# Patient Record
Sex: FEMALE | Race: WHITE | NOT HISPANIC OR LATINO | Employment: OTHER | URBAN - METROPOLITAN AREA
[De-identification: names, ages, dates, MRNs, and addresses within clinical notes are randomized per-mention and may not be internally consistent; named-entity substitution may affect disease eponyms.]

---

## 2017-01-18 ENCOUNTER — ALLSCRIPTS OFFICE VISIT (OUTPATIENT)
Dept: OTHER | Facility: OTHER | Age: 67
End: 2017-01-18

## 2017-01-19 ENCOUNTER — HOSPITAL ENCOUNTER (OUTPATIENT)
Dept: NON INVASIVE DIAGNOSTICS | Facility: CLINIC | Age: 67
Discharge: HOME | End: 2017-01-19
Payer: MEDICARE

## 2017-01-19 DIAGNOSIS — I77.1 STRICTURE OF ARTERY (HCC): ICD-10-CM

## 2017-01-19 PROCEDURE — 93880 EXTRACRANIAL BILAT STUDY: CPT

## 2017-01-20 ENCOUNTER — GENERIC CONVERSION - ENCOUNTER (OUTPATIENT)
Dept: OTHER | Facility: OTHER | Age: 67
End: 2017-01-20

## 2017-03-08 ENCOUNTER — HOSPITAL ENCOUNTER (OUTPATIENT)
Dept: RADIOLOGY | Facility: HOSPITAL | Age: 67
Discharge: HOME | End: 2017-03-08
Payer: COMMERCIAL

## 2017-03-08 DIAGNOSIS — Z87.891 PERSONAL HISTORY OF NICOTINE DEPENDENCE: ICD-10-CM

## 2017-03-12 ENCOUNTER — GENERIC CONVERSION - ENCOUNTER (OUTPATIENT)
Dept: OTHER | Facility: OTHER | Age: 67
End: 2017-03-12

## 2017-03-12 DIAGNOSIS — R91.8 OTHER NONSPECIFIC ABNORMAL FINDING OF LUNG FIELD: ICD-10-CM

## 2017-03-23 ENCOUNTER — ALLSCRIPTS OFFICE VISIT (OUTPATIENT)
Dept: OTHER | Facility: OTHER | Age: 67
End: 2017-03-23

## 2017-05-19 DIAGNOSIS — Z12.31 ENCOUNTER FOR SCREENING MAMMOGRAM FOR MALIGNANT NEOPLASM OF BREAST: ICD-10-CM

## 2017-06-08 ENCOUNTER — TRANSCRIBE ORDERS (OUTPATIENT)
Dept: LAB | Facility: HOSPITAL | Age: 67
End: 2017-06-08

## 2017-06-08 ENCOUNTER — APPOINTMENT (OUTPATIENT)
Dept: LAB | Facility: HOSPITAL | Age: 67
End: 2017-06-08
Payer: MEDICARE

## 2017-06-08 DIAGNOSIS — E03.9 HYPOTHYROIDISM: ICD-10-CM

## 2017-06-08 DIAGNOSIS — I10 ESSENTIAL (PRIMARY) HYPERTENSION: ICD-10-CM

## 2017-06-08 DIAGNOSIS — E78.5 HYPERLIPIDEMIA: ICD-10-CM

## 2017-06-08 LAB
ALBUMIN SERPL BCP-MCNC: 3.2 G/DL (ref 3.5–5)
ALP SERPL-CCNC: 67 U/L (ref 46–116)
ALT SERPL W P-5'-P-CCNC: 17 U/L (ref 12–78)
ANION GAP SERPL CALCULATED.3IONS-SCNC: 5 MMOL/L (ref 4–13)
AST SERPL W P-5'-P-CCNC: 16 U/L (ref 5–45)
BASOPHILS # BLD AUTO: 0.02 THOUSANDS/ΜL (ref 0–0.1)
BASOPHILS NFR BLD AUTO: 0 % (ref 0–1)
BILIRUB SERPL-MCNC: 0.56 MG/DL (ref 0.2–1)
BUN SERPL-MCNC: 17 MG/DL (ref 5–25)
CALCIUM SERPL-MCNC: 9 MG/DL (ref 8.3–10.1)
CHLORIDE SERPL-SCNC: 110 MMOL/L (ref 100–108)
CHOLEST SERPL-MCNC: 179 MG/DL (ref 50–200)
CO2 SERPL-SCNC: 28 MMOL/L (ref 21–32)
CREAT SERPL-MCNC: 0.82 MG/DL (ref 0.6–1.3)
EOSINOPHIL # BLD AUTO: 0.15 THOUSAND/ΜL (ref 0–0.61)
EOSINOPHIL NFR BLD AUTO: 3 % (ref 0–6)
ERYTHROCYTE [DISTWIDTH] IN BLOOD BY AUTOMATED COUNT: 13.4 % (ref 11.6–15.1)
GFR SERPL CREATININE-BSD FRML MDRD: >60 ML/MIN/1.73SQ M
GLUCOSE P FAST SERPL-MCNC: 79 MG/DL (ref 65–99)
HCT VFR BLD AUTO: 39.1 % (ref 34.8–46.1)
HDLC SERPL-MCNC: 63 MG/DL (ref 40–60)
HGB BLD-MCNC: 12.9 G/DL (ref 11.5–15.4)
LDLC SERPL CALC-MCNC: 103 MG/DL (ref 0–100)
LYMPHOCYTES # BLD AUTO: 1.78 THOUSANDS/ΜL (ref 0.6–4.47)
LYMPHOCYTES NFR BLD AUTO: 34 % (ref 14–44)
MCH RBC QN AUTO: 32.8 PG (ref 26.8–34.3)
MCHC RBC AUTO-ENTMCNC: 33 G/DL (ref 31.4–37.4)
MCV RBC AUTO: 100 FL (ref 82–98)
MONOCYTES # BLD AUTO: 0.38 THOUSAND/ΜL (ref 0.17–1.22)
MONOCYTES NFR BLD AUTO: 7 % (ref 4–12)
NEUTROPHILS # BLD AUTO: 2.98 THOUSANDS/ΜL (ref 1.85–7.62)
NEUTS SEG NFR BLD AUTO: 56 % (ref 43–75)
NRBC BLD AUTO-RTO: 0 /100 WBCS
PLATELET # BLD AUTO: 342 THOUSANDS/UL (ref 149–390)
PMV BLD AUTO: 9.4 FL (ref 8.9–12.7)
POTASSIUM SERPL-SCNC: 3.9 MMOL/L (ref 3.5–5.3)
PROT SERPL-MCNC: 6.8 G/DL (ref 6.4–8.2)
RBC # BLD AUTO: 3.93 MILLION/UL (ref 3.81–5.12)
SODIUM SERPL-SCNC: 143 MMOL/L (ref 136–145)
TRIGL SERPL-MCNC: 66 MG/DL
TSH SERPL DL<=0.05 MIU/L-ACNC: 1.97 UIU/ML (ref 0.36–3.74)
WBC # BLD AUTO: 5.32 THOUSAND/UL (ref 4.31–10.16)

## 2017-06-08 PROCEDURE — 84443 ASSAY THYROID STIM HORMONE: CPT

## 2017-06-08 PROCEDURE — 80061 LIPID PANEL: CPT

## 2017-06-08 PROCEDURE — 80053 COMPREHEN METABOLIC PANEL: CPT

## 2017-06-08 PROCEDURE — 85025 COMPLETE CBC W/AUTO DIFF WBC: CPT

## 2017-06-08 PROCEDURE — 36415 COLL VENOUS BLD VENIPUNCTURE: CPT

## 2017-06-14 ENCOUNTER — ALLSCRIPTS OFFICE VISIT (OUTPATIENT)
Dept: OTHER | Facility: OTHER | Age: 67
End: 2017-06-14

## 2017-07-09 ENCOUNTER — APPOINTMENT (OUTPATIENT)
Dept: RADIOLOGY | Age: 67
End: 2017-07-09
Attending: FAMILY MEDICINE
Payer: MEDICARE

## 2017-07-09 ENCOUNTER — APPOINTMENT (OUTPATIENT)
Dept: LAB | Age: 67
End: 2017-07-09
Attending: FAMILY MEDICINE
Payer: MEDICARE

## 2017-07-09 ENCOUNTER — OFFICE VISIT (OUTPATIENT)
Dept: URGENT CARE | Age: 67
End: 2017-07-09
Payer: MEDICARE

## 2017-07-09 ENCOUNTER — TRANSCRIBE ORDERS (OUTPATIENT)
Dept: URGENT CARE | Age: 67
End: 2017-07-09

## 2017-07-09 DIAGNOSIS — M25.522 PAIN IN LEFT ELBOW: ICD-10-CM

## 2017-07-09 DIAGNOSIS — R42 DIZZINESS: ICD-10-CM

## 2017-07-09 DIAGNOSIS — R42 DIZZINESS: Primary | ICD-10-CM

## 2017-07-09 LAB — GLUCOSE SERPL-MCNC: 97 MG/DL (ref 65–140)

## 2017-07-09 PROCEDURE — 93005 ELECTROCARDIOGRAM TRACING: CPT

## 2017-07-09 PROCEDURE — G0463 HOSPITAL OUTPT CLINIC VISIT: HCPCS | Performed by: FAMILY MEDICINE

## 2017-07-09 PROCEDURE — 82948 REAGENT STRIP/BLOOD GLUCOSE: CPT

## 2017-07-09 PROCEDURE — 73080 X-RAY EXAM OF ELBOW: CPT

## 2017-07-09 PROCEDURE — 99203 OFFICE O/P NEW LOW 30 MIN: CPT | Performed by: FAMILY MEDICINE

## 2017-07-10 LAB
ATRIAL RATE: 55 BPM
P AXIS: 69 DEGREES
PR INTERVAL: 148 MS
QRS AXIS: 33 DEGREES
QRSD INTERVAL: 74 MS
QT INTERVAL: 428 MS
QTC INTERVAL: 409 MS
T WAVE AXIS: 82 DEGREES
VENTRICULAR RATE: 55 BPM

## 2017-07-19 ENCOUNTER — ALLSCRIPTS OFFICE VISIT (OUTPATIENT)
Dept: OTHER | Facility: OTHER | Age: 67
End: 2017-07-19

## 2017-07-20 ENCOUNTER — GENERIC CONVERSION - ENCOUNTER (OUTPATIENT)
Dept: OTHER | Facility: OTHER | Age: 67
End: 2017-07-20

## 2017-07-20 ENCOUNTER — HOSPITAL ENCOUNTER (OUTPATIENT)
Dept: RADIOLOGY | Age: 67
Discharge: HOME | End: 2017-07-20
Payer: MEDICARE

## 2017-07-20 PROCEDURE — 77080 DXA BONE DENSITY AXIAL: CPT

## 2017-07-22 ENCOUNTER — ALLSCRIPTS OFFICE VISIT (OUTPATIENT)
Dept: OTHER | Facility: OTHER | Age: 67
End: 2017-07-22

## 2017-09-21 ENCOUNTER — GENERIC CONVERSION - ENCOUNTER (OUTPATIENT)
Dept: OTHER | Facility: OTHER | Age: 67
End: 2017-09-21

## 2017-09-21 ENCOUNTER — HOSPITAL ENCOUNTER (OUTPATIENT)
Dept: RADIOLOGY | Age: 67
Discharge: HOME | End: 2017-09-21
Payer: MEDICARE

## 2017-09-21 DIAGNOSIS — Z12.31 ENCOUNTER FOR SCREENING MAMMOGRAM FOR MALIGNANT NEOPLASM OF BREAST: ICD-10-CM

## 2017-09-21 PROCEDURE — G0202 SCR MAMMO BI INCL CAD: HCPCS

## 2017-11-06 ENCOUNTER — GENERIC CONVERSION - ENCOUNTER (OUTPATIENT)
Dept: OTHER | Facility: OTHER | Age: 67
End: 2017-11-06

## 2017-12-05 DIAGNOSIS — I77.1 STRICTURE OF ARTERY (HCC): ICD-10-CM

## 2017-12-05 DIAGNOSIS — E55.9 VITAMIN D DEFICIENCY: ICD-10-CM

## 2017-12-05 DIAGNOSIS — I10 ESSENTIAL (PRIMARY) HYPERTENSION: ICD-10-CM

## 2017-12-05 DIAGNOSIS — R01.1 CARDIAC MURMUR: ICD-10-CM

## 2017-12-05 DIAGNOSIS — E78.5 HYPERLIPIDEMIA: ICD-10-CM

## 2017-12-05 DIAGNOSIS — M81.0 AGE-RELATED OSTEOPOROSIS WITHOUT CURRENT PATHOLOGICAL FRACTURE: ICD-10-CM

## 2017-12-05 DIAGNOSIS — R91.8 OTHER NONSPECIFIC ABNORMAL FINDING OF LUNG FIELD (CODE): ICD-10-CM

## 2017-12-05 DIAGNOSIS — E03.9 HYPOTHYROIDISM: ICD-10-CM

## 2017-12-11 ENCOUNTER — APPOINTMENT (OUTPATIENT)
Dept: LAB | Facility: HOSPITAL | Age: 67
End: 2017-12-11
Payer: MEDICARE

## 2017-12-11 ENCOUNTER — TRANSCRIBE ORDERS (OUTPATIENT)
Dept: LAB | Facility: HOSPITAL | Age: 67
End: 2017-12-11

## 2017-12-11 DIAGNOSIS — E03.9 HYPOTHYROIDISM: ICD-10-CM

## 2017-12-11 DIAGNOSIS — I10 ESSENTIAL (PRIMARY) HYPERTENSION: ICD-10-CM

## 2017-12-11 DIAGNOSIS — E78.5 HYPERLIPIDEMIA: ICD-10-CM

## 2017-12-11 LAB
ALBUMIN SERPL BCP-MCNC: 3.2 G/DL (ref 3.5–5)
ALP SERPL-CCNC: 80 U/L (ref 46–116)
ALT SERPL W P-5'-P-CCNC: 16 U/L (ref 12–78)
ANION GAP SERPL CALCULATED.3IONS-SCNC: 6 MMOL/L (ref 4–13)
AST SERPL W P-5'-P-CCNC: 16 U/L (ref 5–45)
BILIRUB SERPL-MCNC: 0.92 MG/DL (ref 0.2–1)
BUN SERPL-MCNC: 16 MG/DL (ref 5–25)
CALCIUM SERPL-MCNC: 8.9 MG/DL (ref 8.3–10.1)
CHLORIDE SERPL-SCNC: 106 MMOL/L (ref 100–108)
CHOLEST SERPL-MCNC: 162 MG/DL (ref 50–200)
CO2 SERPL-SCNC: 27 MMOL/L (ref 21–32)
CREAT SERPL-MCNC: 0.86 MG/DL (ref 0.6–1.3)
GFR SERPL CREATININE-BSD FRML MDRD: 69 ML/MIN/1.73SQ M
GLUCOSE P FAST SERPL-MCNC: 87 MG/DL (ref 65–99)
HDLC SERPL-MCNC: 68 MG/DL (ref 40–60)
LDLC SERPL CALC-MCNC: 84 MG/DL (ref 0–100)
POTASSIUM SERPL-SCNC: 3.8 MMOL/L (ref 3.5–5.3)
PROT SERPL-MCNC: 7.3 G/DL (ref 6.4–8.2)
SODIUM SERPL-SCNC: 139 MMOL/L (ref 136–145)
TRIGL SERPL-MCNC: 52 MG/DL
TSH SERPL DL<=0.05 MIU/L-ACNC: 1.42 UIU/ML (ref 0.36–3.74)

## 2017-12-11 PROCEDURE — 36415 COLL VENOUS BLD VENIPUNCTURE: CPT

## 2017-12-11 PROCEDURE — 84443 ASSAY THYROID STIM HORMONE: CPT

## 2017-12-11 PROCEDURE — 80053 COMPREHEN METABOLIC PANEL: CPT

## 2017-12-11 PROCEDURE — 80061 LIPID PANEL: CPT

## 2017-12-15 ENCOUNTER — ALLSCRIPTS OFFICE VISIT (OUTPATIENT)
Dept: OTHER | Facility: OTHER | Age: 67
End: 2017-12-15

## 2017-12-16 NOTE — PROGRESS NOTES
Assessment  1  Hypertension (401 9) (I10)   2  Medicare annual wellness visit, subsequent (V70 0) (Z00 00)   3  Hyperlipidemia (272 4) (E78 5)   4  Hypothyroidism (244 9) (E03 9)   5  Osteoporosis (733 00) (M81 0)   6  Subclavian artery stenosis, left (447 1) (I77 1)   7  Vitamin D deficiency (268 9) (E55 9)   8  Pulmonary nodules (793 19) (R91 8)   9  Heart murmur (785 2) (R01 1)   10  Neck pain on right side (723 1) (M54 2)    Plan  Heart murmur, Hypertension    · ECHO COMPLETE WITH CONTRAST IF INDICATED; Status:Active; Requestedfor:89Mnu4249;   Hyperlipidemia    · Pravastatin Sodium 20 MG Oral Tablet; TAKE 1 TABLET Daily after supper   · (1) LIPID PANEL, FASTING; Status:Active; Requested for:54Osu9718;   Hyperlipidemia, Hypertension, Hypothyroidism    · (1) COMPREHENSIVE METABOLIC PANEL; Status:Active; Requested for:46Mud4624;   Hyperlipidemia, Hypertension, Hypothyroidism, Osteoporosis, Subclavian arterystenosis, left, Vitamin D deficiency    · Follow-up visit in 6 months Evaluation and Treatment  Follow-up  Status: Complete Done: [id number]  Hypertension    · Losartan Potassium 100 MG Oral Tablet; TAKE 1 TABLET DAILY  Hypertension, Hypothyroidism    · (1) CBC/PLT/DIFF; Status:Active; Requested for:50Gya8236;   Hypothyroidism    · Levothyroxine Sodium 112 MCG Oral Tablet; TAKE 1 TABLET DAILY   · (1) TSH; Status:Active; Requested CIF:61TNF0600;   Osteoporosis, Vitamin D deficiency    · (1) VITAMIN D 25-HYDROXY; Status:Active; Requested for:47Nhi4234;   Pulmonary nodules    · * CT CHEST WO CONTRAST; Status:Active; Requested for:90Ywd0814;   Screening for colon cancer    · COLONOSCOPY; Status:Active; Requested for:88Kph3601;   Subclavian artery stenosis, left    · VAS CAROTID COMPLETE STUDY; SIDE:Bilateral; Status:Active; Requestedfor:43Xte6683;   Vitamin D deficiency    · Vitamin D3 2000 UNIT Oral Tablet; take 1 tab daily    Discussion/Summary    1  HTN - patient did not take Losartan 100 mg this morning yet Recommended to take blood pressure medication on a regular basis  Follow a low sodium diet  Check BP at home, call if BP >140/90  Patient has heart murmur  Recommended to schedule ECHO  2  Hyperlipidemia - controlled on Pravastatin 20 mg daily  3  Hypothyroidism - continue Synthroid 112 mcg daily  4  Osteoporosis - DEXA scan from 7/17 showed improvement in BMD in lumbar spine  Continue Prolia injections every 6 months, weight bearing exercise  Take Calcium with Vit D supplements  Recheck DEXA scan in 2 years  5  Vit D deficiency- take Vit D 2000 IU daily  6  L subclavian artery stenosis - patient is asymptomatic  Cont  ASA, statin therapy  Schedule Carotid Duplex in 1/18  7  R-sided neck pain, r/o muscle strain - take Advil 200 mg 2 tablets twice daily for 3- 5 days  Call office if symptoms persist or worsen  Schedule follow-up office visit in 6 months  Check labs prior to next visit  The patient was counseled regarding diagnostic results,-- instructions for management,-- risk factor reductions,-- risks and benefits of treatment options,-- importance of compliance with treatment  Possible side effects of new medications were reviewed with the patient/guardian today  The treatment plan was reviewed with the patient/guardian  The patient/guardian understands and agrees with the treatment plan      Chief Complaint  Patient here for 6 month follow up for Hyperlipidemia, Hypertension, Hypothyroidism, Osteoporosis, and Vitamin D deficiency  Complains of soreness and pain on right side of neck since yesterday  Patient is here today for follow up of chronic conditions described in HPI  History of Present Illness  Patient presents for 6 month followup of HTN, Hyperlipidemia, Hypothyroidism, Osteoporosis, Vit D deficiency  Reviewed current medications, blood work results from 12/11/17  TSH 1 420, cholesterol 162, HDL 68, LDL 84, potassium 3 8, creatinine 0 86, fasting blood sugar 87  HTN - patient did not take Losartan 100 mg this morning yet  Denies chest pain, shortness of breath, dizziness  Hypothyroidism - currently on Synthroid 112 mcg daily  Weight has been stable  Denies fatigue, constipation, hair loss  Hyperlipidemia - controlled on Pravastatin 20 mg daily  Patient has L subclavian artery stenosis  She is asymptomatic  She was evaluated by vascular surgeon Dr Gay Ramirez in 1/15 who recommended antiplatelet and statin therapy, continue annual Duplex evaluation  Last Carotid Doppler done in 1/17  LDCT done in March 2017 showed mild emphysema with 7 mm R middle lobe nodule, 4 mm L LL nodule  Radiologist recommended followup LDCT in 6 months which patent did not schedule yet  Mammogram done in in 9/17  DEXA scan done in July 2015 showed osteoporosis  Patient is getting Prolia injections every 6 months  DEXA scan from 7/17 showed osteopenia, improvement in BMD in lumbar spine  Patient did not schedule colonoscopy as recommended at the last visit  Prevnar 13 done in 12/15  Patient had Flu shot in 10/17  Review of Systems   Constitutional: no fever,-- no chills-- and-- not feeling tired  Wt  has been stable  Eyes: wears glasses, but-- no eye pain,-- no dryness of the eyes,-- eyes not red,-- no purulent discharge from the eyes-- and-- no itching of the eyes  No visual disturbances  ENT: no earache,-- no nosebleeds,-- no sore throat,-- no hearing loss,-- no nasal discharge-- and-- no hoarseness  Cardiovascular: no chest pain,-- no intermittent leg claudication,-- no palpitations-- and-- no lower extremity edema  Respiratory: no shortness of breath,-- no wheezing-- and-- no shortness of breath during exertion--   The patient presents with complaints of occasional episodes of mild cough, described as dry  Gastrointestinal: no abdominal pain,-- no nausea,-- no vomiting,-- no constipation,-- no diarrhea-- and-- no blood in stools  Genitourinary: no dysuria,-- no pelvic pain-- and-- no incontinence    Musculoskeletal: pain on R side neck since yesterday, but-- no joint swelling  Integumentary: no rashes,-- no itching-- and-- no skin wound  Neurological: no headache,-- no numbness,-- no tingling,-- no dizziness-- and-- no fainting  Psychiatric: no anxiety,-- no sleep disturbances-- and-- no depression  Endocrine: no muscle weakness-- and-- no hot flashes  Hematologic/Lymphatic: a tendency for easy bruising, but-- no swollen glands,-- no tendency for easy bleeding-- and-- no swollen glands in the neck  Active Problems  1  Allergic rhinitis (477 9) (J30 9)   2  Bradycardia (427 89) (R00 1)   3  Bruit of left carotid artery (785 9) (R09 89)   4  Chronic sinusitis (473 9) (J32 9)   5  Dizziness (780 4) (R42)   6  Encounter for screening mammogram for malignant neoplasm of breast (V76 12) (Z12 31)   7  Hyperlipidemia (272 4) (E78 5)   8  Hypertension (401 9) (I10)   9  Hypothyroidism (244 9) (E03 9)   10  Initial Medicare annual wellness visit (V70 0) (Z00 00)   11  LVH (left ventricular hypertrophy) (429 3) (I51 7)   12  Osteoporosis (733 00) (M81 0)   13  Pulmonary nodules (793 19) (R91 8)   14  Screening for colon cancer (V76 51) (Z12 11)   15  Subclavian artery stenosis, left (447 1) (I77 1)   16  Visit for screening mammogram (V76 12) (Z12 31)   17  Vitamin D deficiency (268 9) (E55 9)    Past Medical History  1  History of Acute recurrent maxillary sinusitis (461 0) (J01 01)   2  History of Graves' disease (V12 29) (Z86 39)   3  History of Left otitis media (382 9) (H66 92)   4  History of Onychomycosis of toenail (110 1) (B35 1)    The active problems and past medical history were reviewed and updated today  Surgical History  1  History of Open Treatment Of Fracture Of Distal Radius   2  History of Sinus Surgery   3  History of Tonsillectomy With Adenoidectomy    The surgical history was reviewed and updated today  Family History  Mother    1  Family history of malignant neoplasm of breast (V16 3) (Z80 3)   2   Family history of pancreatic cancer (V16 0) (Z80 0)  Father    3  Family history of Aneurysm, aortic  Family History    4  Family history of Aneurysm Of Abdominal Aorta   5  Family history of Breast Cancer (V16 3)    The family history was reviewed and updated today  Social History   · Former smoker (F51 42) (Q37 893)   · Stopped Drinking Alcohol  The social history was reviewed and updated today  Current Meds   1  Aspirin 81 MG TABS; 1 TAB DAILY; Therapy: 81AMB9518 to Recorded   2  Azelastine HCl - 0 15 % Nasal Solution; USE 2 SPRAYS IN EACH NOSTRIL twice DAILY; Therapy: 27RVZ5690 to (Last ZB:66MHR6748)  Requested for: 07OFA0822 Ordered   3  Diclofenac Sodium 1 % Transdermal Gel; APPLY TO AFFECTED AREA UP TO 2- 3 TIMES DAILY; Therapy: 20RFJ5529 to (Last Rx:47Lpg8747)  Requested for: 10Jgp1742 Ordered   4  Levothyroxine Sodium 112 MCG Oral Tablet; TAKE 1 TABLET DAILY; Therapy: 21QTJ2665 to (Evaluate:09Jun2018)  Requested for: 69TNR2623; Last Rx:14Jun2017 Ordered   5  Losartan Potassium 100 MG Oral Tablet; TAKE 1 TABLET DAILY; Therapy: 96OVJ7031 to (Evaluate:09Jun2018)  Requested for: 46GIL0593; Last Rx:14Jun2017 Ordered   6  Pravastatin Sodium 20 MG Oral Tablet; TAKE 1 TABLET Daily after supper; Therapy: 84SFR1276 to (Ala Meline)  Requested for: 99VBU2770; Last Rx:72Gza9058 Ordered   7  ProAir  (90 Base) MCG/ACT Inhalation Aerosol Solution; INHALE 1 PUFF EVERY 4 HOURS AS NEEDED; Therapy: 27WCQ1522 to (Last Rx:20Mar2016)  Requested for: 20Mar2016 Ordered   8  Prolia 60 MG/ML Subcutaneous Solution; INJECT SUBCUTANEOUSLY  60 MG / 1 ML EVERY 6 MONTHS; Recurring Schedule:21Jul2017 to (Exp:21Jul2018); Status: IN PROGRESS - Order Generated Ordered   9  Vitamin D3 2000 UNIT Oral Tablet; take 1 tab daily; Therapy: 84RUE4514 to (Last Rx:17Jun2015) Ordered    The medication list was reviewed and updated today  Allergies  1   Sulfa Drugs    Vitals  Vital Signs    Recorded: 21NJF3632 09:23AM Recorded: 18EWS7367 09: 02AM   Temperature  97 2 F, Tympanic   Heart Rate  72, L Radial   Respiration  16   Systolic 216, , LUE   Diastolic 86, RUE 90, LUE   BP CUFF SIZE Large    Height  5 ft 5 in   Weight  180 lb 6 4 oz   BMI Calculated  30 02   BSA Calculated  1 89   Pain Scale  2-3     Physical Exam   Constitutional  General appearance: No acute distress, well appearing and well nourished  Eyes  Conjunctiva and lids: No swelling, erythema or discharge  Pupils and irises: Equal, round and reactive to light  Ears, Nose, Mouth, and Throat  External inspection of ears and nose: Normal    Otoscopic examination: Tympanic membranes translucent with normal light reflex  Canals patent without erythema  Nasal mucosa, septum, and turbinates: Normal without edema or erythema  Oropharynx: Normal with no erythema, edema, exudate or lesions  Pulmonary  Respiratory effort: No increased work of breathing or signs of respiratory distress  Auscultation of lungs: Clear to auscultation  Cardiovascular  Auscultation of heart: Abnormal   A grade 1 systolic murmur was heard at the RUSB  A grade 1 systolic murmur was heard at the LUSB  Examination of extremities for edema and/or varicosities: Normal    Carotid pulses: Abnormal  -- L carotid bruits  Abdomen  Abdomen: Non-tender, no masses  -- no abdominal bruits  Liver and spleen: No hepatomegaly or splenomegaly  Lymphatic  Palpation of lymph nodes in neck: No lymphadenopathy  Musculoskeletal  Gait and station: Normal    Digits and nails: Normal without clubbing or cyanosis  Inspection/palpation of joints, bones, and muscles: Abnormal   Neck exam: mild tenderness over R side of the neck  FROM  Skin  Skin and subcutaneous tissue: Normal without rashes or lesions     Psychiatric  Orientation to person, place, and time: Normal    Mood and affect: Normal          Results/Data  *VB-Depression Screening 72KYJ1219 09:17AM Jeronimo Rhodes     Test Name Result Flag Reference Depression Scale Result      Depression Screen - Negative For Symptoms     *VB - Fall Risk Assessment  (Dx Z13 89 Screen for Neurologic Disorder) 13TTP3813 09:17AM Pillo Parkinson     Test Name Result Flag Reference   Falls Risk      No falls in the past year     (1) COMPREHENSIVE METABOLIC PANEL 34GPL4851 43:83QJ Salvador Reyna Order Number: [id number]     Test Name Result Flag Reference   SODIUM 139 mmol/L  136-145   POTASSIUM 3 8 mmol/L  3 5-5 3   CHLORIDE 106 mmol/L  100-108   CARBON DIOXIDE 27 mmol/L  21-32   ANION GAP (CALC) 6 mmol/L  4-13   BLOOD UREA NITROGEN 16 mg/dL  5-25   CREATININE 0 86 mg/dL  0 60-1 30   Standardized to IDMS reference method   CALCIUM 8 9 mg/dL  8 3-10 1   BILI, TOTAL 0 92 mg/dL  0 20-1 00   ALK PHOSPHATAS 80 U/L     ALT (SGPT) 16 U/L  12-78   Specimen collection should occur prior to Sulfasalazine and/or Sulfapyridine administration due to the potential for falsely depressed results  AST(SGOT) 16 U/L  5-45   Specimen collection should occur prior to Sulfasalazine administration due to the potential for falsely depressed results  ALBUMIN 3 2 g/dL L 3 5-5 0   TOTAL PROTEIN 7 3 g/dL  6 4-8 2   eGFR 69 ml/min/1 73sq m     National Kidney Disease Education Program recommendations are as follows: GFR calculation is accurate only with a steady state creatinine Chronic Kidney disease less than 60 ml/min/1 73 sq  meters Kidney failure less than 15 ml/min/1 73 sq  meters  GLUCOSE FASTING 87 mg/dL  65-99   Specimen collection should occur prior to Sulfasalazine administration due to the potential for falsely depressed results  Specimen collection should occur prior to Sulfapyridine administration due to the potential for falsely elevated results       (1) LIPID PANEL, FASTING 11Rha5742 06:36AM Pillo Parkinson   TW Order Number: [id number]     Test Name Result Flag Reference   CHOLESTEROL 162 mg/dL     HDL,DIRECT 68 mg/dL H 40-60   Specimen collection should occur prior to Metamizole administration due to the potential for falsley depressed results  LDL CHOLESTEROL CALCULATED 84 mg/dL  0-100   Triglyceride:       Normal <150 mg/dl  Borderline High 150-199 mg/dl  High 200-499 mg/dl  Very High >499 mg/dl   Cholesterol:      Desirable <200 mg/dl   Borderline High 200-239 mg/dl   High >239 mg/dl   HDL Cholesterol:      High>59 mg/dL   Low <41 mg/dL   This screening LDL is a calculated result  It does not have the accuracy of the Direct Measured LDL in the monitoring of patients with hyperlipidemia and/or statin therapy  Direct Measure LDL (QCQ845) must be ordered separately in these patients  TRIGLYCERIDES 52 mg/dL  <=150   Specimen collection should occur prior to N-Acetylcysteine or Metamizole administration due to the potential for falsely depressed results  (1) TSH 40PRL0969 06:36AM Ned RAYGOZA Order Number: [id number]     Test Name Result Flag Reference   TSH 1 420 uIU/mL  0 358-3 740   Patients undergoing fluorescein dye angiography may retain small amounts of fluorescein in the body for 48-72 hours post procedure  Samples containing fluorescein can produce falsely depressed TSH values  If the patient had this procedure,a specimen should be resubmitted post fluorescein clearance  The recommended reference ranges for TSH during pregnancy are as follows: First trimester 0 1 to 2 5 uIU/mL Second trimester  0 2 to 3 0 uIU/mL Third trimester 0 3 to 3 0 uIU/m     * MAMMO SCREENING BILATERAL W CAD 85Grx4513 09:25AM Lola Gomez Order Number: [id number]   - Patient Instructions: To schedule this appointment, please contact Central Scheduling at [phone]  Do not wear any perfume, powder, lotion or deodorant on breast or underarm area    Please bring your doctors order, referral (if needed) and insurance information with you on the day of the test  Failure to bring this information may result in this test being rescheduled  Arrive 15 minutes prior to your appointment time to register  On the day of your test, please bring any prior mammogram or breast studies with you that were not performed at a [facility]  Failure to bring prior exams may result in your test needing to be rescheduled  Test Name Result Flag Reference   MAMMO SCREENING BILATERAL W CAD (Report)     Patient History:  Patient is postmenopausal   Family history of pancreatic cancer at age 77 and breast cancer   at age 47 in mother, breast cancer at age 48 or over in maternal   grandmother  Took hormonal contraceptives for 2 years  Patient is a former smoker, and smoked for 40 years  Patient's   BMI is 28 7  Reason for exam: screening, asymptomatic  Mammo Screening Bilateral W CAD: September 21, 2017 - Check In #:  [de-identified]  Bilateral MLO and CC view(s) were taken  XCCL view(s) were taken  of the left breast    Technologist: RT Juan C(JUAN MIGUEL)(M)  Prior study comparison: May 13, 2016, mammo screening bilateral W  CAD performed at [facility]  March 2, 2015,   digital bilateral screening mammogram performed at [facility]  October 26, 2010, bilateral digital screening   mammogram, performed at [address] [place] [address]    September 8, 2005, bilateral mammogram, performed at [facility]  September 8, 2005, bilateral screening   mammogram, performed at [facility]  September 12, 1995, bilateral mammogram, performed at [facility]  July 12, 1995, bilateral screening mammogram, performed  at [facility]  The breast tissue is heterogeneously dense, potentially limiting   the sensitivity of mammography  Patient risk, included in this   report, assists in determining the appropriate screening regimen   (such as 3-D mammography or the inclusion of automated breast   ultrasound or MRI)   3-D mammography may also remain indicated as screening  The parenchymal pattern appears stable  No dominant soft tissue   mass or suspicious calcifications are noted  Scattered benign   appearing calcifications are seen  The skin and nipple contours   are within normal limits  No mammographic evidence of malignancy  No   significant changes when compared with prior studies  ACR BI-RADSï¾® Assessments: BiRad:2 - Benign   Recommendation:  Routine screening mammogram in 1 year  Analyzed by CAD   The patient is scheduled in a reminder system for screening   mammography  8-10% of cancers will be missed on mammography  Management of a   palpable abnormality must be based on clinical grounds  Patients  will be notified of their results via letter from our facility  Accredited by [employer] of Radiology and FDA  Transcription Location: JUAN MIGUEL Harley 98: [id number]   Risk Value(s):  Tyrer-Cuzick 10 Year: 8 000%, Tyrer-Cuzick Lifetime: 13 300%,   Myriad Table: 1 5%, ANKIT 5 Year: 3 4%, NCI Lifetime: 10 2%, MRS   : Based on personal and/or family history,   consideration of hereditary risk assessment may be warranted  Signed by:  Deandra Cabello MD  9/21/17     Future Appointments    Date/Time Provider Specialty Site   06/15/2018 09:30 AM ROCIO Cunninhgam   [facility]   01/24/2018 10:30 AM [facility], Nurse Schedule  [facility]     Signatures   Electronically signed by : ROCIO Shah ; Dec 15 2017  7:48PM EST                       (Author)

## 2018-01-05 ENCOUNTER — HOSPITAL ENCOUNTER (OUTPATIENT)
Dept: RADIOLOGY | Facility: HOSPITAL | Age: 68
Discharge: HOME | End: 2018-01-05
Payer: MEDICARE

## 2018-01-05 ENCOUNTER — HOSPITAL ENCOUNTER (OUTPATIENT)
Dept: NON INVASIVE DIAGNOSTICS | Facility: HOSPITAL | Age: 68
Discharge: HOME | End: 2018-01-05
Payer: MEDICARE

## 2018-01-05 ENCOUNTER — GENERIC CONVERSION - ENCOUNTER (OUTPATIENT)
Dept: OTHER | Facility: OTHER | Age: 68
End: 2018-01-05

## 2018-01-05 ENCOUNTER — GENERIC CONVERSION - ENCOUNTER (OUTPATIENT)
Dept: FAMILY MEDICINE CLINIC | Facility: CLINIC | Age: 68
End: 2018-01-05

## 2018-01-05 DIAGNOSIS — I10 ESSENTIAL (PRIMARY) HYPERTENSION: ICD-10-CM

## 2018-01-05 DIAGNOSIS — R01.1 CARDIAC MURMUR: ICD-10-CM

## 2018-01-05 DIAGNOSIS — R91.8 OTHER NONSPECIFIC ABNORMAL FINDING OF LUNG FIELD (CODE): ICD-10-CM

## 2018-01-05 PROCEDURE — 71250 CT THORAX DX C-: CPT

## 2018-01-05 PROCEDURE — 93306 TTE W/DOPPLER COMPLETE: CPT

## 2018-01-07 ENCOUNTER — GENERIC CONVERSION - ENCOUNTER (OUTPATIENT)
Dept: OTHER | Facility: OTHER | Age: 68
End: 2018-01-07

## 2018-01-11 NOTE — RESULT NOTES
Message   Call patient  Carodid Doppler showed no singnificant change since prior study, less than 50 % stenosis in BL carotid arteries  Recmmend to continue  Aspirin, Pravastatin  Will recheck Carotid Doppler in 1 year  Verified Results  VAS CAROTID COMPLETE STUDY 21MCB3803 09:11AM Linsey Quintanilla Order Number: [id number]    - Patient Instructions: To schedule this appointment, please contact Central Scheduling at [phone]  Test Name Result Flag Reference   VAS CAROTID COMPLETE STUDY (Report)     [facility] REPORT   CLINICAL:   Indications:   Carotid disease w/o CVA [I65 29]  Yearly surveillance of carotid artery disease  Patient is asymptomatic at this   time  Operative History   Patient denies any cardiovascular surgeries   Risk Factors   The patient has history of HTN, hyperlipidemia and previous smoking (quit   >10yrs ago)  Clinical   Right Brachial Pressure: 130/72 mmHg, Left Brachial Pressure: 142/70 mmHg  FINDINGS:      Right    Impression PSV EDV (cm/s) Direction of Flow Ratio    Dist  ICA         84     15           1 28    Mid  ICA         100     23           1 53    Prox  ICA  1 - 49%   85     20           1 30    Dist CCA         70     12                 Mid CCA          66      9           0 89    Prox CCA         73      0                 Ext Carotid       111     11           1 69    Prox Vert         81     14 Antegrade            Subclavian        183      9                    Left     Impression PSV EDV (cm/s) Direction of Flow Ratio    Dist  ICA         83     22           0 96    Mid  ICA         79     22           0 91    Prox   ICA  1 - 49%   59     13           0 68    Dist CCA         70     11                 Mid CCA          87     13           0 84    Prox CCA         103      0                 Ext Carotid       111      9           1 28    Prox Vert         50     11 Antegrade            Subclavian        384      0 CONCLUSION:   Impression   RIGHT:   There is <50% stenosis noted in the internal carotid artery  Plaque is   heterogenous and smooth  Vertebral artery flow is antegrade  There is no significant subclavian artery   disease  LEFT:   There is <50% stenosis noted in the internal carotid artery  Plaque is   homogenous and smooth  Vertebral artery flow is antegrade  There is no significant subclavian artery   disease  Compared to previous study on 01/12/2016, there is no significant change  Recommend repeat study in 1 year as per protocol, unless otherwise clinically   inidcated        SIGNATURE:   Electronically Signed by: Urbano Macdonald MD on 2017-01-19 07:50:17 PM

## 2018-01-12 VITALS
SYSTOLIC BLOOD PRESSURE: 142 MMHG | HEART RATE: 72 BPM | WEIGHT: 180.05 LBS | BODY MASS INDEX: 30 KG/M2 | TEMPERATURE: 100.1 F | DIASTOLIC BLOOD PRESSURE: 76 MMHG | HEIGHT: 65 IN | RESPIRATION RATE: 16 BRPM

## 2018-01-12 VITALS
HEART RATE: 60 BPM | BODY MASS INDEX: 35.57 KG/M2 | WEIGHT: 181.2 LBS | HEIGHT: 60 IN | SYSTOLIC BLOOD PRESSURE: 142 MMHG | RESPIRATION RATE: 16 BRPM | TEMPERATURE: 98.2 F | DIASTOLIC BLOOD PRESSURE: 72 MMHG

## 2018-01-12 NOTE — PROGRESS NOTES
Chief Complaint  Patient is here for her Prolia injection  Active Problems    1  Acute pain of left shoulder (719 41) (M25 512)   2  Allergic rhinitis (477 9) (J30 9)   3  Bradycardia (427 89) (R00 1)   4  Bruit of left carotid artery (785 9) (R09 89)   5  Chronic sinusitis (473 9) (J32 9)   6  Dizziness (780 4) (R42)   7  Encounter for screening mammogram for malignant neoplasm of breast (V76 12)   (Z12 31)   8  Hyperlipidemia (272 4) (E78 5)   9  Hypertension (401 9) (I10)   10  Hypothyroidism (244 9) (E03 9)   11  Initial Medicare annual wellness visit (V70 0) (Z00 00)   12  Left elbow pain (719 42) (M25 522)   13  LVH (left ventricular hypertrophy) (429 3) (I51 7)   14  Medicare annual wellness visit, subsequent (V70 0) (Z00 00)   15  Onychomycosis of toenail (110 1) (B35 1)   16  Osteoporosis (733 00) (M81 0)   17  Pulmonary nodules (793 19) (R91 8)   18  Ruptured tympanic membrane (384 20) (H72 90)   19  Screening for colon cancer (V76 51) (Z12 11)   20  Subclavian artery stenosis, left (447 1) (I77 1)   21  Visit for screening mammogram (V76 12) (Z12 31)   22  Vitamin D deficiency (268 9) (E55 9)    Current Meds   1  Aspirin 81 MG TABS; 1 TAB DAILY; Therapy: 17BHR5232 to Recorded   2  Fluticasone Propionate 50 MCG/ACT Nasal Suspension; use 2 spr  in each nostril daily; Therapy: 71SGD1391 to (Last Rx:23Mar2017)  Requested for: 23Mar2017 Ordered   3  Levothyroxine Sodium 112 MCG Oral Tablet; TAKE 1 TABLET DAILY; Therapy: 19ZUN5426 to (Evaluate:09Jun2018)  Requested for: 25JSX3431; Last   Rx:14Jun2017 Ordered   4  Losartan Potassium 100 MG Oral Tablet; TAKE 1 TABLET DAILY; Therapy: 95VPH9479 to (Evaluate:09Jun2018)  Requested for: 93FRZ6364; Last   Rx:14Jun2017 Ordered   5  Meloxicam 15 MG Oral Tablet; TAKE 1 TABLET DAILY WITH FOOD; Therapy: 47CDX0442 to (Complete:29Jul2017); Last Rx:09Jul2017 Ordered   6  Pravastatin Sodium 20 MG Oral Tablet; TAKE 1 TABLET Daily after supper;    Therapy: 94MII8108 to ([phone])  Requested for: 07XMW6664; Last   Rx:23Cyu6911 Ordered   7  ProAir  (90 Base) MCG/ACT Inhalation Aerosol Solution; INHALE 1 PUFF   EVERY 4 HOURS AS NEEDED; Therapy: 69JLD5680 to (Last Rx:20Mar2016)  Requested for: 20Mar2016 Ordered   8  Prolia 60 MG/ML Subcutaneous Solution; INJECT SUBCUTANEOUSLY  60 MG / 1 ML   EVERY 6 MONTHS; Therapy: 52RRS2197 to (Evaluate:00Lnt0362)  Requested for: 54RIW9776; Last   Rx:59Crm5601 Ordered   9  Vitamin D3 2000 UNIT Oral Tablet; take 1 tab daily; Therapy: 45RUB2444 to (Last Rx:17Jun2015) Ordered    Allergies    1  Sulfa Drugs    Future Appointments    Date/Time Provider Specialty Site   12/15/2017 09:00 AM ROCIO Cedillo   Family Medicine [facility]     Signatures   Electronically signed by : ROCIO Leon ; Jul 19 2017  4:03PM EST                       (Author)

## 2018-01-13 NOTE — RESULT NOTES
Verified Results  * DXA BONE DENSITY SPINE HIP AND PELVIS 15Jun2017 02:04AM Rebekah Faye Order Number: [id number]    - Patient Instructions: To schedule this appointment, please contact Central Scheduling at [phone]  Test Name Result Flag Reference   DXA BONE DENSITY SPINE HIP AND PELVIS (Report)     DXA SCAN     CLINICAL HISTORY: 47-year-old woman  Menopause at age 38       OTHER RISK FACTORS: COPD  TECHNIQUE: Bone densitometry was performed using a Hologic Horizon A bone densitometer  Regions of interest appear properly placed  COMPARISON: July 15, 2015  RESULTS:      LUMBAR SPINE L1-L3: BMD 1 042 gm/cm2 / T-score 0 2 / Z score 2 2   (Lumbar levels within parentheses [if any] represent vertebrae excluded from analysis due to local structural abnormalities or artifact)  LEFT TOTAL HIP: BMD 0 714 gm/cm2 / T-score -1 9 / Z score -0 4   LEFT FEMORAL NECK: BMD 0 724 gm/cm2 / T score -1 1 / Z score 0 6     CHANGE: Since the last DXA:   LUMBAR SPINE BMD has increased 0 017 gm/cm2 or 1 7%  This change is statistically significant  HIP BMD has increased 0 007 gm/cm2 or 1 0%  This change is not statistically significant  IMPRESSION:     1  Low bone mass (osteopenia)  2  Since a DXA study from July 15, 2015, bone mineral density has increased 1 7% in the lumbar spine  This change is statistically significant  3  The 10 year risk of hip fracture is 0 9% with the 10 year risk of major osteoporotic fracture being 8 7% as calculated by the [facility]/WHO fracture risk assessment tool (FRAX)  4  The current NOF guidelines recommend treating patients with a T-score of -2 5 or less in the lumbar spine or hips, or in post-menopausal women and men over the age of 48 with low bone mass (osteopenia) and a FRAX 10 year risk score of >3% for hip    fracture and/or >20% for major osteoporotic fracture       5  A daily intake of at least 1200 mg calcium and vitamin D 800- 1000 IU, as well as weight bearing and muscle strengthening exercise, fall prevention and avoidance of tobacco and excessive alcohol as preventive measurements are suggested  6  Follow-up DXA in two years is recommended for most patients  A one year follow-up DXA is recommended after initiation or change in therapy for osteoporosis  More frequent evaluation is also appropriate for patients with conditions associated with    rapid bone loss, such as glucocorticoid therapy  The FRAX tool has not been validated in patients currently or previously treated with pharmacotherapy for osteoporosis  In such patients, clinical judgment must be exercised in interpreting FRAX scores  It is not appropriate to use FRAX to monitor    treatment response  WHO CLASSIFICATION:   Normal (a T-score of -1 0 or higher)   Low bone mineral density (a T-score of less than -1 0 but higher than -2 5)   Osteoporosis (a T-score of -2 5 or less)   Severe osteoporosis (a T-score of -2 5 or less with a fragility fracture)     Least significant change (lumbar spine): 0 014 g/cm2; 1 7%   Least significant change (total hip): 0 008 g/cm2; 1 1%   Least significant change (forearm): 0 011 g/cm2; 1 9%         Workstation performed: [id number]     Signed by:   Thao Thorpe MD   7/20/17

## 2018-01-13 NOTE — PROGRESS NOTES
Assessment    1  Medicare annual wellness visit, subsequent (V70 0) (Z00 00)   2  Former smoker (K80 86) (P78 573)   · Patient quit in 2005  3  Vitamin D deficiency (268 9) (E55 9)    Plan  SocHx: Former smoker    · * CT LUNG SCREENING PROGRAM; Status:Hold For - Scheduling; Requested  for:21Cmr5949;   Vitamin D deficiency    · (1) VITAMIN D 25-HYDROXY; Status:Active; Requested for:91Wyd2257;     Discussion/Summary    1  Medicare subsequent annual wellness exam     2  H/o tobacco use for 40 years - recommended to schedule LDCT for lung CA screening  3 Vit D deficiency - cont  Vit D 2000 IU daily  Check Vit D 25 OH with next blood test in 5/16  Impression: Subsequent Annual Wellness Visit  Cardiovascular screening and counseling: the risks and benefits of screening were discussed, screening is current, counseling was given on maintaining a healthy diet, counseling was given on maintaining a healthy weight, counseling was given on ways to improve cholesterol, counseling was given on ways to improve blood pressure and counseling was given on ways to improve exercise tolerance  Diabetes screening and counseling: the risks and benefits of screening were discussed, counseling was given on maintaining a healthy diet, counseling was given on maintaining a healthy weight, counseling was given on ways to improve physical activity and recommended to schedule colonoscopy  Colorectal cancer screening and counseling: the risks and benefits of screening were discussed and screening is current  Breast cancer screening and counseling: the risks and benefits of screening were discussed and screening is current  Cervical cancer screening and counseling: the risks and benefits of screening were discussed and screening not indicated     Osteoporosis screening and counseling: the risks and benefits of screening were discussed, screening is current, counseling was given on obtaining adequate amounts of calcium and vitamin D on a daily basis and counseling was given on the importance of regular weightbearing exercise  Abdominal aortic aneurysm screening and counseling: the risks and benefits of screening were discussed and screening not indicated  Glaucoma screening and counseling: the risks and benefits of screening were discussed and screening is current  HIV screening and counseling: screening not indicated  Immunizations: the risks and benefits of influenza vaccination were discussed with the patient, influenza vaccine is up to date this year, the risks and benefits of pneumococcal vaccination were discussed with the patient, Prevnar 13 done in 12/15, hepatitis B vaccination series is not indicated at this time due to the patient's low risk of lucian the disease, the risks and benefits of the Zostavax vaccine were discussed with the patient, recommended Zostavax vaccination, the risks and benefits of the Tdap vaccine were discussed with the patient and recommended Tdap vaccination  Advance Directive Planning: not complete, paperwork and instructions were given to the patient, she was encouraged to follow-up with me to discuss her questions and/or decisions  Patient Discussion: plan discussed with the patient, follow-up visit needed in one year  Chief Complaint  Medicare wellness visit      History of Present Illness  HPI: Patient presents for Medicare subsequent annual wellness visit  Patient was seen 1 week ago for acute sinusitis  She feels better  No sinus congestion, no IBARRA  She has 2 more days to complete antibiotic therapy with Amoxil 875 mg  Patient has a long history of smoking  She smoked cigarettes 1 ppd for 40 years, quit smoking in 2002  Denies cough, wheezing  No chest tightness  Patient has history of Vitamin D deficiency, currently on Vit D 2000 IU daily  She would like to have blood work done to check Vit D level  Welcome to [employer] and Wellness Visits:  The patient is being seen for the subsequent annual wellness visit  Medicare Screening and Risk Factors   Medicare Screening Tests Risk Questions   Abdominal aortic aneurysm risk assessment: none indicated  Osteoporosis risk assessment: , female gender and over 48years of age  HIV risk assessment: none indicated  Drug and Alcohol Use: The patient is a former cigarette smoker and quit smoking 2002  The patient reports never drinking alcohol  She has never used illicit drugs  Diet and Physical Activity: Current diet includes well balanced meals, limited junk food, 1 servings of fruit per day, 1 servings of vegetables per day, servings of meat per day, 2 servings of whole grains per day and 1 cups of coffee per day  She exercises 3 times per week  Exercise: walking  Mood Disorder and Cognitive Impairment Screening: PHQ-9 Depression Scale   Over the past 2 weeks, how often have you been bothered by the following problems? 1 ) Little interest or pleasure in doing things? Not at all    2 ) Feeling down, depressed or hopeless? Not at all    3 ) Trouble falling asleep or sleeping too much? Not at all    4 ) Feeling tired or having little energy? Not at all    5 ) Poor appetite or overeating? Not at all    6 ) Feeling bad about yourself, or that you are a failure, or have let yourself or your family down? Not at all    7 ) Trouble concentrating on things, such as reading a newspaper or watching television? Not at all    8 ) Moving or speaking so slowly that other people could have noticed, or the opposite, moving or speaking faster than usual? Not at all    9 ) Thoughts that you would be off dead or of hurting yourself in some way? Not at all  TOTAL SCORE: 0  She denies feeling down, depressed, or hopeless over the past two weeks  She denies feeling little interest or pleasure in doing things over the past two weeks     Cognitive impairment screening: denies difficulty learning/retaining new information, denies difficulty handling complex tasks, denies difficulty with reasoning, denies difficulty with spatial ability and orientation, denies difficulty with language, denies difficulty with behavior and MMSE: 30/30  Functional Ability/Level of Safety: Hearing is normal bilaterally, normal in the right ear, normal in the left ear and a hearing aid is not used  The patient is currently able to do activities of daily living without limitations, able to do instrumental activities of daily living without limitations, able to participate in social activities without limitations and able to drive without limitations  Activities of daily living details: does not need help using the phone, no transportation help needed, does not need help shopping, no meal preparation help needed, does not need help doing housework, does not need help doing laundry, does not need help managing medications and does not need help managing money  Fall risk factors:  antihypertensive use, but The patient fell 0 times in the past 12 months , no polypharmacy, no alcohol use, no mobility impairment, no antidepressant use, no deconditioning, no postural hypotension, no sedative use, no visual impairment, no urinary incontinence, no cognitive impairment, up and go test was normal and no previous fall  Home safety risk factors:  no unfamiliar surroundings, no loose rugs, no poor household lighting, no uneven floors, no household clutter, grab bars in the bathroom and handrails on the stairs  Co-Managers and Medical Equipment/Suppliers: See Patient Care Team      Patient Care Team    Care Team Member Role Specialty Office Number   Rosa Lara MD  Vascular Surgery [phone]     Active Problems    1  Acute recurrent maxillary sinusitis (461 0) (J01 01)   2  Allergic rhinitis (477 9) (J30 9)   3  Bruit of left carotid artery (785 9) (R09 89)   4  Chronic sinusitis (473 9) (J32 9)   5   Encounter for screening mammogram for malignant neoplasm of breast (V76 12)   (Z12 31)   6  Hyperlipidemia (272 4) (E78 5)   7  Hypertension (401 9) (I10)   8  Hypothyroidism (244 9) (E03 9)   9  Initial Medicare annual wellness visit (V70 0) (Z00 00)   10  LVH (left ventricular hypertrophy) (429 3) (I51 7)   11  Onychomycosis of toenail (110 1) (B35 1)   12  Osteoporosis (733 00) (M81 0)   13  Ruptured tympanic membrane (384 20) (H72 90)   14  Screening for colon cancer (V76 51) (Z12 11)   15  Subclavian artery stenosis, left (447 1) (I77 1)   16  Visit for screening mammogram (V76 12) (Z12 31)   17  Vitamin D deficiency (268 9) (E55 9)    Past Medical History    · History of acute sinusitis (V12 69) (Z87 09)   · History of Graves' disease (V12 29) (Z86 39)   · History of Left otitis media (382 9) (H66 92)    Surgical History    · History of Open Treatment Of Fracture Of Distal Radius   · History of Sinus Surgery   · History of Tonsillectomy With Adenoidectomy    Family History  Mother    · Family history of malignant neoplasm of breast (V16 3) (Z80 3)   · Family history of pancreatic cancer (V16 0) (Z80 0)  Father    · Family history of Aneurysm, aortic  Family History    · Family history of Aneurysm Of Abdominal Aorta   · Family history of Breast Cancer (V16 3)    The family history was reviewed and updated today  Social History    · Former smoker (R97 88) (S17 321)   · Patient quit in 2005  · Stopped Drinking Alcohol  The social history was reviewed and updated today  Current Meds   1  Amoxicillin 875 MG Oral Tablet; TAKE 1 TABLET EVERY 12 HOURS DAILY; Therapy: 28NSJ9802 to (Complete:58Yqo3655)  Requested for: 66Ihw4942; Last   Rx:24Yvv2640 Ordered   2  Aspirin 81 MG TABS; 1 TAB DAILY; Therapy: 40LWJ4956 to Recorded   3  Calcium 500 +D 500-400 MG-UNIT Oral Tablet; 1 TAB DAILY; Therapy: 40HLP5058 to (Last Rx:18Jun2014) Ordered   4  Levothyroxine Sodium 112 MCG Oral Tablet; TAKE 1 TABLET DAILY;    Therapy: 26QZJ5314 to (Evaluate:09Jun2017)  Requested for: 98IJP2833; Last   Rx:14Jun2016 Ordered   5  Losartan Potassium 100 MG Oral Tablet; TAKE 1 TABLET DAILY; Therapy: 95PBT9092 to (Evaluate:09Jun2017)  Requested for: 38JWN2078; Last   Rx:14Jun2016 Ordered   6  MethylPREDNISolone 4 MG Oral Tablet Therapy Pack; use as directed wit food; Therapy: 90SOL5450 to (Last Rx:70Bep8767)  Requested for: 78Eed7127 Ordered   7  Pravastatin Sodium 20 MG Oral Tablet; TAKE 1 TABLET Daily after supper; Therapy: 53VXY7928 to (Tana Te)  Requested for: 91RQA7700; Last   Rx:69Fle3881 Ordered   8  ProAir  (90 Base) MCG/ACT Inhalation Aerosol Solution; INHALE 1 PUFF   EVERY 4 HOURS AS NEEDED; Therapy: 86QFD2525 to (Last Rx:20Mar2016)  Requested for: 20Mar2016 Ordered   9  Prolia 60 MG/ML Subcutaneous Solution; INJECT SUBCUTANEOUSLY  60 MG / 1 ML   EVERY 6 MONTHS; Therapy: 83CWF8241 to (Evaluate:34Qvd6973)  Requested for: 17ZCN6406; Last   Rx:99Wra9678 Ordered   10  Vitamin D3 2000 UNIT Oral Tablet; take 1 tab daily; Therapy: 11HHG4012 to (Last Rx:17Jun2015) Ordered    Allergies    1  Sulfa Drugs    Immunizations   1 2 3    Influenza  30-Oct-2013 Sep 2015 13-Oct-2016    PCV  15-Dec-2015      PPSV  18-Jun-2014       Vitals  Signs    Temperature: 97 8 F  Heart Rate: 60  Respiration: 20  Systolic: 247  Diastolic: 62  Height: 5 ft 5 in  Weight: 182 lb   BMI Calculated: 30 29  BSA Calculated: 1 9    Physical Exam    Constitutional   General appearance: No acute distress, well appearing and well nourished  Head and Face   Head and face: Normal     Palpation of the face and sinuses: No sinus tenderness  Eyes   Conjunctiva and lids: No swelling, erythema or discharge  Pupils and irises: Equal, round, reactive to light  Ears, Nose, Mouth, and Throat   External inspection of ears and nose: Normal     Otoscopic examination: Tympanic membranes translucent with normal light reflex  Canals patent without erythema      Hearing: Normal     Nasal mucosa, septum, and turbinates: Normal without edema or erythema  Oropharynx: Normal with no erythema, edema, exudate or lesions  Pulmonary   Respiratory effort: No increased work of breathing or signs of respiratory distress  Auscultation of lungs: Clear to auscultation  Cardiovascular   Auscultation of heart: Normal rate and rhythm, normal S1 and S2, no murmurs  Examination of extremities for edema and/or varicosities: Normal     Musculoskeletal   Gait and station: Normal     Digits and nails: Normal without clubbing or cyanosis  Joints, bones, and muscles: Normal     Skin   Skin and subcutaneous tissue: Normal without rashes or lesions  Psychiatric   Judgment and insight: Normal     Orientation to person, place, and time: Normal     Recent and remote memory: Intact  Mood and affect: Normal        Future Appointments    Date/Time Provider Specialty Site   06/14/2017 08:30 AM ROCIO Cunningham   Family Medicine [facility]     Signatures   Electronically signed by : ROCIO Shah ; Dec 21 2016 10:08AM EST                       (Author)

## 2018-01-15 VITALS
SYSTOLIC BLOOD PRESSURE: 120 MMHG | RESPIRATION RATE: 16 BRPM | TEMPERATURE: 97.9 F | HEART RATE: 54 BPM | DIASTOLIC BLOOD PRESSURE: 76 MMHG | BODY MASS INDEX: 30.05 KG/M2 | HEIGHT: 65 IN | WEIGHT: 180.38 LBS

## 2018-01-17 NOTE — RESULT NOTES
Message  I called patient with CT chest report  Recommended to repeat CT chest without contrast in 6 months  Order placed in EMR  Verified Results  * CT LUNG SCREENING PROGRAM [id number] 07:11AM Sd Kohler    Order Number: [id number]    - Patient Instructions: To schedule this appointment, please contact Central Scheduling at [phone]  Test Name Result Flag Reference   CT LUNG SCREENING PROGRAM (Report)     CT CHEST LUNG CANCER SCREENING WITHOUT IV CONTRAST     INDICATION: Long term smoking history  COMPARISON: None  TECHNIQUE: Unenhanced CT examination of the chest was performed utilizing a low dose protocol  Axial, sagittal and coronal reformatted images were submitted for interpretation  This examination, like all CT scans performed in the [facility], was performed utilizing techniques to minimize radiation dose exposure, including the use of iterative reconstruction and automated exposure control  Rad dose 162 94 mGy      FINDINGS:     LUNGS: Background emphysema with biapical scarring  Subpleural nodule right middle lobe series 4 image 47 measuring 7 mm  Subpleural nodule left lower lobe series 4 image 53 measuring 5 mm    No tracheal or endobronchial lesion  PLEURA: Unremarkable  HEART/GREAT VESSELS: Unremarkable for patient's age  MEDIASTINUM AND JORDAN: Unremarkable  CHEST WALL AND LOWER NECK: Unremarkable  VISUALIZED STRUCTURES IN THE UPPER ABDOMEN: Unremarkable  OSSEOUS STRUCTURES: No acute fracture  No destructive osseous lesion  IMPRESSION:     Background emphysema with 7 mm right middle lobe nodule and 4 mm left lower lobe nodule  Lung-RADS: Lung-RADS 3, probably benign  Short-term followup suggested with six-month LDCT  When ordering followup, please request noncontrast chest CT for evaluation of pulmonary nodule  Please do not request Lung cancer screening   because now that an abnormality has been detected, this case can no longer continue in the routine screening   program  Thank you  ##sigslh##sigslh         ##[id number]##fuslh6       Workstation performed: [id number]     Signed by:    Navneet Child MD   3/9/17       Signatures   Electronically signed by : ROCIO Dick ; Mar 12 2017  1:37PM EST                       (Author)

## 2018-01-17 NOTE — PROGRESS NOTES
Chief Complaint  Prolia injection administered L upper arm subcutaneous lot # [id number] Exp 11/18      Active Problems    1  Allergic rhinitis (477 9) (J30 9)   2  Bruit of left carotid artery (785 9) (R09 89)   3  Chronic sinusitis (473 9) (J32 9)   4  Encounter for screening mammogram for malignant neoplasm of breast (V76 12)   (Z12 31)   5  Hyperlipidemia (272 4) (E78 5)   6  Hypertension (401 9) (I10)   7  Hypothyroidism (244 9) (E03 9)   8  Initial Medicare annual wellness visit (V70 0) (Z00 00)   9  LVH (left ventricular hypertrophy) (429 3) (I51 7)   10  Onychomycosis of toenail (110 1) (B35 1)   11  Osteoporosis (733 00) (M81 0)   12  Ruptured tympanic membrane (384 20) (H72 90)   13  Screening for colon cancer (V76 51) (Z12 11)   14  Subclavian artery stenosis, left (447 1) (I77 1)   15  Visit for screening mammogram (V76 12) (Z12 31)   16  Vitamin D deficiency (268 9) (E55 9)    Current Meds   1  Aspirin 81 MG TABS; 1 TAB DAILY; Therapy: 38BXB5284 to Recorded   2  Calcium 500 +D 500-400 MG-UNIT Oral Tablet; 1 TAB DAILY; Therapy: 50ENK7751 to (Last Rx:18Jun2014) Ordered   3  Cefdinir 300 MG Oral Capsule; Therapy: (Recorded:20Mar2016) to Recorded   4  Levothyroxine Sodium 112 MCG Oral Tablet; TAKE 1 TABLET DAILY; Therapy: 60BFM7774 to (Evaluate:09Jun2017)  Requested for: 16WIH8351; Last   Rx:14Jun2016 Ordered   5  Losartan Potassium 100 MG Oral Tablet; TAKE 1 TABLET DAILY; Therapy: 82OQC9955 to (Evaluate:09Jun2017)  Requested for: 76IRU7576; Last   Rx:14Jun2016 Ordered   6  Pravastatin Sodium 20 MG Oral Tablet; TAKE 1 TABLET Daily after supper; Therapy: 43OSF2655 to (Juan M Waddell)  Requested for: 63INL6556; Last   Rx:75Bwc8804 Ordered   7  ProAir  (90 Base) MCG/ACT Inhalation Aerosol Solution; INHALE 1 PUFF   EVERY 4 HOURS AS NEEDED; Therapy: 54TUK5541 to (Last Rx:20Mar2016)  Requested for: 20Mar2016 Ordered   8   Prolia 60 MG/ML Subcutaneous Solution; INJECT SUBCUTANEOUSLY  60 MG / 1 ML   EVERY 6 MONTHS; Therapy: 60CUY2130 to (Evaluate:74Eaz4681)  Requested for: 02LIV3522; Last   Rx:60Xbm6814 Ordered   9  Vitamin D3 2000 UNIT Oral Tablet; take 1 tab daily; Therapy: 40XPK9478 to (Last Rx:17Jun2015) Ordered    Allergies    1  Sulfa Drugs    Plan  Osteoporosis    · Prolia 60 MG/ML Subcutaneous Solution    Future Appointments    Date/Time Provider Specialty Site   12/14/2016 08:30 AM ROCIO Orellana   Family Medicine [facility]     Signatures   Electronically signed by : ROCIO Hedrick ; Jul 13 2016 12:02PM EST                       (Author)

## 2018-01-18 NOTE — RESULT NOTES
Verified Results  * MAMMO SCREENING BILATERAL W CAD [id number] 09:25AM [facility] Order Number: [id number]    - Patient Instructions: To schedule this appointment, please contact Central Scheduling at [phone]  Do not wear any perfume, powder, lotion or deodorant on breast or underarm area  Please bring your doctors order, referral (if needed) and insurance information with you on the day of the test  Failure to bring this information may result in this test being rescheduled  Arrive 15 minutes prior to your appointment time to register  On the day of your test, please bring any prior mammogram or breast studies with you that were not performed at a [facility]  Failure to bring prior exams may result in your test needing to be rescheduled  Test Name Result Flag Reference   MAMMO SCREENING BILATERAL W CAD (Report)     Patient History:   Patient is postmenopausal    Family history of pancreatic cancer at age 77 and breast cancer    at age 47 in mother, breast cancer at age 48 or over in maternal    grandmother  Took hormonal contraceptives for 2 years  Patient is a former smoker, and smoked for 40 years  Patient's    BMI is 28 7  Reason for exam: screening, asymptomatic  Mammo Screening Bilateral W CAD: September 21, 2017 - Check In #:   [de-identified]   Bilateral MLO and CC view(s) were taken  XCCL view(s) were taken   of the left breast      Technologist: RT Jhonny(R)(M)   Prior study comparison: May 13, 2016, mammo screening bilateral W   CAD performed at [facility]  March 2, 2015,    digital bilateral screening mammogram performed at [facility]  October 26, 2010, bilateral digital screening    mammogram, performed at [address] [place] [address]     September 8, 2005, bilateral mammogram, performed at [facility]  September 8, 2005, bilateral screening    mammogram, performed at [facility]  September 12, 1995, bilateral mammogram, performed at [facility]  July 12, 1995, bilateral screening mammogram, performed   at [facility]  The breast tissue is heterogeneously dense, potentially limiting    the sensitivity of mammography  Patient risk, included in this    report, assists in determining the appropriate screening regimen    (such as 3-D mammography or the inclusion of automated breast    ultrasound or MRI)  3-D mammography may also remain indicated as    screening  The parenchymal pattern appears stable  No dominant soft tissue    mass or suspicious calcifications are noted  Scattered benign    appearing calcifications are seen  The skin and nipple contours    are within normal limits  No mammographic evidence of malignancy  No    significant changes when compared with prior studies  ACR BI-RADSï¾® Assessments: BiRad:2 - Benign     Recommendation:   Routine screening mammogram in 1 year  Analyzed by CAD     The patient is scheduled in a reminder system for screening    mammography  8-10% of cancers will be missed on mammography  Management of a    palpable abnormality must be based on clinical grounds  Patients   will be notified of their results via letter from our facility  Accredited by [employer] of [employer] and FDA  Transcription Location: [facility] 98: [id number]     Risk Value(s):   Tyrer-Cuzick 10 Year: 8 000%, Tyrer-Cuzick Lifetime: 13 300%,    Myriad Table: 1 5%, ANKIT 5 Year: 3 4%, NCI Lifetime: 10 2%, MRS    : Based on personal and/or family history,    consideration of hereditary risk assessment may be warranted     Signed by:   Mia Aguila MD   9/21/17

## 2018-01-18 NOTE — PROGRESS NOTES
Chief Complaint  Prolia injection administered L deltoid SQ lot # [id number] Exp 12/18      Active Problems    1  Acute recurrent maxillary sinusitis (461 0) (J01 01)   2  Allergic rhinitis (477 9) (J30 9)   3  Bruit of left carotid artery (785 9) (R09 89)   4  Chronic sinusitis (473 9) (J32 9)   5  Encounter for screening mammogram for malignant neoplasm of breast (V76 12)   (Z12 31)   6  Hyperlipidemia (272 4) (E78 5)   7  Hypertension (401 9) (I10)   8  Hypothyroidism (244 9) (E03 9)   9  Initial Medicare annual wellness visit (V70 0) (Z00 00)   10  LVH (left ventricular hypertrophy) (429 3) (I51 7)   11  Medicare annual wellness visit, subsequent (V70 0) (Z00 00)   12  Onychomycosis of toenail (110 1) (B35 1)   13  Osteoporosis (733 00) (M81 0)   14  Ruptured tympanic membrane (384 20) (H72 90)   15  Screening for colon cancer (V76 51) (Z12 11)   16  Subclavian artery stenosis, left (447 1) (I77 1)   17  Visit for screening mammogram (V76 12) (Z12 31)   18  Vitamin D deficiency (268 9) (E55 9)    Current Meds   1  Aspirin 81 MG TABS; 1 TAB DAILY; Therapy: 99QTN9866 to Recorded   2  Calcium 500 +D 500-400 MG-UNIT Oral Tablet; 1 TAB DAILY; Therapy: 87QIN7336 to (Last Rx:18Jun2014) Ordered   3  Levothyroxine Sodium 112 MCG Oral Tablet; TAKE 1 TABLET DAILY; Therapy: 14IIS0662 to (Evaluate:09Jun2017)  Requested for: 91DGT7177; Last   Rx:14Jun2016 Ordered   4  Losartan Potassium 100 MG Oral Tablet; TAKE 1 TABLET DAILY; Therapy: 36ZEJ2346 to (Evaluate:09Jun2017)  Requested for: 43YGE6809; Last   Rx:14Jun2016 Ordered   5  MethylPREDNISolone 4 MG Oral Tablet Therapy Pack; use as directed wit food; Therapy: 53ZLY5676 to (Last Rx:53Pwd5670)  Requested for: 87Eqs4075 Ordered   6  Pravastatin Sodium 20 MG Oral Tablet; TAKE 1 TABLET Daily after supper; Therapy: 77XXY4120 to (Earl Sheets)  Requested for: 32QIC6038; Last   Rx:92Lqu4057 Ordered   7   ProAir  (90 Base) MCG/ACT Inhalation Aerosol Solution; INHALE 1 PUFF   EVERY 4 HOURS AS NEEDED; Therapy: 71LEI4903 to (Last Rx:20Mar2016)  Requested for: 20Mar2016 Ordered   8  Prolia 60 MG/ML Subcutaneous Solution; INJECT SUBCUTANEOUSLY  60 MG / 1 ML   EVERY 6 MONTHS; Therapy: 04LIF0322 to (Evaluate:54Reu0680)  Requested for: 56BIF1378; Last   Rx:63Fwl8861 Ordered   9  Vitamin D3 2000 UNIT Oral Tablet; take 1 tab daily; Therapy: 69MGN7506 to (Last Rx:17Jun2015) Ordered    Allergies    1  Sulfa Drugs    Plan  Osteoporosis    · Prolia 60 MG/ML Subcutaneous Solution    Future Appointments    Date/Time Provider Specialty Site   06/14/2017 08:30 AM ROCIO Pierce   [facility]   07/19/2017 08:30 AM William Isaac Nurse Schedule  [facility]     Signatures   Electronically signed by : ROCIO Vaca ; Jan 18 2017 11:58AM EST                       (Author)

## 2018-01-18 NOTE — PROGRESS NOTES
Assessment    1  Acute sinusitis (461 9) (J01 90)   2  Left otitis media (382 9) (H66 92)   3  Ruptured tympanic membrane (384 20) (H72 90)    Plan  Ruptured tympanic membrane    · Benzonatate 100 MG Oral Capsule (Tessalon Perles); TAKE 1 CAPSULE EVERY 8  HOURS AS NEEDED   · ProAir  (90 Base) MCG/ACT Inhalation Aerosol Solution; INHALE 1 PUFF  EVERY 4 HOURS AS NEEDED    Discussion/Summary  Discussion Summary:   Use albuterol inhaler every 4 hours as needed for coughing fits  Use tessalon every 8 hours as needed for cough  Continue Cefdinir and Ofloxacin drops  Take tylenol as needed for facial pain  Apply warm compresses to sinuses and do sinus massages to help them drain  Follow up with your ENT physician as scheduled  Medication Side Effects Reviewed: Possible side effects of new medications were reviewed with the patient/guardian today  Understands and agrees with treatment plan: The treatment plan was reviewed with the patient/guardian  The patient/guardian understands and agrees with the treatment plan   Follow Up Instructions: Follow Up with your Primary Care Provider in 3 days  If your symptoms worsen, go to the nearest [facility] Emergency Department  Chief Complaint    1  Cough  Chief Complaint Free Text Note Form: sick for 2 weeks  seen @ PCP and ENT   Dx Sinus Infection /perforation lt eardrum  Here for continued cough with clear mucus  unable to sleep   Pain lt face and lt ear      History of Present Illness  HPI: Pt states she has had cold symptoms x several weeks  She saw her PCP and was given augmentin for a sinus infection  Then she saw her ENT specialist because she had a perforated TM  She was given cefdinir and ofloxacin  She also went to an urgent care and was given prednisone, which she did not complete  She is currently taking cefdinir and using ofloxacin  She denies fevers, but is still having left sided facial pressure, left ear pain and is coughing   Cough is non productive  She is getting coughing fits  Hospital Based Practices Required Assessment:   Pain Assessment   the patient states they have pain  The pain is located in the face/lt ear  The patient describes the pain as aching  (on a scale of 0 to 10, the patient rates the pain at 5 )   Abuse And Domestic Violence Screen    Yes, the patient is safe at home  The patient states no one is hurting them  Depression And Suicide Screen  No, the patient has not had thoughts of hurting themself  No, the patient has not felt depressed in the past 7 days  Prefered Language is  [place]  Primary Language is  English  Review of Systems  Focused-Female:   Constitutional: feeling poorly, but no fever and no chills  ENT: earache  Cardiovascular: no chest pain  Respiratory: cough  ROS Reviewed:   ROS reviewed  Active Problems    1  Acute sinusitis (461 9) (J01 90)   2  Allergic rhinitis (477 9) (J30 9)   3  Bruit of left carotid artery (785 9) (R09 89)   4  Chronic sinusitis (473 9) (J32 9)   5  Encounter for screening mammogram for malignant neoplasm of breast (V76 12)   (Z12 31)   6  Hyperlipidemia (272 4) (E78 5)   7  Hypertension (401 9) (I10)   8  Hypothyroidism (244 9) (E03 9)   9  Initial Medicare annual wellness visit (V70 0) (Z00 00)   10  LVH (left ventricular hypertrophy) (429 3) (I51 7)   11  Onychomycosis of toenail (110 1) (B35 1)   12  Osteoporosis (733 00) (M81 0)   13  Screening for colon cancer (V76 51) (Z12 11)   14  Subclavian artery stenosis, left (447 1) (I77 1)   15  Visit for screening mammogram (V76 12) (Z12 31)   16  Vitamin D deficiency (268 9) (E55 9)    Past Medical History    1  History of Graves' disease (V12 29) (Z86 39)  Active Problems And Past Medical History Reviewed: The active problems and past medical history were reviewed and updated today  Family History    1  Family history of malignant neoplasm of breast (V16 3) (Z80 3)   2   Family history of pancreatic cancer (V16 0) (Z80 0)    3  Family history of Aneurysm, aortic    4  Family history of Aneurysm Of Abdominal Aorta   5  Family history of Breast Cancer (V16 3)  Family History Reviewed: The family history was reviewed and updated today  Social History    · Former smoker (L72 76) (W82 316)   · Stopped Drinking Alcohol  Social History Reviewed: The social history was reviewed and updated today  The social history was reviewed and is unchanged  Surgical History    1  History of Open Treatment Of Fracture Of Distal Radius   2  History of Sinus Surgery   3  History of Tonsillectomy With Adenoidectomy  Surgical History Reviewed: The surgical history was reviewed and updated today  Current Meds   1  Aspirin 81 MG Oral Tablet; 1 TAB DAILY; Therapy: 33WZT4226 to Recorded   2  Calcium 500 +D 500-400 MG-UNIT Oral Tablet; 1 TAB DAILY; Therapy: 21WYY4267 to (Last Rx:18Jun2014) Ordered   3  Cefdinir 300 MG Oral Capsule; Therapy: (Recorded:20Mar2016) to Recorded   4  Levothyroxine Sodium 112 MCG Oral Tablet; TAKE 1 TABLET DAILY; Therapy: 70PVF5289 to (Evaluate:11Jun2016)  Requested for: 44XOB1732; Last   Rx:17Jun2015 Ordered   5  Losartan Potassium 100 MG Oral Tablet; TAKE 1 TABLET DAILY; Therapy: 76FFO8222 to (Evaluate:11Jun2016)  Requested for: 22GWQ9616; Last   Rx:17Jun2015 Ordered   6  Ofloxacin 0 3 % Otic Solution; Therapy: (Recorded:20Mar2016) to Recorded   7  Pravastatin Sodium 20 MG Oral Tablet; TAKE 1 TABLET Daily after supper; Therapy: 76COT2605 to (Shin Pak)  Requested for: 33PKM1140; Last   Rx:67Mee4689 Ordered   8  Prolia 60 MG/ML Subcutaneous Solution; INJECT SUBCUTANEOUSLY  60 MG / 1 ML   EVERY 6 MONTHS; Therapy: 66KNU9311 to (Evaluate:75Cgx3926)  Requested for: 45GKG7912; Last   Rx:93Wji4950 Ordered   9  Vitamin D3 2000 UNIT Oral Tablet; take 1 tab daily; Therapy: 68FYT1365 to (Last Rx:17Jun2015) Ordered  Medication List Reviewed:    The medication list was reviewed and updated today  Allergies    1  Sulfa Drugs    Vitals  Signs [Data Includes: Current Encounter]   Recorded: 20Mar2016 10:12AM   Temperature: 98 7 F  Heart Rate: 84  Respiration: 18  Systolic: 616  Diastolic: 62  Height: 5 ft 5 in  Weight: 178 lb   BMI Calculated: 29 62  BSA Calculated: 1 88  O2 Saturation: 95  Pain Scale: 5    Physical Exam    Constitutional   General appearance: No acute distress, well appearing and well nourished  Ears, Nose, Mouth, and Throat   External inspection of ears and nose: Normal     Otoscopic examination: Abnormal   The right tympanic membrane was not red  The left tympanic membrane had a perforation, but was not red  Exam of the left middle ear showed a hemotympanum  Nasal mucosa, septum, and turbinates: Abnormal   The bilateral nasal mucosa was edematous  Oropharynx: Normal with no erythema, edema, exudate or lesions  Pulmonary   Respiratory effort: No increased work of breathing or signs of respiratory distress  Auscultation of lungs: Clear to auscultation  Cardiovascular   Auscultation of heart: Normal rate and rhythm, normal S1 and S2, without murmurs  Lymphatic   Palpation of lymph nodes in neck: No lymphadenopathy  Psychiatric   Orientation to person, place, and time: Normal     Mood and affect: Normal        Future Appointments    Date/Time Provider Specialty Site   06/14/2016 08:30 AM ROCIO Bond   [facility]     Signatures   Electronically signed by : Job Storm; Mar 20 2016 10:29AM EST                       (Author)    Electronically signed by : Yelena Oliveira DO; Mar 21 2016  7:14AM EST                       (Co-author)

## 2018-01-22 VITALS
WEIGHT: 181.6 LBS | HEART RATE: 76 BPM | TEMPERATURE: 97.1 F | HEIGHT: 60 IN | BODY MASS INDEX: 35.65 KG/M2 | SYSTOLIC BLOOD PRESSURE: 132 MMHG | RESPIRATION RATE: 16 BRPM | DIASTOLIC BLOOD PRESSURE: 70 MMHG

## 2018-01-23 VITALS
TEMPERATURE: 97.2 F | SYSTOLIC BLOOD PRESSURE: 150 MMHG | HEIGHT: 65 IN | HEART RATE: 72 BPM | BODY MASS INDEX: 30.06 KG/M2 | WEIGHT: 180.4 LBS | DIASTOLIC BLOOD PRESSURE: 86 MMHG | RESPIRATION RATE: 16 BRPM

## 2018-01-23 NOTE — RESULT NOTES
Verified Results  * CT CHEST [address] [id number] 11:28AM Romel Carrasco Order Number: [id number]    - Patient Instructions: To schedule this appointment, please contact Central Scheduling at [phone]  Test Name Result Flag Reference   CT CHEST WO CONTRAST (Report)     CT CHEST WITHOUT IV CONTRAST     INDICATION: Pulmonary nodules  COMPARISON: 3/8/2017  TECHNIQUE: CT examination of the chest was performed without intravenous contrast  Reformatted images were created in axial, sagittal, and coronal planes  Radiation dose length product (DLP) for this visit: 161 21 mGy-cm   This examination, like all CT scans performed in the [facility], was performed utilizing techniques to minimize radiation dose exposure, including the use of iterative   reconstruction and automated exposure control  FINDINGS:     LUNGS: Background emphysema with biapical pleural-parenchymal scarring is before  Previously identified right middle lobe 7 mm pulmonary nodule has apparently resolved  Left basilar 4 mm nodule on 3/51 appears stable  Scattered regions of pulmonary    scarring noted as before  PLEURA: Unremarkable  HEART/GREAT VESSELS: Unremarkable for patient's age  MEDIASTINUM AND JORDAN: Unremarkable  CHEST WALL AND LOWER NECK:    Normal      VISUALIZED STRUCTURES IN THE UPPER ABDOMEN: Unremarkable  OSSEOUS STRUCTURES: No acute fracture  No destructive osseous lesion  IMPRESSION:     1  Background emphysema with stable left basilar 4 mm pulmonary nodule  This nodule has been stable for 10 months  Based on current Fleischner Society 2017 Guidelines on incidental pulmonary nodule, because the patient is considered high risk for    lung cancer, 12 month follow-up non-contrast chest CT is recommended  2  Previously characterized 7 mm right middle lobe nodule has resolved during the interim                      Workstation performed: [id number] Signed by:   Jaki Medeiros MD   1/5/18

## 2018-01-23 NOTE — PROGRESS NOTES
Assessment    1  Hypertension (401 9) (I10)   2  Medicare annual wellness visit, subsequent (V70 0) (Z00 00)   3  Hyperlipidemia (272 4) (E78 5)   4  Hypothyroidism (244 9) (E03 9)   5  Osteoporosis (733 00) (M81 0)   6  Subclavian artery stenosis, left (447 1) (I77 1)   7  Vitamin D deficiency (268 9) (E55 9)   8  Pulmonary nodules (793 19) (R91 8)   9  Heart murmur (785 2) (R01 1)    Plan  Heart murmur, Hypertension    · ECHO COMPLETE WITH CONTRAST IF INDICATED; Status:Hold For - Scheduling;  Requested for:05Kha0058;   Hyperlipidemia    · (1) LIPID PANEL, FASTING; Status:Active; Requested for:35Vzd0046;   Hyperlipidemia, Hypertension, Hypothyroidism    · (1) COMPREHENSIVE METABOLIC PANEL; Status:Active; Requested for:69Rpa9836;   Hyperlipidemia, Hypertension, Hypothyroidism, Osteoporosis, Subclavian artery  stenosis, left, Vitamin D deficiency    · Follow-up visit in 6 months Evaluation and Treatment  Follow-up  Status: Hold For -  Scheduling  Requested for: 53Ely2917  Hypertension, Hypothyroidism    · (1) CBC/PLT/DIFF; Status:Active; Requested for:99Vtz8898;   Hypothyroidism    · (1) TSH; Status:Active; Requested URM:71HST3943;   Osteoporosis, Vitamin D deficiency    · (1) VITAMIN D 25-HYDROXY; Status:Active; Requested for:15Zzb2420;   Pulmonary nodules    · * CT CHEST WO CONTRAST; Status:Hold For - Scheduling; Requested for:75Abv1560;   Screening for colon cancer    · COLONOSCOPY; Status:Active; Requested for:98Bxr5465;   Subclavian artery stenosis, left    · VAS CAROTID COMPLETE STUDY; SIDE:Bilateral; Status:Hold For - Scheduling;  Requested for:51Lbn9647;     Discussion/Summary  Impression: Subsequent Annual Wellness Visit       Cardiovascular screening and counseling: the risks and benefits of screening were discussed, screening is current, counseling was given on maintaining a healthy diet, counseling was given on maintaining a healthy weight, counseling was given on ways to improve cholesterol, counseling was given on ways to improve blood pressure and counseling was given on ways to improve exercise tolerance  Diabetes screening and counseling: the risks and benefits of screening were discussed, screening is current, counseling was given on maintaining a healthy diet, counseling was given on maintaining a healthy weight and counseling was given on ways to improve physical activity  Colorectal cancer screening and counseling: the risks and benefits of screening were discussed, counseling was given on ways to eat a high fiber diet and recommended to schedule colonoscopy  Breast cancer screening and counseling: the risks and benefits of screening were discussed and screening is current  Cervical cancer screening and counseling: the risks and benefits of screening were discussed and screening not indicated  Osteoporosis screening and counseling: the risks and benefits of screening were discussed, screening is current, counseling was given on obtaining adequate amounts of calcium and vitamin D on a daily basis and counseling was given on the importance of regular weightbearing exercise  Abdominal aortic aneurysm screening and counseling: the risks and benefits of screening were discussed and screening not indicated  HIV screening and counseling: screening not indicated  Immunizations: the risks and benefits of influenza vaccination were discussed with the patient, influenza vaccine is up to date this year, the risks and benefits of pneumococcal vaccination were discussed with the patient, Prevnar 13 done in 12/15, hepatitis B vaccination series is not indicated at this time due to the patient's low risk of lucian the disease, the risks and benefits of the Zostavax vaccine were discussed with the patient, recommended Zostavax vaccination, the risks and benefits of the Tdap vaccine were discussed with the patient and recommended Tdap vaccination     Advance Directive Planning: not complete, paperwork and instructions were given to the patient, she was encouraged to follow-up with me to discuss her questions and/or decisions  Patient Discussion: plan discussed with the patient, follow-up visit needed in one year  Chief Complaint  Medicare annual wellness visit  History of Present Illness  Welcome to Medicare and Wellness Visits: The patient is being seen for the subsequent annual wellness visit  Medicare Screening and Risk Factors   Hospitalizations: no previous hospitalizations  Once per lifetime medicare screening tests: AAA screening [place] has not yet been done  Medicare Screening Tests Risk Questions   Abdominal aortic aneurysm risk assessment: family history of AAA, but none indicated  Osteoporosis risk assessment: , female gender and over 48years of age  HIV risk assessment: none indicated  Drug and Alcohol Use: The patient is a former cigarette smoker and quit smoking 15 years ago  The patient reports rare alcohol use  She has never used illicit drugs  Diet and Physical Activity: Current diet includes 1 servings of fruit per day, 1 servings of vegetables per day, 1 cups of coffee per day and 0 cups of tea per day  She exercises 3 times per week  Exercise: walking 25 minutes per day  Mood Disorder and Cognitive Impairment Screening: PHQ-9 Depression Scale   Over the past 2 weeks, how often have you been bothered by the following problems? 1 ) Little interest or pleasure in doing things? Not at all    2 ) Feeling down, depressed or hopeless? Not at all    3 ) Trouble falling asleep or sleeping too much? Several days  4 ) Feeling tired or having little energy? Several days  5 ) Poor appetite or overeating? Several days  6 ) Feeling bad about yourself, or that you are a failure, or have let yourself or your family down? Not at all    7 ) Trouble concentrating on things, such as reading a newspaper or watching television?  Not at all    8 ) Moving or speaking so slowly that other people could have noticed, or the opposite, moving or speaking faster than usual? Not at all  TOTAL SCORE: 3    How difficult have these problems made it for you to do your work, take care of things at home, or get along with people? Not at all  She denies feeling down, depressed, or hopeless over the past two weeks  She denies feeling little interest or pleasure in doing things over the past two weeks  Cognitive impairment screening: denies difficulty learning/retaining new information, denies difficulty handling complex tasks, denies difficulty with reasoning, denies difficulty with spatial ability and orientation, denies difficulty with language, denies difficulty with behavior and MMSE: 30/30  Functional Ability/Level of Safety: Hearing is normal bilaterally, normal in the right ear and normal in the left ear  She does not use a hearing aid  The patient is currently able to do activities of daily living without limitations, able to do instrumental activities of daily living without limitations, able to participate in social activities without limitations and able to drive without limitations  Activities of daily living details: does not need help using the phone, no transportation help needed, does not need help shopping, no meal preparation help needed, does not need help doing housework, does not need help doing laundry, does not need help managing medications and does not need help managing money  Fall risk factors: The patient fell 0 times in the past 12 months  Injury History: no polypharmacy, no alcohol use, no mobility impairment, no antidepressant use, no deconditioning, no postural hypotension, no sedative use, no visual impairment, no urinary incontinence, antihypertensive use, no cognitive impairment and no previous fall     Home safety risk factors:  no unfamiliar surroundings, no loose rugs, no poor household lighting, no uneven floors, no household clutter, grab bars in the bathroom and handrails on the stairs  Advance Directives: Advance directives: no living will, no durable power of  for health care directives and no advance directives  end of life decisions were reviewed with the patient  Co-Managers and Medical Equipment/Suppliers: See Patient Care Team      Patient Care Team    Care Team Member Role Specialty Office Number   Manish Voegl MD  Vascular Surgery [phone]     Active Problems    1  Allergic rhinitis (477 9) (J30 9)   2  Bradycardia (427 89) (R00 1)   3  Bruit of left carotid artery (785 9) (R09 89)   4  Chronic sinusitis (473 9) (J32 9)   5  Dizziness (780 4) (R42)   6  Encounter for screening mammogram for malignant neoplasm of breast (V76 12)   (Z12 31)   7  Hyperlipidemia (272 4) (E78 5)   8  Hypertension (401 9) (I10)   9  Hypothyroidism (244 9) (E03 9)   10  Initial Medicare annual wellness visit (V70 0) (Z00 00)   11  LVH (left ventricular hypertrophy) (429 3) (I51 7)   12  Onychomycosis of toenail (110 1) (B35 1)   13  Osteoporosis (733 00) (M81 0)   14  Pulmonary nodules (793 19) (R91 8)   15  Screening for colon cancer (V76 51) (Z12 11)   16  Subclavian artery stenosis, left (447 1) (I77 1)   17  Visit for screening mammogram (V76 12) (Z12 31)   18  Vitamin D deficiency (268 9) (E55 9)    Past Medical History    · History of Acute pain of left shoulder (719 41) (M25 512)   · History of Acute recurrent maxillary sinusitis (461 0) (J01 01)   · History of Acute upper respiratory infection (465 9) (J06 9)   · History of Graves' disease (V12 29) (Z86 39)   · History of lateral epicondylitis of left elbow (V13 59) (Z87 39)   · History of perforation of tympanic membrane (V12 49) (Z86 69)   · History of Left elbow pain (719 42) (M25 522)   · History of Left otitis media (382 9) (H66 92)   · History of Medicare annual wellness visit, subsequent (V70 0) (Z00 00)    The active problems and past medical history were reviewed and updated today        Surgical History    · History of Open Treatment Of Fracture Of Distal Radius   · History of Sinus Surgery   · History of Tonsillectomy With Adenoidectomy    The surgical history was reviewed and updated today  Family History  Mother    · Family history of malignant neoplasm of breast (V16 3) (Z80 3)   · Family history of pancreatic cancer (V16 0) (Z80 0)  Father    · Family history of Aneurysm, aortic  Family History    · Family history of Aneurysm Of Abdominal Aorta   · Family history of Breast Cancer (V16 3)    The family history was reviewed and updated today  Social History    · Former smoker (Z42 73) (O21 086)   · Patient quit in 2005  · Stopped Drinking Alcohol  The social history was reviewed and updated today  Current Meds   1  Aspirin 81 MG TABS; 1 TAB DAILY; Therapy: 06QFP9041 to Recorded   2  Azelastine HCl - 0 15 % Nasal Solution; USE 2 SPRAYS IN EACH NOSTRIL twice   DAILY; Therapy: 72NEM7157 to (Last FC:83CLC2524)  Requested for: 71NWN5464 Ordered   3  Diclofenac Sodium 1 % Transdermal Gel; APPLY TO AFFECTED AREA UP TO 2- 3   TIMES DAILY; Therapy: 45NAM6805 to (Last Rx:02Zty3156)  Requested for: 96Qre4079 Ordered   4  Levothyroxine Sodium 112 MCG Oral Tablet; TAKE 1 TABLET DAILY; Therapy: 20MCE7558 to (Evaluate:09Jun2018)  Requested for: 75WQW4924; Last   Rx:14Jun2017 Ordered   5  Losartan Potassium 100 MG Oral Tablet; TAKE 1 TABLET DAILY; Therapy: 40MDZ9373 to (Evaluate:09Jun2018)  Requested for: 58ELJ8989; Last   Rx:14Jun2017 Ordered   6  Pravastatin Sodium 20 MG Oral Tablet; TAKE 1 TABLET Daily after supper; Therapy: 02EGV5312 to (Uma Manning)  Requested for: 46RAH9756; Last   Rx:85Fgq0400 Ordered   7  ProAir  (90 Base) MCG/ACT Inhalation Aerosol Solution; INHALE 1 PUFF   EVERY 4 HOURS AS NEEDED; Therapy: 71AOC1566 to (Last Rx:20Mar2016)  Requested for: 20Mar2016 Ordered   8   Prolia 60 MG/ML Subcutaneous Solution; INJECT SUBCUTANEOUSLY  60 MG / 1 ML   EVERY 6 MONTHS; Recurring Schedule:82Otg3612 to (Exp:71Tmq7506); Status: IN   PROGRESS - Order Generated Ordered   9  Vitamin D3 2000 UNIT Oral Tablet; take 1 tab daily; Therapy: 87TJS3965 to (Last Rx:17Jun2015) Ordered    The medication list was reviewed and updated today  Allergies    1  Sulfa Drugs    Immunizations   1 2 3 4    Influenza  30-Oct-2013 Sep 2015 13-Oct-2016 04-Oct-2017    PCV  15-Dec-2015       PPSV  18-Jun-2014        Vitals  Signs    Systolic: 538, RUE  Diastolic: 86, RUE  BP Cuff Size: Large   Temperature: 97 2 F, Tympanic  Heart Rate: 72, L Radial  Respiration: 16  Systolic: 295, LUE  Diastolic: 90, LUE  Height: 5 ft 5 in  Weight: 180 lb 6 4 oz  BMI Calculated: 30 02  BSA Calculated: 1 89  Pain Scale: 2-3    Results/Data  *VB-Depression Screening 90XVB9046 09:17AM Altagracia Foote     Test Name Result Flag Reference   Depression Scale Result      Depression Screen - Negative For Symptoms     *VB - Fall Risk Assessment  (Dx Z13 89 Screen for Neurologic Disorder) 69KSI4567 09:17AM Altagracia Foote     Test Name Result Flag Reference   Falls Risk      No falls in the past year       Future Appointments    Date/Time Provider Specialty Site   06/15/2018 09:30 AM ROCIO Baires   [facility]   01/24/2018 10:30 AM [facility], Nurse Schedule  [facility]     Signatures   Electronically signed by : [facility], M D ; Dec 15 2017 10:38AM EST                       (Author)

## 2018-01-23 NOTE — RESULT NOTES
Verified Results  ECHO COMPLETE WITH CONTRAST IF INDICATED 16RTO9936 10:50AM Andrez Bang Order Number: [id number]    - Patient Instructions: To schedule this appointment, please contact Central Scheduling at [phone]  Test Name Result Flag Reference   ECHO COMPLETE WITH CONTRAST IF INDICATED (Report)     Madisyn Gómez   [address], [address] [facility] [facility]   [phone]     Transthoracic Echocardiogram   2D, M-mode, Doppler, and Color Doppler     Study date: 2018     Patient: Anjana Anderson   MR number: [id number]   Account number: [de-identified]   : 1950   Age: 69 years   Gender: Female   Status: Outpatient   Location: Echo lab   Height: 65 in   Weight: 180 lb   BP: 150/ 86 mmHg     Indications: HTN     Diagnoses: I10  - Essential (primary) hypertension     Sonographer: JAZMÍN Dc   Primary Physician: Alireza Cohen MD   Referring Physician: Alireza Cohen MD   Group: [facility]   Interpreting Physician: Noni Cabot, MD     SUMMARY     LEFT VENTRICLE:   Systolic function was normal  Ejection fraction was estimated to be 60 %  There were no regional wall motion abnormalities  RIGHT VENTRICLE:   The size was normal    Systolic function was normal      HISTORY: PRIOR HISTORY: HTN,HLD, Bradycardia     PROCEDURE: The procedure was performed in the echo lab  This was a routine study  The transthoracic approach was used  The study included complete 2D imaging, M-mode, complete spectral Doppler, and color Doppler  The heart rate was 60 bpm,   at the start of the study  Images were obtained from the parasternal, apical, subcostal, and suprasternal notch acoustic windows  Echocardiographic views were limited due to lung interference  This was a technically difficult study  LEFT VENTRICLE: Size was normal  Systolic function was normal  Ejection fraction was estimated to be 60 %   There were no regional wall motion abnormalities  Wall thickness was normal  DOPPLER: The transmitral flow pattern was normal  Left   ventricular diastolic function parameters were normal      RIGHT VENTRICLE: The size was normal  Systolic function was normal  Wall thickness was normal      LEFT ATRIUM: Size was normal      RIGHT ATRIUM: Size was normal      MITRAL VALVE: Valve structure was normal  There was normal leaflet separation  DOPPLER: The transmitral velocity was within the normal range  There was no evidence for stenosis  There was trace regurgitation  AORTIC VALVE: The valve was trileaflet  Leaflets exhibited normal thickness and normal cuspal separation  DOPPLER: Transaortic velocity was within the normal range  There was no evidence for stenosis  There was no significant   regurgitation  TRICUSPID VALVE: The valve structure was normal  There was normal leaflet separation  DOPPLER: The transtricuspid velocity was within the normal range  There was no evidence for stenosis  There was trace regurgitation  Pulmonary artery   systolic pressure was within the normal range  Estimated peak PA pressure was 24 mmHg  PULMONIC VALVE: Leaflets exhibited normal thickness, no calcification, and normal cuspal separation  DOPPLER: The transpulmonic velocity was within the normal range  There was trace regurgitation  PERICARDIUM: There was no pericardial effusion  The pericardium was normal in appearance  AORTA: The root exhibited normal size  SYSTEMIC VEINS: IVC: The inferior vena cava was normal in size   Respirophasic changes were normal      SYSTEM MEASUREMENT TABLES     2D   %FS: 39 3 %   Ao Diam: 3 04 cm   EDV(Teich): 90 25 ml   EF(Cube): 77 63 %   EF(Teich): 69 96 %   ESV(Cube): 19 77 ml   ESV(Teich): 27 11 ml   IVSd: 0 82 cm   LA Area: 16 33 cm2   LA Diam: 2 8 cm   LVEDV MOD A4C: 81 4 ml   LVEF MOD A4C: 76 42 %   LVESV MOD A4C: 19 19 ml   LVIDd: 4 45 cm   LVIDs: 2 7 cm   LVLd A4C: 8 42 cm   LVLs A4C: 6 38 cm LVPWd: 0 79 cm   RA Area: 15 8 cm2   SV MOD A4C: 62 21 ml   SV(Cube): 68 6 ml   SV(Teich): 63 14 ml   rv diam: 3 32 cm     CW   TR Vmax: 2 32 m/s   TR maxP 45 mmHg     MM   TAPSE: 2 53 cm     PW   E': 0 1 m/s   E/E': 9 13   MV A Mikel: 0 61 m/s   MV Dec [place]: 3 71 m/s2   MV DecT: 238 88 ms   MV E Mikel: 0 89 m/s   MV E/A Ratio: 1 45     [employer] Accredited Echocardiography Laboratory     Prepared and electronically signed by     Connie Frankel MD   Signed 2018 12:39:32

## 2018-01-24 ENCOUNTER — CLINICAL SUPPORT (OUTPATIENT)
Dept: FAMILY MEDICINE CLINIC | Facility: CLINIC | Age: 68
End: 2018-01-24
Payer: MEDICARE

## 2018-01-24 DIAGNOSIS — M81.0 AGE RELATED OSTEOPOROSIS, UNSPECIFIED PATHOLOGICAL FRACTURE PRESENCE: Primary | ICD-10-CM

## 2018-01-24 PROCEDURE — 96372 THER/PROPH/DIAG INJ SC/IM: CPT

## 2018-01-24 RX ORDER — LOSARTAN POTASSIUM 100 MG/1
1 TABLET ORAL DAILY
COMMUNITY
Start: 2011-12-16 | End: 2018-06-15 | Stop reason: SDUPTHER

## 2018-01-24 RX ORDER — LEVOTHYROXINE SODIUM 112 UG/1
1 TABLET ORAL DAILY
COMMUNITY
Start: 2011-12-16 | End: 2018-05-24 | Stop reason: SDUPTHER

## 2018-01-24 RX ORDER — PRAVASTATIN SODIUM 20 MG
1 TABLET ORAL DAILY
COMMUNITY
Start: 2015-06-17 | End: 2018-05-23 | Stop reason: SDUPTHER

## 2018-01-24 RX ORDER — AZELASTINE HCL 205.5 UG/1
2 SPRAY NASAL 2 TIMES DAILY
COMMUNITY
Start: 2017-11-06 | End: 2018-11-12 | Stop reason: ALTCHOICE

## 2018-01-24 RX ORDER — CHOLECALCIFEROL (VITAMIN D3) 125 MCG
1 CAPSULE ORAL DAILY
COMMUNITY
Start: 2015-06-17

## 2018-01-24 RX ORDER — ALBUTEROL SULFATE 90 UG/1
1 AEROSOL, METERED RESPIRATORY (INHALATION) EVERY 4 HOURS PRN
COMMUNITY
Start: 2016-03-20 | End: 2018-06-15 | Stop reason: HOSPADM

## 2018-01-25 ENCOUNTER — HOSPITAL ENCOUNTER (OUTPATIENT)
Dept: NON INVASIVE DIAGNOSTICS | Facility: CLINIC | Age: 68
Discharge: HOME | End: 2018-01-25
Payer: MEDICARE

## 2018-01-25 DIAGNOSIS — I65.23 CAROTID ARTERY STENOSIS, ASYMPTOMATIC, BILATERAL: ICD-10-CM

## 2018-01-25 PROCEDURE — 93880 EXTRACRANIAL BILAT STUDY: CPT

## 2018-01-25 PROCEDURE — 93880 EXTRACRANIAL BILAT STUDY: CPT | Performed by: SURGERY

## 2018-02-05 ENCOUNTER — TELEPHONE (OUTPATIENT)
Dept: FAMILY MEDICINE CLINIC | Facility: CLINIC | Age: 68
End: 2018-02-05

## 2018-05-23 DIAGNOSIS — E78.5 HYPERLIPIDEMIA, UNSPECIFIED HYPERLIPIDEMIA TYPE: Primary | ICD-10-CM

## 2018-05-23 RX ORDER — PRAVASTATIN SODIUM 20 MG
20 TABLET ORAL DAILY
Qty: 90 TABLET | Refills: 3 | Status: SHIPPED | OUTPATIENT
Start: 2018-05-23 | End: 2019-07-10 | Stop reason: SDUPTHER

## 2018-05-24 DIAGNOSIS — E03.9 ACQUIRED HYPOTHYROIDISM: ICD-10-CM

## 2018-05-24 DIAGNOSIS — E03.9 ACQUIRED HYPOTHYROIDISM: Primary | ICD-10-CM

## 2018-05-24 RX ORDER — LEVOTHYROXINE SODIUM 112 UG/1
TABLET ORAL
Qty: 90 TABLET | Refills: 3 | Status: SHIPPED | OUTPATIENT
Start: 2018-05-24 | End: 2018-05-24 | Stop reason: SDUPTHER

## 2018-05-24 RX ORDER — LEVOTHYROXINE SODIUM 112 UG/1
112 TABLET ORAL DAILY
Qty: 90 TABLET | Refills: 3 | Status: SHIPPED | OUTPATIENT
Start: 2018-05-24 | End: 2018-06-15 | Stop reason: SDUPTHER

## 2018-06-05 DIAGNOSIS — E03.9 HYPOTHYROIDISM: ICD-10-CM

## 2018-06-08 ENCOUNTER — APPOINTMENT (OUTPATIENT)
Dept: LAB | Facility: HOSPITAL | Age: 68
End: 2018-06-08
Payer: MEDICARE

## 2018-06-08 DIAGNOSIS — E55.9 VITAMIN D DEFICIENCY: ICD-10-CM

## 2018-06-08 DIAGNOSIS — E03.9 HYPOTHYROIDISM: ICD-10-CM

## 2018-06-08 DIAGNOSIS — M81.0 AGE-RELATED OSTEOPOROSIS WITHOUT CURRENT PATHOLOGICAL FRACTURE: ICD-10-CM

## 2018-06-08 DIAGNOSIS — E78.5 HYPERLIPIDEMIA: ICD-10-CM

## 2018-06-08 DIAGNOSIS — I10 ESSENTIAL (PRIMARY) HYPERTENSION: ICD-10-CM

## 2018-06-08 LAB
25(OH)D3 SERPL-MCNC: 39.5 NG/ML (ref 30–100)
ALBUMIN SERPL BCP-MCNC: 3.4 G/DL (ref 3.5–5)
ALP SERPL-CCNC: 66 U/L (ref 46–116)
ALT SERPL W P-5'-P-CCNC: 16 U/L (ref 12–78)
ANION GAP SERPL CALCULATED.3IONS-SCNC: 6 MMOL/L (ref 4–13)
AST SERPL W P-5'-P-CCNC: 16 U/L (ref 5–45)
BASOPHILS # BLD AUTO: 0.03 THOUSANDS/ΜL (ref 0–0.1)
BASOPHILS NFR BLD AUTO: 1 % (ref 0–1)
BILIRUB SERPL-MCNC: 0.61 MG/DL (ref 0.2–1)
BUN SERPL-MCNC: 18 MG/DL (ref 5–25)
CALCIUM SERPL-MCNC: 8.5 MG/DL (ref 8.3–10.1)
CHLORIDE SERPL-SCNC: 107 MMOL/L (ref 100–108)
CHOLEST SERPL-MCNC: 169 MG/DL (ref 50–200)
CO2 SERPL-SCNC: 27 MMOL/L (ref 21–32)
CREAT SERPL-MCNC: 0.85 MG/DL (ref 0.6–1.3)
EOSINOPHIL # BLD AUTO: 0.13 THOUSAND/ΜL (ref 0–0.61)
EOSINOPHIL NFR BLD AUTO: 3 % (ref 0–6)
ERYTHROCYTE [DISTWIDTH] IN BLOOD BY AUTOMATED COUNT: 12.9 % (ref 11.6–15.1)
GFR SERPL CREATININE-BSD FRML MDRD: 70 ML/MIN/1.73SQ M
GLUCOSE P FAST SERPL-MCNC: 76 MG/DL (ref 65–99)
HCT VFR BLD AUTO: 39.5 % (ref 34.8–46.1)
HDLC SERPL-MCNC: 53 MG/DL (ref 40–60)
HGB BLD-MCNC: 12.6 G/DL (ref 11.5–15.4)
IMM GRANULOCYTES # BLD AUTO: 0.02 THOUSAND/UL (ref 0–0.2)
IMM GRANULOCYTES NFR BLD AUTO: 0 % (ref 0–2)
LDLC SERPL CALC-MCNC: 102 MG/DL (ref 0–100)
LYMPHOCYTES # BLD AUTO: 1.85 THOUSANDS/ΜL (ref 0.6–4.47)
LYMPHOCYTES NFR BLD AUTO: 37 % (ref 14–44)
MCH RBC QN AUTO: 32.9 PG (ref 26.8–34.3)
MCHC RBC AUTO-ENTMCNC: 31.9 G/DL (ref 31.4–37.4)
MCV RBC AUTO: 103 FL (ref 82–98)
MONOCYTES # BLD AUTO: 0.41 THOUSAND/ΜL (ref 0.17–1.22)
MONOCYTES NFR BLD AUTO: 8 % (ref 4–12)
NEUTROPHILS # BLD AUTO: 2.59 THOUSANDS/ΜL (ref 1.85–7.62)
NEUTS SEG NFR BLD AUTO: 51 % (ref 43–75)
NONHDLC SERPL-MCNC: 116 MG/DL
NRBC BLD AUTO-RTO: 0 /100 WBCS
PLATELET # BLD AUTO: 394 THOUSANDS/UL (ref 149–390)
PMV BLD AUTO: 9.1 FL (ref 8.9–12.7)
POTASSIUM SERPL-SCNC: 4 MMOL/L (ref 3.5–5.3)
PROT SERPL-MCNC: 6.8 G/DL (ref 6.4–8.2)
RBC # BLD AUTO: 3.83 MILLION/UL (ref 3.81–5.12)
SODIUM SERPL-SCNC: 140 MMOL/L (ref 136–145)
TRIGL SERPL-MCNC: 70 MG/DL
TSH SERPL DL<=0.05 MIU/L-ACNC: 2.67 UIU/ML (ref 0.36–3.74)
WBC # BLD AUTO: 5.03 THOUSAND/UL (ref 4.31–10.16)

## 2018-06-08 PROCEDURE — 80053 COMPREHEN METABOLIC PANEL: CPT

## 2018-06-08 PROCEDURE — 36415 COLL VENOUS BLD VENIPUNCTURE: CPT

## 2018-06-08 PROCEDURE — 80061 LIPID PANEL: CPT

## 2018-06-08 PROCEDURE — 82306 VITAMIN D 25 HYDROXY: CPT

## 2018-06-08 PROCEDURE — 85025 COMPLETE CBC W/AUTO DIFF WBC: CPT

## 2018-06-08 PROCEDURE — 84443 ASSAY THYROID STIM HORMONE: CPT

## 2018-06-14 PROBLEM — R01.1 HEART MURMUR: Status: ACTIVE | Noted: 2017-12-15

## 2018-06-14 PROBLEM — R91.8 PULMONARY NODULES: Status: ACTIVE | Noted: 2017-03-12

## 2018-06-14 PROBLEM — R00.1 BRADYCARDIA: Status: ACTIVE | Noted: 2017-07-09

## 2018-06-15 ENCOUNTER — OFFICE VISIT (OUTPATIENT)
Dept: FAMILY MEDICINE CLINIC | Facility: CLINIC | Age: 68
End: 2018-06-15
Payer: MEDICARE

## 2018-06-15 VITALS
WEIGHT: 180 LBS | HEART RATE: 62 BPM | OXYGEN SATURATION: 97 % | SYSTOLIC BLOOD PRESSURE: 126 MMHG | TEMPERATURE: 99 F | DIASTOLIC BLOOD PRESSURE: 86 MMHG | HEIGHT: 65 IN | RESPIRATION RATE: 16 BRPM | BODY MASS INDEX: 29.99 KG/M2

## 2018-06-15 DIAGNOSIS — E78.2 MIXED HYPERLIPIDEMIA: ICD-10-CM

## 2018-06-15 DIAGNOSIS — E03.9 ACQUIRED HYPOTHYROIDISM: ICD-10-CM

## 2018-06-15 DIAGNOSIS — I77.1 SUBCLAVIAN ARTERY STENOSIS (HCC): ICD-10-CM

## 2018-06-15 DIAGNOSIS — E55.9 VITAMIN D DEFICIENCY: ICD-10-CM

## 2018-06-15 DIAGNOSIS — I10 ESSENTIAL HYPERTENSION: Primary | ICD-10-CM

## 2018-06-15 DIAGNOSIS — Z12.39 SCREENING FOR MALIGNANT NEOPLASM OF BREAST: ICD-10-CM

## 2018-06-15 DIAGNOSIS — R91.8 PULMONARY NODULES: ICD-10-CM

## 2018-06-15 DIAGNOSIS — M81.0 AGE-RELATED OSTEOPOROSIS WITHOUT CURRENT PATHOLOGICAL FRACTURE: ICD-10-CM

## 2018-06-15 PROCEDURE — 99215 OFFICE O/P EST HI 40 MIN: CPT | Performed by: FAMILY MEDICINE

## 2018-06-15 RX ORDER — LOSARTAN POTASSIUM 100 MG/1
100 TABLET ORAL DAILY
Qty: 90 TABLET | Refills: 3 | Status: SHIPPED | OUTPATIENT
Start: 2018-06-15 | End: 2019-06-21 | Stop reason: SDUPTHER

## 2018-06-15 RX ORDER — LEVOTHYROXINE SODIUM 112 UG/1
112 TABLET ORAL DAILY
Qty: 90 TABLET | Refills: 3 | Status: SHIPPED | OUTPATIENT
Start: 2018-06-15 | End: 2019-05-02 | Stop reason: SDUPTHER

## 2018-06-15 NOTE — ASSESSMENT & PLAN NOTE
Patient is asymptomatic  Continue aspirin 81 mg daily, statin therapy  Will continue to monitor subclavian artery disease with annual carotid Doppler

## 2018-06-15 NOTE — PROGRESS NOTES
Assessment/Plan:    Hypertension  BP is stable  Continue Losartan 100 mg daily  Follow a low sodium diet, regular exercise  Hyperlipidemia  Hyperlipidemia - controlled on Pravastatin 20 mg daily  Hypothyroidism  Continue Levothyroxine 112 mcg daily  Osteoporosis  DEXA scan done in 7/17 showed  improvement in bone mineral density in the lumbar spine  Continue Prolia injections every 6 months, weight-bearing exercise, calcium with Vit D supplementation  Recheck DEXA scan next year  Vitamin D deficiency  Vit D level improved  Continue Vit D 2000 IU daily  Pulmonary nodules  Patient is a former smoker  Will recheck LDCT in 1/19  Subclavian artery stenosis [facility])   Patient is asymptomatic  Continue aspirin 81 mg daily, statin therapy  Will continue to monitor subclavian artery disease with annual carotid Doppler  HM: recommended colonoscopy as discussed at previous visits  Check screening annual mammogram in 9/18  I have spent 40 minutes with Patient  today in which greater than 50% of this time was spent in counseling/coordination of care regarding Importance of tx compliance , reviewing diagnostic testings, answering patients questions, counseling regarding regular exercise and healthy diet  Schedule follow-up office visit in 6 months with AWV   Check labs prior to next visit  Diagnoses and all orders for this visit:    Essential hypertension  -     Comprehensive metabolic panel; Future    Mixed hyperlipidemia  -     CBC and differential; Future  -     Comprehensive metabolic panel; Future    Acquired hypothyroidism  -     TSH, 3rd generation with T4 reflex; Future    Age-related osteoporosis without current pathological fracture    Vitamin D deficiency    Pulmonary nodules    Subclavian artery stenosis (HCC)    Screening for malignant neoplasm of breast  -     Mammo screening bilateral w cad; Future          Subjective:      Patient ID: Gail Gonzales is a 77 y o  female  HPI     Patient is 64-year-old female with HTN, Hyperlipidemia, Hypothyroidism, Osteoporosis, Vit D deficiency, BL sublclavian  artery disease  She presents for 6 month follow-up office visit  Reviewed current medications,  blood work results from June 8, 2018  TSH 2 670, potassium 4 0, creatinine 0 85, fasting blood sugar 76, cholesterol 169, HDL 53, , Hb 12 6,   Vit D 25 hydroxy 39 5  HTN -  blood pressure is stable  Patient takes Losartan 100 mg daily  Denies chest pain, shortness of breath, dizziness  She walks every day  Echocardiogram done in January 2018: EF 60%, trace MR, TR  Hypothyroidism - currently on Levothyroxine 112 mcg daily  Weight has been stable  Patient denies fatigue, constipation, hair loss  Hyperlipidemia -controlled on Pravastatin 20 mg daily  Patient had Carotid Doppler done in January 2018 which showed moderate subclavian artery disease, <  50% stenosis in ICA  bilaterally  Patient is a former smoker  She had CT of the chest low-dose protocol done in January 2018 which showed small stable 4 mm L LE pulmonary nodule  patient had normal mammogram in September 2017  Osteoporosis - DEXA scan from 7/17 showed osteopenia, improvement in BMD  in the lumbar spine  Patient is getting Prolia injections every 6 months  Patient did not schedule colonoscopy as recommended at the last visit  Prevnar 13 done in 12/15  The following portions of the patient's history were reviewed and updated as appropriate: allergies, current medications, past family history, past medical history, past social history, past surgical history and problem list     Review of Systems   Constitutional: Negative for activity change, appetite change, chills, fatigue and fever  HENT: Negative for congestion, ear pain, hearing loss, nosebleeds, sore throat, tinnitus and trouble swallowing      Eyes: Negative for pain, discharge, redness, itching and visual disturbance  Respiratory: Negative for cough, chest tightness, shortness of breath and wheezing  Cardiovascular: Negative for chest pain, palpitations and leg swelling  Gastrointestinal: Negative for abdominal pain, blood in stool, constipation, diarrhea, nausea and vomiting  Endocrine: Negative for cold intolerance and heat intolerance  Genitourinary: Negative for difficulty urinating, dysuria, flank pain, frequency, hematuria and pelvic pain  Musculoskeletal: Negative for arthralgias, gait problem, joint swelling and myalgias  Skin: Negative for rash and wound  Neurological: Negative for dizziness, syncope and headaches  Hematological: Negative for adenopathy  Bruises/bleeds easily  Psychiatric/Behavioral: Negative  Objective:      /86 (BP Location: Left arm, Patient Position: Sitting, Cuff Size: Adult)   Pulse 62   Temp 99 °F (37 2 °C) (Tympanic)   Resp 16   Ht 5' 5" (1 651 m)   Wt 81 6 kg (180 lb)   SpO2 97%   BMI 29 95 kg/m²        Physical Exam   Constitutional: She appears well-nourished  HENT:   Head: Normocephalic and atraumatic  Right Ear: External ear normal    Left Ear: External ear normal    Mouth/Throat: Oropharynx is clear and moist    Eyes: Conjunctivae are normal  Pupils are equal, round, and reactive to light  Neck: Normal range of motion  No JVD present  Cardiovascular: Normal rate and regular rhythm  Murmur (soft systolic murmur at R USB) heard  L carotid bruit  No BL LE edema  No abdominal bruits   Pulmonary/Chest: Effort normal and breath sounds normal  She has no wheezes  She has no rales  Abdominal: Soft  Bowel sounds are normal  There is no tenderness  Musculoskeletal: Normal range of motion  She exhibits no edema, tenderness or deformity  Skin: Skin is warm and dry  Lipomatous lump 2 x 1 5 cm on L inner forearm    Psychiatric: She has a normal mood and affect  Nursing note and vitals reviewed

## 2018-06-15 NOTE — ASSESSMENT & PLAN NOTE
DEXA scan done in 7/17 showed  improvement in bone mineral density in the lumbar spine  Continue Prolia injections every 6 months, weight-bearing exercise, calcium with Vit D supplementation  Recheck DEXA scan next year

## 2018-08-02 ENCOUNTER — CLINICAL SUPPORT (OUTPATIENT)
Dept: FAMILY MEDICINE CLINIC | Facility: CLINIC | Age: 68
End: 2018-08-02
Payer: MEDICARE

## 2018-08-02 DIAGNOSIS — M81.0 AGE-RELATED OSTEOPOROSIS WITHOUT CURRENT PATHOLOGICAL FRACTURE: ICD-10-CM

## 2018-08-02 PROCEDURE — 96372 THER/PROPH/DIAG INJ SC/IM: CPT

## 2018-08-02 NOTE — PROGRESS NOTES
Patient presents for a Prolia Injection  Tolerated well in left arm  She will return in 6 months, around February 2019 for the next injection

## 2018-10-04 ENCOUNTER — HOSPITAL ENCOUNTER (OUTPATIENT)
Dept: RADIOLOGY | Age: 68
Discharge: HOME | End: 2018-10-04
Payer: MEDICARE

## 2018-10-04 DIAGNOSIS — Z12.39 SCREENING FOR MALIGNANT NEOPLASM OF BREAST: ICD-10-CM

## 2018-10-04 PROCEDURE — 77067 SCR MAMMO BI INCL CAD: CPT

## 2018-10-05 DIAGNOSIS — R92.2 BREAST DENSITY: Primary | ICD-10-CM

## 2018-10-11 ENCOUNTER — HOSPITAL ENCOUNTER (OUTPATIENT)
Dept: ULTRASOUND IMAGING | Facility: CLINIC | Age: 68
Discharge: HOME | End: 2018-10-11
Payer: MEDICARE

## 2018-10-11 ENCOUNTER — HOSPITAL ENCOUNTER (OUTPATIENT)
Dept: MAMMOGRAPHY | Facility: CLINIC | Age: 68
Discharge: HOME | End: 2018-10-11
Payer: MEDICARE

## 2018-10-11 DIAGNOSIS — R92.8 ABNORMAL MAMMOGRAM: ICD-10-CM

## 2018-10-11 DIAGNOSIS — N63.0 BREAST NODULE: Primary | ICD-10-CM

## 2018-10-11 PROCEDURE — 76642 ULTRASOUND BREAST LIMITED: CPT

## 2018-10-11 PROCEDURE — 77065 DX MAMMO INCL CAD UNI: CPT

## 2018-10-11 PROCEDURE — G0279 TOMOSYNTHESIS, MAMMO: HCPCS

## 2018-10-11 NOTE — PROGRESS NOTES
Left message for patient letting her know the results were reviewed by Dr Alf Lara, and I do see she scheduled the biopsy for 11/1/18, so I told her to call with questions or concerns

## 2018-10-11 NOTE — PROGRESS NOTES
Met with patient and Dr Eli Chapa regarding recommendation for;      __x___ RIGHT ______LEFT      __x___Ultrasound guided  ______Stereotactic  Breast biopsy  __x___Verbalized understanding        Blood thinners:  _____yes __x___no  [facility] only    Date stopped: _____n/a______    Biopsy teaching sheet given:  ____x___yes ______no

## 2018-11-01 ENCOUNTER — HOSPITAL ENCOUNTER (OUTPATIENT)
Dept: MAMMOGRAPHY | Facility: CLINIC | Age: 68
Discharge: HOME | End: 2018-11-01

## 2018-11-01 ENCOUNTER — HOSPITAL ENCOUNTER (OUTPATIENT)
Dept: ULTRASOUND IMAGING | Facility: CLINIC | Age: 68
Discharge: HOME | End: 2018-11-01
Payer: MEDICARE

## 2018-11-01 VITALS — DIASTOLIC BLOOD PRESSURE: 76 MMHG | HEART RATE: 80 BPM | SYSTOLIC BLOOD PRESSURE: 128 MMHG

## 2018-11-01 DIAGNOSIS — R92.8 ABNORMAL ULTRASOUND OF BREAST: ICD-10-CM

## 2018-11-01 DIAGNOSIS — R92.8 ABNORMAL MAMMOGRAM: ICD-10-CM

## 2018-11-01 PROCEDURE — 88361 TUMOR IMMUNOHISTOCHEM/COMPUT: CPT | Performed by: PATHOLOGY

## 2018-11-01 PROCEDURE — 88341 IMHCHEM/IMCYTCHM EA ADD ANTB: CPT | Performed by: PATHOLOGY

## 2018-11-01 PROCEDURE — 88342 IMHCHEM/IMCYTCHM 1ST ANTB: CPT | Performed by: PATHOLOGY

## 2018-11-01 PROCEDURE — 88305 TISSUE EXAM BY PATHOLOGIST: CPT | Performed by: PATHOLOGY

## 2018-11-01 PROCEDURE — 19083 BX BREAST 1ST LESION US IMAG: CPT

## 2018-11-01 RX ORDER — LIDOCAINE HYDROCHLORIDE 10 MG/ML
4 INJECTION, SOLUTION INFILTRATION; PERINEURAL ONCE
Status: COMPLETED | OUTPATIENT
Start: 2018-11-01 | End: 2018-11-01

## 2018-11-01 RX ADMIN — LIDOCAINE HYDROCHLORIDE 4 ML: 10 INJECTION, SOLUTION INFILTRATION; PERINEURAL at 10:33

## 2018-11-01 NOTE — DISCHARGE INSTR - OTHER ORDERS
POST LARGE CORE BREAST BIOPSY PATIENT INFORMATION      1  Place an ice pack inside your bra over the top of the dressing every hour for 20 minutes (20 minutes on, 60 minutes off)  Do this until bedtime  2  Do not shower or bathe until the following morning  3  You may bathe your breast carefully with the steri-strips in place  Be careful    Not to loosen them  The steri-strips will fall off in 3-5 days  4  You may have mild discomfort, and you may have some bruising where the   Needle entered the skin  This should clear within 5-7 days  5  If you need medicine for discomfort, take acetaminophen products such as   Tylenol  You may also take Advil or Motrin products  6  Do not participate in strenuous activities such as-tennis, aerobics, skiing,  Weight lifting, etc  for 24 hours  Refrain from swimming/soaking for 72 hours  7  Wearing a bra for sleeping may be more comfortable for the first 24-48 hours  8  Watch for continued bleeding, pain or fever over 101; please call with any questions or concerns  For procedures done at the [facility]  Keith Pickens Osmar Mcintyre "[address] call:  Sai Allen RN at [phone]  Vicky Arroyo RN at [phone]                    *After 4 PM call the Interventional Radiology Department                    [phone] and ask to speak with the nurse on call  For procedures done at the [address] [place] [place] [address] call:         Mandie Carver RN at   *After 4 PM call the Interventional Radiology Department   [phone] and ask to speak with the nurse on call  For procedures done at [address] [place] [address] call: The Radiology Nurse at [phone]  *After 4 PM call your physician, or go to the Emergency Department  9          The final results of your biopsy are usually available within one week

## 2018-11-01 NOTE — PROGRESS NOTES
Procedure type:    __x___ultrasound guided _____stereotactic    Breast:    _____Left __x___Right    Location: 3-4:00 5cm from nipple    Needle: 12g Ivis    # of passes: 3    Clip: Hydromark-butterfly    Performed by: Dr Ezekiel Norris held for 5 minutes by: Irish Gutierrez     Sternelia Strips:    __x___yes _____no    Band aid:    __x___yes_____no    Tape and guaze:    _____yes __x___no    Tolerated procedure:    ___x__yes _____no

## 2018-11-02 ENCOUNTER — TELEPHONE (OUTPATIENT)
Dept: FAMILY MEDICINE CLINIC | Facility: CLINIC | Age: 68
End: 2018-11-02

## 2018-11-02 NOTE — TELEPHONE ENCOUNTER
Please call patient and pharmacy  OK to refill prescription for Levothyroxine 75 mcg -take 1 5 tablet daily  90 day supply with 3 refills

## 2018-11-02 NOTE — PROGRESS NOTES
Post procedure call completed on 11/2/18 at 1046    Bleeding: _____yes __x___no    Pain: _____yes ___x___no    Redness/Swelling: ______yes ___x___no    Band aid removed: __x___yes _____no    Steri-Strips intact: ___x___yes _____no

## 2018-11-06 ENCOUNTER — TELEPHONE (OUTPATIENT)
Dept: MAMMOGRAPHY | Facility: CLINIC | Age: 68
End: 2018-11-06

## 2018-11-06 PROBLEM — C50.311 MALIGNANT NEOPLASM OF LOWER-INNER QUADRANT OF RIGHT BREAST OF FEMALE, ESTROGEN RECEPTOR POSITIVE (HCC): Status: ACTIVE | Noted: 2018-11-06

## 2018-11-06 PROBLEM — Z17.0 MALIGNANT NEOPLASM OF LOWER-INNER QUADRANT OF RIGHT BREAST OF FEMALE, ESTROGEN RECEPTOR POSITIVE (HCC): Status: ACTIVE | Noted: 2018-11-06

## 2018-11-12 ENCOUNTER — APPOINTMENT (OUTPATIENT)
Dept: LAB | Facility: CLINIC | Age: 68
End: 2018-11-12
Payer: MEDICARE

## 2018-11-12 ENCOUNTER — DOCUMENTATION (OUTPATIENT)
Dept: HEMATOLOGY ONCOLOGY | Facility: CLINIC | Age: 68
End: 2018-11-12

## 2018-11-12 ENCOUNTER — OFFICE VISIT (OUTPATIENT)
Dept: SURGICAL ONCOLOGY | Facility: CLINIC | Age: 68
End: 2018-11-12
Payer: MEDICARE

## 2018-11-12 ENCOUNTER — TRANSCRIBE ORDERS (OUTPATIENT)
Dept: LAB | Facility: CLINIC | Age: 68
End: 2018-11-12

## 2018-11-12 VITALS
SYSTOLIC BLOOD PRESSURE: 102 MMHG | RESPIRATION RATE: 14 BRPM | WEIGHT: 181 LBS | DIASTOLIC BLOOD PRESSURE: 60 MMHG | HEIGHT: 65 IN | BODY MASS INDEX: 30.16 KG/M2 | HEART RATE: 62 BPM | TEMPERATURE: 97.8 F

## 2018-11-12 DIAGNOSIS — C50.311 MALIGNANT NEOPLASM OF LOWER-INNER QUADRANT OF RIGHT FEMALE BREAST, UNSPECIFIED ESTROGEN RECEPTOR STATUS (HCC): Primary | ICD-10-CM

## 2018-11-12 DIAGNOSIS — C50.311 MALIGNANT NEOPLASM OF LOWER-INNER QUADRANT OF RIGHT BREAST OF FEMALE, ESTROGEN RECEPTOR POSITIVE (HCC): Primary | ICD-10-CM

## 2018-11-12 DIAGNOSIS — C50.311 MALIGNANT NEOPLASM OF LOWER-INNER QUADRANT OF RIGHT FEMALE BREAST, UNSPECIFIED ESTROGEN RECEPTOR STATUS (HCC): ICD-10-CM

## 2018-11-12 DIAGNOSIS — Z17.0 MALIGNANT NEOPLASM OF LOWER-INNER QUADRANT OF RIGHT BREAST OF FEMALE, ESTROGEN RECEPTOR POSITIVE (HCC): Primary | ICD-10-CM

## 2018-11-12 PROCEDURE — 99205 OFFICE O/P NEW HI 60 MIN: CPT | Performed by: SURGERY

## 2018-11-12 PROCEDURE — 36415 COLL VENOUS BLD VENIPUNCTURE: CPT

## 2018-11-12 NOTE — PROGRESS NOTES
Oncology RN Navigator Visit:    Assessment:  Genetic information visit: Joanna Huddleston patient to educate on insurance/copay and lab processes for genetic testing through [employer]  Explained my role as a nurse navigator and informed her that I am not a Genetics Counselor  I advised her that positive result becomes part of the permanent medical records, and might have implications if she applies for life insurance  She verbalized understanding and wants to pursue testing-explained that results turnaround are typically 7-10 days, and Dr Renay Diaz will share her results during her f/u visit in 2 weeks  Gave her literature on genetic testing, insurance/OOP averages and a card on how to reach the genetics lab for testing or billing concerns  Also provided info for testing of blood relatives through [facility]  Offered emotional  support to the patient, and answered all her questions at this time  Also advised patient that if her results are either positive or a mutation of unknown significance we will schedule her for Genetics counseling session with a certified genetics counselor with the lab company  Pt was given my office contact information as well as a contact number, e-mail address and reference ID for [facility]  Patient verbalized understanding of information and was escorted to the lab for her blood draw   Pt was encouraged to call for any questions/concerns  Will follow up with her on an as needed basis      Potential Barriers:    Care Coordination:    Communication/Education:    Cultural/Protestant/Spiritual:    Health Promotion:    Insurance/Medical Costs:    Logistical:    Psychosocial/Distress/Behavioral:    Work/School:    Interventions:    Referrals:        Comments:

## 2018-11-12 NOTE — PROGRESS NOTES
Surgical Oncology Consult Note       [address]  [facility] [place]  [address]  [facility] [address]  1950  [id number]  [address]  [facility] [place]  [address] [address]      Chief Complaint:     Chief Complaint   Patient presents with    Consult    Breast Cancer       Assessment and Plan:   Assessment/Plan   Patient presents with a new diagnosis of right breast cancer  Patient has a family history of her mother as well as her maternal grandmother having breast cancer the mother was diagnosed at the age of 46  I have recommended that the patient be genetically tested for predisposition to breast cancer prior to making any definitive plans however we are leaning towards breast conservation therapy  See HPI  Oncology History:        Malignant neoplasm of lower-inner quadrant of right breast of female, estrogen receptor positive ([facility] [address] )    11/1/2018 Biopsy     Right breast biopsy  3-4 o'clock, 5 cmfn  Invasive ductal carcinoma  Grade 1  8 mm on ultrasound    DE 90  Her 2 1 +         11/12/2018 Genetic Testing     BRCA testing-[facility]            History of Present Illness: This is a 69-year-old woman who went for a screening mammogram which demonstrated an asymmetry at the 3 o'clock position 5 cm from the nipple in the right breast   The left breast was cleared  This was concordant with the imaging report  There are no abnormalities identified in the left breast   This was biopsied and shown to be invasive ductal carcinoma measuring approximately 0 6 cm in size, grade 1, %, DE 90%, HER2 Kaylyn 1+/negative  An axillary ultrasound of the right axilla showed no evidence of adenopathy  The butterfly clip was in good position  Patient presents now for an opinion regarding further management      Review of Systems:   Review of Systems   All other systems reviewed and are negative  Past Medical History:      Patient Active Problem List   Diagnosis    Allergic rhinitis    Bradycardia    Heart murmur    Hyperlipidemia    Hypertension    Hypothyroidism    Osteoporosis    Pulmonary nodules    Subclavian artery stenosis (HCC)    Vitamin D deficiency    Malignant neoplasm of lower-inner quadrant of right breast of female, estrogen receptor positive ([facility] [address] )        Past Medical History:   Diagnosis Date    Acute pain of left shoulder     History    Acute recurrent maxillary sinusitis     History    Acute upper respiratory infection     History    Disease of thyroid gland     History of Graves' disease     status post 131 treatment    History of lateral epicondylitis of left elbow     History of onychomycosis     of toenail    History of perforation of tympanic membrane     Hyperlipidemia     Hypertension     Left otitis media     History    Medicare annual wellness visit, subsequent         Past Surgical History:   Procedure Laterality Date    SINUS SURGERY Left 1996    nasal polyps(?)-large papilloma left maxillary sinus with extension into posterior nasopharynx completed by Dr Rajesh Gilbert   TONSILLECTOMY AND ADENOIDECTOMY      US GUIDED BREAST BIOPSY RIGHT COMPLETE Right 11/1/2018    WRIST FRACTURE SURGERY  09/1960    Open Treatment of Fracture of Distal Radius        Family History   Problem Relation Age of Onset    Breast cancer Mother     Pancreatic cancer Mother     Aortic aneurysm Father     Aortic aneurysm Family         Abdominal    Breast cancer Family     Breast cancer Maternal Grandmother         Social History     Social History    Marital status: /Civil Union     Spouse name: N/A    Number of children: N/A    Years of education: N/A     Occupational History    Not on file       Social History Main Topics    Smoking status: Former Smoker     Quit date: 10/16/2002    Smokeless tobacco: Never Used    Alcohol use No    Drug use: Unknown    Sexual activity: Not on file     Other Topics Concern    Not on file     Social History Narrative    No narrative on file        Current Outpatient Prescriptions:     aspirin 81 MG tablet, Take 1 tablet by mouth daily, Disp: , Rfl:     Cholecalciferol (VITAMIN D3) 2000 units TABS, Take 1 tablet by mouth daily, Disp: , Rfl:     levothyroxine 112 mcg tablet, Take 1 tablet (112 mcg total) by mouth daily, Disp: 90 tablet, Rfl: 3    losartan (COZAAR) 100 MG tablet, Take 1 tablet (100 mg total) by mouth daily, Disp: 90 tablet, Rfl: 3    pravastatin (PRAVACHOL) 20 mg tablet, Take 1 tablet (20 mg total) by mouth daily for 90 days, Disp: 90 tablet, Rfl: 3    Current Facility-Administered Medications:     denosumab (PROLIA) subcutaneous injection 60 mg, 60 mg, Subcutaneous, Q6 Months, Sumeet Mccormick MD, 60 mg at 08/02/18 1357     Allergies   Allergen Reactions    Sulfa Antibiotics Dizziness     Other reaction(s): feeling "off-balance"  Category: Adverse Reaction;        Physical Exam:     Vitals:    11/12/18 1331   BP: 102/60   Pulse: 62   Resp: 14   Temp: 97 8 °F (36 6 °C)     Physical Exam   Constitutional: She is oriented to person, place, and time  She appears well-developed and well-nourished  HENT:   Head: Normocephalic and atraumatic  Mouth/Throat: Oropharynx is clear and moist    Eyes: Pupils are equal, round, and reactive to light  EOM are normal    Neck: Normal range of motion  Neck supple  No JVD present  No tracheal deviation present  No thyromegaly present  Cardiovascular: Normal rate, regular rhythm, normal heart sounds and intact distal pulses  Exam reveals no gallop and no friction rub  No murmur heard  Left carotid bruit known to the patient   Pulmonary/Chest: Effort normal and breath sounds normal  No respiratory distress  She has no wheezes  She has no rales     Examination the right breast demonstrates a small amount of ecchymosis at the 3 o'clock position there is no dominant mass no other skin changes and no axillary adenopathy  The left breast shows no nipple discharge dominant masses axillary or supraclavicular adenopathy  Abdominal: Soft  She exhibits no distension and no mass  There is no hepatomegaly  There is no tenderness  There is no rebound and no guarding  Musculoskeletal: Normal range of motion  She exhibits no edema or tenderness  Lymphadenopathy:     She has no cervical adenopathy  Neurological: She is alert and oriented to person, place, and time  No cranial nerve deficit  Skin: Skin is warm and dry  No rash noted  No erythema  Psychiatric: She has a normal mood and affect  Her behavior is normal    Vitals reviewed  Results:   Pathology:  I reviewed the pathology is concordant with the imaging  Imaging  I reviewed the imaging specifically the mammograms and post biopsy films I agree with reports as outlined  Discussion/Summary:   Given the observation of breast cancer in the maternal grandmother the mother and the patient as well as the patient having children I have recommended she have genetic testing  Because she wishes to have genetic testing I prefer to have that done before surgery in case it would impact her surgical options  The patient currently prefers breast conservation therapy  At the age of 77 she may not need radiation therapy we talked about this but I deferred this further in till final pathology  We also talked about anti hormonal therapy  Also explained would be unlikely for her to need chemotherapy but again final pathology will be necessary prior to making definitive decisions  All questions were answered the patient's satisfaction  We will see her back after her genetic testing  Advance Care Planning/Advance Directives:  I discussed the disease status, treatment plans and follow-up with the patient

## 2018-11-12 NOTE — LETTER
November 12, 2018     Bety Ahmadi[address]    Patient: Fidencio Grewal   YOB: 1950   Date of Visit: 11/12/2018       Dear Dr Susie Torrez:    Thank you for referring Rajni Holliday to me for evaluation  Below are my notes for this consultation  If you have questions, please do not hesitate to call me  I look forward to following your patient along with you  Sincerely,        Luciano Duncan MD        CC: No Recipients  Luciano Duncan MD  11/12/2018  2:22 PM  Sign at close encounter               Surgical Oncology Consult Note       [address]  1950  [id number]  [address] [facility],[address]  [facility] [place] [id number]      Chief Complaint:     Chief Complaint   Patient presents with    Consult    Breast Cancer       Assessment and Plan:   Assessment/Plan   Patient presents with a new diagnosis of right breast cancer  Patient has a family history of her mother as well as her maternal grandmother having breast cancer the mother was diagnosed at the age of 46  I have recommended that the patient be genetically tested for predisposition to breast cancer prior to making any definitive plans however we are leaning towards breast conservation therapy  See HPI  Oncology History:        Malignant neoplasm of lower-inner quadrant of right breast of female, estrogen receptor positive ([facility] [address] )    11/1/2018 Biopsy     Right breast biopsy  3-4 o'clock, 5 cmfn  Invasive ductal carcinoma  Grade 1  8 mm on ultrasound    CO 90  Her 2 1 +         11/12/2018 Genetic Testing     BRCA testing-[facility]            History of Present Illness:    This is a 70-year-old woman who went for a screening mammogram which demonstrated an asymmetry at the 3 o'clock position 5 cm from the nipple in the right breast  The left breast was cleared  This was concordant with the imaging report  There are no abnormalities identified in the left breast   This was biopsied and shown to be invasive ductal carcinoma measuring approximately 0 6 cm in size, grade 1, %, [place] 90%, HER2 Kaylyn 1+/negative  An axillary ultrasound of the right axilla showed no evidence of adenopathy  The butterfly clip was in good position  Patient presents now for an opinion regarding further management  Review of Systems:   Review of Systems   All other systems reviewed and are negative  Past Medical History:      Patient Active Problem List   Diagnosis    Allergic rhinitis    Bradycardia    Heart murmur    Hyperlipidemia    Hypertension    Hypothyroidism    Osteoporosis    Pulmonary nodules    Subclavian artery stenosis (HCC)    Vitamin D deficiency    Malignant neoplasm of lower-inner quadrant of right breast of female, estrogen receptor positive ([facility] [address] )        Past Medical History:   Diagnosis Date    Acute pain of left shoulder     History    Acute recurrent maxillary sinusitis     History    Acute upper respiratory infection     History    Disease of thyroid gland     History of Graves' disease     status post 131 treatment    History of lateral epicondylitis of left elbow     History of onychomycosis     of toenail    History of perforation of tympanic membrane     Hyperlipidemia     Hypertension     Left otitis media     History    Medicare annual wellness visit, subsequent         Past Surgical History:   Procedure Laterality Date    SINUS SURGERY Left 1996    nasal polyps(?)-large papilloma left maxillary sinus with extension into posterior nasopharynx completed by Dr Goran Farley      TONSILLECTOMY AND ADENOIDECTOMY      US GUIDED BREAST BIOPSY RIGHT COMPLETE Right 11/1/2018    WRIST FRACTURE SURGERY  09/1960    Open Treatment of Fracture of Distal Radius        Family History   Problem Relation Age of Onset    Breast cancer Mother     Pancreatic cancer Mother     Aortic aneurysm Father     Aortic aneurysm Family         Abdominal    Breast cancer Family     Breast cancer Maternal Grandmother         Social History     Social History    Marital status: /Civil Union     Spouse name: N/A    Number of children: N/A    Years of education: N/A     Occupational History    Not on file  Social History Main Topics    Smoking status: Former Smoker     Quit date: 10/16/2002    Smokeless tobacco: Never Used    Alcohol use No    Drug use: Unknown    Sexual activity: Not on file     Other Topics Concern    Not on file     Social History Narrative    No narrative on file        Current Outpatient Prescriptions:     aspirin 81 MG tablet, Take 1 tablet by mouth daily, Disp: , Rfl:     Cholecalciferol (VITAMIN D3) 2000 units TABS, Take 1 tablet by mouth daily, Disp: , Rfl:     levothyroxine 112 mcg tablet, Take 1 tablet (112 mcg total) by mouth daily, Disp: 90 tablet, Rfl: 3    losartan (COZAAR) 100 MG tablet, Take 1 tablet (100 mg total) by mouth daily, Disp: 90 tablet, Rfl: 3    pravastatin (PRAVACHOL) 20 mg tablet, Take 1 tablet (20 mg total) by mouth daily for 90 days, Disp: 90 tablet, Rfl: 3    Current Facility-Administered Medications:     denosumab (PROLIA) subcutaneous injection 60 mg, 60 mg, Subcutaneous, Q6 Months, Bety Ahmadi MD, 60 mg at 08/02/18 1357     Allergies   Allergen Reactions    Sulfa Antibiotics Dizziness     Other reaction(s): feeling "off-balance"  Category: Adverse Reaction;        Physical Exam:     Vitals:    11/12/18 1331   BP: 102/60   Pulse: 62   Resp: 14   Temp: 97 8 °F (36 6 °C)     Physical Exam   Constitutional: She is oriented to person, place, and time  She appears well-developed and well-nourished  HENT:   Head: Normocephalic and atraumatic  Mouth/Throat: Oropharynx is clear and moist    Eyes: Pupils are equal, round, and reactive to light   EOM are normal    Neck: Normal range of motion  Neck supple  No JVD present  No tracheal deviation present  No thyromegaly present  Cardiovascular: Normal rate, regular rhythm, normal heart sounds and intact distal pulses  Exam reveals no gallop and no friction rub  No murmur heard  Left carotid bruit known to the patient   Pulmonary/Chest: Effort normal and breath sounds normal  No respiratory distress  She has no wheezes  She has no rales  Examination the right breast demonstrates a small amount of ecchymosis at the 3 o'clock position there is no dominant mass no other skin changes and no axillary adenopathy  The left breast shows no nipple discharge dominant masses axillary or supraclavicular adenopathy  Abdominal: Soft  She exhibits no distension and no mass  There is no hepatomegaly  There is no tenderness  There is no rebound and no guarding  Musculoskeletal: Normal range of motion  She exhibits no edema or tenderness  Lymphadenopathy:     She has no cervical adenopathy  Neurological: She is alert and oriented to person, place, and time  No cranial nerve deficit  Skin: Skin is warm and dry  No rash noted  No erythema  Psychiatric: She has a normal mood and affect  Her behavior is normal    Vitals reviewed  Results:   Pathology:  I reviewed the pathology is concordant with the imaging  Imaging  I reviewed the imaging specifically the mammograms and post biopsy films I agree with reports as outlined  Discussion/Summary:   Given the observation of breast cancer in the maternal grandmother the mother and the patient as well as the patient having children I have recommended she have genetic testing  Because she wishes to have genetic testing I prefer to have that done before surgery in case it would impact her surgical options  The patient currently prefers breast conservation therapy    At the age of 77 she may not need radiation therapy we talked about this but I deferred this further in till final pathology  We also talked about anti hormonal therapy  Also explained would be unlikely for her to need chemotherapy but again final pathology will be necessary prior to making definitive decisions  All questions were answered the patient's satisfaction  We will see her back after her genetic testing  Advance Care Planning/Advance Directives:  I discussed the disease status, treatment plans and follow-up with the patient

## 2018-11-13 LAB — MISCELLANEOUS LAB TEST RESULT: NORMAL

## 2018-11-21 ENCOUNTER — TELEPHONE (OUTPATIENT)
Dept: SURGICAL ONCOLOGY | Facility: CLINIC | Age: 68
End: 2018-11-21

## 2018-11-21 NOTE — TELEPHONE ENCOUNTER
Results of genetic testing given to patient which were negative  Patient has pre op followup scheduled for 11/29

## 2018-11-29 ENCOUNTER — OFFICE VISIT (OUTPATIENT)
Dept: SURGICAL ONCOLOGY | Facility: CLINIC | Age: 68
End: 2018-11-29
Payer: MEDICARE

## 2018-11-29 VITALS
RESPIRATION RATE: 18 BRPM | SYSTOLIC BLOOD PRESSURE: 142 MMHG | DIASTOLIC BLOOD PRESSURE: 70 MMHG | HEIGHT: 65 IN | HEART RATE: 55 BPM | BODY MASS INDEX: 29.99 KG/M2 | TEMPERATURE: 98.1 F | WEIGHT: 180 LBS

## 2018-11-29 DIAGNOSIS — Z17.0 MALIGNANT NEOPLASM OF LOWER-INNER QUADRANT OF RIGHT BREAST OF FEMALE, ESTROGEN RECEPTOR POSITIVE (HCC): Primary | ICD-10-CM

## 2018-11-29 DIAGNOSIS — C50.311 MALIGNANT NEOPLASM OF LOWER-INNER QUADRANT OF RIGHT BREAST OF FEMALE, ESTROGEN RECEPTOR POSITIVE (HCC): Primary | ICD-10-CM

## 2018-11-29 PROCEDURE — 99214 OFFICE O/P EST MOD 30 MIN: CPT | Performed by: SURGERY

## 2018-11-29 RX ORDER — OXYCODONE HYDROCHLORIDE AND ACETAMINOPHEN 5; 325 MG/1; MG/1
1 TABLET ORAL EVERY 4 HOURS PRN
Qty: 6 TABLET | Refills: 0 | Status: SHIPPED | OUTPATIENT
Start: 2018-11-29 | End: 2019-01-04 | Stop reason: ALTCHOICE

## 2018-11-29 NOTE — LETTER
November 29, 2018     Blas Jaimes, [facility] [address] [facility]    Patient: Marvel Nava   YOB: 1950   Date of Visit: 11/29/2018       Dear Dr Prabhjot Velazquez:    Thank you for referring Aster Zhang to me for evaluation  Below are my notes for this consultation  If you have questions, please do not hesitate to call me  I look forward to following your patient along with you  Sincerely,        Rafaela Ochoa MD        CC: No Recipients  Rafaela Ochoa MD  11/29/2018 12:23 PM  Sign at close encounter     Surgical Oncology Follow Up       [address] [place] [address] [address]  1950  [id number]  [address]  [facility] [place] [id number]    Chief Complaint   Patient presents with    Pre-op Exam          Assessment & Plan:   Patient presents for a follow-up visit  Her genetic results were negative  The patient was aware of this  We reviewed the implications of this  The patient prefers breast conservation therapy  We went through the process of informed consent for right breast needle localization lumpectomy in conjunction with a sentinel lymph node biopsy and possible axillary dissection  All questions were answered to the patient's satisfaction  We will coordinate the surgery next mutual convenience  The risk and benefit as outlined on the consent form were all reviewed in detail and all questions answered      Cancer History:        Malignant neoplasm of lower-inner quadrant of right breast of female, estrogen receptor positive ([facility] [address] )    11/1/2018 Biopsy     Right breast biopsy  3-4 o'clock, 5 cmfn  Invasive ductal carcinoma  Grade 1  8 mm on ultrasound    MA 90  Her 2 1 +         11/12/2018 Genetic Testing     BRCA testing-invitae  Negative              Interval History:   See above    Review of Systems: Review of Systems   Constitutional: Negative for activity change, appetite change and fatigue  HENT: Negative  Eyes: Negative  Respiratory: Negative for cough, shortness of breath and wheezing  Cardiovascular: Negative for chest pain and leg swelling  Gastrointestinal: Negative  Endocrine: Negative  Genitourinary: Negative  Musculoskeletal:        No new changes or complaints of bone pain   Skin: Negative  Allergic/Immunologic: Negative  Neurological: Negative  Hematological: Negative  Psychiatric/Behavioral: Negative  Past Medical History     Patient Active Problem List   Diagnosis    Allergic rhinitis    Bradycardia    Heart murmur    Hyperlipidemia    Hypertension    Hypothyroidism    Osteoporosis    Pulmonary nodules    Subclavian artery stenosis (HCC)    Vitamin D deficiency    Malignant neoplasm of lower-inner quadrant of right breast of female, estrogen receptor positive ([facility] [address] )     Past Medical History:   Diagnosis Date    Acute pain of left shoulder     History    Acute recurrent maxillary sinusitis     History    Acute upper respiratory infection     History    Disease of thyroid gland     History of Graves' disease     status post 131 treatment    History of lateral epicondylitis of left elbow     History of onychomycosis     of toenail    History of perforation of tympanic membrane     Hyperlipidemia     Hypertension     Left otitis media     History    Medicare annual wellness visit, subsequent      Past Surgical History:   Procedure Laterality Date    SINUS SURGERY Left 1996    nasal polyps(?)-large papilloma left maxillary sinus with extension into posterior nasopharynx completed by Dr Felisa Lou      TONSILLECTOMY AND ADENOIDECTOMY      US GUIDED BREAST BIOPSY RIGHT COMPLETE Right 11/1/2018    WRIST FRACTURE SURGERY  09/1960    Open Treatment of Fracture of Distal Radius     Family History   Problem Relation Age of Onset    Breast cancer Mother     Pancreatic cancer Mother     Aortic aneurysm Father     Aortic aneurysm Family         Abdominal    Breast cancer Family     Breast cancer Maternal Grandmother      Social History     Social History    Marital status: /Civil Union     Spouse name: N/A    Number of children: N/A    Years of education: N/A     Occupational History    Not on file  Social History Main Topics    Smoking status: Former Smoker     Quit date: 10/16/2002    Smokeless tobacco: Never Used    Alcohol use No    Drug use: Unknown    Sexual activity: Not on file     Other Topics Concern    Not on file     Social History Narrative    No narrative on file       Current Outpatient Prescriptions:     aspirin 81 MG tablet, Take 1 tablet by mouth daily, Disp: , Rfl:     Cholecalciferol (VITAMIN D3) 2000 units TABS, Take 1 tablet by mouth daily, Disp: , Rfl:     levothyroxine 112 mcg tablet, Take 1 tablet (112 mcg total) by mouth daily, Disp: 90 tablet, Rfl: 3    losartan (COZAAR) 100 MG tablet, Take 1 tablet (100 mg total) by mouth daily, Disp: 90 tablet, Rfl: 3    pravastatin (PRAVACHOL) 20 mg tablet, Take 1 tablet (20 mg total) by mouth daily for 90 days, Disp: 90 tablet, Rfl: 3    Current Facility-Administered Medications:     denosumab (PROLIA) subcutaneous injection 60 mg, 60 mg, Subcutaneous, Q6 Months, Blas Jaimes MD, 60 mg at 08/02/18 1357  Allergies   Allergen Reactions    Sulfa Antibiotics Dizziness     Other reaction(s): feeling "off-balance"  Category: Adverse Reaction;        Physical Exam:     Vitals:    11/29/18 1142   BP: 142/70   Pulse: 55   Resp: 18   Temp: 98 1 °F (36 7 °C)     Physical Exam   Constitutional: She is oriented to person, place, and time  She appears well-developed and well-nourished  HENT:   Head: Normocephalic and atraumatic  Mouth/Throat: Oropharynx is clear and moist    Eyes: Pupils are equal, round, and reactive to light   EOM are normal    Neck: Normal range of motion  Neck supple  No JVD present  No tracheal deviation present  No thyromegaly present  Cardiovascular: Normal rate, regular rhythm, normal heart sounds and intact distal pulses  Exam reveals no gallop and no friction rub  No murmur heard  Pulmonary/Chest: Effort normal and breath sounds normal  No respiratory distress  She has no wheezes  She has no rales  Both breasts were examined in the sitting and supine position  There are no worrisome skin lesions, no nipple retraction and no nipple discharge  There are no dominant masses, axillary adenopathy or supraclavicular adenopathy on either side  Abdominal: Soft  She exhibits no distension and no mass  There is no hepatomegaly  There is no tenderness  There is no rebound and no guarding  Musculoskeletal: Normal range of motion  She exhibits no edema or tenderness  Lymphadenopathy:     She has no cervical adenopathy  Neurological: She is alert and oriented to person, place, and time  No cranial nerve deficit  Skin: Skin is warm and dry  No rash noted  No erythema  Psychiatric: She has a normal mood and affect  Her behavior is normal    Vitals reviewed  Results:   I reviewed the implications of the genetic testing with the patient  She is related this to the family as well  We reviewed the process of the surgery as well as the benefit and options  Advance Care Planning/Advance Directives:  I discussed the disease status, treatment plans and follow-up with the patient

## 2018-11-29 NOTE — PROGRESS NOTES
Surgical Oncology Follow Up       [address] [place] [address]  [facility] SUR[facility]COLOGY [address]  1950  [id number]  [address] [place] [address]  CANCER [facility] SURGICAL ONCOL[facility]Y [facility] [phone] [id number]    Chief Complaint   Patient presents with    Pre-op Exam          Assessment & Plan:   Patient presents for a follow-up visit  Her genetic results were negative  The patient was aware of this  We reviewed the implications of this  The patient prefers breast conservation therapy  We went through the process of informed consent for right breast needle localization lumpectomy in conjunction with a sentinel lymph node biopsy and possible axillary dissection  All questions were answered to the patient's satisfaction  We will coordinate the surgery next mutual convenience  The risk and benefit as outlined on the consent form were all reviewed in detail and all questions answered  Cancer History:        Malignant neoplasm of lower-inner quadrant of right breast of female, estrogen receptor positive ([facility] [address] )    11/1/2018 Biopsy     Right breast biopsy  3-4 o'clock, 5 cmfn  Invasive ductal carcinoma  Grade 1  8 mm on ultrasound    SC 90  Her 2 1 +         11/12/2018 Genetic Testing     BRCA testing-invitae  Negative              Interval History:   See above    Review of Systems:   Review of Systems   Constitutional: Negative for activity change, appetite change and fatigue  HENT: Negative  Eyes: Negative  Respiratory: Negative for cough, shortness of breath and wheezing  Cardiovascular: Negative for chest pain and leg swelling  Gastrointestinal: Negative  Endocrine: Negative  Genitourinary: Negative  Musculoskeletal:        No new changes or complaints of bone pain   Skin: Negative  Allergic/Immunologic: Negative  Neurological: Negative  Hematological: Negative  Psychiatric/Behavioral: Negative  Past Medical History     Patient Active Problem List   Diagnosis    Allergic rhinitis    Bradycardia    Heart murmur    Hyperlipidemia    Hypertension    Hypothyroidism    Osteoporosis    Pulmonary nodules    Subclavian artery stenosis (HCC)    Vitamin D deficiency    Malignant neoplasm of lower-inner quadrant of right breast of female, estrogen receptor positive ([facility] [address] )     Past Medical History:   Diagnosis Date    Acute pain of left shoulder     History    Acute recurrent maxillary sinusitis     History    Acute upper respiratory infection     History    Disease of thyroid gland     History of Graves' disease     status post 131 treatment    History of lateral epicondylitis of left elbow     History of onychomycosis     of toenail    History of perforation of tympanic membrane     Hyperlipidemia     Hypertension     Left otitis media     History    Medicare annual wellness visit, subsequent      Past Surgical History:   Procedure Laterality Date    SINUS SURGERY Left 1996    nasal polyps(?)-large papilloma left maxillary sinus with extension into posterior nasopharynx completed by Dr Castillo Matters   TONSILLECTOMY AND ADENOIDECTOMY      US GUIDED BREAST BIOPSY RIGHT COMPLETE Right 11/1/2018    WRIST FRACTURE SURGERY  09/1960    Open Treatment of Fracture of Distal Radius     Family History   Problem Relation Age of Onset    Breast cancer Mother     Pancreatic cancer Mother     Aortic aneurysm Father     Aortic aneurysm Family         Abdominal    Breast cancer Family     Breast cancer Maternal Grandmother      Social History     Social History    Marital status: /Civil Union     Spouse name: N/A    Number of children: N/A    Years of education: N/A     Occupational History    Not on file       Social History Main Topics    Smoking status: Former Smoker     Quit date: 10/16/2002    Smokeless tobacco: Never Used    Alcohol use No    Drug use: Unknown    Sexual activity: Not on file     Other Topics Concern    Not on file     Social History Narrative    No narrative on file       Current Outpatient Prescriptions:     aspirin 81 MG tablet, Take 1 tablet by mouth daily, Disp: , Rfl:     Cholecalciferol (VITAMIN D3) 2000 units TABS, Take 1 tablet by mouth daily, Disp: , Rfl:     levothyroxine 112 mcg tablet, Take 1 tablet (112 mcg total) by mouth daily, Disp: 90 tablet, Rfl: 3    losartan (COZAAR) 100 MG tablet, Take 1 tablet (100 mg total) by mouth daily, Disp: 90 tablet, Rfl: 3    pravastatin (PRAVACHOL) 20 mg tablet, Take 1 tablet (20 mg total) by mouth daily for 90 days, Disp: 90 tablet, Rfl: 3    Current Facility-Administered Medications:     denosumab (PROLIA) subcutaneous injection 60 mg, 60 mg, Subcutaneous, Q6 Months, Chio Melton MD, 60 mg at 08/02/18 1357  Allergies   Allergen Reactions    Sulfa Antibiotics Dizziness     Other reaction(s): feeling "off-balance"  Category: Adverse Reaction;        Physical Exam:     Vitals:    11/29/18 1142   BP: 142/70   Pulse: 55   Resp: 18   Temp: 98 1 °F (36 7 °C)     Physical Exam   Constitutional: She is oriented to person, place, and time  She appears well-developed and well-nourished  HENT:   Head: Normocephalic and atraumatic  Mouth/Throat: Oropharynx is clear and moist    Eyes: Pupils are equal, round, and reactive to light  EOM are normal    Neck: Normal range of motion  Neck supple  No JVD present  No tracheal deviation present  No thyromegaly present  Cardiovascular: Normal rate, regular rhythm, normal heart sounds and intact distal pulses  Exam reveals no gallop and no friction rub  No murmur heard  Pulmonary/Chest: Effort normal and breath sounds normal  No respiratory distress  She has no wheezes  She has no rales  Both breasts were examined in the sitting and supine position  There are no worrisome skin lesions, no nipple retraction and no nipple discharge  There are no dominant masses, axillary adenopathy or supraclavicular adenopathy on either side  Abdominal: Soft  She exhibits no distension and no mass  There is no hepatomegaly  There is no tenderness  There is no rebound and no guarding  Musculoskeletal: Normal range of motion  She exhibits no edema or tenderness  Lymphadenopathy:     She has no cervical adenopathy  Neurological: She is alert and oriented to person, place, and time  No cranial nerve deficit  Skin: Skin is warm and dry  No rash noted  No erythema  Psychiatric: She has a normal mood and affect  Her behavior is normal    Vitals reviewed  Results:   I reviewed the implications of the genetic testing with the patient  She is related this to the family as well  We reviewed the process of the surgery as well as the benefit and options  Advance Care Planning/Advance Directives:  I discussed the disease status, treatment plans and follow-up with the patient

## 2018-11-29 NOTE — PATIENT INSTRUCTIONS
Pre-Surgery Instructions:   Medication Instructions    aspirin 81 MG tablet Stop taking 1 week prior to surgery    Cholecalciferol (VITAMIN D3) 2000 units TABS Stop taking 1 days prior to surgery    levothyroxine 112 mcg tablet Take morning of surgery    losartan (COZAAR) 100 MG tablet Take morning of surgery       [place] Lymph Node Biopsy   AMBULATORY CARE:   What you need to know about a sentinel lymph node biopsy:  A sentinel lymph node (SLN) is usually the lymph node closest to a tumor  A biopsy is a procedure used to find and remove a SLN  During the biopsy, the SLN will be tested for cancer cells  If the test is positive, it may mean that cancer has spread to other places in your body  This information can help your healthcare provider decide what other treatments you need  How to prepare for a sentinel lymph node biopsy:   · You may need a nuclear scan before your procedure  During a nuclear scan, healthcare providers will inject a small amount of radioactive liquid near the tumor  Radioactive liquid will move to the location of your lymph nodes and help them show up better in pictures  A camera will take pictures of the lymph nodes  The pictures will help your healthcare provider plan for your procedure  · Your healthcare provider will talk to you about how to prepare for your procedure  He may tell you not to eat or drink anything after midnight on the day of your procedure  He will tell you what medicines to take or not take on the day of your procedure  You may be given contrast liquid during your biopsy  Tell your healthcare provider if you have ever had an allergic reaction to contrast liquid  Arrange for someone to drive you home and stay with you after your procedure  What will happen during a sentinel lymph node biopsy:   · You may be given an antibiotic through your IV to help prevent a bacterial infection   You may be given general anesthesia to keep you asleep and free from pain during procedure  You may instead be given local anesthesia to numb the area  With local anesthesia, you may still feel pressure or pushing during the procedure, but you should not feel any pain  · Your healthcare provider will inject blue contrast liquid, radioactive liquid, or both near the tumor  The liquid will move to the SLN  Your healthcare provider may use an instrument to help find the SLN  He will do this by gently moving an instrument over your skin  The instrument will show pictures of the SLN on a monitor  Your healthcare provider will make a small incision in the skin that covers the SLN  The SLN will be removed and checked for cancer cells  If cancer is found, your healthcare provider may remove several more lymph nodes for testing  Your incision may be closed with stitches or strips of medical tape and covered with a bandage  What will happen after a sentinel lymph node biopsy:  Healthcare providers will monitor you until you are awake  You may be able to go home after you are awake and your pain is controlled  Your urine or bowel movement may be blue for 24 to 48 hours after your procedure  This is caused by the blue contrast liquid given to you during the procedure  You may have bruising or swelling at the biopsy site  This is normal and expected  The arm or leg closest to the biopsy site may be sore  This should get better within 48 to 72 hours  Risks of a sentinel lymph node biopsy: You may bleed more than expected or get an infection  You may develop a condition called lymphedema  Lymphedema is tissue swelling in the body part nearest to where the SLN was removed  You may have long-term pain or discomfort in this area  Your skin in this area may be permanently thick or hard  Your nerves may be damaged during the procedure  This may cause numbness or tingling where the SLN was removed  It may also cause difficulty moving the body part closest to the SLN   You may have an allergic reaction to the contrast liquid  This may require medicine or other treatments  Seek care immediately if:   · Blood soaks through your bandage  · Your stitches come apart  · Your bruise suddenly gets larger and feels firm  Contact your healthcare provider if:   · You have a fever or chills  · Your wound is red, swollen, or draining pus  · You have nausea or are vomiting  · Your skin is itchy, swollen, or you have a rash  · Your pain does not get better after you take medicine for pain  · You have questions or concerns about your condition or care  Medicines: You may need any of the following:  · NSAIDs , such as ibuprofen, help decrease swelling, pain, and fever  This medicine is available with or without a doctor's order  NSAIDs can cause stomach bleeding or kidney problems in certain people  If you take blood thinner medicine, always ask your healthcare provider if NSAIDs are safe for you  Always read the medicine label and follow directions  · Acetaminophen  decreases pain and fever  It is available without a doctor's order  Ask how much to take and how often to take it  Follow directions  Read the labels of all other medicines you are using to see if they also contain acetaminophen, or ask your doctor or pharmacist  Acetaminophen can cause liver damage if not taken correctly  Do not use more than 4 grams (4,000 milligrams) total of acetaminophen in one day  · Prescription pain medicine  may be given  Ask your healthcare provider how to take this medicine safely  Some prescription pain medicines contain acetaminophen  Do not take other medicines that contain acetaminophen without talking to your healthcare provider  Too much acetaminophen may cause liver damage  Prescription pain medicine may cause constipation  Ask your healthcare provider how to prevent or treat constipation  · Take your medicine as directed    Contact your healthcare provider if you think your medicine is not helping or if you have side effects  Tell him or her if you are allergic to any medicine  Keep a list of the medicines, vitamins, and herbs you take  Include the amounts, and when and why you take them  Bring the list or the pill bottles to follow-up visits  Carry your medicine list with you in case of an emergency  Care for your wound as directed:  Ask your healthcare provider when your incision can get wet  Carefully wash around the incision with soap and water  It is okay to let soap and water gently run over your incision  Do not  scrub your incision  Gently pat dry the area and put on new, clean bandages as directed  Change your bandages when they get wet or dirty  If you have strips of medical tape, let them fall of on their own  It may take 10 to 14 days for them to fall off  Check your incision every day for signs of infection, such as redness, swelling, or pus  Do not put powders or lotions on your incision  If lymph nodes have been taken from your armpit, ask your healthcare provider when you can wear deodorant  Self-care:   · Apply ice  on your incision for 15 to 20 minutes every hour or as directed  Use an ice pack, or put crushed ice in a plastic bag  Cover it with a towel before you apply it to your skin  Ice helps prevent tissue damage and decreases swelling and pain  · Elevate  your arm or leg nearest to the biopsy site as often as you can  This will help decrease swelling and pain  Prop your arm or leg on pillows or blankets to keep it elevated above the level of your heart comfortably  · Do not do strenuous activities  for 24 to 48 hours  Strenuous activities include heavy lifting, sports, or running  If lymph nodes were taken from your armpit, do not push or pull with your arm  These activities may put too much stress on your incision  Rest and take short walks around the house  Ask your healthcare provider when you can return to your normal activities       · Drink plenty of liquids  as directed  This will help flush out contrast liquid from your body  Ask how much liquid to drink each day and which liquids are best for you  Ask your healthcare provider how to prevent lymphedema and infection:  Lymphedema is fluid buildup in fatty tissues under your skin  Lymphedema may happen where lymph nodes were removed or in the arm or leg closest to this area  An infection in your skin can make lymphedema worse  Ask your healthcare provider how you can decrease your risk for skin infections and lymphedema  Follow up with your healthcare provider as directed:  Write down your questions so you remember to ask them during your visits  © 2017 [address]  Information is for End User's use only and may not be sold, redistributed or otherwise used for commercial purposes  All illustrations and images included in CareNotes® are the copyrighted property of [employer]  or [employer]  The above information is an  only  It is not intended as medical advice for individual conditions or treatments  Talk to your doctor, nurse or pharmacist before following any medical regimen to see if it is safe and effective for you  Breast Lumpectomy   AMBULATORY CARE:   What you need to know about a lumpectomy:  A lumpectomy is surgery to remove a mass in your breast  Breast tissue that surrounds the mass may also be taken  A lumpectomy is also known as breast-conserving surgery, a partial mastectomy, or a segmental mastectomy  How to prepare for a lumpectomy:   · You may need a mammogram or ultrasound before surgery  These tests may be done the same day as your surgery or at an earlier time  Your healthcare provider may use pictures from these tests to bria the location of the mass  The marker will show him where to make your incision  · Your healthcare provider will talk to you about how to prepare for surgery   He may tell you not to eat or drink anything after midnight on the day of your surgery  He will tell you what medicines to take or not take on the day of your surgery  You may need to stop taking blood thinners or aspirin several days before your surgery  Arrange for someone to drive you home and stay with you for 24 hours after surgery  This person can help care for you, and monitor for any problems  What will happen during a lumpectomy:  You will be given general anesthesia to keep you asleep and free from pain during surgery  You may be given an antibiotic through your IV to help prevent a bacterial infection  Your healthcare provider will make an incision in your breast and remove the mass  He may also remove breast tissue or lymph nodes that are close to the mass  A drain may be inserted near your incision to remove extra fluid  This will decrease swelling and help your incision heal  Your healthcare provider will close your incision with stitches or strips of medical tape and cover it with a bandage  He may also wrap a tight-fitting bandage around both of your breasts  This may decrease swelling, bleeding, and pain  What will happen after a lumpectomy:  Healthcare providers will monitor you until you are awake  You may able to go home when you are awake and your pain is controlled  Instead you may need to spend the night in the hospital    Risks of a lumpectomy:  You may bleed more than expected or get an infection  Nerves, blood vessels, and muscles may be damaged during your surgery  You may have swelling in your arm closest to the lumpectomy or where lymph nodes were removed  This swelling is called lymphedema  Lymphedema may cause tingling, numbness, stiffness, and weakness in your arm  This may be permanent  You may get a blood clot in your arm or leg  The blood clot may travel to your heart lungs, or brain  This may become life-threatening  Call 911 for any of the following:   · You feel lightheaded, short of breath, and have chest pain       · You cough up blood      · You have trouble breathing  Seek care immediately if:   · Blood soaks through your bandage  · Your stitches come apart  · Your bruise suddenly gets bigger  · Your leg or arm is larger than normal and painful  Contact your healthcare provider if:   · You have a fever or chills  · Your wound is red, swollen, or draining pus  · You have nausea or are vomiting  · Your skin is itchy, swollen, or you have a rash  · Your pain does not get better after you take pain medicine  · Your drain falls out or stops draining fluid  · Your drain has pus or foul-smelling fluid coming out of it  · You have questions or concerns about your condition or care  Medicines: You may need any of the following:  · Antibiotics  help prevent a bacterial infection  · Prescription pain medicine  may be given  Ask your healthcare provider how to take this medicine safely  Some prescription pain medicines contain acetaminophen  Do not take other medicines that contain acetaminophen without talking to your healthcare provider  Too much acetaminophen may cause liver damage  Prescription pain medicine may cause constipation  Ask your healthcare provider how to prevent or treat constipation  · NSAIDs , such as ibuprofen, help decrease swelling, pain, and fever  NSAIDs can cause stomach bleeding or kidney problems in certain people  If you take blood thinner medicine, always ask your healthcare provider if NSAIDs are safe for you  Always read the medicine label and follow directions  · Take your medicine as directed  Contact your healthcare provider if you think your medicine is not helping or if you have side effects  Tell him or her if you are allergic to any medicine  Keep a list of the medicines, vitamins, and herbs you take  Include the amounts, and when and why you take them  Bring the list or the pill bottles to follow-up visits   Carry your medicine list with you in case of an emergency  Care for your wound as directed: If you have a tight-fitting bandage, you can remove it in 24 to 48 hours, or as directed  Ask your healthcare provider when your incision can get wet  You may need to take a sponge bath until your drain is removed  Carefully wash around the incision with soap and water  It is okay to allow the soap and water to gently run over your incision  Gently pat dry the area and put on new, clean bandages as directed  Change your bandages when they get wet or dirty  Check your incision every day for redness, pus, or swelling  Self-care:   · Apply ice  on your breast for 15 to 20 minutes every hour or as directed  Use an ice pack, or put crushed ice in a plastic bag  Cover it with a towel  Ice helps prevent tissue damage and decreases swelling and pain  · Rest  as directed  Do not lift anything heavy  Do not push or pull with your arms  Take short walks around the house  Gradually walk further as you feel better  Ask your healthcare provider when you can return to your normal activities  · Empty your drain  as directed  You may need to write down how much you empty from your drain each day  Ask your healthcare provider for more information about how to empty your drain  · Wear a supportive bra  as directed  Wait until you remove the tight-fitting bandage to wear a bra  You may be given a surgical bra or told to wear a sports bra  A supportive bra may help hold your bandages in place  It may also help with swelling and pain  Do not  wear bras with lace or underwire  They may rub against your incision and cause discomfort  Arm stretches: Your healthcare provider may show you how to do arm stretches  Arm stretches may prevent stiff arms or shoulders  You may need to wait until after your drains are removed to begin stretching  Do not do arm stretches until your healthcare provider says it is okay  Ask your healthcare provider for more information about arm stretches  Follow up with your healthcare provider as directed:  Write down your questions so you remember to ask them during your visits  © 2017 [facility] Information is for End User's use only and may not be sold, redistributed or otherwise used for commercial purposes  All illustrations and images included in CareNotes® are the copyrighted property of [employer]  or [employer]  The above information is an  only  It is not intended as medical advice for individual conditions or treatments  Talk to your doctor, nurse or pharmacist before following any medical regimen to see if it is safe and effective for you

## 2018-12-03 ENCOUNTER — APPOINTMENT (OUTPATIENT)
Dept: RADIOLOGY | Facility: CLINIC | Age: 68
End: 2018-12-03
Payer: MEDICARE

## 2018-12-03 ENCOUNTER — LAB (OUTPATIENT)
Dept: LAB | Facility: CLINIC | Age: 68
End: 2018-12-03
Payer: MEDICARE

## 2018-12-03 ENCOUNTER — APPOINTMENT (OUTPATIENT)
Dept: LAB | Facility: CLINIC | Age: 68
End: 2018-12-03
Payer: MEDICARE

## 2018-12-03 DIAGNOSIS — Z17.0 MALIGNANT NEOPLASM OF LOWER-INNER QUADRANT OF RIGHT BREAST OF FEMALE, ESTROGEN RECEPTOR POSITIVE (HCC): ICD-10-CM

## 2018-12-03 DIAGNOSIS — C50.311 MALIGNANT NEOPLASM OF LOWER-INNER QUADRANT OF RIGHT BREAST OF FEMALE, ESTROGEN RECEPTOR POSITIVE (HCC): ICD-10-CM

## 2018-12-03 LAB
ALBUMIN SERPL BCP-MCNC: 3.5 G/DL (ref 3.5–5)
ALP SERPL-CCNC: 72 U/L (ref 46–116)
ALT SERPL W P-5'-P-CCNC: 20 U/L (ref 12–78)
ANION GAP SERPL CALCULATED.3IONS-SCNC: 7 MMOL/L (ref 4–13)
AST SERPL W P-5'-P-CCNC: 21 U/L (ref 5–45)
ATRIAL RATE: 58 BPM
BASOPHILS # BLD AUTO: 0.04 THOUSANDS/ΜL (ref 0–0.1)
BASOPHILS NFR BLD AUTO: 1 % (ref 0–1)
BILIRUB SERPL-MCNC: 0.5 MG/DL (ref 0.2–1)
BUN SERPL-MCNC: 19 MG/DL (ref 5–25)
CALCIUM SERPL-MCNC: 9.4 MG/DL (ref 8.3–10.1)
CHLORIDE SERPL-SCNC: 105 MMOL/L (ref 100–108)
CO2 SERPL-SCNC: 27 MMOL/L (ref 21–32)
CREAT SERPL-MCNC: 0.9 MG/DL (ref 0.6–1.3)
EOSINOPHIL # BLD AUTO: 0.12 THOUSAND/ΜL (ref 0–0.61)
EOSINOPHIL NFR BLD AUTO: 2 % (ref 0–6)
ERYTHROCYTE [DISTWIDTH] IN BLOOD BY AUTOMATED COUNT: 12.7 % (ref 11.6–15.1)
GFR SERPL CREATININE-BSD FRML MDRD: 65 ML/MIN/1.73SQ M
GLUCOSE SERPL-MCNC: 89 MG/DL (ref 65–140)
HCT VFR BLD AUTO: 37.9 % (ref 34.8–46.1)
HGB BLD-MCNC: 12.6 G/DL (ref 11.5–15.4)
IMM GRANULOCYTES # BLD AUTO: 0.01 THOUSAND/UL (ref 0–0.2)
IMM GRANULOCYTES NFR BLD AUTO: 0 % (ref 0–2)
LYMPHOCYTES # BLD AUTO: 1.61 THOUSANDS/ΜL (ref 0.6–4.47)
LYMPHOCYTES NFR BLD AUTO: 26 % (ref 14–44)
MCH RBC QN AUTO: 33.5 PG (ref 26.8–34.3)
MCHC RBC AUTO-ENTMCNC: 33.2 G/DL (ref 31.4–37.4)
MCV RBC AUTO: 101 FL (ref 82–98)
MONOCYTES # BLD AUTO: 0.48 THOUSAND/ΜL (ref 0.17–1.22)
MONOCYTES NFR BLD AUTO: 8 % (ref 4–12)
NEUTROPHILS # BLD AUTO: 4.04 THOUSANDS/ΜL (ref 1.85–7.62)
NEUTS SEG NFR BLD AUTO: 63 % (ref 43–75)
NRBC BLD AUTO-RTO: 0 /100 WBCS
P AXIS: 58 DEGREES
PLATELET # BLD AUTO: 316 THOUSANDS/UL (ref 149–390)
PMV BLD AUTO: 8.7 FL (ref 8.9–12.7)
POTASSIUM SERPL-SCNC: 4.5 MMOL/L (ref 3.5–5.3)
PR INTERVAL: 150 MS
PROT SERPL-MCNC: 6.9 G/DL (ref 6.4–8.2)
QRS AXIS: 41 DEGREES
QRSD INTERVAL: 74 MS
QT INTERVAL: 428 MS
QTC INTERVAL: 420 MS
RBC # BLD AUTO: 3.76 MILLION/UL (ref 3.81–5.12)
SODIUM SERPL-SCNC: 139 MMOL/L (ref 136–145)
T WAVE AXIS: 77 DEGREES
VENTRICULAR RATE: 58 BPM
WBC # BLD AUTO: 6.3 THOUSAND/UL (ref 4.31–10.16)

## 2018-12-03 PROCEDURE — 71046 X-RAY EXAM CHEST 2 VIEWS: CPT

## 2018-12-03 PROCEDURE — 93005 ELECTROCARDIOGRAM TRACING: CPT

## 2018-12-03 PROCEDURE — 85025 COMPLETE CBC W/AUTO DIFF WBC: CPT

## 2018-12-03 PROCEDURE — 36415 COLL VENOUS BLD VENIPUNCTURE: CPT

## 2018-12-03 PROCEDURE — 93010 ELECTROCARDIOGRAM REPORT: CPT | Performed by: INTERNAL MEDICINE

## 2018-12-03 PROCEDURE — 80053 COMPREHEN METABOLIC PANEL: CPT

## 2018-12-12 NOTE — PRE-PROCEDURE INSTRUCTIONS
Pre-Surgery Instructions:   Medication Instructions    aspirin 81 MG tablet Patient was instructed by Physician and understands   Cholecalciferol (VITAMIN D3) 2000 units TABS Patient was instructed by Physician and understands   levothyroxine 112 mcg tablet Instructed patient per Anesthesia Guidelines   losartan (COZAAR) 100 MG tablet Instructed patient per Anesthesia Guidelines   pravastatin (PRAVACHOL) 20 mg tablet Instructed patient per Anesthesia Guidelines  Pre op and bathing instructions reviewed   Pt has hibiclens

## 2018-12-13 ENCOUNTER — TELEPHONE (OUTPATIENT)
Dept: FAMILY MEDICINE CLINIC | Facility: CLINIC | Age: 68
End: 2018-12-13

## 2018-12-13 NOTE — TELEPHONE ENCOUNTER
Patient is having a needle loc biopsy on 12/18/18 with Dr Madina Bradley  Patient needs to know when she should stop her low dose Kojo Aspirin prior to the procedure  Patient can be contacted at [phone]

## 2018-12-17 ENCOUNTER — ANESTHESIA EVENT (OUTPATIENT)
Dept: PERIOP | Facility: HOSPITAL | Age: 68
End: 2018-12-17
Payer: MEDICARE

## 2018-12-18 ENCOUNTER — APPOINTMENT (OUTPATIENT)
Dept: MAMMOGRAPHY | Facility: HOSPITAL | Age: 68
End: 2018-12-18
Payer: MEDICARE

## 2018-12-18 ENCOUNTER — HOSPITAL ENCOUNTER (OUTPATIENT)
Facility: HOSPITAL | Age: 68
Setting detail: OUTPATIENT SURGERY
Discharge: HOME | End: 2018-12-18
Attending: SURGERY | Admitting: SURGERY
Payer: MEDICARE

## 2018-12-18 ENCOUNTER — HOSPITAL ENCOUNTER (OUTPATIENT)
Dept: NUCLEAR MEDICINE | Facility: HOSPITAL | Age: 68
Discharge: HOME | End: 2018-12-18
Attending: SURGERY
Payer: MEDICARE

## 2018-12-18 ENCOUNTER — APPOINTMENT (OUTPATIENT)
Dept: ULTRASOUND IMAGING | Facility: HOSPITAL | Age: 68
End: 2018-12-18
Payer: MEDICARE

## 2018-12-18 ENCOUNTER — HOSPITAL ENCOUNTER (OUTPATIENT)
Dept: MAMMOGRAPHY | Facility: HOSPITAL | Age: 68
Discharge: HOME | End: 2018-12-18
Attending: SURGERY
Payer: MEDICARE

## 2018-12-18 ENCOUNTER — ANESTHESIA (OUTPATIENT)
Dept: PERIOP | Facility: HOSPITAL | Age: 68
End: 2018-12-18
Payer: MEDICARE

## 2018-12-18 VITALS
TEMPERATURE: 97 F | SYSTOLIC BLOOD PRESSURE: 160 MMHG | DIASTOLIC BLOOD PRESSURE: 66 MMHG | OXYGEN SATURATION: 99 % | BODY MASS INDEX: 29.99 KG/M2 | HEIGHT: 65 IN | HEART RATE: 55 BPM | WEIGHT: 180 LBS | RESPIRATION RATE: 18 BRPM

## 2018-12-18 DIAGNOSIS — C50.311 MALIGNANT NEOPLASM OF LOWER-INNER QUADRANT OF RIGHT BREAST OF FEMALE, ESTROGEN RECEPTOR POSITIVE (HCC): ICD-10-CM

## 2018-12-18 DIAGNOSIS — Z17.0 MALIGNANT NEOPLASM OF LOWER-INNER QUADRANT OF RIGHT BREAST OF FEMALE, ESTROGEN RECEPTOR POSITIVE (HCC): ICD-10-CM

## 2018-12-18 PROCEDURE — 19301 PARTIAL MASTECTOMY: CPT | Performed by: SURGERY

## 2018-12-18 PROCEDURE — 38792 RA TRACER ID OF SENTINL NODE: CPT

## 2018-12-18 PROCEDURE — 38525 BIOPSY/REMOVAL LYMPH NODES: CPT | Performed by: SURGERY

## 2018-12-18 PROCEDURE — 38900 IO MAP OF SENT LYMPH NODE: CPT | Performed by: SURGERY

## 2018-12-18 PROCEDURE — 88307 TISSUE EXAM BY PATHOLOGIST: CPT | Performed by: PATHOLOGY

## 2018-12-18 PROCEDURE — 19285 PERQ DEV BREAST 1ST US IMAG: CPT

## 2018-12-18 PROCEDURE — 88342 IMHCHEM/IMCYTCHM 1ST ANTB: CPT | Performed by: PATHOLOGY

## 2018-12-18 PROCEDURE — 38792 RA TRACER ID OF SENTINL NODE: CPT | Performed by: SURGERY

## 2018-12-18 PROCEDURE — A9541 TC99M SULFUR COLLOID: HCPCS

## 2018-12-18 RX ORDER — FENTANYL CITRATE/PF 50 MCG/ML
50 SYRINGE (ML) INJECTION
Status: DISCONTINUED | OUTPATIENT
Start: 2018-12-18 | End: 2018-12-18 | Stop reason: HOSPADM

## 2018-12-18 RX ORDER — CEFAZOLIN SODIUM 2 G/50ML
2000 SOLUTION INTRAVENOUS ONCE
Status: COMPLETED | OUTPATIENT
Start: 2018-12-18 | End: 2018-12-18

## 2018-12-18 RX ORDER — MAGNESIUM HYDROXIDE 1200 MG/15ML
LIQUID ORAL AS NEEDED
Status: DISCONTINUED | OUTPATIENT
Start: 2018-12-18 | End: 2018-12-18 | Stop reason: HOSPADM

## 2018-12-18 RX ORDER — SODIUM CHLORIDE 9 MG/ML
100 INJECTION, SOLUTION INTRAVENOUS CONTINUOUS
Status: DISCONTINUED | OUTPATIENT
Start: 2018-12-18 | End: 2018-12-18 | Stop reason: HOSPADM

## 2018-12-18 RX ORDER — PROPOFOL 10 MG/ML
INJECTION, EMULSION INTRAVENOUS AS NEEDED
Status: DISCONTINUED | OUTPATIENT
Start: 2018-12-18 | End: 2018-12-18 | Stop reason: SURG

## 2018-12-18 RX ORDER — SODIUM CHLORIDE, SODIUM LACTATE, POTASSIUM CHLORIDE, CALCIUM CHLORIDE 600; 310; 30; 20 MG/100ML; MG/100ML; MG/100ML; MG/100ML
125 INJECTION, SOLUTION INTRAVENOUS CONTINUOUS
Status: DISCONTINUED | OUTPATIENT
Start: 2018-12-18 | End: 2018-12-18 | Stop reason: HOSPADM

## 2018-12-18 RX ORDER — MIDAZOLAM HYDROCHLORIDE 1 MG/ML
INJECTION INTRAMUSCULAR; INTRAVENOUS AS NEEDED
Status: DISCONTINUED | OUTPATIENT
Start: 2018-12-18 | End: 2018-12-18 | Stop reason: SURG

## 2018-12-18 RX ORDER — ONDANSETRON 2 MG/ML
INJECTION INTRAMUSCULAR; INTRAVENOUS AS NEEDED
Status: DISCONTINUED | OUTPATIENT
Start: 2018-12-18 | End: 2018-12-18 | Stop reason: SURG

## 2018-12-18 RX ORDER — ONDANSETRON 2 MG/ML
4 INJECTION INTRAMUSCULAR; INTRAVENOUS ONCE AS NEEDED
Status: DISCONTINUED | OUTPATIENT
Start: 2018-12-18 | End: 2018-12-18 | Stop reason: HOSPADM

## 2018-12-18 RX ORDER — LIDOCAINE HYDROCHLORIDE 10 MG/ML
INJECTION, SOLUTION INFILTRATION; PERINEURAL AS NEEDED
Status: DISCONTINUED | OUTPATIENT
Start: 2018-12-18 | End: 2018-12-18 | Stop reason: SURG

## 2018-12-18 RX ORDER — OXYCODONE HYDROCHLORIDE AND ACETAMINOPHEN 5; 325 MG/1; MG/1
1 TABLET ORAL EVERY 4 HOURS PRN
Status: DISCONTINUED | OUTPATIENT
Start: 2018-12-18 | End: 2018-12-18 | Stop reason: HOSPADM

## 2018-12-18 RX ORDER — SODIUM CHLORIDE 9 MG/ML
INJECTION, SOLUTION INTRAVENOUS CONTINUOUS PRN
Status: DISCONTINUED | OUTPATIENT
Start: 2018-12-18 | End: 2018-12-18 | Stop reason: SURG

## 2018-12-18 RX ORDER — FENTANYL CITRATE 50 UG/ML
INJECTION, SOLUTION INTRAMUSCULAR; INTRAVENOUS AS NEEDED
Status: DISCONTINUED | OUTPATIENT
Start: 2018-12-18 | End: 2018-12-18 | Stop reason: SURG

## 2018-12-18 RX ORDER — LIDOCAINE WITH 8.4% SOD BICARB 0.9%(10ML)
5 SYRINGE (ML) INJECTION ONCE
Status: COMPLETED | OUTPATIENT
Start: 2018-12-18 | End: 2018-12-18

## 2018-12-18 RX ADMIN — LIDOCAINE HYDROCHLORIDE 3 ML: 10 INJECTION, SOLUTION INFILTRATION; PERINEURAL at 07:22

## 2018-12-18 RX ADMIN — SODIUM CHLORIDE: 0.9 INJECTION, SOLUTION INTRAVENOUS at 08:00

## 2018-12-18 RX ADMIN — ONDANSETRON 4 MG: 2 INJECTION INTRAMUSCULAR; INTRAVENOUS at 08:20

## 2018-12-18 RX ADMIN — MIDAZOLAM HYDROCHLORIDE 2 MG: 1 INJECTION, SOLUTION INTRAMUSCULAR; INTRAVENOUS at 08:09

## 2018-12-18 RX ADMIN — PROPOFOL 150 MG: 10 INJECTION, EMULSION INTRAVENOUS at 08:17

## 2018-12-18 RX ADMIN — LIDOCAINE HYDROCHLORIDE ANHYDROUS 50 MG: 10 INJECTION, SOLUTION INFILTRATION at 08:17

## 2018-12-18 RX ADMIN — FENTANYL CITRATE 25 MCG: 50 INJECTION INTRAMUSCULAR; INTRAVENOUS at 08:17

## 2018-12-18 RX ADMIN — CEFAZOLIN SODIUM 2000 MG: 2 SOLUTION INTRAVENOUS at 08:05

## 2018-12-18 RX ADMIN — DEXAMETHASONE SODIUM PHOSPHATE 4 MG: 10 INJECTION INTRAMUSCULAR; INTRAVENOUS at 08:20

## 2018-12-18 RX ADMIN — FENTANYL CITRATE 25 MCG: 50 INJECTION INTRAMUSCULAR; INTRAVENOUS at 08:54

## 2018-12-18 NOTE — H&P (VIEW-ONLY)
Surgical Oncology Follow Up       [address] [place] [address],[address] [place] [address]  [facility] [place] [address] [address] [facility] [address]  1950  [id number]  [address] [place] [address],[address] [place] [address]  [facility] SURGICAL ONCOL[facility] [place] [address] [address] [place] [address]    Chief Complaint   Patient presents with    Pre-op Exam          Assessment & Plan:   Patient presents for a follow-up visit  Her genetic results were negative  The patient was aware of this  We reviewed the implications of this  The patient prefers breast conservation therapy  We went through the process of informed consent for right breast needle localization lumpectomy in conjunction with a sentinel lymph node biopsy and possible axillary dissection  All questions were answered to the patient's satisfaction  We will coordinate the surgery next mutual convenience  The risk and benefit as outlined on the consent form were all reviewed in detail and all questions answered  Cancer History:        Malignant neoplasm of lower-inner quadrant of right breast of female, estrogen receptor positive ([facility] [address] )    11/1/2018 Biopsy     Right breast biopsy  3-4 o'clock, 5 cmfn  Invasive ductal carcinoma  Grade 1  8 mm on ultrasound    PA 90  Her 2 1 +         11/12/2018 Genetic Testing     BRCA testing-invitae  Negative              Interval History:   See above    Review of Systems:   Review of Systems   Constitutional: Negative for activity change, appetite change and fatigue  HENT: Negative  Eyes: Negative  Respiratory: Negative for cough, shortness of breath and wheezing  Cardiovascular: Negative for chest pain and leg swelling  Gastrointestinal: Negative  Endocrine: Negative  Genitourinary: Negative  Musculoskeletal:        No new changes or complaints of bone pain   Skin: Negative  Allergic/Immunologic: Negative  Neurological: Negative  Hematological: Negative  Psychiatric/Behavioral: Negative  Past Medical History     Patient Active Problem List   Diagnosis    Allergic rhinitis    Bradycardia    Heart murmur    Hyperlipidemia    Hypertension    Hypothyroidism    Osteoporosis    Pulmonary nodules    Subclavian artery stenosis (HCC)    Vitamin D deficiency    Malignant neoplasm of lower-inner quadrant of right breast of female, estrogen receptor positive ([facility] [address] [facility]     Past Medical History:   Diagnosis Date    Acute pain of left shoulder     History    Acute recurrent maxillary sinusitis     History    Acute upper respiratory infection     History    Disease of thyroid gland     History of Graves' disease     status post 131 treatment    History of lateral epicondylitis of left elbow     History of onychomycosis     of toenail    History of perforation of tympanic membrane     Hyperlipidemia     Hypertension     Left otitis media     History    Medicare annual wellness visit, subsequent      Past Surgical History:   Procedure Laterality Date    SINUS SURGERY Left 1996    nasal polyps(?)-large papilloma left maxillary sinus with extension into posterior nasopharynx completed by Dr Castillo Matters   TONSILLECTOMY AND ADENOIDECTOMY      US GUIDED BREAST BIOPSY RIGHT COMPLETE Right 11/1/2018    WRIST FRACTURE SURGERY  09/1960    Open Treatment of Fracture of Distal Radius     Family History   Problem Relation Age of Onset    Breast cancer Mother     Pancreatic cancer Mother     Aortic aneurysm Father     Aortic aneurysm Family         Abdominal    Breast cancer Family     Breast cancer Maternal Grandmother      Social History     Social History    Marital status: /Civil Union     Spouse name: N/A    Number of children: N/A    Years of education: N/A     Occupational History    Not on file       Social History Main Topics    Smoking status: Former Smoker     Quit date: 10/16/2002    Smokeless tobacco: Never Used    Alcohol use No    Drug use: Unknown    Sexual activity: Not on file     Other Topics Concern    Not on file     Social History Narrative    No narrative on file       Current Outpatient Prescriptions:     aspirin 81 MG tablet, Take 1 tablet by mouth daily, Disp: , Rfl:     Cholecalciferol (VITAMIN D3) 2000 units TABS, Take 1 tablet by mouth daily, Disp: , Rfl:     levothyroxine 112 mcg tablet, Take 1 tablet (112 mcg total) by mouth daily, Disp: 90 tablet, Rfl: 3    losartan (COZAAR) 100 MG tablet, Take 1 tablet (100 mg total) by mouth daily, Disp: 90 tablet, Rfl: 3    pravastatin (PRAVACHOL) 20 mg tablet, Take 1 tablet (20 mg total) by mouth daily for 90 days, Disp: 90 tablet, Rfl: 3    Current Facility-Administered Medications:     denosumab (PROLIA) subcutaneous injection 60 mg, 60 mg, Subcutaneous, Q6 Months, Ivonne Menard MD, 60 mg at 08/02/18 1357  Allergies   Allergen Reactions    Sulfa Antibiotics Dizziness     Other reaction(s): feeling "off-balance"  Category: Adverse Reaction;        Physical Exam:     Vitals:    11/29/18 1142   BP: 142/70   Pulse: 55   Resp: 18   Temp: 98 1 °F (36 7 °C)     Physical Exam   Constitutional: She is oriented to person, place, and time  She appears well-developed and well-nourished  HENT:   Head: Normocephalic and atraumatic  Mouth/Throat: Oropharynx is clear and moist    Eyes: Pupils are equal, round, and reactive to light  EOM are normal    Neck: Normal range of motion  Neck supple  No JVD present  No tracheal deviation present  No thyromegaly present  Cardiovascular: Normal rate, regular rhythm, normal heart sounds and intact distal pulses  Exam reveals no gallop and no friction rub  No murmur heard  Pulmonary/Chest: Effort normal and breath sounds normal  No respiratory distress  She has no wheezes  She has no rales  Both breasts were examined in the sitting and supine position  There are no worrisome skin lesions, no nipple retraction and no nipple discharge  There are no dominant masses, axillary adenopathy or supraclavicular adenopathy on either side  Abdominal: Soft  She exhibits no distension and no mass  There is no hepatomegaly  There is no tenderness  There is no rebound and no guarding  Musculoskeletal: Normal range of motion  She exhibits no edema or tenderness  Lymphadenopathy:     She has no cervical adenopathy  Neurological: She is alert and oriented to person, place, and time  No cranial nerve deficit  Skin: Skin is warm and dry  No rash noted  No erythema  Psychiatric: She has a normal mood and affect  Her behavior is normal    Vitals reviewed  Results:   I reviewed the implications of the genetic testing with the patient  She is related this to the family as well  We reviewed the process of the surgery as well as the benefit and options  Advance Care Planning/Advance Directives:  I discussed the disease status, treatment plans and follow-up with the patient

## 2018-12-18 NOTE — OP NOTE
OPERATIVE REPORT  PATIENT NAME: Maira Vazquez    :  1950  MRN: [id number]  Pt Location: [facility] OR ROOM 02    SURGERY DATE: 2018    Surgeon(s) and Role:     * Shannon Bright MD - Primary     * Mariah Griffin - Assisting    Preop Diagnosis:  Malignant neoplasm of lower-inner quadrant of right breast of female, estrogen receptor positive ([facility] [address] ) [C50 311, Z17 0]    Post-Op Diagnosis Codes:     * Malignant neoplasm of lower-inner quadrant of right breast of female, estrogen receptor positive ([facility] [address] ) [C50 311, Z17 0]    Procedure(s) (LRB):  BREAST LUMPECTOMY; BREAST NEEDLE LOCALIZATION (NEEDLE LOC AT 0700) (Right)  SENTINEL LYMPH NODE BIOPSY; LYMPHATIC MAPPING WITH BLUE DYE AND RADIOACTIVE DYE (INJECT AT 0800 BY DR PRADO IN THE OR) (Right)    Specimen(s):  ID Type Source Tests Collected by Time Destination   1 : Right breast lumpectomy Tissue Breast, Right TISSUE EXAM Shannon Bright MD 2018 4249    2 : Right breast lumpectomy- inferior- blue Tissue Breast, Right TISSUE Felicia Del Cid MD 2018 0844    3 : Right breast lumpectomy- lateral- red Tissue Breast, Right TISSUE Felicia Del Cid MD 2018 0845    4 : Right breast lumpectomy- medial- yellow Tissue Breast, Right TISSUE Felicia Del Cid MD 2018 0845    5 : Right breast lumpectomy- superior- orange Tissue Breast, Right TISSUE Felicia Del Cid MD 2018 0845    6 : Right axillary sentinel lymph node Tissue Lymph Node, [place] TISSUE EXAM Shannon Bright MD 2018 0850        Estimated Blood Loss:   Minimal    Drains:       Anesthesia Type:   General    Operative Indications:  Malignant neoplasm of lower-inner quadrant of right breast of female, estrogen receptor positive ([facility] [address] ) [C50 311, Z17 0]      Operative Findings:  Good specimen radiograph, sln not blue, but hot (7113 cpm)    Complications:   None    Procedure and Technique:  Lumpectomy  The patient was brought to the operation room and placed under general anesthesia    Attention was paid to ensure appropriate padding and correct positioning  The right breast was injected intradermally with 0 1cc of methylene blue followed by technetium  This area was vigorously massaged to facilitate identification of the sentinel lymph node (SLN)  The breast and axillary region were then prepped and draped in a sterile fashion  I initiated a time-out, identifying the patient, the correct side and the above procedure  All parties agreed with the time out  The planned incision was then injected with 0 25% Marcaine and epinephrine for local anesthesia  The incision was sharply incised  The localizing wire was used to guide the dissection  Superior, inferior, medial and lateral planes were developed around the localizing wire and the specimen truncated posteriorly with electrocautery  The specimen was then inked for orientation purposes  A specimen radiograph was taken in two dimensions and additional margins were removed to optimize complete removal of the tumor  The additional margins were inked on the most distal area  All specimens were sent to pathology for permanent analysis  Skin was anterior, muscle was posterior  Superficial bleeding controlled with electrocautery and the wound was extensively irrigated  The wound was closed with two layers, an inner layer of 3-0 vicryl and a subcuticular layer of 4-0 monocryl  Histocryl and steri-strips were applied  SLN Biopsy  The previously marked location of the sentinel lymph node was injected with 0 25% Marcaine and epinephrine  A curivilinear incision was made and dissection was taken down into the axillar proper with electrocautery  The yola counter was used to help localize the SLN  The sentinel lymph node was identified and removed with electrocautery  SLN Findings  The SLN was not blue but radioactive  The lymph node was grossly normal and sent for permanent pathologic analysis       I was present for the entire procedure    Patient Disposition:  PACU     SIGNATURE: Cynthia Braun MD  DATE: December 18, 2018  TIME: 9:04 AM

## 2018-12-18 NOTE — ANESTHESIA PREPROCEDURE EVALUATION
Review of Systems/Medical History  Patient summary reviewed    No history of anesthetic complications     Cardiovascular  EKG reviewed, Exercise tolerance (METS): >4,  Hyperlipidemia, Hypertension controlled,    Pulmonary  Smoker ex-smoker  , Tobacco cessation counseling given ,        GI/Hepatic  Negative GI/hepatic ROS          Negative  ROS        Endo/Other  History of thyroid disease , hypothyroidism,      GYN       Hematology  Negative hematology ROS      Musculoskeletal  Negative musculoskeletal ROS        Neurology  Negative neurology ROS      Psychology   Negative psychology ROS              Physical Exam    Airway    Mallampati score: III  TM Distance: <3 FB  Neck ROM: full     Dental   Comment: No loose,     Cardiovascular  Rhythm: regular, Rate: normal,     Pulmonary  Breath sounds clear to auscultation,     Other Findings        Anesthesia Plan  ASA Score- 2     Anesthesia Type- general with ASA Monitors  Additional Monitors:   Airway Plan: LMA  Plan Factors-  Patient did not smoke on day of surgery  Induction- intravenous  Postoperative Plan-   Planned trial extubation    Informed Consent- Anesthetic plan and risks discussed with patient

## 2018-12-18 NOTE — ANESTHESIA POSTPROCEDURE EVALUATION
Post-Op Assessment Note      CV Status:  Stable    Mental Status:  Alert and awake    Hydration Status:  Euvolemic and stable    PONV Controlled:  Controlled    Airway Patency:  Patent    Post Op Vitals Reviewed: Yes          Staff: Anesthesiologist           BP   147/65   Temp   97 6   Pulse  58   Resp   12   SpO2   100

## 2018-12-18 NOTE — DISCHARGE INSTRUCTIONS
POST-OPERATIVE BREAST CARE INSTRUCTIONS    Wound Closure/Dressing: Your wound is closed with:   Steri strips-white pieces of tape that hold your incision together along with surgical glue           Dissolvable sutures-underneath the skin    Wound care:     If you have gauze over your wound you may remove it the day after surgery  Leave the Steri-strips on, they will fall off on their own in 1-2 weeks   You may shower using soap and water to clean your wound  Gently pat dry  Drain Care: If you do not have a drain, disregard this section   Visiting Nursing should have been arranged upon discharge from hospital   A nurse will call your home to schedule an initial visit  Please call the number below if you have not received a call within 48 hours of hospital discharge   You may shower with the CECILE drains  Wash area around the drains with soap and water and pat dry   Record drain outputs on chart given to you at pre op visit and bring that chart to office at post op appt   CECILE drains can be removed in the office by a nurse either at post op visit OR when drain output is 30 ccs or less in a 24 hour period for three days in a row   If you had plastic surgery or reconstructive surgery, the plastic surgeon will manage the drains  Other:       You can begin wearing your own bra when it feels comfortable   You may resume using deodorant the day after surgery                 Post-op visit:  your post-op visit is scheduled for:  2019, @ 8 AM    Call our office at [phone] if you have any  of the followin  Redness, swelling, heat, unusual drainage or heavy bleeding from wound  2  Fever greater than 101 °  3  Pain not relieved by medication

## 2018-12-20 ENCOUNTER — OFFICE VISIT (OUTPATIENT)
Dept: SURGICAL ONCOLOGY | Facility: CLINIC | Age: 68
End: 2018-12-20

## 2018-12-20 ENCOUNTER — TELEPHONE (OUTPATIENT)
Dept: SURGICAL ONCOLOGY | Facility: CLINIC | Age: 68
End: 2018-12-20

## 2018-12-20 VITALS
WEIGHT: 189 LBS | HEIGHT: 65 IN | SYSTOLIC BLOOD PRESSURE: 122 MMHG | TEMPERATURE: 98.3 F | BODY MASS INDEX: 31.49 KG/M2 | RESPIRATION RATE: 18 BRPM | DIASTOLIC BLOOD PRESSURE: 80 MMHG | HEART RATE: 70 BPM

## 2018-12-20 DIAGNOSIS — Z17.0 MALIGNANT NEOPLASM OF LOWER-INNER QUADRANT OF RIGHT BREAST OF FEMALE, ESTROGEN RECEPTOR POSITIVE (HCC): Primary | ICD-10-CM

## 2018-12-20 DIAGNOSIS — C50.311 MALIGNANT NEOPLASM OF LOWER-INNER QUADRANT OF RIGHT BREAST OF FEMALE, ESTROGEN RECEPTOR POSITIVE (HCC): Primary | ICD-10-CM

## 2018-12-20 PROCEDURE — 99024 POSTOP FOLLOW-UP VISIT: CPT | Performed by: SURGERY

## 2018-12-20 NOTE — PROGRESS NOTES
Surgical Oncology Breast Post-Op       [address]  CANCER CARE [facility] SURGICAL ONCOLOGY [address]  1950  [id number]  [address]  CANCER [facility] SURGICAL ONCOLOGY [facility] [phone]    Chief Complaint:   Camilo Melendez is seen for a post-operative visit of her recent right breast surgery  Oncology History:        Malignant neoplasm of lower-inner quadrant of right breast of female, estrogen receptor positive ([facility] [address] )    11/1/2018 Biopsy     Right breast biopsy  3-4 o'clock, 5 cmfn  Invasive ductal carcinoma  Grade 1  8 mm on ultrasound    IA 90  Her 2 1 +         11/12/2018 Genetic Testing     BRCA testing-invitae  Negative            Assessment & Plan:   Assessment/Plan    Patient presents because she had noticed a bright red spot just underneath her sentinel lymph node site on the right side  History of Present Illness:   See above    Interval History:   See above    Review of Systems:   Review of Systems   All other systems reviewed and are negative        Past Medical History:     Patient Active Problem List   Diagnosis    Allergic rhinitis    Bradycardia    Heart murmur    Hyperlipidemia    Hypertension    Hypothyroidism    Osteoporosis    Pulmonary nodules    Subclavian artery stenosis (HCC)    Vitamin D deficiency    Malignant neoplasm of lower-inner quadrant of right breast of female, estrogen receptor positive ([facility] [address] )     Past Medical History:   Diagnosis Date    Acute pain of left shoulder     History    Acute recurrent maxillary sinusitis     History    Acute upper respiratory infection     History    Cancer ([address] )     Carotid artery narrowing     stenosis    Disease of thyroid gland     History of Graves' disease     status post 131 treatment    History of lateral epicondylitis of left elbow     History of onychomycosis     of toenail    History of perforation of tympanic membrane     Hyperlipidemia     Hypertension     Left otitis media     History    Medicare annual wellness visit, subsequent      Past Surgical History:   Procedure Laterality Date    BREAST LUMPECTOMY Right 12/18/2018    Procedure: BREAST LUMPECTOMY; BREAST NEEDLE LOCALIZATION (NEEDLE LOC AT 0700); Surgeon: Lenore French MD;  Location: [facility] OR;  Service: Surgical Oncology    ELBOW FRACTURE SURGERY  09/1960    Open Treatment of Fracture of proximal Radius    LYMPH NODE BIOPSY Right 12/18/2018    Procedure: SENTINEL LYMPH NODE BIOPSY; LYMPHATIC MAPPING WITH BLUE DYE AND RADIOACTIVE DYE (INJECT AT 0800 BY DR PRADO IN THE OR); Surgeon: Lenore French MD;  Location: [facility] OR;  Service: Surgical Oncology    SINUS SURGERY Left 1996    nasal polyps(?)-large papilloma left maxillary sinus with extension into posterior nasopharynx completed by Dr Maribel Bonilla   THYROID CYST EXCISION      ablation    TONSILLECTOMY AND ADENOIDECTOMY      US BREAST NEEDLE LOC RIGHT Right 12/18/2018    US GUIDED BREAST BIOPSY RIGHT COMPLETE Right 11/1/2018     Family History   Problem Relation Age of Onset    Breast cancer Mother     Pancreatic cancer Mother     Aortic aneurysm Father     Aortic aneurysm Family         Abdominal    Breast cancer Family     Breast cancer Maternal Grandmother      Social History     Social History    Marital status: /Civil Union     Spouse name: N/A    Number of children: N/A    Years of education: N/A     Occupational History    Not on file       Social History Main Topics    Smoking status: Former Smoker     Quit date: 10/16/2002    Smokeless tobacco: Never Used    Alcohol use Yes      Comment: rarely    Drug use: No    Sexual activity: Not on file     Other Topics Concern    Not on file     Social History Narrative    No narrative on file       Current Outpatient Prescriptions:     aspirin 81 MG tablet, Take 1 tablet by mouth daily, Disp: , Rfl:     Cholecalciferol (VITAMIN D3) 2000 units TABS, Take 1 tablet by mouth daily, Disp: , Rfl:     levothyroxine 112 mcg tablet, Take 1 tablet (112 mcg total) by mouth daily, Disp: 90 tablet, Rfl: 3    losartan (COZAAR) 100 MG tablet, Take 1 tablet (100 mg total) by mouth daily, Disp: 90 tablet, Rfl: 3    oxyCODONE-acetaminophen (PERCOCET) 5-325 mg per tablet, Take 1 tablet by mouth every 4 (four) hours as needed for moderate pain Max Daily Amount: 6 tablets (Patient not taking: Reported on 12/20/2018 ), Disp: 6 tablet, Rfl: 0    pravastatin (PRAVACHOL) 20 mg tablet, Take 1 tablet (20 mg total) by mouth daily for 90 days, Disp: 90 tablet, Rfl: 3    Current Facility-Administered Medications:     denosumab (PROLIA) subcutaneous injection 60 mg, 60 mg, Subcutaneous, Q6 Months, Chayo Lorenz MD, 60 mg at 08/02/18 1357  Allergies   Allergen Reactions    Sulfa Antibiotics Dizziness     Other reaction(s): feeling "off-balance"  Category: Adverse Reaction;        Physical Exams:     Vitals:    12/20/18 1512   BP: 122/80   Pulse: 70   Resp: 18   Temp: 98 3 °F (36 8 °C)     Physical Exam   Pulmonary/Chest:   Examination of the right sentinel lymph node biopsy site demonstrates the area of what appears to be abrasion were possible tape injury but not consistent with cellulitis or infection  Is separate from the incision itself  Results:       Labs:       Discussion/Summary:   I explained the patient that I did not think this was an area of infection I would not treat this with antibiotics  It may but potentially be an underlying hematoma but there is no swelling associated with it  I think it will resolve spontaneously reassured the patient  She will contact us if it worsens  Otherwise we will see her back for her regular postoperative visit

## 2018-12-20 NOTE — TELEPHONE ENCOUNTER
Patient called with concern of right axillary incision redness  Patient had lumpectomy and SLN biopsy this week  No fevers or swelling   Appt given for patient to be seen by Dr Santo Morris today

## 2018-12-31 ENCOUNTER — APPOINTMENT (OUTPATIENT)
Dept: LAB | Facility: HOSPITAL | Age: 68
End: 2018-12-31
Payer: MEDICARE

## 2018-12-31 DIAGNOSIS — E78.2 MIXED HYPERLIPIDEMIA: ICD-10-CM

## 2018-12-31 DIAGNOSIS — I10 ESSENTIAL HYPERTENSION: ICD-10-CM

## 2018-12-31 DIAGNOSIS — E03.9 ACQUIRED HYPOTHYROIDISM: ICD-10-CM

## 2018-12-31 LAB
ALBUMIN SERPL BCP-MCNC: 3.3 G/DL (ref 3.5–5)
ALP SERPL-CCNC: 74 U/L (ref 46–116)
ALT SERPL W P-5'-P-CCNC: 16 U/L (ref 12–78)
ANION GAP SERPL CALCULATED.3IONS-SCNC: 4 MMOL/L (ref 4–13)
AST SERPL W P-5'-P-CCNC: 11 U/L (ref 5–45)
BASOPHILS # BLD AUTO: 0.05 THOUSANDS/ΜL (ref 0–0.1)
BASOPHILS NFR BLD AUTO: 1 % (ref 0–1)
BILIRUB SERPL-MCNC: 0.45 MG/DL (ref 0.2–1)
BUN SERPL-MCNC: 18 MG/DL (ref 5–25)
CALCIUM SERPL-MCNC: 8.8 MG/DL (ref 8.3–10.1)
CHLORIDE SERPL-SCNC: 108 MMOL/L (ref 100–108)
CO2 SERPL-SCNC: 27 MMOL/L (ref 21–32)
CREAT SERPL-MCNC: 0.81 MG/DL (ref 0.6–1.3)
EOSINOPHIL # BLD AUTO: 0.16 THOUSAND/ΜL (ref 0–0.61)
EOSINOPHIL NFR BLD AUTO: 3 % (ref 0–6)
ERYTHROCYTE [DISTWIDTH] IN BLOOD BY AUTOMATED COUNT: 12.8 % (ref 11.6–15.1)
GFR SERPL CREATININE-BSD FRML MDRD: 74 ML/MIN/1.73SQ M
GLUCOSE P FAST SERPL-MCNC: 81 MG/DL (ref 65–99)
HCT VFR BLD AUTO: 40.3 % (ref 34.8–46.1)
HGB BLD-MCNC: 13 G/DL (ref 11.5–15.4)
IMM GRANULOCYTES # BLD AUTO: 0.02 THOUSAND/UL (ref 0–0.2)
IMM GRANULOCYTES NFR BLD AUTO: 0 % (ref 0–2)
LYMPHOCYTES # BLD AUTO: 2.06 THOUSANDS/ΜL (ref 0.6–4.47)
LYMPHOCYTES NFR BLD AUTO: 32 % (ref 14–44)
MCH RBC QN AUTO: 33.2 PG (ref 26.8–34.3)
MCHC RBC AUTO-ENTMCNC: 32.3 G/DL (ref 31.4–37.4)
MCV RBC AUTO: 103 FL (ref 82–98)
MONOCYTES # BLD AUTO: 0.5 THOUSAND/ΜL (ref 0.17–1.22)
MONOCYTES NFR BLD AUTO: 8 % (ref 4–12)
NEUTROPHILS # BLD AUTO: 3.67 THOUSANDS/ΜL (ref 1.85–7.62)
NEUTS SEG NFR BLD AUTO: 56 % (ref 43–75)
NRBC BLD AUTO-RTO: 0 /100 WBCS
PLATELET # BLD AUTO: 338 THOUSANDS/UL (ref 149–390)
PMV BLD AUTO: 9.3 FL (ref 8.9–12.7)
POTASSIUM SERPL-SCNC: 3.9 MMOL/L (ref 3.5–5.3)
PROT SERPL-MCNC: 7 G/DL (ref 6.4–8.2)
RBC # BLD AUTO: 3.92 MILLION/UL (ref 3.81–5.12)
SODIUM SERPL-SCNC: 139 MMOL/L (ref 136–145)
TSH SERPL DL<=0.05 MIU/L-ACNC: 0.46 UIU/ML (ref 0.36–3.74)
WBC # BLD AUTO: 6.46 THOUSAND/UL (ref 4.31–10.16)

## 2018-12-31 PROCEDURE — 84443 ASSAY THYROID STIM HORMONE: CPT

## 2018-12-31 PROCEDURE — 80053 COMPREHEN METABOLIC PANEL: CPT

## 2018-12-31 PROCEDURE — 36415 COLL VENOUS BLD VENIPUNCTURE: CPT

## 2018-12-31 PROCEDURE — 85025 COMPLETE CBC W/AUTO DIFF WBC: CPT

## 2019-01-02 ENCOUNTER — OFFICE VISIT (OUTPATIENT)
Dept: SURGICAL ONCOLOGY | Facility: CLINIC | Age: 69
End: 2019-01-02

## 2019-01-02 VITALS
DIASTOLIC BLOOD PRESSURE: 70 MMHG | SYSTOLIC BLOOD PRESSURE: 138 MMHG | HEART RATE: 60 BPM | RESPIRATION RATE: 16 BRPM | WEIGHT: 180.6 LBS | HEIGHT: 65 IN | TEMPERATURE: 98 F | BODY MASS INDEX: 30.09 KG/M2

## 2019-01-02 DIAGNOSIS — C50.311 MALIGNANT NEOPLASM OF LOWER-INNER QUADRANT OF RIGHT BREAST OF FEMALE, ESTROGEN RECEPTOR POSITIVE (HCC): Primary | ICD-10-CM

## 2019-01-02 DIAGNOSIS — Z17.0 MALIGNANT NEOPLASM OF LOWER-INNER QUADRANT OF RIGHT BREAST OF FEMALE, ESTROGEN RECEPTOR POSITIVE (HCC): Primary | ICD-10-CM

## 2019-01-02 PROCEDURE — 99024 POSTOP FOLLOW-UP VISIT: CPT | Performed by: SURGERY

## 2019-01-02 NOTE — PROGRESS NOTES
Surgical Oncology Breast Post-Op       [address]  CANCER CARE [facility] SURGICAL ONCOLOGY [address]  1950  [id number]  [address] SURGICAL ONCOLOGY [facility] [phone]    Chief Complaint:   Qing Campo is seen for a post-operative visit of her recent right breast surgery  Oncology History:        Malignant neoplasm of lower-inner quadrant of right breast of female, estrogen receptor positive ([facility] [address] )    11/1/2018 Biopsy     Right breast biopsy  3-4 o'clock, 5 cmfn  Invasive ductal carcinoma  Grade 1  8 mm on ultrasound    ME 90  Her 2 1 +         11/12/2018 Genetic Testing     BRCA testing-invitae  Negative         12/18/2018 Surgery     Right breast lumpectomy with sentinel lymph node biopsy  Invasive ductal carcinoma  Grade 1  1 1 cm  Margins negative   0/1 lymph node    ME 90  Her 2 1+  Stage IA            Assessment & Plan:   Assessment/Plan    Patient presents for a postoperative visit  She has no complaints referable to her surgery  Her wounds are healing well  Her pathology demonstrated clean margins, grade 1 ER strongly positive ME strongly positive  I have recommended she see Dr Carlos Choudhary for an opinion regarding anti hormonal therapy  Presuming she takes anti hormonal therapy I do not think radiation therapy would provide her significant advantage in this case  This was reviewed with the patient  She is agreeable to this approach  We will see her back in 3 months for  History of Present Illness:   See above    Interval History:   See above    Review of Systems:   Review of Systems   All other systems reviewed and are negative        Past Medical History:     Patient Active Problem List   Diagnosis    Allergic rhinitis    Bradycardia    Heart murmur    Hyperlipidemia    Hypertension    Hypothyroidism    Osteoporosis    Pulmonary nodules    Subclavian artery stenosis (HCC)    Vitamin D deficiency    Malignant neoplasm of lower-inner quadrant of right breast of female, estrogen receptor positive ([facility] [address] )     Past Medical History:   Diagnosis Date    Acute pain of left shoulder     History    Acute recurrent maxillary sinusitis     History    Acute upper respiratory infection     History    Cancer ([facility] [address] )     Carotid artery narrowing     stenosis    Disease of thyroid gland     History of Graves' disease     status post 131 treatment    History of lateral epicondylitis of left elbow     History of onychomycosis     of toenail    History of perforation of tympanic membrane     Hyperlipidemia     Hypertension     Left otitis media     History    Medicare annual wellness visit, subsequent      Past Surgical History:   Procedure Laterality Date    BREAST LUMPECTOMY Right 12/18/2018    Procedure: BREAST LUMPECTOMY; BREAST NEEDLE LOCALIZATION (NEEDLE LOC AT 0700); Surgeon: Marlen Choudhury MD;  Location: [facility];  Service: Surgical Oncology    ELBOW FRACTURE SURGERY  09/1960    Open Treatment of Fracture of proximal Radius    LYMPH NODE BIOPSY Right 12/18/2018    Procedure: SENTINEL LYMPH NODE BIOPSY; LYMPHATIC MAPPING WITH BLUE DYE AND RADIOACTIVE DYE (INJECT AT 0800 BY DR PRADO IN THE OR); Surgeon: Marlen Choudhury MD;  Location: [facility];  Service: Surgical Oncology    SINUS SURGERY Left 1996    nasal polyps(?)-large papilloma left maxillary sinus with extension into posterior nasopharynx completed by Dr Ludmila Law      THYROID CYST EXCISION      ablation    TONSILLECTOMY AND ADENOIDECTOMY      US BREAST NEEDLE LOC RIGHT Right 12/18/2018    US GUIDED BREAST BIOPSY RIGHT COMPLETE Right 11/1/2018     Family History   Problem Relation Age of Onset    Breast cancer Mother     Pancreatic cancer Mother     Aortic aneurysm Father     Aortic aneurysm Family         Abdominal    Breast cancer Family     Breast cancer Maternal Grandmother      Social History     Social History    Marital status: /Civil Union     Spouse name: N/A    Number of children: N/A    Years of education: N/A     Occupational History    Not on file  Social History Main Topics    Smoking status: Former Smoker     Quit date: 10/16/2002    Smokeless tobacco: Never Used    Alcohol use Yes      Comment: rarely    Drug use: No    Sexual activity: Not on file     Other Topics Concern    Not on file     Social History Narrative    No narrative on file       Current Outpatient Prescriptions:     aspirin 81 MG tablet, Take 1 tablet by mouth daily, Disp: , Rfl:     Cholecalciferol (VITAMIN D3) 2000 units TABS, Take 1 tablet by mouth daily, Disp: , Rfl:     levothyroxine 112 mcg tablet, Take 1 tablet (112 mcg total) by mouth daily, Disp: 90 tablet, Rfl: 3    losartan (COZAAR) 100 MG tablet, Take 1 tablet (100 mg total) by mouth daily, Disp: 90 tablet, Rfl: 3    oxyCODONE-acetaminophen (PERCOCET) 5-325 mg per tablet, Take 1 tablet by mouth every 4 (four) hours as needed for moderate pain Max Daily Amount: 6 tablets (Patient not taking: Reported on 1/2/2019 ), Disp: 6 tablet, Rfl: 0    pravastatin (PRAVACHOL) 20 mg tablet, Take 1 tablet (20 mg total) by mouth daily for 90 days, Disp: 90 tablet, Rfl: 3    Current Facility-Administered Medications:     denosumab (PROLIA) subcutaneous injection 60 mg, 60 mg, Subcutaneous, Q6 Months, Rylie Mendez MD, 60 mg at 08/02/18 1357  Allergies   Allergen Reactions    Sulfa Antibiotics Dizziness     Other reaction(s): feeling "off-balance"  Category: Adverse Reaction;        Physical Exams:     Vitals:    01/02/19 0744   BP: 138/70   Pulse: 60   Resp: 16   Temp: 98 °F (36 7 °C)     Physical Exam   Pulmonary/Chest:   Examination of the right breast demonstrates a well-healed medial incision as well as the sentinel lymph node incision  There is no evidence of infection         Results:       Labs:       Discussion/Summary:   I explained the patient we would see her back in 3 months  She will follow with Dr Kirby Hernandez  She understands that radiation reduces the chance that she would get recurrence by approximately 50% though her actual risk of recurrence is very low particularly if she takes anti hormonal therapy which will also reduced her wrist not only for local but also systemic disease

## 2019-01-02 NOTE — LETTER
January 2, 2019     [place] , [facility] [address] [facility]    Patient: Poonam Gómez   YOB: 1950   Date of Visit: 1/2/2019       Dear Dr Diane Gamboa:    Thank you for referring Tarunartur Carole to me for evaluation  Below are my notes for this consultation  If you have questions, please do not hesitate to call me  I look forward to following your patient along with you  Sincerely,        Greg Norman MD        CC: No Recipients  Greg Norman MD  1/2/2019  8:07 AM  Sign at close encounter     Surgical Oncology Breast Post-Op       [address]  1950  [id number]  [address] [facility],[address]  [facility] [phone] [id number]    Chief Complaint:   Beryle Ishihara is seen for a post-operative visit of her recent right breast surgery  Oncology History:        Malignant neoplasm of lower-inner quadrant of right breast of female, estrogen receptor positive ([facility] [address] )    11/1/2018 Biopsy     Right breast biopsy  3-4 o'clock, 5 cmfn  Invasive ductal carcinoma  Grade 1  8 mm on ultrasound    OK 90  Her 2 1 +         11/12/2018 Genetic Testing     BRCA testing-invitae  Negative         12/18/2018 Surgery     Right breast lumpectomy with sentinel lymph node biopsy  Invasive ductal carcinoma  Grade 1  1 1 cm  Margins negative   0/1 lymph node    OK 90  Her 2 1+  Stage IA            Assessment & Plan:   Assessment/Plan    Patient presents for a postoperative visit  She has no complaints referable to her surgery  Her wounds are healing well  Her pathology demonstrated clean margins, grade 1 ER strongly positive OK strongly positive  I have recommended she see Dr Gabe Hubbard for an opinion regarding anti hormonal therapy    Presuming she takes anti hormonal therapy I do not think radiation therapy would provide her significant advantage in this case  This was reviewed with the patient  She is agreeable to this approach  We will see her back in 3 months for  History of Present Illness:   See above    Interval History:   See above    Review of Systems:   Review of Systems   All other systems reviewed and are negative  Past Medical History:     Patient Active Problem List   Diagnosis    Allergic rhinitis    Bradycardia    Heart murmur    Hyperlipidemia    Hypertension    Hypothyroidism    Osteoporosis    Pulmonary nodules    Subclavian artery stenosis (HCC)    Vitamin D deficiency    Malignant neoplasm of lower-inner quadrant of right breast of female, estrogen receptor positive ([facility] [address] )     Past Medical History:   Diagnosis Date    Acute pain of left shoulder     History    Acute recurrent maxillary sinusitis     History    Acute upper respiratory infection     History    Cancer ([facility] [address] )     Carotid artery narrowing     stenosis    Disease of thyroid gland     History of Graves' disease     status post 131 treatment    History of lateral epicondylitis of left elbow     History of onychomycosis     of toenail    History of perforation of tympanic membrane     Hyperlipidemia     Hypertension     Left otitis media     History    Medicare annual wellness visit, subsequent      Past Surgical History:   Procedure Laterality Date    BREAST LUMPECTOMY Right 12/18/2018    Procedure: BREAST LUMPECTOMY; BREAST NEEDLE LOCALIZATION (NEEDLE LOC AT 0700); Surgeon: Milagro Ames MD;  [facility];  Service: Surgical Oncology    ELBOW FRACTURE SURGERY  09/1960    Open Treatment of Fracture of proximal Radius    LYMPH NODE BIOPSY Right 12/18/2018    Procedure: SENTINEL LYMPH NODE BIOPSY; LYMPHATIC MAPPING WITH BLUE DYE AND RADIOACTIVE DYE (INJECT AT 0800 BY DR PRADO IN THE OR);   Surgeon: Milagro Ames MD;  [facility];  Service: Surgical Oncology    SINUS SURGERY Left 1996    nasal polyps(?)-large papilloma left maxillary sinus with extension into posterior nasopharynx completed by Dr Kym Montes   THYROID CYST EXCISION      ablation    TONSILLECTOMY AND ADENOIDECTOMY      US BREAST NEEDLE LOC RIGHT Right 12/18/2018    US GUIDED BREAST BIOPSY RIGHT COMPLETE Right 11/1/2018     Family History   Problem Relation Age of Onset    Breast cancer Mother     Pancreatic cancer Mother     Aortic aneurysm Father     Aortic aneurysm Family         Abdominal    Breast cancer Family     Breast cancer Maternal Grandmother      Social History     Social History    Marital status: /Civil Union     Spouse name: N/A    Number of children: N/A    Years of education: N/A     Occupational History    Not on file       Social History Main Topics    Smoking status: Former Smoker     Quit date: 10/16/2002    Smokeless tobacco: Never Used    Alcohol use Yes      Comment: rarely    Drug use: No    Sexual activity: Not on file     Other Topics Concern    Not on file     Social History Narrative    No narrative on file       Current Outpatient Prescriptions:     aspirin 81 MG tablet, Take 1 tablet by mouth daily, Disp: , Rfl:     Cholecalciferol (VITAMIN D3) 2000 units TABS, Take 1 tablet by mouth daily, Disp: , Rfl:     levothyroxine 112 mcg tablet, Take 1 tablet (112 mcg total) by mouth daily, Disp: 90 tablet, Rfl: 3    losartan (COZAAR) 100 MG tablet, Take 1 tablet (100 mg total) by mouth daily, Disp: 90 tablet, Rfl: 3    oxyCODONE-acetaminophen (PERCOCET) 5-325 mg per tablet, Take 1 tablet by mouth every 4 (four) hours as needed for moderate pain Max Daily Amount: 6 tablets (Patient not taking: Reported on 1/2/2019 ), Disp: 6 tablet, Rfl: 0    pravastatin (PRAVACHOL) 20 mg tablet, Take 1 tablet (20 mg total) by mouth daily for 90 days, Disp: 90 tablet, Rfl: 3    Current Facility-Administered Medications:     denosumab (PROLIA) subcutaneous injection 60 mg, 60 mg, Subcutaneous, Q6 Months, Laurent Ramirez Natalia Wagner MD, 60 mg at 08/02/18 8015  Allergies   Allergen Reactions    Sulfa Antibiotics Dizziness     Other reaction(s): feeling "off-balance"  Category: Adverse Reaction;        Physical Exams:     Vitals:    01/02/19 0744   BP: 138/70   Pulse: 60   Resp: 16   Temp: 98 °F (36 7 °C)     Physical Exam   Pulmonary/Chest:   Examination of the right breast demonstrates a well-healed medial incision as well as the sentinel lymph node incision  There is no evidence of infection  Results:       Labs:       Discussion/Summary:   I explained the patient we would see her back in 3 months  She will follow with Dr Shameka Foote  She understands that radiation reduces the chance that she would get recurrence by approximately 50% though her actual risk of recurrence is very low particularly if she takes anti hormonal therapy which will also reduced her wrist not only for local but also systemic disease

## 2019-01-04 ENCOUNTER — OFFICE VISIT (OUTPATIENT)
Dept: FAMILY MEDICINE CLINIC | Facility: CLINIC | Age: 69
End: 2019-01-04
Payer: MEDICARE

## 2019-01-04 VITALS
DIASTOLIC BLOOD PRESSURE: 80 MMHG | HEIGHT: 65 IN | RESPIRATION RATE: 16 BRPM | SYSTOLIC BLOOD PRESSURE: 140 MMHG | OXYGEN SATURATION: 97 % | BODY MASS INDEX: 29.96 KG/M2 | WEIGHT: 179.8 LBS | TEMPERATURE: 97.6 F | HEART RATE: 64 BPM

## 2019-01-04 DIAGNOSIS — M81.0 AGE-RELATED OSTEOPOROSIS WITHOUT CURRENT PATHOLOGICAL FRACTURE: ICD-10-CM

## 2019-01-04 DIAGNOSIS — E78.2 MIXED HYPERLIPIDEMIA: ICD-10-CM

## 2019-01-04 DIAGNOSIS — I10 ESSENTIAL HYPERTENSION: Primary | ICD-10-CM

## 2019-01-04 DIAGNOSIS — R91.8 PULMONARY NODULES: ICD-10-CM

## 2019-01-04 DIAGNOSIS — Z00.00 MEDICARE ANNUAL WELLNESS VISIT, SUBSEQUENT: ICD-10-CM

## 2019-01-04 DIAGNOSIS — I77.1 SUBCLAVIAN ARTERY STENOSIS (HCC): ICD-10-CM

## 2019-01-04 DIAGNOSIS — E03.9 ACQUIRED HYPOTHYROIDISM: ICD-10-CM

## 2019-01-04 DIAGNOSIS — C50.311 MALIGNANT NEOPLASM OF LOWER-INNER QUADRANT OF RIGHT BREAST OF FEMALE, ESTROGEN RECEPTOR POSITIVE (HCC): ICD-10-CM

## 2019-01-04 DIAGNOSIS — Z17.0 MALIGNANT NEOPLASM OF LOWER-INNER QUADRANT OF RIGHT BREAST OF FEMALE, ESTROGEN RECEPTOR POSITIVE (HCC): ICD-10-CM

## 2019-01-04 PROCEDURE — G0439 PPPS, SUBSEQ VISIT: HCPCS | Performed by: FAMILY MEDICINE

## 2019-01-04 PROCEDURE — 99214 OFFICE O/P EST MOD 30 MIN: CPT | Performed by: FAMILY MEDICINE

## 2019-01-04 NOTE — PROGRESS NOTES
Assessment/Plan:    Hypertension  HTN - stable  Continue Losartan 100 mg daily  Hyperlipidemia  Continue Pravastatin 20 mg daily  Follow a low-cholesterol diet, regular exercise  Subclavian artery stenosis (HCC)  Continue Aspirin 81 mdaily, statin  Schedule annual carotid Doppler  Hypothyroidism  Continue Levothyroxine 112 mcg daily  Osteoporosis  Continue Prolia injections every 6 months  Will check DEXA scan in July 2019  Encouraged weight-bearing exercise, calcium with Vit D supplementation  Pulmonary nodules  Patient is a former smoker  Will order follow-up CT chest this year  Malignant neoplasm of lower-inner quadrant of right breast of female, estrogen receptor positive ([facility] [address] )  Patient is status post R lumpectomy with sentinel lymph node biopsy performed on 12/18/18  Follow- up with surgical oncology Dr Sergio Valenzuela and medical oncology   Dr Eleuterio Knutson to discuss hormonal therapy  HM: recommended to schedule colonoscopy  Discussed Shingrix vaccination  Patient will check with oncology if she can get Shingrix vaccine  Schedule follow-up office visit in 6 months  Check labs prior to next visit  Diagnoses and all orders for this visit:    Essential hypertension  -     Comprehensive metabolic panel; Future    Mixed hyperlipidemia  -     Comprehensive metabolic panel; Future  -     Lipid panel; Future    Subclavian artery stenosis (HCC)  -     aspirin 81 MG tablet; Take 1 tablet (81 mg total) by mouth daily  -     VAS carotid complete study; Future    Acquired hypothyroidism  -     TSH, 3rd generation with Free T4 reflex; Future    Age-related osteoporosis without current pathological fracture    Pulmonary nodules    Malignant ne[facility] of lower-inner quadrant of right breast of female, [facility]    Medicare annual wellness visit, subsequent          Subjective:      Patient ID: Jazzmine Vega is a 77 y o  female      HPI     Patient presents for 6 month follow-up of chronic medical conditions  PMHx: HTN, Hyperlipidemia, Hypothyroidism, Osteoporosis, Vit D deficiency, BL sublclavian  artery disease, R breast CA / S/P right lumpectomy on 12/18/18  Reviewed current medications, blood work results from  12/31/18  TSH 0 460, potassium 3 9, creatinine 0 81, fasting blood sugar 81  HTN - patient currently taking Losartan 100 mg daily  Denies chest pain, shortness of breath  C/o feeling lightheaded today  No heart palpitations  Echocardiogram done in January 2018: EF 60%,   trace MR, TR  Hypothyroidism -patient takes Levothyroxine 112 mcg daily  Weight has been stable  Denies fatigue, constipation, hair loss      Hyperlipidemia - controlled on Pravastatin 20 mg daily  Patient had carotid Doppler done in 1/18 which showed moderate subclavian artery disease, <  50% stenosis in  ICA BL  Patient is a former smoker  She had CT chest done in January 2018 which showed stable left basilar 4 mm pulmonary nodule  Osteoporosis - patient is getting Prolia injection every   6 months  DEXA scan done in 7/17 showed osteopenia, improvement in bone mineral density in the lumbar spine  Patient did not schedule colonoscopy yet  She was recently diagnosed with R breast CA, S/P   R breast lumpectomy with sentinel lymph node biopsy  Performed on 12/18/18  Patient was seen on 1/2/19 by surgical oncologist    Dr Abhay Ruggiero  who recommended to schedule appointment with medical oncologist Dr Ross Bernheim to discuss hormonal therapy  Prevnar 13 done in February 2015  Patient had Flu shot in 9/18  The following portions of the patient's history were reviewed and updated as appropriate: allergies, past family history, past medical history, past social history, past surgical history and problem list   Review of Systems   Constitutional: Negative for activity change, appetite change, chills, fatigue and fever     HENT: Negative for congestion, ear pain, hearing loss, nosebleeds, sore throat, tinnitus and trouble swallowing  Eyes: Negative for pain, discharge, redness, itching and visual disturbance  Respiratory: Negative for cough, chest tightness, shortness of breath and wheezing  Cardiovascular: Negative for chest pain, palpitations and leg swelling  Gastrointestinal: Negative for abdominal pain, blood in stool, constipation, diarrhea, nausea and vomiting  Genitourinary: Negative for difficulty urinating, dysuria, flank pain and hematuria  Musculoskeletal: Negative for arthralgias, back pain, gait problem, joint swelling, myalgias and neck pain  Skin: Negative for rash and wound  Neurological: Negative for syncope, light-headedness and headaches  Hematological: Negative for adenopathy  Bruises/bleeds easily  Psychiatric/Behavioral: Negative  Objective:      /80 (BP Location: Right arm, Patient Position: Sitting, Cuff Size: Standard)   Pulse 64   Temp 97 6 °F (36 4 °C) (Tympanic)   Resp 16   Ht 5' 5" (1 651 m)   Wt 81 6 kg (179 lb 12 8 oz)   SpO2 97%   BMI 29 92 kg/m²          Physical Exam   Constitutional: She appears well-developed and well-nourished  HENT:   Head: Normocephalic and atraumatic  Right Ear: External ear normal    Left Ear: External ear normal    Mouth/Throat: Oropharynx is clear and moist    Eyes: Pupils are equal, round, and reactive to light  Conjunctivae are normal    Neck: Normal range of motion  Neck supple  No JVD present  Cardiovascular: Normal rate, regular rhythm and normal heart sounds  No BL LE edema  L carotid bruit   Pulmonary/Chest: Effort normal and breath sounds normal  She has no wheezes  She has no rales  Abdominal: Soft  Bowel sounds are normal  There is no tenderness  Musculoskeletal: Normal range of motion  She exhibits no edema, tenderness or deformity  Skin: Skin is warm and dry  No rash noted  Psychiatric: She has a normal mood and affect     Nursing note and vitals reviewed

## 2019-01-04 NOTE — PROGRESS NOTES
Chief Complaint   Patient presents with   [employer] Wellness Visit     Subsequent AWV    Follow-up     6 month follow up     Health Maintenance   Topic Date Due    Hepatitis C Screening  1950   Ardeth Needs Medicare Annual Wellness Visit (AWV)  1950    CRC Screening: Colonoscopy  1950    DTaP,Tdap,and Td Vaccines (1 - Tdap) 03/02/1969    Fall Risk  06/15/2019    Depression Screening PHQ  06/15/2019    Urinary Incontinence Screening  06/15/2019    INFLUENZA VACCINE  Completed    Pneumococcal PPSV23/PCV13 65+ Years / High and Highest Risk  Completed     Assessment/Plan:    No problem-specific Assessment & Plan notes found for this encounter  Diagnoses and all orders for this visit:    Essential hypertension    Mixed hyperlipidemia    Subclavian artery stenosis (HCC)    Acquired hypothyroidism    Age-related osteoporosis without current pathological fracture    Pulmonary nodules    Malignant neoplasm of lower-inner quadrant of right breast of female, estrogen receptor positive (HCC)          Subjective:      Patient ID: Clifford Manzo is a 77 y o  female  HPI    The following portions of the patient's history were reviewed and updated as appropriate: allergies, current medications, past family history, past medical history, past surgical history and problem list     Review of Systems   Gastrointestinal: Negative for bowel incontinence  Psychiatric/Behavioral: The patient is not nervous/anxious            Objective:      /74 (BP Location: Left arm, Patient Position: Sitting, Cuff Size: Adult)   Pulse 64   Temp 97 6 °F (36 4 °C) (Tympanic)   Resp 16   Ht 5' 5" (1 651 m)   Wt 81 6 kg (179 lb 12 8 oz)   SpO2 97%   BMI 29 92 kg/m²          Physical Exam    Assessment and Plan:    Problem List Items Addressed This Visit     Hyperlipidemia    Hypertension - Primary    Hypothyroidism    Osteoporosis    Pulmonary nodules    Subclavian artery stenosis (HCC)    Malignant neoplasm of lower-inner quadrant of right breast of female, estrogen receptor positive [facility])        Health Maintenance Due   Topic Date Due    Hepatitis C Screening  1950   Georgeana Rouleau Medicare Annual Wellness Visit (AWV)  1950    CRC Screening: Colonoscopy  1950    DTaP,Tdap,and Td Vaccines (1 - Tdap) 03/02/1969         HPI:  Nelson Baez is a 77 y o  female here for her Subsequent Wellness Visit  Patient Active Problem List   Diagnosis    Allergic rhinitis    Bradycardia    Heart murmur    Hyperlipidemia    Hypertension    Hypothyroidism    Osteoporosis    Pulmonary nodules    Subclavian artery stenosis (HCC)    Vitamin D deficiency    Malignant neoplasm of lower-inner quadrant of right breast of female, estrogen receptor positive ([facility] [address] )     Past Medical History:   Diagnosis Date    Acute pain of left shoulder     History    Acute recurrent maxillary sinusitis     History    Acute upper respiratory infection     History    Cancer ([facility] [address] )     Carotid artery narrowing     stenosis    Disease of thyroid gland     History of Graves' disease     status post 131 treatment    History of lateral epicondylitis of left elbow     History of onychomycosis     of toenail    History of perforation of tympanic membrane     Hyperlipidemia     Hypertension     Left otitis media     History    Medicare annual wellness visit, subsequent      Past Surgical History:   Procedure Laterality Date    BREAST LUMPECTOMY Right 12/18/2018    Procedure: BREAST LUMPECTOMY; BREAST NEEDLE LOCALIZATION (NEEDLE LOC AT 0700); Surgeon: Tono Bentley MD;  [facility];  Service: Surgical Oncology    ELBOW FRACTURE SURGERY  09/1960    Open Treatment of Fracture of proximal Radius    LYMPH NODE BIOPSY Right 12/18/2018    Procedure: SENTINEL LYMPH NODE BIOPSY; LYMPHATIC MAPPING WITH BLUE DYE AND RADIOACTIVE DYE (INJECT AT 0800 BY DR PRADO IN THE OR);   Surgeon: Tono Bentley MD;  [facility];  Service: Surgical Oncology    SINUS SURGERY Left 1996    nasal polyps(?)-large papilloma left maxillary sinus with extension into posterior nasopharynx completed by Dr Rosa Maria Sheikh   THYROID CYST EXCISION      ablation    TONSILLECTOMY AND ADENOIDECTOMY      US BREAST NEEDLE LOC RIGHT Right 12/18/2018    US GUIDED BREAST BIOPSY RIGHT COMPLETE Right 11/1/2018     Family History   Problem Relation Age of Onset    Breast cancer Mother     Pancreatic cancer Mother     Aortic aneurysm Father     Aortic aneurysm Family         Abdominal    Breast cancer Family     Breast cancer Maternal Grandmother      History   Smoking Status    Former Smoker    Quit date: 10/16/2002   Smokeless Tobacco    Never Used     History   Alcohol Use    Yes     Comment: rarely      History   Drug Use No       Current Outpatient Prescriptions   Medication Sig Dispense Refill    aspirin 81 MG tablet Take 1 tablet by mouth daily      Cholecalciferol (VITAMIN D3) 2000 units TABS Take 1 tablet by mouth daily      levothyroxine 112 mcg tablet Take 1 tablet (112 mcg total) by mouth daily 90 tablet 3    losartan (COZAAR) 100 MG tablet Take 1 tablet (100 mg total) by mouth daily 90 tablet 3    pravastatin (PRAVACHOL) 20 mg tablet Take 1 tablet (20 mg total) by mouth daily for 90 days 90 tablet 3     Current Facility-Administered Medications   Medication Dose Route Frequency Provider Last Rate Last Dose    denosumab (PROLIA) subcutaneous injection 60 mg  60 mg Subcutaneous Q6 Months Elvin Haque MD   60 mg at 08/02/18 1357     Allergies   Allergen Reactions    Sulfa Antibiotics Dizziness     Other reaction(s): feeling "off-balance"  Category:  Adverse Reaction;      Immunization History   Administered Date(s) Administered    Influenza 10/10/2014, 09/19/2018    Influenza Split High Dose Preservative Free IM 10/30/2013    Influenza TIV (IM) 09/01/2015, 10/13/2016, 10/04/2017    Pneumococcal Conjugate 13-Valent 12/15/2015    Pneumococcal Polysaccharide PPV23 06/18/2014       Patient Care Team:  Davin Preciado MD as PCP - General  MD Agustín Horner MD as Surgeon (Surgical Oncology)  Irasema Danielson RN as Registered Nurse (Oncology)  Patricia Rosenbaum RN as Registered Nurse (Surgical Oncology)    Medicare Screening Tests and Risk Assessments:  Zo Nelson is here for her Subsequent Wellness visit  Health Risk Assessment:  Patient rates overall health as very good  Patient feels that their physical health rating is Same  Eyesight was rated as Slightly worse  Hearing was rated as Same  Patient feels that their emotional and mental health rating is Same  Pain experienced by patient in the last 7 days has been None  Emotional/Mental Health:  Patient has been feeling nervous/anxious  PHQ-9 Depression Screening:    Frequency of the following problems over the past two weeks:      1  Little interest or pleasure in doing things: 0 - not at all      2  Feeling down, depressed, or hopeless: 0 - not at all  PHQ-2 Score: 0          Broken Bones/Falls: Fall Risk Assessment:    In the past year, patient has experienced: No history of falling in past year          Bladder/Bowel:  Patient has not leaked urine accidently in the last six months  Patient reports no loss of bowel control  Immunizations:  Patient has had a flu vaccination within the last year  Patient has received a pneumonia shot  Patient has not received a shingles shot  Patient has not received tetanus/diphtheria shot  Home Safety:  Patient does not have trouble with stairs inside or outside of their home  Patient currently reports that there are no safety hazards present in home, working smoke alarms, no working carbon monoxide detectors        Preventative Screenings:   Breast cancer screening performed, 10/10/2018  no colon cancer screen completed, cholesterol screen completed, glaucoma eye exam completed,     Nutrition:  Current diet: Regular and Limited junk food with servings of the following:    Medications:  Patient is currently taking over-the-counter supplements  Patient is able to manage medications  Lifestyle Choices:  Patient reports no tobacco use  Patient has smoked or used tobacco in the past   Patient has stopped her tobacco use  Tobacco use quit date: 10/2002  Patient reports no alcohol use  Patient drives a vehicle  Patient wears seat belt  Activities of Daily Living:  Can get out of bed by his or her self, able to dress self, able to make own meals, able to do own shopping, able to bathe self, can do own laundry/housekeeping, can manage own money, pay bills and track expenses    Previous Hospitalizations:  No hospitalization or ED visit in past 12 months        Advanced Directives:  Patient has decided on a power of   Patient has spoken to designated power of   Patient has not completed advanced directive          Preventative Screening/Counseling:      Cardiovascular:      General: Risks and Benefits Discussed and Screening Current      Counseling: Healthy Diet, Healthy Weight, Improve Cholesterol, Improve Blood Pressure and Improve Exercise Tolerance          Diabetes:      General: Risks and Benefits Discussed and Screening Current      Counseling: Healthy Diet, Healthy Weight and Improve Physical Activity          Colorectal Cancer:      General: Risks and Benefits Discussed      Counseling: high fiber diet      Comments: Recommended to schedule colonoscopy        Breast Cancer:      General: Risks and Benefits Discussed and Screening Current          Cervical Cancer:      General: Risks and Benefits Discussed and Screening Not Indicated          Osteoporosis:      General: Risks and Benefits Discussed and Screening Current      Counseling: Calcium and Vitamin D Intake and Regular Weightbearing Exercise          AAA:      General: Risks and Benefits Discussed and Screening Not Indicated          Glaucoma: General: Risks and Benefits Discussed and Screening Current          HIV:      General: Screening Not Indicated          Hepatitis C:      General: Risks and Benefits Discussed        Advanced Directives:   Patient has no living will for healthcare, does not have durable POA for healthcare, patient does not have an advanced directive  Information on ACP and/or AD provided  5 wishes given  End of life assessment reviewed with patient  Immunizations:      Influenza: Risks & Benefits Discussed and Influenza UTD This Year      Pneumococcal: Risks & Benefits Discussed and Lifetime Vaccine Completed      Shingrix: Risks & Benefits Discussed      Hepatitis B (Low risk patients): Series Not Indicated      Zostavax: Risks & Benefits Discussed      TDAP: Risks & Benefits Discussed      Other Preventative Counseling (Non-Medicare): Fall Prevention, Increase physical activity, Nutrition Counseling, Car/seat belt/driving safety reviewed and Skin self-exam      MMSE: 30/30

## 2019-01-05 NOTE — ASSESSMENT & PLAN NOTE
Patient is status post R lumpectomy with sentinel lymph node biopsy performed on 12/18/18  Follow- up with surgical oncology Dr Greg Norman and medical oncology   Dr Gardenia Eugene to discuss hormonal therapy

## 2019-01-05 NOTE — ASSESSMENT & PLAN NOTE
Continue Prolia injections every 6 months  Will check DEXA scan in July 2019  Encouraged weight-bearing exercise, calcium with Vit D supplementation

## 2019-01-23 ENCOUNTER — DOCUMENTATION (OUTPATIENT)
Dept: HEMATOLOGY ONCOLOGY | Facility: CLINIC | Age: 69
End: 2019-01-23

## 2019-01-23 ENCOUNTER — CONSULT (OUTPATIENT)
Dept: HEMATOLOGY ONCOLOGY | Facility: CLINIC | Age: 69
End: 2019-01-23
Payer: MEDICARE

## 2019-01-23 VITALS
TEMPERATURE: 97 F | DIASTOLIC BLOOD PRESSURE: 64 MMHG | SYSTOLIC BLOOD PRESSURE: 138 MMHG | RESPIRATION RATE: 16 BRPM | WEIGHT: 180 LBS | OXYGEN SATURATION: 98 % | BODY MASS INDEX: 29.99 KG/M2 | HEART RATE: 67 BPM | HEIGHT: 65 IN

## 2019-01-23 DIAGNOSIS — Z17.0 MALIGNANT NEOPLASM OF LOWER-INNER QUADRANT OF RIGHT BREAST OF FEMALE, ESTROGEN RECEPTOR POSITIVE (HCC): ICD-10-CM

## 2019-01-23 DIAGNOSIS — C50.311 MALIGNANT NEOPLASM OF LOWER-INNER QUADRANT OF RIGHT BREAST OF FEMALE, ESTROGEN RECEPTOR POSITIVE (HCC): ICD-10-CM

## 2019-01-23 PROCEDURE — 99205 OFFICE O/P NEW HI 60 MIN: CPT | Performed by: INTERNAL MEDICINE

## 2019-01-23 RX ORDER — ANASTROZOLE 1 MG/1
1 TABLET ORAL DAILY
Qty: 90 TABLET | Refills: 1 | Status: SHIPPED | OUTPATIENT
Start: 2019-01-23 | End: 2019-07-03 | Stop reason: SDUPTHER

## 2019-01-23 NOTE — PROGRESS NOTES
1/23/2019  Received notification from Clinical that the pt will be starting Anastrozole (arimidex) 1mg  Called pt to obtain rx insurance information  However, she informed me that she already picked up the medication  The copay is $50 00  She stated that she may start looking for new insurances to lower the copay  I asked her if it would be ok  For a financial counselor to reach out to her to see what type/or if any type of assistance is available for her  Patient stated it would be ok      Notified clinical and financial

## 2019-01-23 NOTE — PROGRESS NOTES
Hematology / Oncology Outpatient Consult Note    Emry Kocher 77 y o  female [id number]         Date:  1/23/2019    Assessment / Plan:  A 66-year-old postmenopausal woman with newly diagnosed stage IA right breast cancer, grade 1, % positive, NM 90% positive, HER2 negative disease  She underwent lumpectomy with sentinel lymph node biopsy, resulting in MANUEL  She presents today to discuss adjuvant treatment options  We had extensive discussion regarding the diagnosis, staging information, tumor phenotype, low-grade disease, good prognosis and treatment options  Based on her age, low-grade ER positive disease, there is no benefit from adjuvant chemotherapy  I recommended her to have adjuvant hormonal therapy with anastrozole 1 mg once a day for 5 years  Side effects of anastrozole was thoroughly discussed, including but not limited to hot flashes, musculoskeletal symptoms and bone mineral density loss  She is partially protected from bone density loss, due to the ongoing treatment with denosumab  I recommended her to continue with denosumab at primary care physician's office  She is in agreement with my recommendation  I will see her again in 4 months for routine follow-up  Subjective:     HPI:  A 66-year-old postmenopausal woman who was found to have abnormality in her right breast, based on a screening mammography  She underwent right breast biopsy in November 1, 2018 which showed invasive ductal carcinoma, grade 1  Subsequently, she underwent lumpectomy with sentinel lymph node biopsy by Dr Renay Diaz in December 18, 2018 which showed 11 x 5 mm of invasive ductal carcinoma, grade 1  There was no evidence of lymphovascular invasion  One sentinel lymph node was negative for metastatic disease  This was % positive, NM 90% positive, HER2 negative disease  She presents today to discuss adjuvant treatment options  She has no complaint of pain  She feels well   She has history of osteoporosis for which she is on denosumab injection every 6 months for at least 3-4 years  She has improvement of bone density on denosumab  She has well-controlled hypertension  She was a smoker until 15 years ago  She is on lung cancer screening by CT scan  She has no respiratory symptoms such as cough, sputum production or shortness of breath  Her weight is stable  She has normal performance status  Interval History:          Objective:     Primary Diagnosis:    1  Right breast cancer, stage IA (pT1c, pN0, M0) grade 1, % positive, MO 90% positive, HER2 negative disease  Diagnosed in December 2018  2    History of osteoporosis  Cancer Staging:  Cancer Staging  No matching staging information was found for the patient  Previous Hematologic/ Oncologic Treatment:         Current Hematologic/ Oncologic Treatment:      Adjuvant hormonal therapy with anastrozole to be started in January 2019  Disease Status:     MANUEL status post lumpectomy and sentinel lymph node biopsy  Test Results:    Pathology:    11 x 5 mm of invasive ductal carcinoma, grade 1  No evidence of lymphovascular invasion  One sentinel lymph node was negative for metastatic disease  % positive, MO 90% positive, HER2 negative disease  Stage IA (pT1c, pN0, M0)    Radiology:    Chest x-ray was negative for pulmonary disease  DEXA scan in July 2017 showed T-score-1 9, consistent with osteopenia  Laboratory:    See below  Physical Exam:      General Appearance:    Alert, oriented        Eyes:    PERRL   Ears:    Normal external ear canals, both ears   Nose:   Nares normal, septum midline   Throat:   Mucosa moist  Pharynx without injection      Neck:   Supple       Lungs:     Clear to auscultation bilaterally   Chest Wall:    No tenderness or deformity    Heart:    Regular rate and rhythm       Abdomen:     Soft, non-tender, bowel sounds +, no organomegaly           Extremities:   Extremities no cyanosis or edema       Skin:   no rash or icterus  Lymph nodes:   Cervical, supraclavicular, and axillary nodes normal   Neurologic:   CNII-XII intact, normal strength, sensation and reflexes     Throughout          Breast exam:   Lumpectomy scar at 3:00 position in her right breast with no palpable abnormalities  Left breast exam is negative  ROS: Review of Systems   All other systems reviewed and are negative  Imaging: No results found  Labs:   Lab Results   Component Value Date    WBC 6 46 12/31/2018    HGB 13 0 12/31/2018    HCT 40 3 12/31/2018     (H) 12/31/2018     12/31/2018     Lab Results   Component Value Date     12/09/2015    K 3 9 12/31/2018     12/31/2018    CO2 27 12/31/2018    ANIONGAP 6 12/09/2015    BUN 18 12/31/2018    CREATININE 0 81 12/31/2018    GLUCOSE 81 12/09/2015    GLUF 81 12/31/2018    CALCIUM 8 8 12/31/2018    AST 11 12/31/2018    ALT 16 12/31/2018    ALKPHOS 74 12/31/2018    PROT 7 0 12/09/2015    BILITOT 0 57 12/09/2015    EGFR 74 12/31/2018       Vital Sign:    Body surface area is 1 9 meters squared      Wt Readings from Last 3 Encounters:   01/23/19 81 6 kg (180 lb)   01/04/19 81 6 kg (179 lb 12 8 oz)   01/02/19 81 9 kg (180 lb 9 6 oz)        Temp Readings from Last 3 Encounters:   01/23/19 (!) 97 °F (36 1 °C) (Tympanic)   01/04/19 97 6 °F (36 4 °C) (Tympanic)   01/02/19 98 °F (36 7 °C)        BP Readings from Last 3 Encounters:   01/23/19 138/64   01/04/19 140/80   01/02/19 138/70         Pulse Readings from Last 3 Encounters:   01/23/19 67   01/04/19 64   01/02/19 60     @LASTSAO2(3)@    Active Problems:   Patient Active Problem List   Diagnosis    Allergic rhinitis    Bradycardia    Heart murmur    Hyperlipidemia    Hypertension    Hypothyroidism    Osteoporosis    Pulmonary nodules    Subclavian artery stenosis (HCC)    Vitamin D deficiency    Malignant neoplasm of lower-inner quadrant of right breast of female, estrogen receptor [facility]    Medicare annual wellness visit, subsequent       Past Medical History:   Past Medical History:   Diagnosis Date    Acute pain of left shoulder     History    Acute recurrent maxillary sinusitis     History    Acute upper respiratory infection     History    [facility]     Cancer ([facility] )     Carotid artery narrowing     stenosis    Disease of thyroid gland     History of Graves' disease     status post 131 treatment    History of lateral epicondylitis of left elbow     History of onychomycosis     of toenail    History of perforation of tympanic membrane     Hyperlipidemia     Hypertension     Left otitis media     History    Medicare annual wellness visit, subsequent        Surgical History:   Past Surgical History:   Procedure Laterality Date    BREAST LUMPECTOMY Right 12/18/2018    Procedure: BREAST LUMPECTOMY; BREAST NEEDLE LOCALIZATION (NEEDLE LOC AT 0700); Surgeon: Greg Norman MD;  [facility];  Service: Surgical Oncology    BREAST LUMPECTOMY      ELBOW FRACTURE SURGERY  09/1960    Open Treatment of Fracture of proximal Radius    LYMPH NODE BIOPSY Right 12/18/2018    Procedure: SENTINEL LYMPH NODE BIOPSY; LYMPHATIC MAPPING WITH BLUE DYE AND RADIOACTIVE DYE (INJECT AT 0800 BY DR PRADO IN THE OR); Surgeon: Greg Norman MD;  [facility];  Service: Surgical Oncology    SINUS SURGERY Left 1996    nasal polyps(?)-large papilloma left maxillary sinus with extension into posterior nasopharynx completed by Dr Tenisha San      THYROID CYST EXCISION      ablation    TONSILLECTOMY AND ADENOIDECTOMY      US BREAST NEEDLE LOC RIGHT Right 12/18/2018    US GUIDED BREAST BIOPSY RIGHT COMPLETE Right 11/1/2018       Family History:    Family History   Problem Relation Age of Onset    Breast cancer Mother     Pancreatic cancer Mother     Aortic aneurysm Father     Aortic aneurysm Family         Abdominal    Breast cancer Family     Breast cancer Maternal Grandmother        Cancer-related family history includes Breast cancer in her family, maternal grandmother, and mother; Pancreatic cancer in her mother  Social History:   Social History     Social History    Marital status: /Civil Union     Spouse name: N/A    Number of children: N/A    Years of education: N/A     Occupational History    Not on file  Social History Main Topics    Smoking status: Former Smoker     Quit date: 10/16/2002    Smokeless tobacco: Never Used    Alcohol use Yes      Comment: rarely    Drug use: No    Sexual activity: Not on file     Other Topics Concern    Not on file     Social History Narrative    No narrative on file       Current Medications:   Current Outpatient Prescriptions   Medication Sig Dispense Refill    aspirin 81 MG tablet Take 1 tablet (81 mg total) by mouth daily 30 tablet 6    Cholecalciferol (VITAMIN D3) 2000 units TABS Take 1 tablet by mouth daily      levothyroxine 112 mcg tablet Take 1 tablet (112 mcg total) by mouth daily 90 tablet 3    losartan (COZAAR) 100 MG tablet Take 1 tablet (100 mg total) by mouth daily 90 tablet 3    pravastatin (PRAVACHOL) 20 mg tablet Take 1 tablet (20 mg total) by mouth daily for 90 days 90 tablet 3     Current Facility-Administered Medications   Medication Dose Route Frequency Provider Last Rate Last Dose    denosumab (PROLIA) subcutaneous injection 60 mg  60 mg Subcutaneous Q6 Months Chayo Lorenz MD   60 mg at 08/02/18 2848       Allergies: Allergies   Allergen Reactions    Sulfa Antibiotics Dizziness     Other reaction(s): feeling "off-balance"  Category:  Adverse Reaction;

## 2019-01-23 NOTE — LETTER
January 23, 2019     [phone]    Patient: Amaya Park   YOB: 1950   Date of Visit: 1/23/2019       Dear Dr Brandin Noyola:    Thank you for referring Maggy Oreilly to me for evaluation  Below are my notes for this consultation  If you have questions, please do not hesitate to call me  I look forward to following your patient along with you  Sincerely,        Shakila Figueroa MD        CC: MD Shakila Friedman MD  1/23/2019 10:17 AM  Sign at close encounter  Hematology / Oncology Outpatient Consult Note    Amaya Park 77 y o  female [id number]         Date:  1/23/2019    Assessment / Plan:  A 59-year-old postmenopausal woman with newly diagnosed stage IA right breast cancer, grade 1, % positive, [place] 90% positive, HER2 negative disease  She underwent lumpectomy with sentinel lymph node biopsy, resulting in MANUEL  She presents today to discuss adjuvant treatment options  We had extensive discussion regarding the diagnosis, staging information, tumor phenotype, low-grade disease, good prognosis and treatment options  Based on her age, low-grade ER positive disease, there is no benefit from adjuvant chemotherapy  I recommended her to have adjuvant hormonal therapy with anastrozole 1 mg once a day for 5 years  Side effects of anastrozole was thoroughly discussed, including but not limited to hot flashes, musculoskeletal symptoms and bone mineral density loss  She is partially protected from bone density loss, due to the ongoing treatment with denosumab  I recommended her to continue with denosumab at primary care physician's office  She is in agreement with my recommendation  I will see her again in 4 months for routine follow-up  Subjective:     HPI:  A 59-year-old postmenopausal woman who was found to have abnormality in her right breast, based on a screening mammography    She underwent right breast biopsy in November 1, 2018 which showed invasive ductal carcinoma, grade 1  Subsequently, she underwent lumpectomy with sentinel lymph node biopsy by Dr Nancy Bird in December 18, 2018 which showed 11 x 5 mm of invasive ductal carcinoma, grade 1  There was no evidence of lymphovascular invasion  One sentinel lymph node was negative for metastatic disease  This was % positive, IL 90% positive, HER2 negative disease  She presents today to discuss adjuvant treatment options  She has no complaint of pain  She feels well  She has history of osteoporosis for which she is on denosumab injection every 6 months for at least 3-4 years  She has improvement of bone density on denosumab  She has well-controlled hypertension  She was a smoker until 15 years ago  She is on lung cancer screening by CT scan  She has no respiratory symptoms such as cough, sputum production or shortness of breath  Her weight is stable  She has normal performance status  Interval History:          Objective:     Primary Diagnosis:    1  Right breast cancer, stage IA (pT1c, pN0, M0) grade 1, % positive, IL 90% positive, HER2 negative disease  Diagnosed in December 2018  2    History of osteoporosis  Cancer Staging:  Cancer Staging  No matching staging information was found for the patient  Previous Hematologic/ Oncologic Treatment:         Current Hematologic/ Oncologic Treatment:      Adjuvant hormonal therapy with anastrozole to be started in January 2019  Disease Status:     MANUEL status post lumpectomy and sentinel lymph node biopsy  Test Results:    Pathology:    11 x 5 mm of invasive ductal carcinoma, grade 1  No evidence of lymphovascular invasion  One sentinel lymph node was negative for metastatic disease  % positive, IL 90% positive, HER2 negative disease  Stage IA (pT1c, pN0, M0)    Radiology:    Chest x-ray was negative for pulmonary disease    DEXA scan in July 2017 showed T-score-1 9, consistent with osteopenia  Laboratory:    See below  Physical Exam:      General Appearance:    Alert, oriented        Eyes:    PERRL   Ears:    Normal external ear canals, both ears   Nose:   Nares normal, septum midline   Throat:   Mucosa moist  Pharynx without injection  Neck:   Supple       Lungs:     Clear to auscultation bilaterally   Chest Wall:    No tenderness or deformity    Heart:    Regular rate and rhythm       Abdomen:     Soft, non-tender, bowel sounds +, no organomegaly           Extremities:   Extremities no cyanosis or edema       Skin:   no rash or icterus  Lymph nodes:   Cervical, supraclavicular, and axillary nodes normal   Neurologic:   CNII-XII intact, normal strength, sensation and reflexes     Throughout          Breast exam:   Lumpectomy scar at 3:00 position in her right breast with no palpable abnormalities  Left breast exam is negative  ROS: Review of Systems   All other systems reviewed and are negative  Imaging: No results found  Labs:   Lab Results   Component Value Date    WBC 6 46 12/31/2018    HGB 13 0 12/31/2018    HCT 40 3 12/31/2018     (H) 12/31/2018     12/31/2018     Lab Results   Component Value Date     12/09/2015    K 3 9 12/31/2018     12/31/2018    CO2 27 12/31/2018    ANIONGAP 6 12/09/2015    BUN 18 12/31/2018    CREATININE 0 81 12/31/2018    GLUCOSE 81 12/09/2015    GLUF 81 12/31/2018    CALCIUM 8 8 12/31/2018    AST 11 12/31/2018    ALT 16 12/31/2018    ALKPHOS 74 12/31/2018    PROT 7 0 12/09/2015    BILITOT 0 57 12/09/2015    EGFR 74 12/31/2018       Vital Sign:    Body surface area is 1 9 meters squared      Wt Readings from Last 3 Encounters:   01/23/19 81 6 kg (180 lb)   01/04/19 81 6 kg (179 lb 12 8 oz)   01/02/19 81 9 kg (180 lb 9 6 oz)        Temp Readings from Last 3 Encounters:   01/23/19 (!) 97 °F (36 1 °C) (Tympanic)   01/04/19 97 6 °F (36 4 °C) (Tympanic)   01/02/19 98 °F (36 7 °C) BP Readings from Last 3 Encounters:   01/23/19 138/64   01/04/19 140/80   01/02/19 138/70         Pulse Readings from Last 3 Encounters:   01/23/19 67   01/04/19 64   01/02/19 60     @LASTSAO2(3)@    Active Problems:   Patient Active Problem List   Diagnosis    Allergic rhinitis    Bradycardia    Heart murmur    Hyperlipidemia    Hypertension    Hypothyroidism    Osteoporosis    Pulmonary nodules    Subclavian artery stenosis (HCC)    Vitamin D deficiency    Malignant neoplasm of lower-inner quadrant of right breast of female, estrogen receptor positive ([facility] )    Medicare annual wellness visit, subsequent       Past Medical History:   Past Medical History:   Diagnosis Date    Acute pain of left shoulder     History    Acute recurrent maxillary sinusitis     History    Acute upper respiratory infection     History    [facility]     Cancer ([facility] )     Carotid artery narrowing     stenosis    Disease of thyroid gland     History of Graves' disease     status post 131 treatment    History of lateral epicondylitis of left elbow     History of onychomycosis     of toenail    History of perforation of tympanic membrane     Hyperlipidemia     Hypertension     Left otitis media     History    Medicare annual wellness visit, subsequent        Surgical History:   Past Surgical History:   Procedure Laterality Date    BREAST LUMPECTOMY Right 12/18/2018    Procedure: BREAST LUMPECTOMY; BREAST NEEDLE LOCALIZATION (NEEDLE LOC AT 0700); Surgeon: Abhay Ruggiero MD;  [facility];  Service: Surgical Oncology    BREAST LUMPECTOMY      ELBOW FRACTURE SURGERY  09/1960    Open Treatment of Fracture of proximal Radius    LYMPH NODE BIOPSY Right 12/18/2018    Procedure: SENTINEL LYMPH NODE BIOPSY; LYMPHATIC MAPPING WITH BLUE DYE AND RADIOACTIVE DYE (INJECT AT 0800 BY DR PRADO IN THE OR);   Surgeon: Abhay Ruggiero MD;  [facility];  Service: Surgical Oncology    SINUS SURGERY Left 1996 nasal polyps(?)-large papilloma left maxillary sinus with extension into posterior nasopharynx completed by Dr Brittany Rincon   THYROID CYST EXCISION      ablation    TONSILLECTOMY AND ADENOIDECTOMY      US BREAST NEEDLE LOC RIGHT Right 12/18/2018    US GUIDED BREAST BIOPSY RIGHT COMPLETE Right 11/1/2018       Family History:    Family History   Problem Relation Age of Onset    Breast cancer Mother     Pancreatic cancer Mother     Aortic aneurysm Father     Aortic aneurysm Family         Abdominal    Breast cancer Family     Breast cancer Maternal Grandmother        Cancer-related family history includes Breast cancer in her family, maternal grandmother, and mother; Pancreatic cancer in her mother  Social History:   Social History     Social History    Marital status: /Civil Union     Spouse name: N/A    Number of children: N/A    Years of education: N/A     Occupational History    Not on file       Social History Main Topics    Smoking status: Former Smoker     Quit date: 10/16/2002    Smokeless tobacco: Never Used    Alcohol use Yes      Comment: rarely    Drug use: No    Sexual activity: Not on file     Other Topics Concern    Not on file     Social History Narrative    No narrative on file       Current Medications:   Current Outpatient Prescriptions   Medication Sig Dispense Refill    aspirin 81 MG tablet Take 1 tablet (81 mg total) by mouth daily 30 tablet 6    Cholecalciferol (VITAMIN D3) 2000 units TABS Take 1 tablet by mouth daily      levothyroxine 112 mcg tablet Take 1 tablet (112 mcg total) by mouth daily 90 tablet 3    losartan (COZAAR) 100 MG tablet Take 1 tablet (100 mg total) by mouth daily 90 tablet 3    pravastatin (PRAVACHOL) 20 mg tablet Take 1 tablet (20 mg total) by mouth daily for 90 days 90 tablet 3     Current Facility-Administered Medications   Medication Dose Route Frequency Provider Last Rate Last Dose    denosumab (PROLIA) subcutaneous injection 60 mg  60 mg Subcutaneous Q6 Months Reymundo Chapa MD   60 mg at 08/02/18 9892       Allergies: Allergies   Allergen Reactions    Sulfa Antibiotics Dizziness     Other reaction(s): feeling "off-balance"  Category:  Adverse Reaction;

## 2019-01-25 ENCOUNTER — DOCUMENTATION (OUTPATIENT)
Dept: HEMATOLOGY ONCOLOGY | Facility: CLINIC | Age: 69
End: 2019-01-25

## 2019-01-25 NOTE — PROGRESS NOTES
For Patient Socorro Kendrick   [id number]    Patient has an Approved teri thru [facility]  Valid  7-29-18  Thru 1-26-20  Card#  [id number]  Bin#  [id number]  PCN#  [id number]  [facility]#  [id number]    Patient has been notified and EPIC has been noted

## 2019-01-28 ENCOUNTER — HOSPITAL ENCOUNTER (OUTPATIENT)
Dept: NON INVASIVE DIAGNOSTICS | Facility: CLINIC | Age: 69
Discharge: HOME | End: 2019-01-28
Payer: MEDICARE

## 2019-01-28 DIAGNOSIS — I77.1 SUBCLAVIAN ARTERY STENOSIS (HCC): ICD-10-CM

## 2019-01-28 PROCEDURE — 93880 EXTRACRANIAL BILAT STUDY: CPT | Performed by: SURGERY

## 2019-01-28 PROCEDURE — 93880 EXTRACRANIAL BILAT STUDY: CPT

## 2019-02-04 ENCOUNTER — CLINICAL SUPPORT (OUTPATIENT)
Dept: FAMILY MEDICINE CLINIC | Facility: CLINIC | Age: 69
End: 2019-02-04
Payer: MEDICARE

## 2019-02-04 DIAGNOSIS — M81.0 OSTEOPOROSIS, UNSPECIFIED OSTEOPOROSIS TYPE, UNSPECIFIED PATHOLOGICAL FRACTURE PRESENCE: ICD-10-CM

## 2019-02-04 PROCEDURE — 96372 THER/PROPH/DIAG INJ SC/IM: CPT | Performed by: FAMILY MEDICINE

## 2019-04-02 PROBLEM — Z79.811 USE OF ANASTROZOLE (ARIMIDEX): Status: ACTIVE | Noted: 2019-04-02

## 2019-04-03 ENCOUNTER — OFFICE VISIT (OUTPATIENT)
Dept: SURGICAL ONCOLOGY | Facility: CLINIC | Age: 69
End: 2019-04-03
Payer: MEDICARE

## 2019-04-03 VITALS
WEIGHT: 180 LBS | HEART RATE: 52 BPM | SYSTOLIC BLOOD PRESSURE: 132 MMHG | TEMPERATURE: 97.8 F | HEIGHT: 65 IN | BODY MASS INDEX: 29.99 KG/M2 | RESPIRATION RATE: 16 BRPM | DIASTOLIC BLOOD PRESSURE: 70 MMHG

## 2019-04-03 DIAGNOSIS — Z17.0 MALIGNANT NEOPLASM OF LOWER-INNER QUADRANT OF RIGHT BREAST OF FEMALE, ESTROGEN RECEPTOR POSITIVE (HCC): Primary | ICD-10-CM

## 2019-04-03 DIAGNOSIS — Z79.811 USE OF ANASTROZOLE (ARIMIDEX): ICD-10-CM

## 2019-04-03 DIAGNOSIS — C50.311 MALIGNANT NEOPLASM OF LOWER-INNER QUADRANT OF RIGHT BREAST OF FEMALE, ESTROGEN RECEPTOR POSITIVE (HCC): Primary | ICD-10-CM

## 2019-04-03 PROCEDURE — 99214 OFFICE O/P EST MOD 30 MIN: CPT | Performed by: SURGERY

## 2019-05-02 DIAGNOSIS — E03.9 ACQUIRED HYPOTHYROIDISM: ICD-10-CM

## 2019-05-02 RX ORDER — LEVOTHYROXINE SODIUM 112 UG/1
112 TABLET ORAL DAILY
Qty: 90 TABLET | Refills: 3 | Status: SHIPPED | OUTPATIENT
Start: 2019-05-02 | End: 2019-07-10 | Stop reason: SDUPTHER

## 2019-05-15 ENCOUNTER — TELEPHONE (OUTPATIENT)
Dept: HEMATOLOGY ONCOLOGY | Facility: CLINIC | Age: 69
End: 2019-05-15

## 2019-06-21 DIAGNOSIS — I10 ESSENTIAL HYPERTENSION: ICD-10-CM

## 2019-06-21 RX ORDER — LOSARTAN POTASSIUM 100 MG/1
100 TABLET ORAL DAILY
Qty: 90 TABLET | Refills: 3 | Status: SHIPPED | OUTPATIENT
Start: 2019-06-21 | End: 2020-05-05 | Stop reason: SDUPTHER

## 2019-07-03 ENCOUNTER — OFFICE VISIT (OUTPATIENT)
Dept: HEMATOLOGY ONCOLOGY | Facility: CLINIC | Age: 69
End: 2019-07-03
Payer: MEDICARE

## 2019-07-03 VITALS
HEIGHT: 65 IN | HEART RATE: 75 BPM | BODY MASS INDEX: 30.32 KG/M2 | SYSTOLIC BLOOD PRESSURE: 124 MMHG | RESPIRATION RATE: 18 BRPM | DIASTOLIC BLOOD PRESSURE: 64 MMHG | WEIGHT: 182 LBS | OXYGEN SATURATION: 98 % | TEMPERATURE: 96.8 F

## 2019-07-03 DIAGNOSIS — Z17.0 MALIGNANT NEOPLASM OF LOWER-INNER QUADRANT OF RIGHT BREAST OF FEMALE, ESTROGEN RECEPTOR POSITIVE (HCC): Primary | ICD-10-CM

## 2019-07-03 DIAGNOSIS — C50.311 MALIGNANT NEOPLASM OF LOWER-INNER QUADRANT OF RIGHT BREAST OF FEMALE, ESTROGEN RECEPTOR POSITIVE (HCC): Primary | ICD-10-CM

## 2019-07-03 PROCEDURE — 99214 OFFICE O/P EST MOD 30 MIN: CPT | Performed by: INTERNAL MEDICINE

## 2019-07-03 PROCEDURE — 1124F ACP DISCUSS-NO DSCNMKR DOCD: CPT | Performed by: INTERNAL MEDICINE

## 2019-07-03 RX ORDER — ANASTROZOLE 1 MG/1
1 TABLET ORAL DAILY
Qty: 90 TABLET | Refills: 3 | Status: SHIPPED | OUTPATIENT
Start: 2019-07-03 | End: 2020-07-08 | Stop reason: SDUPTHER

## 2019-07-03 NOTE — PROGRESS NOTES
Hematology / Oncology Outpatient Follow Up Note    Pedro Betts 68 y o  female N:[id number] [id number]         Date:  7/3/2019    Assessment / Plan:  A 68-year-old postmenopausal woman with stage IA right breast cancer, grade 1, % positive, NH 90% positive, HER2 negative disease  She underwent lumpectomy with sentinel lymph node biopsy, resulting in MANUEL  Since January 2019, she has been on adjuvant hormonal therapy with anastrozole with no toxicity  Clinically, she has no evidence recurrent disease  I recommended her to continue with anastrozole 1 mg once a day  She is in agreement with my recommendation  I will see her again in a year for routine follow-up              Subjective:      HPI:  A 68-year-old postmenopausal woman who was found to have abnormality in her right breast, based on a screening mammography  She underwent right breast biopsy in November 1, 2018 which showed invasive ductal carcinoma, grade 1  Subsequently, she underwent lumpectomy with sentinel lymph node biopsy by Dr Angela Griffiths in December 18, 2018 which showed 11 x 5 mm of invasive ductal carcinoma, grade 1  There was no evidence of lymphovascular invasion  One sentinel lymph node was negative for metastatic disease  This was % positive, NH 90% positive, HER2 negative disease  She presents today to discuss adjuvant treatment options  She has no complaint of pain  She feels well  She has history of osteoporosis for which she is on denosumab injection every 6 months for at least 3-4 years  She has improvement of bone density on denosumab  She has well-controlled hypertension  She was a smoker until 15 years ago  She is on lung cancer screening by CT scan  She has no respiratory symptoms such as cough, sputum production or shortness of breath  Her weight is stable    She has normal performance status            Interval History:  A 68year-old postmenopausal woman with stage IA right breast cancer, grade 1, ER 100% positive, MD 90% positive, HER2 negative disease  She underwent lumpectomy with sentinel lymph node biopsy, resulting in MANUEL  Since January 2019, she has been on adjuvant hormonal therapy with anastrozole  She presents today for routine follow-up  She has no complaint hot flashes  She has baseline joint stiffness which did not change much since she started anastrozole  She has no complaint of pain  Her weight is stable  She has no respiratory symptoms  Her performance status is normal            Objective:      Primary Diagnosis:     1  Right breast cancer, stage IA (pT1c, pN0, M0) grade 1, % positive, MD 90% positive, HER2 negative disease  Diagnosed in December 2018  2    History of osteoporosis      Cancer Staging:  Cancer Staging  No matching staging information was found for the patient         Previous Hematologic/ Oncologic Treatment:            Current Hematologic/ Oncologic Treatment:       Adjuvant hormonal therapy with anastrozole since January 2019      Disease Status:      MANUEL status post lumpectomy and sentinel lymph node biopsy      Test Results:     Pathology:     11 x 5 mm of invasive ductal carcinoma, grade 1  No evidence of lymphovascular invasion  One sentinel lymph node was negative for metastatic disease  % positive, MD 90% positive, HER2 negative disease  Stage IA (pT1c, pN0, M0)     Radiology:     Chest x-ray was negative for pulmonary disease  DEXA scan in July 2017 showed T-score-1 9, consistent with osteopenia      Laboratory:     See below      Physical Exam:        General Appearance:    Alert, oriented          Eyes:    PERRL   Ears:    Normal external ear canals, both ears   Nose:   Nares normal, septum midline   Throat:   Mucosa moist  Pharynx without injection      Neck:   Supple         Lungs:     Clear to auscultation bilaterally   Chest Wall:    No tenderness or deformity    Heart:    Regular rate and rhythm         Abdomen:     Soft, non-tender, bowel sounds +, no organomegaly               Extremities:   Extremities no cyanosis or edema         Skin:   no rash or icterus  Lymph nodes:   Cervical, supraclavicular, and axillary nodes normal   Neurologic:   CNII-XII intact, normal strength, sensation and reflexes     Throughout             Breast exam:   Lumpectomy scar at 3:00 position in her right breast with no palpable abnormalities  Left breast exam is negative  ROS: Review of Systems   All other systems reviewed and are negative  Imaging: No results found  Labs:   Lab Results   Component Value Date    WBC 6 46 12/31/2018    HGB 13 0 12/31/2018    HCT 40 3 12/31/2018     (H) 12/31/2018     12/31/2018     Lab Results   Component Value Date     12/09/2015    K 3 9 12/31/2018     12/31/2018    CO2 27 12/31/2018    ANIONGAP 6 12/09/2015    BUN 18 12/31/2018    CREATININE 0 81 12/31/2018    GLUCOSE 81 12/09/2015    GLUF 81 12/31/2018    CALCIUM 8 8 12/31/2018    AST 11 12/31/2018    ALT 16 12/31/2018    ALKPHOS 74 12/31/2018    PROT 7 0 12/09/2015    BILITOT 0 57 12/09/2015    EGFR 74 12/31/2018         Current Medications: Reviewed  Allergies: Reviewed  PMH/FH/SH:  Reviewed      Vital Sign:    Body surface area is 1 9 meters squared      Wt Readings from Last 3 Encounters:   07/03/19 82 6 kg (182 lb)   04/03/19 81 6 kg (180 lb)   01/23/19 81 6 kg (180 lb)        Temp Readings from Last 3 Encounters:   07/03/19 (!) 96 8 °F (36 °C) (Tympanic Core)   04/03/19 97 8 °F (36 6 °C)   01/23/19 (!) 97 °F (36 1 °C) (Tympanic)        BP Readings from Last 3 Encounters:   07/03/19 124/64   04/03/19 132/70   01/23/19 138/64         Pulse Readings from Last 3 Encounters:   07/03/19 75   04/03/19 (!) 52   01/23/19 67     @LASTSAO2(3)@

## 2019-07-05 ENCOUNTER — LAB (OUTPATIENT)
Dept: LAB | Facility: HOSPITAL | Age: 69
End: 2019-07-05
Payer: MEDICARE

## 2019-07-05 DIAGNOSIS — I10 ESSENTIAL HYPERTENSION: ICD-10-CM

## 2019-07-05 DIAGNOSIS — E78.2 MIXED HYPERLIPIDEMIA: ICD-10-CM

## 2019-07-05 DIAGNOSIS — E03.9 ACQUIRED HYPOTHYROIDISM: ICD-10-CM

## 2019-07-05 LAB
ALBUMIN SERPL BCP-MCNC: 3.2 G/DL (ref 3.5–5)
ALP SERPL-CCNC: 61 U/L (ref 46–116)
ALT SERPL W P-5'-P-CCNC: 17 U/L (ref 12–78)
ANION GAP SERPL CALCULATED.3IONS-SCNC: 4 MMOL/L (ref 4–13)
AST SERPL W P-5'-P-CCNC: 17 U/L (ref 5–45)
BILIRUB SERPL-MCNC: 0.52 MG/DL (ref 0.2–1)
BUN SERPL-MCNC: 16 MG/DL (ref 5–25)
CALCIUM SERPL-MCNC: 9 MG/DL (ref 8.3–10.1)
CHLORIDE SERPL-SCNC: 110 MMOL/L (ref 100–108)
CHOLEST SERPL-MCNC: 157 MG/DL (ref 50–200)
CO2 SERPL-SCNC: 27 MMOL/L (ref 21–32)
CREAT SERPL-MCNC: 0.85 MG/DL (ref 0.6–1.3)
GFR SERPL CREATININE-BSD FRML MDRD: 69 ML/MIN/1.73SQ M
GLUCOSE P FAST SERPL-MCNC: 75 MG/DL (ref 65–99)
HDLC SERPL-MCNC: 59 MG/DL (ref 40–60)
LDLC SERPL CALC-MCNC: 86 MG/DL (ref 0–100)
NONHDLC SERPL-MCNC: 98 MG/DL
POTASSIUM SERPL-SCNC: 3.9 MMOL/L (ref 3.5–5.3)
PROT SERPL-MCNC: 6.5 G/DL (ref 6.4–8.2)
SODIUM SERPL-SCNC: 141 MMOL/L (ref 136–145)
TRIGL SERPL-MCNC: 58 MG/DL
TSH SERPL DL<=0.05 MIU/L-ACNC: 2.48 UIU/ML (ref 0.36–3.74)

## 2019-07-05 PROCEDURE — 36415 COLL VENOUS BLD VENIPUNCTURE: CPT

## 2019-07-05 PROCEDURE — 80061 LIPID PANEL: CPT

## 2019-07-05 PROCEDURE — 80053 COMPREHEN METABOLIC PANEL: CPT

## 2019-07-05 PROCEDURE — 84443 ASSAY THYROID STIM HORMONE: CPT

## 2019-07-10 ENCOUNTER — OFFICE VISIT (OUTPATIENT)
Dept: FAMILY MEDICINE CLINIC | Facility: CLINIC | Age: 69
End: 2019-07-10
Payer: MEDICARE

## 2019-07-10 VITALS
HEART RATE: 60 BPM | DIASTOLIC BLOOD PRESSURE: 76 MMHG | SYSTOLIC BLOOD PRESSURE: 128 MMHG | HEIGHT: 65 IN | WEIGHT: 178.8 LBS | BODY MASS INDEX: 29.79 KG/M2 | TEMPERATURE: 98 F | OXYGEN SATURATION: 97 % | RESPIRATION RATE: 12 BRPM

## 2019-07-10 DIAGNOSIS — Z11.59 ENCOUNTER FOR HEPATITIS C VIRUS SCREENING TEST FOR HIGH RISK PATIENT: ICD-10-CM

## 2019-07-10 DIAGNOSIS — C50.311 MALIGNANT NEOPLASM OF LOWER-INNER QUADRANT OF RIGHT BREAST OF FEMALE, ESTROGEN RECEPTOR POSITIVE (HCC): ICD-10-CM

## 2019-07-10 DIAGNOSIS — R91.8 PULMONARY NODULES: ICD-10-CM

## 2019-07-10 DIAGNOSIS — Z17.0 MALIGNANT NEOPLASM OF LOWER-INNER QUADRANT OF RIGHT BREAST OF FEMALE, ESTROGEN RECEPTOR POSITIVE (HCC): ICD-10-CM

## 2019-07-10 DIAGNOSIS — M81.0 OSTEOPOROSIS, UNSPECIFIED OSTEOPOROSIS TYPE, UNSPECIFIED PATHOLOGICAL FRACTURE PRESENCE: ICD-10-CM

## 2019-07-10 DIAGNOSIS — Z91.89 ENCOUNTER FOR HEPATITIS C VIRUS SCREENING TEST FOR HIGH RISK PATIENT: ICD-10-CM

## 2019-07-10 DIAGNOSIS — E03.9 ACQUIRED HYPOTHYROIDISM: ICD-10-CM

## 2019-07-10 DIAGNOSIS — I10 ESSENTIAL HYPERTENSION: Primary | ICD-10-CM

## 2019-07-10 DIAGNOSIS — E78.2 MIXED HYPERLIPIDEMIA: ICD-10-CM

## 2019-07-10 DIAGNOSIS — I77.1 SUBCLAVIAN ARTERY STENOSIS (HCC): ICD-10-CM

## 2019-07-10 PROCEDURE — 99214 OFFICE O/P EST MOD 30 MIN: CPT | Performed by: FAMILY MEDICINE

## 2019-07-10 RX ORDER — PRAVASTATIN SODIUM 20 MG
20 TABLET ORAL DAILY
Qty: 90 TABLET | Refills: 4
Start: 2019-07-10 | End: 2020-05-05 | Stop reason: SDUPTHER

## 2019-07-10 RX ORDER — PRAVASTATIN SODIUM 20 MG
20 TABLET ORAL DAILY
Qty: 90 TABLET | Refills: 4 | Status: SHIPPED | OUTPATIENT
Start: 2019-07-10 | End: 2019-07-10 | Stop reason: SDUPTHER

## 2019-07-10 RX ORDER — LEVOTHYROXINE SODIUM 112 UG/1
112 TABLET ORAL DAILY
Qty: 90 TABLET | Refills: 4 | Status: SHIPPED | OUTPATIENT
Start: 2019-07-10 | End: 2020-05-05 | Stop reason: SDUPTHER

## 2019-07-10 NOTE — PROGRESS NOTES
Chief Complaint   Patient presents with    Follow-up     6 month follow up     Health Maintenance   Topic Date Due    Hepatitis C Screening  1950    CRC Screening: Colonoscopy  1950    BMI: Followup Plan  03/02/1966    DTaP,Tdap,and Td Vaccines (1 - Tdap) 03/02/1969    INFLUENZA VACCINE  09/09/2019 (Originally 7/1/2019)    MAMMOGRAM  10/11/2019    Fall Risk  01/04/2020    Depression Screening PHQ  01/04/2020    Urinary Incontinence Screening  01/04/2020    Medicare Annual Wellness Visit (AWV)  01/04/2020    BMI: Adult  07/10/2020    Pneumococcal Vaccine: 65+ Years  Completed    Pneumococcal Vaccine: Pediatrics (0 to 5 Years) and At-Risk Patients (6 to 59 Years)  Aged Out    HEPATITIS B VACCINES  Aged Out     BMI Counseling: Body mass index is 29 75 kg/m²  Discussed the patient's BMI with her  The BMI is above average  BMI counseling and education was provided to the patient  Nutrition recommendations include reducing portion sizes, decreasing overall calorie intake, 3-5 servings of fruits/vegetables daily, reducing fast food intake, consuming healthier snacks, decreasing soda and/or juice intake, moderation in carbohydrate intake, increasing intake of lean protein, reducing intake of saturated fat and trans fat and reducing intake of cholesterol  Exercise recommendations include moderate aerobic physical activity for 150 minutes/week  Assessment/Plan:    Hypertension  Blood pressure is well controlled  Continue Losartan 100 mg daily  Hypothyroidism  Continue replacement with Levothyroxine 112 mcg daily  Hyperlipidemia  Lipid panel has improved  Take Pravastatin 20 mg daily  Follow a low-cholesterol diet, regular exercise  Osteoporosis  Schedule DEXA scan  Continue Prolia injections every 3 months  Encouraged weight-bearing exercise, continue calcium with Vit D supplementation  Pulmonary nodules  Patient is a former smoker      Will repeat CT chest next year     Subclavian artery stenosis [facility])  Patient is asymptomatic  Continue aspirin 81 mg daily, statin therapy  Will repeat Carotid Doppler next year  Malignant neoplasm of lower-inner quadrant of right breast of female, estrogen receptor positive ([facility] [address] )  Patient is S/P right lumpectomy with sentinel lymph node biopsy performed in 2018  Currently taking Anastrozole 1 mg daily  Follow-up with surgical oncologist Dr Corrine Hammond and medical oncologist  Dr Armando Ayers     Patient has order from oncology to schedule mammogram in December  HM: schedule colonoscopy  Discussed screening for Hep C, Tdap and Shingrix vaccination  Patient will check with insurance regarding coverage  Schedule follow-up office visit in 7 months  Check labs prior to next visit  Diagnoses and all orders for this visit:    Essential hypertension  -     CBC and differential; Future  -     Comprehensive metabolic panel; Future    Acquired hypothyroidism  -     TSH, 3rd generation with Free T4 reflex; Future  -     levothyroxine 112 mcg tablet; Take 1 tablet (112 mcg total) by mouth daily for 90 days    Mixed hyperlipidemia  -     Comprehensive metabolic panel; Future  -     Lipid panel; Future  -     pravastatin (PRAVACHOL) 20 mg tablet; Take 1 tablet (20 mg total) by mouth daily    Osteoporosis, unspecified osteoporosis type, unspecified pathological fracture presence  -     DXA bone density spine hip and pelvis; Future    Encounter for hepatitis C virus screening test for high risk patient  -     Hepatitis C antibody; Future    Pulmonary nodules    Subclavian artery stenosis (HCC)    Malignant neoplasm of lower-inner quadrant of right breast of female, estrogen receptor positive ([facility] [address] )    Other orders  -     Discontinue: pravastatin (PRAVACHOL) 20 mg tablet; Take 1 tablet (20 mg total) by mouth daily          Subjective:      Patient ID: Pradeep Marie is a 68 y o  female      HPI    Patient is 70-year-old female with HTN, Hyperlipidemia, Hypothyroidism, Osteoporosis, Vit D deficiency, BL sublclavian  artery disease, R breast CA / S/P right lumpectomy in 12/18  She presents for 6 month follow-up visit  Reviewed current medications, blood test results from July 5, 2019  Potassium 3 9, fasting blood sugar 75, creatinine 0 85, TSH 2 480, cholesterol 157, HDL 59, LDL 86  HTN -  blood pressure remains stable  Patient takes Losartan 100 mg daily  Denies chest pain, shortness of breath, dizziness  Echocardiogram done in January 2018 showed EF 60%, trace MR, TR  Hyperlipidemia - lipid panel has improved  Patient takes Pravastatin 20 mg daily  Hypothyroidism - currently on Levothyroxine 112 mcg daily  Denies fatigue, constipation, hair loss  Weight has been stable  Osteoporosis - patient is getting Prolia injections every 6 months  She had DEXA scan done in July 2017 which showed osteopenia, improvement in bone mineral density in the lumbar spine  Patient has been followed by surgical oncologist Dr Justin Topete and medical oncologist Dr Tila Webster  Currently taking Anastrozole 1 mg daily  She was seen by Dr Tila Webster in on 7/3/19  Patient is a former smoker  She had CT chest done in January 2018 which showed stable left basilar 4 mm pulmonary nodule  Prevnar 13 done in February 2015  The following portions of the patient's history were reviewed and updated as appropriate: current medications, past family history, past medical history, past social history, past surgical history and problem list     Review of Systems   Constitutional: Negative for activity change, appetite change, chills, fatigue and fever  HENT: Negative for congestion, ear pain, hearing loss, nosebleeds, sore throat, tinnitus and trouble swallowing  Eyes: Negative for pain, discharge, redness, itching and visual disturbance     Respiratory: Negative for cough, chest tightness, shortness of breath and wheezing  Cardiovascular: Negative for chest pain and leg swelling  Gastrointestinal: Negative for abdominal pain, blood in stool, constipation, diarrhea, nausea and vomiting  Genitourinary: Negative for difficulty urinating, dysuria, flank pain, frequency, hematuria and pelvic pain  Musculoskeletal: Negative for arthralgias, back pain, gait problem, joint swelling, myalgias and neck pain  Skin: Negative for rash and wound  Neurological: Negative for dizziness, syncope and headaches  Hematological: Negative for adenopathy  Bruises/bleeds easily  Psychiatric/Behavioral: Negative  Objective:      /76 (BP Location: Left arm, Patient Position: Sitting, Cuff Size: Adult)   Pulse 60   Temp 98 °F (36 7 °C) (Tympanic)   Resp 12   Ht 5' 5" (1 651 m)   Wt 81 1 kg (178 lb 12 8 oz)   SpO2 97%   BMI 29 75 kg/m²          Physical Exam   Constitutional: She appears well-developed and well-nourished  HENT:   Head: Normocephalic and atraumatic  Right Ear: External ear normal    Left Ear: External ear normal    Mouth/Throat: Oropharynx is clear and moist    Eyes: Pupils are equal, round, and reactive to light  Conjunctivae are normal    Neck: Normal range of motion  Neck supple  No JVD present  Cardiovascular: Normal rate, regular rhythm and normal heart sounds  No murmur heard  No carotid bruits BL  No BL LE edema   Pulmonary/Chest: Effort normal and breath sounds normal    Abdominal: Soft  Bowel sounds are normal  There is no tenderness  Musculoskeletal: Normal range of motion  She exhibits no edema, tenderness or deformity  Skin: Skin is warm and dry  No rash noted  Psychiatric: She has a normal mood and affect  Nursing note and vitals reviewed

## 2019-07-11 NOTE — ASSESSMENT & PLAN NOTE
Lipid panel has improved  Take Pravastatin 20 mg daily  Follow a low-cholesterol diet, regular exercise

## 2019-07-11 NOTE — ASSESSMENT & PLAN NOTE
Patient is S/P right lumpectomy with sentinel lymph node biopsy performed in December 2018  Currently taking Anastrozole 1 mg daily  Follow-up with surgical oncologist Dr Christiana Ramachandran and medical oncologist  Dr Mohinder Uglade     Patient has order from oncology to schedule mammogram in December

## 2019-07-11 NOTE — ASSESSMENT & PLAN NOTE
Patient is asymptomatic  Continue aspirin 81 mg daily, statin therapy  Will repeat Carotid Doppler next year

## 2019-07-11 NOTE — ASSESSMENT & PLAN NOTE
Schedule DEXA scan  Continue Prolia injections every 3 months  Encouraged weight-bearing exercise, continue calcium with Vit D supplementation

## 2019-08-07 ENCOUNTER — CLINICAL SUPPORT (OUTPATIENT)
Dept: FAMILY MEDICINE CLINIC | Facility: CLINIC | Age: 69
End: 2019-08-07
Payer: MEDICARE

## 2019-08-07 DIAGNOSIS — M81.0 AGE-RELATED OSTEOPOROSIS WITHOUT CURRENT PATHOLOGICAL FRACTURE: Primary | ICD-10-CM

## 2019-08-07 PROCEDURE — 96372 THER/PROPH/DIAG INJ SC/IM: CPT

## 2019-08-07 NOTE — PROGRESS NOTES
Patient presented for her Prolia injection, given for treatment of Osteoporosis  She receives this every 6 months  The last date she had the shot was 2/4/2019  Today, she received the injection in her left arm, subcutaneously  She tolerated the injection well  She is to return on 2/11/2019 for an office visit and will receive the next injection then

## 2019-08-22 ENCOUNTER — HOSPITAL ENCOUNTER (OUTPATIENT)
Dept: RADIOLOGY | Age: 69
Discharge: HOME | End: 2019-08-22
Payer: MEDICARE

## 2019-08-22 DIAGNOSIS — M81.0 OSTEOPOROSIS, UNSPECIFIED OSTEOPOROSIS TYPE, UNSPECIFIED PATHOLOGICAL FRACTURE PRESENCE: ICD-10-CM

## 2019-08-22 PROCEDURE — 77080 DXA BONE DENSITY AXIAL: CPT

## 2019-10-14 ENCOUNTER — HOSPITAL ENCOUNTER (OUTPATIENT)
Dept: MAMMOGRAPHY | Facility: CLINIC | Age: 69
Discharge: HOME | End: 2019-10-14
Payer: MEDICARE

## 2019-10-14 VITALS — BODY MASS INDEX: 29.66 KG/M2 | WEIGHT: 178 LBS | HEIGHT: 65 IN

## 2019-10-14 DIAGNOSIS — C50.311 MALIGNANT NEOPLASM OF LOWER-INNER QUADRANT OF RIGHT BREAST OF FEMALE, ESTROGEN RECEPTOR POSITIVE (HCC): ICD-10-CM

## 2019-10-14 DIAGNOSIS — Z17.0 MALIGNANT NEOPLASM OF LOWER-INNER QUADRANT OF RIGHT BREAST OF FEMALE, ESTROGEN RECEPTOR POSITIVE (HCC): ICD-10-CM

## 2019-10-14 PROCEDURE — 77066 DX MAMMO INCL CAD BI: CPT

## 2019-10-14 PROCEDURE — G0279 TOMOSYNTHESIS, MAMMO: HCPCS

## 2019-10-23 ENCOUNTER — OFFICE VISIT (OUTPATIENT)
Dept: SURGICAL ONCOLOGY | Facility: CLINIC | Age: 69
End: 2019-10-23
Payer: MEDICARE

## 2019-10-23 VITALS
DIASTOLIC BLOOD PRESSURE: 72 MMHG | WEIGHT: 179 LBS | HEART RATE: 62 BPM | SYSTOLIC BLOOD PRESSURE: 148 MMHG | HEIGHT: 65 IN | TEMPERATURE: 97.7 F | BODY MASS INDEX: 29.82 KG/M2

## 2019-10-23 DIAGNOSIS — Z79.811 USE OF ANASTROZOLE (ARIMIDEX): ICD-10-CM

## 2019-10-23 DIAGNOSIS — Z17.0 MALIGNANT NEOPLASM OF LOWER-INNER QUADRANT OF RIGHT BREAST OF FEMALE, ESTROGEN RECEPTOR POSITIVE (HCC): Primary | ICD-10-CM

## 2019-10-23 DIAGNOSIS — C50.311 MALIGNANT NEOPLASM OF LOWER-INNER QUADRANT OF RIGHT BREAST OF FEMALE, ESTROGEN RECEPTOR POSITIVE (HCC): Primary | ICD-10-CM

## 2019-10-23 PROCEDURE — 99214 OFFICE O/P EST MOD 30 MIN: CPT | Performed by: NURSE PRACTITIONER

## 2019-10-23 NOTE — PROGRESS NOTES
Surgical Oncology Follow Up       [address] [facility]  CANCER [facility] SURGICAL ONCOLOGY [place]  [address] [facility] [place] [address]    Sandhya Betts  1950  [id number]  [address] [facility]  CANCER [facility] SURGICAL ONCOLOGY [place]  [address] [facility] [place] [address]    Chief Complaint   Patient presents with    Breast Cancer     F/u        Assessment/Plan:  1  Malignant neoplasm of lower-inner quadrant of right breast of female, estrogen receptor positive ([facility] [address] )  - 6 mo f/u visit    2  Use of anastrozole (Arimidex)  - continue use per Medical Oncology     Discussion/Summary:  Patient is a 64-year-old female who presents today for a six-month follow-up visit for right breast cancer diagnosed in November 2018  Her pathology revealed invasive ductal carcinoma, %, GA 90%, her 2-  She underwent genetic testing which was negative  She underwent a right lumpectomy and sentinel node biopsy by Dr Vance Islas  She is currently taking anastrozole  She did not receive radiation therapy  She had a bilateral 3D diagnostic mammogram performed on October 14, 2019 which was BI-RADS 2, category 2 density  She has no new complaints today and there are no concerns on today's exam   She stopped smoking approximately 15 years ago  We will plan to see the patient back in 6 months or sooner if the need arises  She was instructed to call with any new concerns or symptoms prior to that time  All of her questions were answered today      History of Present Illness:        Malignant neoplasm of lower-inner quadrant of right breast of female, estrogen receptor positive ([facility] [address] )    11/1/2018 Biopsy     Right breast biopsy  3-4 o'clock, 5 cmfn  Invasive ductal carcinoma  Grade 1  8 mm on ultrasound    GA 90  Her 2 1 +      11/12/2018 Genetic Testing     BRCA testing-invitae  Negative      12/18/2018 Surgery     Right breast lumpectomy with sentinel lymph node biopsy  Invasive ductal carcinoma  Grade 1  1 1 cm  Margins negative   0/1 lymph node    AZ 90  Her 2 1+  Stage IA      1/23/2019 -  Hormone Therapy     Anastrozole 1 mg daily for 5 years  With Dr Jeannine Reyes          -Interval History:  Patient presents today for a six-month follow-up visit for right breast cancer diagnosed in 2018  She had a bilateral 3D diagnostic mammogram performed on October 14, 2019 which was BI-RADS 2, category 2 density  She notices no changes on self-exam   She reports generalized stiffness but denies headaches, back pain, bone pain, cough, shortness of breath, abdominal pain  Review of Systems:  Review of Systems   Constitutional: Negative for activity change, appetite change, chills, fatigue, fever and unexpected weight change  HENT: Negative for trouble swallowing  Eyes: Negative for pain, redness and visual disturbance  Respiratory: Negative for cough, shortness of breath and wheezing  Cardiovascular: Negative for chest pain, palpitations and leg swelling  Gastrointestinal: Negative for abdominal pain, constipation, diarrhea, nausea and vomiting  Endocrine: Negative for cold intolerance and heat intolerance  Musculoskeletal: Positive for arthralgias  Negative for back pain, gait problem and myalgias  Skin: Negative for color change and rash  Neurological: Negative for dizziness, syncope, light-headedness, numbness and headaches  Hematological: Negative for adenopathy  Psychiatric/Behavioral: Negative for agitation and confusion  All other systems reviewed and are negative        Patient Active Problem List   Diagnosis    Allergic rhinitis    Bradycardia    Heart murmur    Hyperlipidemia    Hypertension    Hypothyroidism    Osteoporosis    Pulmonary nodules    Subclavian artery stenosis (HCC)    Vitamin D deficiency    Malignant neoplasm of lower-inner quadrant of right breast of female, estrogen receptor positive ([facility] [address] )    Medicare annual wellness visit, subsequent    Use of anastrozole (Arimidex)     Past Medical History:   Diagnosis Date    Acute pain of left shoulder     History    Acute recurrent maxillary sinusitis     History    Acute upper respiratory infection     History    BRCA negative 11/12/2018    Invitae    BRCA1 negative     BRCA2 negative     Breast cancer ([facility] [address] ) 11/01/2018    Right    Cancer ([facility] [address] )     Carotid artery narrowing     stenosis    Disease of thyroid gland     History of Graves' disease     status post 131 treatment    History of lateral epicondylitis of left elbow     History of onychomycosis     of toenail    History of perforation of tympanic membrane     Hyperlipidemia     Hypertension     Left otitis media     History    Medicare annual wellness visit, subsequent      Past Surgical History:   Procedure Laterality Date    BREAST BIOPSY Right 11/01/2018    U/S BX    BREAST LUMPECTOMY Right 12/18/2018    Procedure: BREAST LUMPECTOMY; BREAST NEEDLE LOCALIZATION (NEEDLE LOC AT 0700); Surgeon: Larisa Juarez MD;  [facility];  Service: Surgical Oncology    BREAST LUMPECTOMY      ELBOW FRACTURE SURGERY  09/1960    Open Treatment of Fracture of proximal Radius    LYMPH NODE BIOPSY Right 12/18/2018    Procedure: SENTINEL LYMPH NODE BIOPSY; LYMPHATIC MAPPING WITH BLUE DYE AND RADIOACTIVE DYE (INJECT AT 0800 BY DR PRADO IN THE OR); Surgeon: Larisa Juarez MD;  [facility];  Service: Surgical Oncology    SINUS SURGERY Left 1996    nasal polyps(?)-large papilloma left maxillary sinus with extension into posterior nasopharynx completed by Dr Jerad Acosta      THYROID CYST EXCISION      ablation    TONSILLECTOMY AND ADENOIDECTOMY      US BREAST NEEDLE LOC RIGHT Right 12/18/2018    US GUIDED BREAST BIOPSY RIGHT COMPLETE Right 11/1/2018     Family History   Problem Relation Age of Onset    Breast cancer Mother     Pancreatic cancer Mother     Aortic aneurysm Father     Aortic aneurysm Family         Abdominal    Breast cancer Family  Breast cancer Maternal Grandmother      Social History     Socioeconomic History    Marital status: /Civil Union     Spouse name: Not on file    Number of children: Not on file    Years of education: Not on file    Highest education level: Not on file   Occupational History    Not on file   Social Needs    Financial resource strain: Not on file    Food insecurity:     Worry: Not on file     Inability: Not on file    Transportation needs:     Medical: Not on file     Non-medical: Not on file   Tobacco Use    Smoking status: Former Smoker     Last attempt to quit: 10/16/2002     Years since quittin 0    Smokeless tobacco: Never Used   Substance and Sexual Activity    Alcohol use: Yes     Comment: rarely    Drug use: No    Sexual activity: Not on file   Lifestyle    Physical activity:     Days per week: Not on file     Minutes per session: Not on file    Stress: Not on file   Relationships    Social connections:     Talks on phone: Not on file     Gets together: Not on file     Attends Baptism service: Not on file     Active member of club or organization: Not on file     Attends meetings of clubs or organizations: Not on file     Relationship status: Not on file    Intimate partner violence:     Fear of current or ex partner: Not on file     Emotionally abused: Not on file     Physically abused: Not on file     Forced sexual activity: Not on file   Other Topics Concern    Not on file   Social History Narrative    Not on file       Current Outpatient Medications:     anastrozole (ARIMIDEX) 1 mg tablet, Take 1 tablet (1 mg total) by mouth daily, Disp: 90 tablet, Rfl: 3    aspirin 81 MG tablet, Take 1 tablet (81 mg total) by mouth daily, Disp: 30 tablet, Rfl: 6    Cholecalciferol (VITAMIN D3) 2000 units TABS, Take 1 tablet by mouth daily, Disp: , Rfl:     losartan (COZAAR) 100 MG tablet, Take 1 tablet (100 mg total) by mouth daily, Disp: 90 tablet, Rfl: 3    pravastatin (PRAVACHOL) 20 mg tablet, Take 1 tablet (20 mg total) by mouth daily, Disp: 90 tablet, Rfl: 4    levothyroxine 112 mcg tablet, Take 1 tablet (112 mcg total) by mouth daily for 90 days, Disp: 90 tablet, Rfl: 4    Current Facility-Administered Medications:     denosumab (PROLIA) subcutaneous injection 60 mg, 60 mg, Subcutaneous, Q6 Months, Priscila Irvin MD, 60 mg at 08/07/19 0827  Allergies   Allergen Reactions    Sulfa Antibiotics Dizziness     Other reaction(s): feeling "off-balance"  Category: Adverse Reaction;      Vitals:    10/23/19 0901   BP: 148/72   Pulse: 62   Temp: 97 7 °F (36 5 °C)       Physical Exam   Constitutional: She is oriented to person, place, and time  Vital signs are normal  She appears well-developed and well-nourished  No distress  HENT:   Head: Normocephalic and atraumatic  Neck: Normal range of motion  Cardiovascular: Normal rate, regular rhythm and normal heart sounds  Pulmonary/Chest: Effort normal and breath sounds normal    Bilateral breasts were examined in the sitting and supine position  Right breast and right axillary surgical scars present  There are no dominant masses, skin nodules, nipple changes or nipple discharge  There is no bilateral supraclavicular or axillary lymphadenopathy noted  Abdominal: Soft  Normal appearance  She exhibits no mass  There is no hepatosplenomegaly  There is no tenderness  Musculoskeletal: Normal range of motion  Lymphadenopathy:     She has no axillary adenopathy  Right: No supraclavicular adenopathy present  Left: No supraclavicular adenopathy present  Neurological: She is alert and oriented to person, place, and time  Skin: Skin is warm, dry and intact  No rash noted  She is not diaphoretic  Psychiatric: She has a normal mood and affect  Her speech is normal    Vitals reviewed          Results:    Imaging  Mammo Diagnostic Bilateral W 3d & Cad    Result Date: 10/14/2019  Narrative: DIAGNOSIS: Malignant neoplasm of lower-inner quadrant of right breast of female, estrogen receptor positive ([facility] [address] ) TECHNIQUE: Digital screening mammography was performed  Computer Aided Detection (CAD) analyzed all applicable images  COMPARISONS:  Multiple prior exams dated between 10/26/2010 and 12/18/2018  RELEVANT HISTORY: Family Breast Cancer History: History of breast cancer in Mother, Family, Maternal Grandmother  Family Medical History: Family medical history includes breast cancer in family, breast cancer in maternal grandmother, and breast cancer in mother  Personal History: Hormone history includes birth control and other  Surgical history includes breast biopsy and lumpectomy  Medical history includes breast cancer, BRCA 1 negative, and BRCA 2 negative  RISK ASSESSMENT: 5 Year Tyrer-Cuzick: 4 99 % 10 Year Tyrer-Cuzick: 10 39 % Lifetime Tyrer-Cuzick: 16 11 % TISSUE DENSITY: There are scattered areas of fibroglandular density  INDICATION: Cipriano Gallardo is a 68 y o  female presenting for History of breast cancer  FINDINGS: RIGHT 1) POST-SURGICAL FINDING: There are post-surgical findings from a previous lumpectomy seen in the right breast  There has been no interval development of a suspicious mass, microcalcification, or architectural distortion  BILATERAL There are no suspicious masses, grouped microcalcifications or areas of architectural distortion  The skin and nipple areolar complex are unremarkable  Benign-appearing calcifications are noted in each breast       Impression:  No evidence of malignancy  ASSESSMENT/BI-RADS CATEGORY: Left: 2 - Benign Right: 2 - Benign Overall: 2 - Benign RECOMMENDATION:      - Diagnostic mammogram in 1 year for both breasts  Workstation ID: [id number]      I reviewed the above imaging data  Advance Care Planning/Advance Directives:  Discussed disease status, cancer treatment plans and/or cancer treatment goals with the patient

## 2019-11-10 ENCOUNTER — OFFICE VISIT (OUTPATIENT)
Dept: URGENT CARE | Facility: CLINIC | Age: 69
End: 2019-11-10
Payer: MEDICARE

## 2019-11-10 VITALS
OXYGEN SATURATION: 97 % | WEIGHT: 175 LBS | HEART RATE: 84 BPM | TEMPERATURE: 100 F | SYSTOLIC BLOOD PRESSURE: 140 MMHG | BODY MASS INDEX: 28.12 KG/M2 | HEIGHT: 66 IN | RESPIRATION RATE: 18 BRPM | DIASTOLIC BLOOD PRESSURE: 66 MMHG

## 2019-11-10 DIAGNOSIS — B96.89 ACUTE BACTERIAL BRONCHITIS: Primary | ICD-10-CM

## 2019-11-10 DIAGNOSIS — J20.8 ACUTE BACTERIAL BRONCHITIS: Primary | ICD-10-CM

## 2019-11-10 PROCEDURE — 99213 OFFICE O/P EST LOW 20 MIN: CPT | Performed by: PHYSICIAN ASSISTANT

## 2019-11-10 PROCEDURE — G0463 HOSPITAL OUTPT CLINIC VISIT: HCPCS | Performed by: PHYSICIAN ASSISTANT

## 2019-11-10 RX ORDER — BENZONATATE 100 MG/1
100 CAPSULE ORAL 3 TIMES DAILY PRN
Qty: 30 CAPSULE | Refills: 0 | Status: SHIPPED | OUTPATIENT
Start: 2019-11-10 | End: 2020-02-11 | Stop reason: ALTCHOICE

## 2019-11-10 RX ORDER — AZITHROMYCIN 250 MG/1
TABLET, FILM COATED ORAL
Qty: 6 TABLET | Refills: 0 | Status: SHIPPED | OUTPATIENT
Start: 2019-11-10 | End: 2019-11-14

## 2019-11-10 NOTE — PROGRESS NOTES
Assessment/Plan    Acute bacterial bronchitis [J20 8, B96 89]  1  Acute bacterial bronchitis  azithromycin (ZITHROMAX) 250 mg tablet    benzonatate (TESSALON PERLES) 100 mg capsule         Subjective:     Patient ID: Cammy Aggarwal is a 68 y o  female  Reason For Visit / Chief Complaint  Chief Complaint   Patient presents with    Cough     began thursday; taking Mucinex DM - last dose @ [phone] this am  Keeping awake all night  Cough so harsh at times causes throat pain (pain alleviates when coughing ceases )         24-year-old female presents the clinic with intermittent productive and dry cough, chills, subjective fevers, sore throat pain that is worse when cough is productive  Patient denies nasal congestion, rhinorrhea, headaches, dizziness, lightheadedness, wheezing  Patient states that she does get some mild chest tightness when coughing which resolves quickly after coughing ends  Patient states she has been taking Mucinex DM since yesterday without much improvement in symptoms  Patient states that her grandchildren, daughter,  have all had colds and she is last 1 to get sick at home        Past Medical History:   Diagnosis Date    Acute pain of left shoulder     History    Acute recurrent maxillary sinusitis     History    Acute upper respiratory infection     History    BRCA negative 11/12/2018    [facility]ae    BRCA1 negative     BRCA2 negative     Breast cancer ([facility] [address] ) 11/01/2018    Right    Cancer ([facility] [address] [facility]     Carotid artery narrowing     stenosis    Disease of thyroid gland     History of Graves' disease     status post 131 treatment    History of lateral epicondylitis of left elbow     History of onychomycosis     of toenail    History of perforation of tympanic membrane     Hyperlipidemia     Hypertension     Left otitis media     History    Medicare annual wellness visit, subsequent        Past Surgical History:   Procedure Laterality Date    BREAST BIOPSY Right 11/01/2018 U/S BX    BREAST LUMPECTOMY Right 12/18/2018    Procedure: BREAST LUMPECTOMY; BREAST NEEDLE LOCALIZATION (NEEDLE LOC AT 0700); Surgeon: Dick Verdugo MD;  [facility];  Service: Surgical Oncology    BREAST LUMPECTOMY      ELBOW FRACTURE SURGERY  09/1960    Open Treatment of Fracture of proximal Radius    LYMPH NODE BIOPSY Right 12/18/2018    Procedure: SENTINEL LYMPH NODE BIOPSY; LYMPHATIC MAPPING WITH BLUE DYE AND RADIOACTIVE DYE (INJECT AT 0800 BY DR PRADO IN THE OR); Surgeon: Dick Verdugo MD;  [facility];  Service: Surgical Oncology    SINUS SURGERY Left 1996    nasal polyps(?)-large papilloma left maxillary sinus with extension into posterior nasopharynx completed by Dr Yolanda Ram   THYROID CYST EXCISION      ablation    TONSILLECTOMY AND ADENOIDECTOMY      US BREAST NEEDLE LOC RIGHT Right 12/18/2018    US GUIDED BREAST BIOPSY RIGHT COMPLETE Right 11/1/2018       Family History   Problem Relation Age of Onset    Breast cancer Mother     Pancreatic cancer Mother     Aortic aneurysm Father     Aortic aneurysm Family         Abdominal    Breast cancer Family     Breast cancer Maternal Grandmother        Review of Systems   Constitutional: Positive for chills and fever (subjective)  HENT: Positive for sore throat  Negative for congestion, ear pain and rhinorrhea  Respiratory: Positive for cough, chest tightness and wheezing  Skin: Negative for rash  Neurological: Positive for headaches  Negative for dizziness and light-headedness  All other systems reviewed and are negative  Objective:    /66   Pulse 84   Temp 100 °F (37 8 °C)   Resp 18   Ht 5' 6" (1 676 m)   Wt 79 4 kg (175 lb)   SpO2 97%   BMI 28 25 kg/m²     Physical Exam   Constitutional: She is oriented to person, place, and time  Vital signs are normal  She appears well-developed and well-nourished  No distress  HENT:   Head: Normocephalic and atraumatic     Right Ear: Tympanic membrane normal  Left Ear: Tympanic membrane normal    Nose: Nose normal    Mouth/Throat: Uvula is midline and mucous membranes are normal  Posterior oropharyngeal erythema present  Eyes: Conjunctivae are normal    Cardiovascular: Normal rate, regular rhythm and normal heart sounds  Pulmonary/Chest: Effort normal and breath sounds normal    Dry coughing noted in exam room and when taking deep breaths  No rhonchi is a rales noted on exam    Musculoskeletal: Normal range of motion  Neurological: She is alert and oriented to person, place, and time  Skin: Skin is warm and dry  She is not diaphoretic  Nursing note and vitals reviewed

## 2019-11-10 NOTE — PATIENT INSTRUCTIONS

## 2020-02-03 ENCOUNTER — APPOINTMENT (OUTPATIENT)
Dept: LAB | Facility: HOSPITAL | Age: 70
End: 2020-02-03
Payer: COMMERCIAL

## 2020-02-03 DIAGNOSIS — Z91.89 ENCOUNTER FOR HEPATITIS C VIRUS SCREENING TEST FOR HIGH RISK PATIENT: ICD-10-CM

## 2020-02-03 DIAGNOSIS — E78.2 MIXED HYPERLIPIDEMIA: ICD-10-CM

## 2020-02-03 DIAGNOSIS — Z11.59 ENCOUNTER FOR HEPATITIS C VIRUS SCREENING TEST FOR HIGH RISK PATIENT: ICD-10-CM

## 2020-02-03 DIAGNOSIS — I10 ESSENTIAL HYPERTENSION: ICD-10-CM

## 2020-02-03 DIAGNOSIS — E03.9 ACQUIRED HYPOTHYROIDISM: ICD-10-CM

## 2020-02-03 LAB
ALBUMIN SERPL BCP-MCNC: 3.2 G/DL (ref 3.5–5)
ALP SERPL-CCNC: 78 U/L (ref 46–116)
ALT SERPL W P-5'-P-CCNC: 15 U/L (ref 12–78)
ANION GAP SERPL CALCULATED.3IONS-SCNC: 5 MMOL/L (ref 4–13)
AST SERPL W P-5'-P-CCNC: 14 U/L (ref 5–45)
BASOPHILS # BLD AUTO: 0.05 THOUSANDS/ΜL (ref 0–0.1)
BASOPHILS NFR BLD AUTO: 1 % (ref 0–1)
BILIRUB SERPL-MCNC: 0.5 MG/DL (ref 0.2–1)
BUN SERPL-MCNC: 17 MG/DL (ref 5–25)
CALCIUM SERPL-MCNC: 9.3 MG/DL (ref 8.3–10.1)
CHLORIDE SERPL-SCNC: 107 MMOL/L (ref 100–108)
CHOLEST SERPL-MCNC: 173 MG/DL (ref 50–200)
CO2 SERPL-SCNC: 28 MMOL/L (ref 21–32)
CREAT SERPL-MCNC: 0.82 MG/DL (ref 0.6–1.3)
EOSINOPHIL # BLD AUTO: 0.41 THOUSAND/ΜL (ref 0–0.61)
EOSINOPHIL NFR BLD AUTO: 8 % (ref 0–6)
ERYTHROCYTE [DISTWIDTH] IN BLOOD BY AUTOMATED COUNT: 12.8 % (ref 11.6–15.1)
GFR SERPL CREATININE-BSD FRML MDRD: 72 ML/MIN/1.73SQ M
GLUCOSE P FAST SERPL-MCNC: 76 MG/DL (ref 65–99)
HCT VFR BLD AUTO: 39.1 % (ref 34.8–46.1)
HCV AB SER QL: NORMAL
HDLC SERPL-MCNC: 62 MG/DL
HGB BLD-MCNC: 12.7 G/DL (ref 11.5–15.4)
IMM GRANULOCYTES # BLD AUTO: 0.02 THOUSAND/UL (ref 0–0.2)
IMM GRANULOCYTES NFR BLD AUTO: 0 % (ref 0–2)
LDLC SERPL CALC-MCNC: 97 MG/DL (ref 0–100)
LYMPHOCYTES # BLD AUTO: 1.44 THOUSANDS/ΜL (ref 0.6–4.47)
LYMPHOCYTES NFR BLD AUTO: 29 % (ref 14–44)
MCH RBC QN AUTO: 33 PG (ref 26.8–34.3)
MCHC RBC AUTO-ENTMCNC: 32.5 G/DL (ref 31.4–37.4)
MCV RBC AUTO: 102 FL (ref 82–98)
MONOCYTES # BLD AUTO: 0.45 THOUSAND/ΜL (ref 0.17–1.22)
MONOCYTES NFR BLD AUTO: 9 % (ref 4–12)
NEUTROPHILS # BLD AUTO: 2.64 THOUSANDS/ΜL (ref 1.85–7.62)
NEUTS SEG NFR BLD AUTO: 53 % (ref 43–75)
NONHDLC SERPL-MCNC: 111 MG/DL
NRBC BLD AUTO-RTO: 0 /100 WBCS
PLATELET # BLD AUTO: 355 THOUSANDS/UL (ref 149–390)
PMV BLD AUTO: 8.8 FL (ref 8.9–12.7)
POTASSIUM SERPL-SCNC: 4 MMOL/L (ref 3.5–5.3)
PROT SERPL-MCNC: 7 G/DL (ref 6.4–8.2)
RBC # BLD AUTO: 3.85 MILLION/UL (ref 3.81–5.12)
SODIUM SERPL-SCNC: 140 MMOL/L (ref 136–145)
TRIGL SERPL-MCNC: 68 MG/DL
TSH SERPL DL<=0.05 MIU/L-ACNC: 1.94 UIU/ML (ref 0.36–3.74)
WBC # BLD AUTO: 5.01 THOUSAND/UL (ref 4.31–10.16)

## 2020-02-03 PROCEDURE — 85025 COMPLETE CBC W/AUTO DIFF WBC: CPT

## 2020-02-03 PROCEDURE — 84443 ASSAY THYROID STIM HORMONE: CPT

## 2020-02-03 PROCEDURE — 80061 LIPID PANEL: CPT

## 2020-02-03 PROCEDURE — 36415 COLL VENOUS BLD VENIPUNCTURE: CPT

## 2020-02-03 PROCEDURE — 80053 COMPREHEN METABOLIC PANEL: CPT

## 2020-02-03 PROCEDURE — 86803 HEPATITIS C AB TEST: CPT

## 2020-02-09 PROBLEM — M85.89 OSTEOPENIA OF MULTIPLE SITES: Status: ACTIVE | Noted: 2020-02-09

## 2020-02-11 ENCOUNTER — OFFICE VISIT (OUTPATIENT)
Dept: FAMILY MEDICINE CLINIC | Facility: CLINIC | Age: 70
End: 2020-02-11
Payer: COMMERCIAL

## 2020-02-11 VITALS
HEIGHT: 66 IN | BODY MASS INDEX: 28.73 KG/M2 | SYSTOLIC BLOOD PRESSURE: 136 MMHG | DIASTOLIC BLOOD PRESSURE: 70 MMHG | RESPIRATION RATE: 16 BRPM | WEIGHT: 178.8 LBS | HEART RATE: 56 BPM | OXYGEN SATURATION: 98 %

## 2020-02-11 DIAGNOSIS — E78.2 MIXED HYPERLIPIDEMIA: ICD-10-CM

## 2020-02-11 DIAGNOSIS — C50.311 MALIGNANT NEOPLASM OF LOWER-INNER QUADRANT OF RIGHT BREAST OF FEMALE, ESTROGEN RECEPTOR POSITIVE (HCC): ICD-10-CM

## 2020-02-11 DIAGNOSIS — M85.89 OSTEOPENIA OF MULTIPLE SITES: ICD-10-CM

## 2020-02-11 DIAGNOSIS — R91.8 PULMONARY NODULES: ICD-10-CM

## 2020-02-11 DIAGNOSIS — E03.9 ACQUIRED HYPOTHYROIDISM: ICD-10-CM

## 2020-02-11 DIAGNOSIS — I10 ESSENTIAL HYPERTENSION: Primary | ICD-10-CM

## 2020-02-11 DIAGNOSIS — Z17.0 MALIGNANT NEOPLASM OF LOWER-INNER QUADRANT OF RIGHT BREAST OF FEMALE, ESTROGEN RECEPTOR POSITIVE (HCC): ICD-10-CM

## 2020-02-11 DIAGNOSIS — I77.1 SUBCLAVIAN ARTERY STENOSIS (HCC): ICD-10-CM

## 2020-02-11 PROCEDURE — 1036F TOBACCO NON-USER: CPT | Performed by: FAMILY MEDICINE

## 2020-02-11 PROCEDURE — 3078F DIAST BP <80 MM HG: CPT | Performed by: FAMILY MEDICINE

## 2020-02-11 PROCEDURE — 4040F PNEUMOC VAC/ADMIN/RCVD: CPT | Performed by: FAMILY MEDICINE

## 2020-02-11 PROCEDURE — 3008F BODY MASS INDEX DOCD: CPT | Performed by: FAMILY MEDICINE

## 2020-02-11 PROCEDURE — 1101F PT FALLS ASSESS-DOCD LE1/YR: CPT | Performed by: FAMILY MEDICINE

## 2020-02-11 PROCEDURE — 99214 OFFICE O/P EST MOD 30 MIN: CPT | Performed by: FAMILY MEDICINE

## 2020-02-11 PROCEDURE — 3288F FALL RISK ASSESSMENT DOCD: CPT | Performed by: FAMILY MEDICINE

## 2020-02-11 PROCEDURE — 3075F SYST BP GE 130 - 139MM HG: CPT | Performed by: FAMILY MEDICINE

## 2020-02-11 PROCEDURE — 1160F RVW MEDS BY RX/DR IN RCRD: CPT | Performed by: FAMILY MEDICINE

## 2020-02-11 NOTE — PROGRESS NOTES
Assessment/Plan:    Hypothyroidism  Continue Levothyroxine 112 mcg daily  Subclavian artery stenosis [facility])  Patient is asymptomatic  Continue aspirin, statin therapy  Schedule Carotid Doppler  Hyperlipidemia  Hyperlipidemia -controlled  Continue Pravastatin 20 mg daily  Osteopenia of multiple sites  DEXA scan done August 2019 showed osteopenia  Patient declined to continue Prolia injections  Encouraged weight-bearing exercise, continue calcium with Vit D supplementation  Pulmonary nodules  Patient is a former smoker  Recommended to repeat CT chest     Malignant neoplasm of lower-inner quadrant of right breast of female, estrogen receptor positive ([facility] [address] )  Patient is status post right lumpectomy with sentinel lymph node biopsy perform in December 2018  Currently taking Anastrozole 1 mg daily  Patient c/o worsening joint pains and stiffness in the joints  Recommended to contact medical oncologist Dr Jed Stewart to review medical therapy  Schedule follow-up office visit and Medicare AWV in 6 months  Check labs prior to next visit  Diagnoses and all orders for this visit:    Essential hypertension  -     Comprehensive metabolic panel; Future    Acquired hypothyroidism  -     TSH, 3rd generation with Free T4 reflex; Future    Subclavian artery stenosis (HCC)  -     VAS carotid complete study; Future    Mixed hyperlipidemia  -     Comprehensive metabolic panel; Future  -     Lipid panel; Future    Osteopenia of multiple sites    Pulmonary nodules  -     CT chest wo contrast; Future    Malignant neoplasm of lower-inner quadrant of right breast of female, estrogen receptor positive (HCC)          Subjective:      Patient ID: Rachelle Bell is a 68 y o  female  HPI     Patient presents for 6 month follow of chronic medical conditions        PMHx: HTN, Hyperlipidemia, Hypothyroidism, Osteoporosis, Vit D deficiency, BL sublclavian  artery disease, R breast CA / S/P right lumpectomy in 12/18  Reviewed all current medications blood test results from 2/3/20  TSH 1 940, cholesterol 173, triglycerides 68, HDL 62, LDL 97, Hemoglobin 12 7  Fasting blood sugar 76,6, potassium 4 0, creatinine 0 82  HTN -  patient takes Losartan 100 mg daily  Denies chest pain, shortness of breath, dizziness  Hypothyroidism -currently on Levothyroxine 112 mcg daily  Denies fatigue, hair loss, constipation  Weight has been stable  Patient has H/o osteoporosis  She refusing to continue Prolia injections  DEXA scan done in 8/19 showed osteopenia  Patient is a former smoker  She had CT of the chest done in January 2018 which showed stable left basilar 4 mm pulmonary nodule  Patient denies cough  No recurrent lung infections  R breast CA - patient has been followed surgical oncologist Dr Johnny Canela and medical oncologist Dr Tracy Bynum  Currently taking Anastrozole 1 mg daily  Reports worsening stiffness and pain in  Her joints  Takes Aleve PRN  Patient had bilateral diagnostic mammogram in  October 2019 which showed no evidence of malignancy  She was seen by surgical oncologist Dr Johnny Canela in October 2019  Flu shot done in 9/10,  Prevnar 13 in 2/15  The following portions of the patient's history were reviewed and updated as appropriate: allergies, past family history, past medical history, past social history, past surgical history and problem list     Review of Systems   Constitutional: Negative for activity change, appetite change, chills, fatigue and fever  HENT: Negative for congestion, ear pain, hearing loss, mouth sores, nosebleeds, sore throat and trouble swallowing  Eyes: Negative for pain, discharge, redness, itching and visual disturbance  Respiratory: Negative for cough, chest tightness, shortness of breath and wheezing  Cardiovascular: Negative for chest pain, palpitations and leg swelling     Gastrointestinal: Negative for abdominal pain, blood in stool, constipation, diarrhea, nausea and vomiting  Genitourinary: Negative for difficulty urinating, dysuria, flank pain, frequency, hematuria and pelvic pain  Musculoskeletal: Positive for arthralgias  Negative for back pain, gait problem, joint swelling and neck pain  Stiffness in joints   Skin: Negative for rash and wound  Neurological: Negative for dizziness, syncope and headaches  Hematological: Negative for adenopathy  Bruises/bleeds easily  Psychiatric/Behavioral: Negative  Objective:      /70 (BP Location: Left arm, Patient Position: Sitting, Cuff Size: Standard)   Pulse 56   Resp 16   Ht 5' 6" (1 676 m)   Wt 81 1 kg (178 lb 12 8 oz)   SpO2 98%   BMI 28 86 kg/m²          Physical Exam   Constitutional: She appears well-developed and well-nourished  HENT:   Head: Normocephalic and atraumatic  Right Ear: External ear normal    Left Ear: External ear normal    Mouth/Throat: Oropharynx is clear and moist    Eyes: Pupils are equal, round, and reactive to light  Conjunctivae are normal    Neck: Normal range of motion  Neck supple  No JVD present  Cardiovascular: Normal rate, regular rhythm and normal heart sounds  No murmur heard  No BL LE edema  No carotid bruits BL   Pulmonary/Chest: Effort normal and breath sounds normal    Abdominal: Soft  Bowel sounds are normal  There is no tenderness  Musculoskeletal:   No joints swelling   Skin: Skin is warm and dry  No rash noted  Psychiatric: She has a normal mood and affect  Vitals reviewed

## 2020-02-11 NOTE — ASSESSMENT & PLAN NOTE
DEXA scan done August 2019 showed osteopenia  Patient declined to continue Prolia injections  Encouraged weight-bearing exercise, continue calcium with Vit D supplementation

## 2020-02-11 NOTE — ASSESSMENT & PLAN NOTE
Patient is status post right lumpectomy with sentinel lymph node biopsy perform in December 2018  Currently taking Anastrozole 1 mg daily  Patient c/o worsening joint pains and stiffness in the joints  Recommended to contact medical oncologist Dr Rosmery Bhandari to review medical therapy

## 2020-02-20 ENCOUNTER — TELEPHONE (OUTPATIENT)
Dept: FAMILY MEDICINE CLINIC | Facility: CLINIC | Age: 70
End: 2020-02-20

## 2020-02-20 NOTE — TELEPHONE ENCOUNTER
Patient called in to let you know she scheduled her lung scan for 3/4/20  She said we would check how much her insurance would cover?  Please advise

## 2020-02-20 NOTE — TELEPHONE ENCOUNTER
I called patient back, she was calling to ensure that if a prior authorization is needed, that we can start it  Under the medicare plan and secondary insurance, they do not require an authorization

## 2020-02-28 DIAGNOSIS — I10 ESSENTIAL HYPERTENSION: ICD-10-CM

## 2020-02-28 DIAGNOSIS — I10 ESSENTIAL HYPERTENSION: Primary | ICD-10-CM

## 2020-02-28 RX ORDER — LOSARTAN POTASSIUM 50 MG/1
TABLET ORAL
Qty: 60 TABLET | Refills: 3 | Status: SHIPPED | OUTPATIENT
Start: 2020-02-28 | End: 2020-08-17 | Stop reason: ALTCHOICE

## 2020-02-28 NOTE — TELEPHONE ENCOUNTER
Please inform patient that Cozaar 100 mg on back order  Prescription sent to the pharmacy for Cozaar 50 mg to take 2 tablets daily

## 2020-02-28 NOTE — TELEPHONE ENCOUNTER
I actually did receive a notification to obtain a prior authorization, and was able to get that approved  The authorization number is [id number] and is valid through the dates 2/28/20-4/28/20  I called patient and made her aware and also faxed the approval information to the finance dept

## 2020-02-28 NOTE — TELEPHONE ENCOUNTER
Pharmacy calling, Cozaar 100 mg  on back order   Change to 50 mg 1 tab bid    [facility]

## 2020-02-29 RX ORDER — LOSARTAN POTASSIUM 100 MG/1
100 TABLET ORAL DAILY
Qty: 90 TABLET | Refills: 3 | OUTPATIENT
Start: 2020-02-29

## 2020-03-06 ENCOUNTER — HOSPITAL ENCOUNTER (OUTPATIENT)
Dept: NON INVASIVE DIAGNOSTICS | Facility: CLINIC | Age: 70
Discharge: HOME | End: 2020-03-06
Payer: COMMERCIAL

## 2020-03-06 DIAGNOSIS — I77.1 SUBCLAVIAN ARTERY STENOSIS (HCC): ICD-10-CM

## 2020-03-06 PROCEDURE — 93880 EXTRACRANIAL BILAT STUDY: CPT

## 2020-03-07 PROCEDURE — 93880 EXTRACRANIAL BILAT STUDY: CPT | Performed by: SURGERY

## 2020-03-11 ENCOUNTER — HOSPITAL ENCOUNTER (OUTPATIENT)
Dept: RADIOLOGY | Facility: HOSPITAL | Age: 70
Discharge: HOME | End: 2020-03-11
Payer: COMMERCIAL

## 2020-03-11 DIAGNOSIS — R91.8 PULMONARY NODULES: ICD-10-CM

## 2020-03-11 PROCEDURE — 71250 CT THORAX DX C-: CPT

## 2020-04-27 ENCOUNTER — TELEPHONE (OUTPATIENT)
Dept: SURGICAL ONCOLOGY | Facility: CLINIC | Age: 70
End: 2020-04-27

## 2020-04-30 ENCOUNTER — OFFICE VISIT (OUTPATIENT)
Dept: SURGICAL ONCOLOGY | Facility: CLINIC | Age: 70
End: 2020-04-30
Payer: COMMERCIAL

## 2020-04-30 VITALS
HEIGHT: 66 IN | BODY MASS INDEX: 28.28 KG/M2 | RESPIRATION RATE: 14 BRPM | SYSTOLIC BLOOD PRESSURE: 138 MMHG | WEIGHT: 176 LBS | DIASTOLIC BLOOD PRESSURE: 70 MMHG | TEMPERATURE: 97.9 F | HEART RATE: 60 BPM

## 2020-04-30 DIAGNOSIS — Z17.0 MALIGNANT NEOPLASM OF LOWER-INNER QUADRANT OF RIGHT BREAST OF FEMALE, ESTROGEN RECEPTOR POSITIVE (HCC): Primary | ICD-10-CM

## 2020-04-30 DIAGNOSIS — Z79.811 USE OF ANASTROZOLE (ARIMIDEX): ICD-10-CM

## 2020-04-30 DIAGNOSIS — C50.311 MALIGNANT NEOPLASM OF LOWER-INNER QUADRANT OF RIGHT BREAST OF FEMALE, ESTROGEN RECEPTOR POSITIVE (HCC): Primary | ICD-10-CM

## 2020-04-30 PROCEDURE — 1036F TOBACCO NON-USER: CPT | Performed by: NURSE PRACTITIONER

## 2020-04-30 PROCEDURE — 3078F DIAST BP <80 MM HG: CPT | Performed by: NURSE PRACTITIONER

## 2020-04-30 PROCEDURE — 3075F SYST BP GE 130 - 139MM HG: CPT | Performed by: NURSE PRACTITIONER

## 2020-04-30 PROCEDURE — 99214 OFFICE O/P EST MOD 30 MIN: CPT | Performed by: NURSE PRACTITIONER

## 2020-04-30 PROCEDURE — 3008F BODY MASS INDEX DOCD: CPT | Performed by: NURSE PRACTITIONER

## 2020-04-30 PROCEDURE — 1160F RVW MEDS BY RX/DR IN RCRD: CPT | Performed by: NURSE PRACTITIONER

## 2020-04-30 PROCEDURE — 4040F PNEUMOC VAC/ADMIN/RCVD: CPT | Performed by: NURSE PRACTITIONER

## 2020-05-05 DIAGNOSIS — E03.9 ACQUIRED HYPOTHYROIDISM: ICD-10-CM

## 2020-05-05 DIAGNOSIS — I10 ESSENTIAL HYPERTENSION: ICD-10-CM

## 2020-05-05 DIAGNOSIS — E78.2 MIXED HYPERLIPIDEMIA: ICD-10-CM

## 2020-05-05 RX ORDER — PRAVASTATIN SODIUM 20 MG
20 TABLET ORAL DAILY
Qty: 90 TABLET | Refills: 3 | Status: SHIPPED | OUTPATIENT
Start: 2020-05-05 | End: 2021-07-14 | Stop reason: SDUPTHER

## 2020-05-05 RX ORDER — LOSARTAN POTASSIUM 100 MG/1
100 TABLET ORAL DAILY
Qty: 90 TABLET | Refills: 3 | Status: SHIPPED | OUTPATIENT
Start: 2020-05-05 | End: 2021-05-05 | Stop reason: SDUPTHER

## 2020-05-05 RX ORDER — LEVOTHYROXINE SODIUM 112 UG/1
112 TABLET ORAL DAILY
Qty: 90 TABLET | Refills: 3 | Status: SHIPPED | OUTPATIENT
Start: 2020-05-05 | End: 2020-09-28 | Stop reason: SDUPTHER

## 2020-07-08 ENCOUNTER — OFFICE VISIT (OUTPATIENT)
Dept: HEMATOLOGY ONCOLOGY | Facility: CLINIC | Age: 70
End: 2020-07-08
Payer: COMMERCIAL

## 2020-07-08 VITALS
WEIGHT: 174 LBS | OXYGEN SATURATION: 97 % | HEIGHT: 66 IN | RESPIRATION RATE: 18 BRPM | HEART RATE: 69 BPM | SYSTOLIC BLOOD PRESSURE: 122 MMHG | BODY MASS INDEX: 27.97 KG/M2 | DIASTOLIC BLOOD PRESSURE: 70 MMHG | TEMPERATURE: 97.9 F

## 2020-07-08 DIAGNOSIS — Z17.0 MALIGNANT NEOPLASM OF LOWER-INNER QUADRANT OF RIGHT BREAST OF FEMALE, ESTROGEN RECEPTOR POSITIVE (HCC): Primary | ICD-10-CM

## 2020-07-08 DIAGNOSIS — C50.311 MALIGNANT NEOPLASM OF LOWER-INNER QUADRANT OF RIGHT BREAST OF FEMALE, ESTROGEN RECEPTOR POSITIVE (HCC): Primary | ICD-10-CM

## 2020-07-08 PROCEDURE — 4040F PNEUMOC VAC/ADMIN/RCVD: CPT | Performed by: INTERNAL MEDICINE

## 2020-07-08 PROCEDURE — 1160F RVW MEDS BY RX/DR IN RCRD: CPT | Performed by: INTERNAL MEDICINE

## 2020-07-08 PROCEDURE — 3074F SYST BP LT 130 MM HG: CPT | Performed by: INTERNAL MEDICINE

## 2020-07-08 PROCEDURE — 1036F TOBACCO NON-USER: CPT | Performed by: INTERNAL MEDICINE

## 2020-07-08 PROCEDURE — 3078F DIAST BP <80 MM HG: CPT | Performed by: INTERNAL MEDICINE

## 2020-07-08 PROCEDURE — 99214 OFFICE O/P EST MOD 30 MIN: CPT | Performed by: INTERNAL MEDICINE

## 2020-07-08 PROCEDURE — 3008F BODY MASS INDEX DOCD: CPT | Performed by: INTERNAL MEDICINE

## 2020-07-08 RX ORDER — ANASTROZOLE 1 MG/1
1 TABLET ORAL DAILY
Qty: 90 TABLET | Refills: 3 | Status: SHIPPED | OUTPATIENT
Start: 2020-07-08 | End: 2021-07-13 | Stop reason: SDUPTHER

## 2020-07-08 RX ORDER — ANASTROZOLE 1 MG/1
1 TABLET ORAL DAILY
Qty: 90 TABLET | Refills: 3 | Status: CANCELLED | OUTPATIENT
Start: 2020-07-08

## 2020-07-08 NOTE — PROGRESS NOTES
Hematology / Oncology Outpatient Follow Up Note    Charisma Betts 65 y o  female :1950 [id number]         Date:  2020    Assessment / Plan:  A 68-year-old postmenopausal woman with stage IA right breast cancer, grade 1, % positive, WA 90% positive, HER2 negative disease   She underwent lumpectomy with sentinel lymph node biopsy, resulting in MANUEL  Since 2019, she has been on adjuvant hormonal therapy with anastrozole with no toxicity  She has no evidence recurrent disease, based on her symptomatology and physical examinations  I recommended her to continue with anastrozole 1 mg once a day  She is in agreement with my recommendation  I will see her again in a year for routine follow-up              Subjective:      HPI:  A 23-year-old postmenopausal woman who was found to have abnormality in her right breast, based on a screening mammography   She underwent right breast biopsy in 2018 which showed invasive ductal carcinoma, grade 1   Subsequently, she underwent lumpectomy with sentinel lymph node biopsy by Dr Guillaume Calhoun in 2018 which showed 11 x 5 mm of invasive ductal carcinoma, grade 1   There was no evidence of lymphovascular invasion   One sentinel lymph node was negative for metastatic disease   This was % positive, WA 90% positive, HER2 negative disease   She presents today to discuss adjuvant treatment options   She has no complaint of pain   She feels well   She has history of osteoporosis for which she is on denosumab injection every 6 months for at least 3-4 years  Adeola Copeland has improvement of bone density on denosumab    She has well-controlled hypertension  Adeola Copeland was a smoker until 15 years ago  Adeola Copeland is on lung cancer screening by CT scan   She has no respiratory symptoms such as cough, sputum production or shortness of breath   Her weight is stable   She has normal performance status            Interval History:  A 68-year-old postmenopausal woman with stage IA right breast cancer, grade 1, % positive, PA 90% positive, HER2 negative disease   She underwent lumpectomy with sentinel lymph node biopsy, resulting in MANUEL  Since January 2019, she has been on adjuvant hormonal therapy with anastrozole  She presents today for routine follow-up  She continued to do well with no new complaint  She has no hot flashes  However, she has mild joint stiffness  Otherwise, she feels well  She denied any pain  Her weight is stable  He has no respiratory symptoms  Her performance status is normal        Objective:      Primary Diagnosis:     1    Right breast cancer, stage IA (pT1c, pN0, M0) grade 1, % positive, PA 90% positive, HER2 negative disease    Diagnosed in December 2018  2    History of osteoporosis      Cancer Staging:  Cancer Staging  No matching staging information was found for the patient         Previous Hematologic/ Oncologic Treatment:            Current Hematologic/ Oncologic Treatment:       Adjuvant hormonal therapy with anastrozole since January 2019      Disease Status:      MANUEL status post lumpectomy and sentinel lymph node biopsy      Test Results:     Pathology:     11 x 5 mm of invasive ductal carcinoma, grade 1   No evidence of lymphovascular invasion   One sentinel lymph node was negative for metastatic disease   % positive, PA 90% positive, HER2 negative disease   Stage IA (pT1c, pN0, M0)     Radiology:     Mammography in October 2019 was benign  BI-RADS 2  DEXA scan in August 2019 showed T-score-1 8, consistent with osteopenia      Laboratory:     See below      Physical Exam:        General Appearance:    Alert, oriented          Eyes:    PERRL   Ears:    Normal external ear canals, both ears   Nose:   Nares normal, septum midline   Throat:   Mucosa moist  Pharynx without injection      Neck:   Supple         Lungs:     Clear to auscultation bilaterally   Chest Wall:    No tenderness or deformity    Heart:    Regular rate and rhythm         Abdomen:     Soft, non-tender, bowel sounds +, no organomegaly               Extremities:   Extremities no cyanosis or edema         Skin:   no rash or icterus  Lymph nodes:   Cervical, supraclavicular, and axillary nodes normal   Neurologic:   CNII-XII intact, normal strength, sensation and reflexes     Throughout             Breast exam:   Lumpectomy scar at 3:00 position in her right breast with no palpable abnormalities  Left breast exam is negative  ROS: Review of Systems   All other systems reviewed and are negative  Imaging: No results found  Labs:   Lab Results   Component Value Date    WBC 5 01 02/03/2020    HGB 12 7 02/03/2020    HCT 39 1 02/03/2020     (H) 02/03/2020     02/03/2020     Lab Results   Component Value Date     12/09/2015    K 4 0 02/03/2020     02/03/2020    CO2 28 02/03/2020    ANIONGAP 6 12/09/2015    BUN 17 02/03/2020    CREATININE 0 82 02/03/2020    GLUCOSE 81 12/09/2015    GLUF 76 02/03/2020    CALCIUM 9 3 02/03/2020    AST 14 02/03/2020    ALT 15 02/03/2020    ALKPHOS 78 02/03/2020    PROT 7 0 12/09/2015    BILITOT 0 57 12/09/2015    EGFR 72 02/03/2020         Current Medications: Reviewed  Allergies: Reviewed  PMH/FH/SH:  Reviewed      Vital Sign:    Body surface area is 1 88 meters squared      Wt Readings from Last 3 Encounters:   07/08/20 78 9 kg (174 lb)   04/30/20 79 8 kg (176 lb)   02/11/20 81 1 kg (178 lb 12 8 oz)        Temp Readings from Last 3 Encounters:   07/08/20 97 9 °F (36 6 °C) (Tympanic Core)   04/30/20 97 9 °F (36 6 °C)   11/10/19 100 °F (37 8 °C)        BP Readings from Last 3 Encounters:   07/08/20 122/70   04/30/20 138/70   02/11/20 136/70         Pulse Readings from Last 3 Encounters:   07/08/20 69   04/30/20 60   02/11/20 56     @LASTSAO2(3)@

## 2020-08-05 ENCOUNTER — APPOINTMENT (OUTPATIENT)
Dept: LAB | Facility: HOSPITAL | Age: 70
End: 2020-08-05
Payer: COMMERCIAL

## 2020-08-05 DIAGNOSIS — I10 ESSENTIAL HYPERTENSION: ICD-10-CM

## 2020-08-05 DIAGNOSIS — E03.9 ACQUIRED HYPOTHYROIDISM: ICD-10-CM

## 2020-08-05 DIAGNOSIS — E78.2 MIXED HYPERLIPIDEMIA: ICD-10-CM

## 2020-08-05 LAB
ALBUMIN SERPL BCP-MCNC: 3.3 G/DL (ref 3.5–5)
ALP SERPL-CCNC: 112 U/L (ref 46–116)
ALT SERPL W P-5'-P-CCNC: 19 U/L (ref 12–78)
ANION GAP SERPL CALCULATED.3IONS-SCNC: 3 MMOL/L (ref 4–13)
AST SERPL W P-5'-P-CCNC: 18 U/L (ref 5–45)
BILIRUB SERPL-MCNC: 0.65 MG/DL (ref 0.2–1)
BUN SERPL-MCNC: 22 MG/DL (ref 5–25)
CALCIUM SERPL-MCNC: 9.8 MG/DL (ref 8.3–10.1)
CHLORIDE SERPL-SCNC: 108 MMOL/L (ref 100–108)
CHOLEST SERPL-MCNC: 171 MG/DL (ref 50–200)
CO2 SERPL-SCNC: 30 MMOL/L (ref 21–32)
CREAT SERPL-MCNC: 0.84 MG/DL (ref 0.6–1.3)
GFR SERPL CREATININE-BSD FRML MDRD: 70 ML/MIN/1.73SQ M
GLUCOSE P FAST SERPL-MCNC: 82 MG/DL (ref 65–99)
HDLC SERPL-MCNC: 61 MG/DL
LDLC SERPL CALC-MCNC: 97 MG/DL (ref 0–100)
NONHDLC SERPL-MCNC: 110 MG/DL
POTASSIUM SERPL-SCNC: 3.9 MMOL/L (ref 3.5–5.3)
PROT SERPL-MCNC: 6.7 G/DL (ref 6.4–8.2)
SODIUM SERPL-SCNC: 141 MMOL/L (ref 136–145)
TRIGL SERPL-MCNC: 67 MG/DL
TSH SERPL DL<=0.05 MIU/L-ACNC: 1.4 UIU/ML (ref 0.36–3.74)

## 2020-08-05 PROCEDURE — 84443 ASSAY THYROID STIM HORMONE: CPT

## 2020-08-05 PROCEDURE — 80061 LIPID PANEL: CPT

## 2020-08-05 PROCEDURE — 80053 COMPREHEN METABOLIC PANEL: CPT

## 2020-08-05 PROCEDURE — 36415 COLL VENOUS BLD VENIPUNCTURE: CPT

## 2020-08-17 ENCOUNTER — OFFICE VISIT (OUTPATIENT)
Dept: FAMILY MEDICINE CLINIC | Facility: CLINIC | Age: 70
End: 2020-08-17
Payer: COMMERCIAL

## 2020-08-17 VITALS
RESPIRATION RATE: 14 BRPM | OXYGEN SATURATION: 96 % | BODY MASS INDEX: 27 KG/M2 | DIASTOLIC BLOOD PRESSURE: 80 MMHG | SYSTOLIC BLOOD PRESSURE: 150 MMHG | HEART RATE: 74 BPM | HEIGHT: 66 IN | WEIGHT: 168 LBS | TEMPERATURE: 97.8 F

## 2020-08-17 DIAGNOSIS — C50.311 MALIGNANT NEOPLASM OF LOWER-INNER QUADRANT OF RIGHT BREAST OF FEMALE, ESTROGEN RECEPTOR POSITIVE (HCC): ICD-10-CM

## 2020-08-17 DIAGNOSIS — I77.1 SUBCLAVIAN ARTERY STENOSIS (HCC): ICD-10-CM

## 2020-08-17 DIAGNOSIS — M85.89 OSTEOPENIA OF MULTIPLE SITES: ICD-10-CM

## 2020-08-17 DIAGNOSIS — I10 ESSENTIAL HYPERTENSION: Primary | ICD-10-CM

## 2020-08-17 DIAGNOSIS — E03.9 ACQUIRED HYPOTHYROIDISM: ICD-10-CM

## 2020-08-17 DIAGNOSIS — Z00.00 MEDICARE ANNUAL WELLNESS VISIT, SUBSEQUENT: ICD-10-CM

## 2020-08-17 DIAGNOSIS — E78.2 MIXED HYPERLIPIDEMIA: ICD-10-CM

## 2020-08-17 DIAGNOSIS — Z17.0 MALIGNANT NEOPLASM OF LOWER-INNER QUADRANT OF RIGHT BREAST OF FEMALE, ESTROGEN RECEPTOR POSITIVE (HCC): ICD-10-CM

## 2020-08-17 DIAGNOSIS — R91.8 PULMONARY NODULES: ICD-10-CM

## 2020-08-17 PROCEDURE — 3077F SYST BP >= 140 MM HG: CPT | Performed by: FAMILY MEDICINE

## 2020-08-17 PROCEDURE — 3079F DIAST BP 80-89 MM HG: CPT | Performed by: FAMILY MEDICINE

## 2020-08-17 PROCEDURE — 3725F SCREEN DEPRESSION PERFORMED: CPT | Performed by: FAMILY MEDICINE

## 2020-08-17 PROCEDURE — 1160F RVW MEDS BY RX/DR IN RCRD: CPT | Performed by: FAMILY MEDICINE

## 2020-08-17 PROCEDURE — 1170F FXNL STATUS ASSESSED: CPT | Performed by: FAMILY MEDICINE

## 2020-08-17 PROCEDURE — G0439 PPPS, SUBSEQ VISIT: HCPCS | Performed by: FAMILY MEDICINE

## 2020-08-17 PROCEDURE — 99214 OFFICE O/P EST MOD 30 MIN: CPT | Performed by: FAMILY MEDICINE

## 2020-08-17 PROCEDURE — 1036F TOBACCO NON-USER: CPT | Performed by: FAMILY MEDICINE

## 2020-08-17 PROCEDURE — 3008F BODY MASS INDEX DOCD: CPT | Performed by: FAMILY MEDICINE

## 2020-08-17 PROCEDURE — 4040F PNEUMOC VAC/ADMIN/RCVD: CPT | Performed by: FAMILY MEDICINE

## 2020-08-17 PROCEDURE — 1125F AMNT PAIN NOTED PAIN PRSNT: CPT | Performed by: FAMILY MEDICINE

## 2020-08-17 NOTE — PATIENT INSTRUCTIONS
Medicare Preventive Visit Patient Instructions  Thank you for completing your Welcome to Medicare Visit or Medicare Annual Wellness Visit today  Your next wellness visit will be due in one year (8/17/2021)  The screening/preventive services that you may require over the next 5-10 years are detailed below  Some tests may not apply to you based off risk factors and/or age  Screening tests ordered at today's visit but not completed yet may show as past due  Also, please note that scanned in results may not display below  Preventive Screenings:  Service Recommendations Previous Testing/Comments   Colorectal Cancer Screening  * Colonoscopy    * Fecal Occult Blood Test (FOBT)/Fecal Immunochemical Test (FIT)  * Fecal DNA/Cologuard Test  * Flexible Sigmoidoscopy Age: 54-65 years old   Colonoscopy: every 10 years (may be performed more frequently if at higher risk)  OR  FOBT/FIT: every 1 year  OR  Cologuard: every 3 years  OR  Sigmoidoscopy: every 5 years  Screening may be recommended earlier than age 48 if at higher risk for colorectal cancer  Also, an individualized decision between you and your healthcare provider will decide whether screening between the ages of 74-80 would be appropriate  Colonoscopy: Not on file  FOBT/FIT: Not on file  Cologuard: Not on file  Sigmoidoscopy: Not on file         Breast Cancer Screening Age: 36 years old  Frequency: every 1-2 years  Not required if history of left and right mastectomy Mammogram: 10/14/2019       Cervical Cancer Screening Between the ages of 21-29, pap smear recommended once every 3 years  Between the ages of 33-67, can perform pap smear with HPV co-testing every 5 years     Recommendations may differ for women with a history of total hysterectomy, cervical cancer, or abnormal pap smears in past  Pap Smear: Not on file       Hepatitis C Screening Once for adults born between [facility]  More frequently in patients at high risk for Hepatitis C Hep C Antibody: 02/03/2020       Diabetes Screening 1-2 times per year if you're at risk for diabetes or have pre-diabetes Fasting glucose: 82 mg/dL   A1C: No results in last 5 years       Cholesterol Screening Once every 5 years if you don't have a lipid disorder  May order more often based on risk factors  Lipid panel: 08/05/2020         Other Preventive Screenings Covered by Medicare:  1  Abdominal Aortic Aneurysm (AAA) Screening: covered once if your at risk  You're considered to be at risk if you have a family history of AAA  2  Lung Cancer Screening: covers low dose CT scan once per year if you meet all of the following conditions: (1) Age 50-69; (2) No signs or symptoms of lung cancer; (3) Current smoker or have quit smoking within the last 15 years; (4) You have a tobacco smoking history of at least 30 pack years (packs per day multiplied by number of years you smoked); (5) You get a written order from a healthcare provider  3  Glaucoma Screening: covered annually if you're considered high risk: (1) You have diabetes OR (2) Family history of glaucoma OR (3)  aged 48 and older OR (3)  American aged 72 and older  3  Osteoporosis Screening: covered every 2 years if you meet one of the following conditions: (1) You're estrogen deficient and at risk for osteoporosis based off medical history and other findings; (2) Have a vertebral abnormality; (3) On glucocorticoid therapy for more than 3 months; (4) Have primary hyperparathyroidism; (5) On osteoporosis medications and need to assess response to drug therapy  · Last bone density test (DXA Scan): 08/22/2019   5  HIV Screening: covered annually if you're between the age of 15-65  Also covered annually if you are younger than 13 and older than 72 with risk factors for HIV infection  For pregnant patients, it is covered up to 3 times per pregnancy      Immunizations:  Immunization Recommendations   Influenza Vaccine Annual influenza vaccination during flu season is recommended for all persons aged >= 6 months who do not have contraindications   Pneumococcal Vaccine (Prevnar and Pneumovax)  * Prevnar = PCV13  * Pneumovax = PPSV23   Adults 25-60 years old: 1-3 doses may be recommended based on certain risk factors  Adults 72 years old: Prevnar (PCV13) vaccine recommended followed by Pneumovax (PPSV23) vaccine  If already received PPSV23 since turning 65, then PCV13 recommended at least one year after PPSV23 dose  Hepatitis B Vaccine 3 dose series if at intermediate or high risk (ex: diabetes, end stage renal disease, liver disease)   Tetanus (Td) Vaccine - COST NOT COVERED BY MEDICARE PART B Following completion of primary series, a booster dose should be given every 10 years to maintain immunity against tetanus  Td may also be given as tetanus wound prophylaxis  Tdap Vaccine - COST NOT COVERED BY MEDICARE PART B Recommended at least once for all adults  For pregnant patients, recommended with each pregnancy  Shingles Vaccine (Shingrix) - COST NOT COVERED BY MEDICARE PART B  2 shot series recommended in those aged 48 and above     Health Maintenance Due:      Topic Date Due    MAMMOGRAM  10/14/2020    Hepatitis C Screening  Completed     Immunizations Due:      Topic Date Due    DTaP,Tdap,and Td Vaccines (1 - Tdap) 03[birth year]/1969    Influenza Vaccine  07/01/2020     Advance Directives   What are advance directives? Advance directives are legal documents that state your wishes and plans for medical care  These plans are made ahead of time in case you lose your ability to make decisions for yourself  Advance directives can apply to any medical decision, such as the treatments you want, and if you want to donate organs  What are the types of advance directives? There are many types of advance directives, and each state has rules about how to use them  You may choose a combination of any of the following:  · Living will:   This is a written record of the treatment you want  You can also choose which treatments you do not want, which to limit, and which to stop at a certain time  This includes surgery, medicine, IV fluid, and tube feedings  · Durable power of  for healthcare [place] [facility]): This is a written record that states who you want to make healthcare choices for you when you are unable to make them for yourself  This person, called a proxy, is usually a family member or a friend  You may choose more than 1 proxy  · Do not resuscitate (DNR) order:  A DNR order is used in case your heart stops beating or you stop breathing  It is a request not to have certain forms of treatment, such as CPR  A DNR order may be included in other types of advance directives  · Medical directive: This covers the care that you want if you are in a coma, near death, or unable to make decisions for yourself  You can list the treatments you want for each condition  Treatment may include pain medicine, surgery, blood transfusions, dialysis, IV or tube feedings, and a ventilator (breathing machine)  · Values history: This document has questions about your views, beliefs, and how you feel and think about life  This information can help others choose the care that you would choose  Why are advance directives important? An advance directive helps you control your care  Although spoken wishes may be used, it is better to have your wishes written down  Spoken wishes can be misunderstood, or not followed  Treatments may be given even if you do not want them  An advance directive may make it easier for your family to make difficult choices about your care  Weight Management   Why it is important to manage your weight:  Being overweight increases your risk of health conditions such as heart disease, high blood pressure, type 2 diabetes, and certain types of cancer  It can also increase your risk for osteoarthritis, sleep apnea, and other respiratory problems   Aim for a slow, steady weight loss  Even a small amount of weight loss can lower your risk of health problems  How to lose weight safely:  A safe and healthy way to lose weight is to eat fewer calories and get regular exercise  You can lose up about 1 pound a week by decreasing the number of calories you eat by 500 calories each day  Healthy meal plan for weight management:  A healthy meal plan includes a variety of foods, contains fewer calories, and helps you stay healthy  A healthy meal plan includes the following:  · Eat whole-grain foods more often  A healthy meal plan should contain fiber  Fiber is the part of grains, fruits, and vegetables that is not broken down by your body  Whole-grain foods are healthy and provide extra fiber in your diet  Some examples of whole-grain foods are whole-wheat breads and pastas, oatmeal, brown rice, and bulgur  · Eat a variety of vegetables every day  Include dark, leafy greens such as spinach, kale, jean carlos greens, and mustard greens  Eat yellow and orange vegetables such as carrots, sweet potatoes, and winter squash  · Eat a variety of fruits every day  Choose fresh or canned fruit (canned in its own juice or light syrup) instead of juice  Fruit juice has very little or no fiber  · Eat low-fat dairy foods  Drink fat-free (skim) milk or 1% milk  Eat fat-free yogurt and low-fat cottage cheese  Try low-fat cheeses such as mozzarella and other reduced-fat cheeses  · Choose meat and other protein foods that are low in fat  Choose beans or other legumes such as split peas or lentils  Choose fish, skinless poultry (chicken or turkey), or lean cuts of red meat (beef or pork)  Before you cook meat or poultry, cut off any visible fat  · Use less fat and oil  Try baking foods instead of frying them  Add less fat, such as margarine, sour cream, regular salad dressing and mayonnaise to foods  Eat fewer high-fat foods   Some examples of high-fat foods include french fries, doughnuts, ice cream, and cakes  · Eat fewer sweets  Limit foods and drinks that are high in sugar  This includes candy, cookies, regular soda, and sweetened drinks  Exercise:  Exercise at least 30 minutes per day on most days of the week  Some examples of exercise include walking, biking, dancing, and swimming  You can also fit in more physical activity by taking the stairs instead of the elevator or parking farther away from stores  Ask your healthcare provider about the best exercise plan for you  © Copyright [employer] 2018 Information is for End User's use only and may not be sold, redistributed or otherwise used for commercial purposes   All illustrations and images included in CareNotes® are the copyrighted property of [employer]  or [address] [place] [address]

## 2020-08-17 NOTE — ASSESSMENT & PLAN NOTE
Patient had Carotid Doppler done in March 2020 which showed <  50% stenosis in BL ICA, no significant subclavian carotid artery disease  Continue aspirin, statin therapy  Will repeat Carotid Doppler in March 2021

## 2020-08-17 NOTE — PROGRESS NOTES
Assessment/Plan:    Hypertension  Continue Losartan 100 mg daily  Patient c/o occasional lightheadedness  Recommended to stay well hydrated, avoid salty foods  Start checking BP at home and call with BP log in 1 week  Hypothyroidism  Continue Levothyroxine 112 mcg daily  Hyperlipidemia  Continue Pravastatin 20 mg daily  Follow a low cholesterol diet, regular exercise  Osteopenia of multiple sites  Patient has history of osteoporosis  DEXA scan done in August 2019 showed osteopenia  Patient declined to continue Prolia injections  Continue weight-bearing exercise, calcium with Vit D supplementation  Recheck DEXA scan next year  Pulmonary nodules  Patient stopped smoking 18 years ago  She was a smoker for 40 years  Will continue annual LDCT for lung cancer screening  Subclavian artery stenosis [facility])  Patient had Carotid Doppler done in March 2020 which showed <  50% stenosis in BL ICA, no significant subclavian carotid artery disease  Continue aspirin, statin therapy  Will repeat Carotid Doppler in March 2021  Malignant neoplasm of lower-inner quadrant of right breast of female, estrogen receptor positive ([address] )  Patient is S/P right lumpectomy with sentinel lymph node biopsy in December 2018  Currently taking Anastrozole 1 mg daily  Follow- up with medical oncologist Dr Carlos Choudhary and surgical oncology Dr Mary Jane Hand  HM: recommended screening colonoscopy, annual Flu vaccination  Schedule follow-up office visit in 6 months  Check labs prior to next visit  Diagnoses and all orders for this visit:    Essential hypertension  -     CBC and differential; Future  -     Comprehensive metabolic panel; Future    Acquired hypothyroidism  -     TSH, 3rd generation with Free T4 reflex; Future    Mixed hyperlipidemia  -     Comprehensive metabolic panel; Future  -     Lipid panel;  Future    Osteopenia of multiple sites    Pulmonary nodules    Subclavian artery stenosis [facility])    Malignant neoplasm of lower-inner quadrant of right breast of female, estrogen receptor positive ([facility] [address] )    Medicare annual wellness visit, subsequent          Subjective:      Patient ID: Jeir Pascual is a 65 y o  female  HPI     Patient presents for routine 6 month follow-up office visit  PMHx: HTN, Hyperlipidemia, Hypothyroidism, Osteoporosis, Vit D deficiency, BL sublclavian  artery disease, R breast CA / S/P right lumpectomy in 12/18  Reviewed current medications, blood test results from August 5, 2020  TSH 1 400, creatinine 0 84, potassium 3 9, fasting blood sugar 82  Cholesterol 171, HDL 61, LDL 97  HTN - patient takes Losartan 100 mg daily  Denies chest pain, headache, SOB  C/o occasional episodes of feeling lightheaded  She has not been checking blood pressure at home  Hypothyroidism - currently taking Levothyroxine 112 mcg daily  Denies fatigue, hair loss, constipation  Patient has H/o osteoporosis  She states that was getting Prolia injections for the past 10 years  Refusing to continue Prolia injections  DEXA scan done in August 2019 showed osteopenia  She takes calcium with Vit D supplements, walks a few times per week  Patient is a former smoker  LDCT done in 3/20 showed stable back ground emphysema with benign 4 mm nodule in the left lower lobe  Patient denies cough, shortness of breath, wheezing  No recurrent lung infections  Right breast CA / S/P lumpectomy in 12/18  Patient has been followed by surgical oncologist Dr Greg Griffiths and medical oncologist Dr Keily Kwon, was seen in July 2020  Currently taking Anastrozole 1 mg daily  C/o some stiffness in her joints  Patient had bilateral diagnostic mammogram in October 2019 which showed no evidence of malignancy  Patient is getting Flu shots at the pharmacy  Prevnar 13 done in February 2015      The following portions of the patient's history were reviewed and updated as appropriate: allergies, current medications, past family history, past social history, past surgical history and problem list     Review of Systems   Constitutional: Negative for activity change, appetite change, chills, fatigue and fever  HENT: Negative for congestion, ear pain, hearing loss, mouth sores, nosebleeds, sore throat, tinnitus and trouble swallowing  Eyes: Negative for pain, discharge, redness, itching and visual disturbance  Respiratory: Negative for cough, chest tightness, shortness of breath and wheezing  Cardiovascular: Negative for chest pain, palpitations and leg swelling  Gastrointestinal: Negative for abdominal pain, blood in stool, constipation, diarrhea, nausea and vomiting  Genitourinary: Negative for difficulty urinating, dysuria, flank pain, frequency, hematuria and pelvic pain  Musculoskeletal: Positive for arthralgias  Negative for back pain, gait problem, joint swelling and neck pain  Stiffness in joints   Neurological: Positive for light-headedness (occasional) and headaches  Negative for syncope  Hematological: Negative for adenopathy  Bruises/bleeds easily  Psychiatric/Behavioral: Positive for sleep disturbance  The patient is not nervous/anxious  Objective:      /80 (BP Location: Left arm, Patient Position: Sitting, Cuff Size: Standard)   Pulse 74   Temp 97 8 °F (36 6 °C) (Oral)   Resp 14   Ht 5' 5 5" (1 664 m)   Wt 76 2 kg (168 lb)   SpO2 96%   BMI 27 53 kg/m²          Physical Exam  Vitals signs and nursing note reviewed  Constitutional:       Appearance: Normal appearance  HENT:      Head: Normocephalic and atraumatic  Right Ear: Tympanic membrane and external ear normal       Left Ear: Tympanic membrane and external ear normal    Eyes:      Conjunctiva/sclera: Conjunctivae normal       Pupils: Pupils are equal, round, and reactive to light  Neck:      Musculoskeletal: Normal range of motion and neck supple  Vascular: Carotid bruit (left carotid bruits) present  Cardiovascular:      Rate and Rhythm: Normal rate and regular rhythm  Heart sounds: No murmur  Pulmonary:      Effort: Pulmonary effort is normal       Breath sounds: Normal breath sounds  Abdominal:      General: Bowel sounds are normal  There is no distension  Palpations: Abdomen is soft  Tenderness: There is no abdominal tenderness  Musculoskeletal:      Comments: No joints swelling or tenderness   Skin:     General: Skin is warm and dry  Findings: No erythema  Neurological:      General: No focal deficit present  Mental Status: She is alert and oriented to person, place, and time  Motor: No weakness        Coordination: Coordination normal       Gait: Gait normal    Psychiatric:         Mood and Affect: Mood normal

## 2020-08-17 NOTE — ASSESSMENT & PLAN NOTE
Patient has history of osteoporosis  DEXA scan done in August 2019 showed osteopenia  Patient declined to continue Prolia injections  Continue weight-bearing exercise, calcium with Vit D supplementation  Recheck DEXA scan next year

## 2020-08-17 NOTE — PROGRESS NOTES
Assessment and Plan:     Problem List Items Addressed This Visit        Endocrine    Hypothyroidism    Relevant Orders    TSH, 3rd generation with Free T4 reflex       Cardiovascular and Mediastinum    Hypertension - Primary    Relevant Orders    CBC and differential    Comprehensive metabolic panel    Subclavian artery stenosis (HCC)       Musculoskeletal and Integument    Osteopenia of multiple sites       Other    Hyperlipidemia    Relevant Orders    Comprehensive metabolic panel    Lipid panel    Pulmonary nodules    Malignant neoplasm of lower-inner quadrant of right breast of female, estrogen receptor positive ([facility] )    Medicare annual wellness visit, subsequent           Preventive health issues were discussed with patient, and age appropriate screening tests were ordered as noted in patient's After Visit Summary  Personalized health advice and appropriate referrals for health education or preventive services given if needed, as noted in patient's After Visit Summary       History of Present Illness:     Patient presents for Medicare Annual Wellness visit    Patient Care Team:  Jodi Rose MD as PCP - General  MD Farhat Jurado MD as Surgeon (Surgical Oncology)  Davene Moritz, RN as Registered Nurse (Oncology)  Noemi Mcmanus as Registered Nurse (Surgical Oncology)  Isiah Enriquez MD (Hematology)     Problem List:     Patient Active Problem List   Diagnosis    Allergic rhinitis    Bradycardia    Heart murmur    Hyperlipidemia    Hypertension    Hypothyroidism    Osteoporosis    Pulmonary nodules    Subclavian artery stenosis ([facility] )    Vitamin D deficiency    Malignant neoplasm of lower-inner quadrant of right breast of female, estrogen receptor positive ([facility] )    Medicare annual wellness visit, subsequent    Use of anastrozole (Arimidex)    Osteopenia of multiple sites      Past Medical and Surgical History:     Past Medical History:   Diagnosis Date    Acute pain of left shoulder     History    Acute recurrent maxillary sinusitis     History    Acute upper respiratory infection     History    BRCA negative 11/12/2018    [facility]    BRCA1 negative     BRCA2 negative     Breast cancer ([facility] ) 11/01/2018    Right    Cancer ([facility] )     Carotid artery narrowing     stenosis    Disease of thyroid gland     History of Graves' disease     status post 131 treatment    History of lateral epicondylitis of left elbow     History of onychomycosis     of toenail    History of perforation of tympanic membrane     Hyperlipidemia     Hypertension     Left otitis media     History    Medicare annual wellness visit, subsequent      Past Surgical History:   Procedure Laterality Date    BREAST BIOPSY Right 11/01/2018    U/S BX    BREAST LUMPECTOMY Right 12/18/2018    Procedure: BREAST LUMPECTOMY; BREAST NEEDLE LOCALIZATION (NEEDLE LOC AT 0700); Surgeon: Jaki Fernandes MD;  [facility];  Service: Surgical Oncology    BREAST LUMPECTOMY      ELBOW FRACTURE SURGERY  09/1960    Open Treatment of Fracture of proximal Radius    LYMPH NODE BIOPSY Right 12/18/2018    Procedure: SENTINEL LYMPH NODE BIOPSY; LYMPHATIC MAPPING WITH BLUE DYE AND RADIOACTIVE DYE (INJECT AT 0800 BY DR PRADO IN THE OR); Surgeon: Jaki Fernandes MD;  [facility];  Service: Surgical Oncology    SINUS SURGERY Left 1996    nasal polyps(?)-large papilloma left maxillary sinus with extension into posterior nasopharynx completed by Dr Abelardo Dupree      THYROID CYST EXCISION      ablation    TONSILLECTOMY AND ADENOIDECTOMY      US BREAST NEEDLE LOC RIGHT Right 12/18/2018    US GUIDED BREAST BIOPSY RIGHT COMPLETE Right 11/1/2018      Family History:     Family History   Problem Relation Age of Onset    Breast cancer Mother     Pancreatic cancer Mother     Aortic aneurysm Father     Aortic aneurysm Family         Abdominal    Breast cancer Family     Breast cancer Maternal Grandmother       Social History: Social History     Socioeconomic History    Marital status: /Civil Union     Spouse name: None    Number of children: None    Years of education: None    Highest education level: None   Occupational History    None   Social Needs    Financial resource strain: None    Food insecurity     Worry: None     Inability: None    Transportation needs     Medical: None     Non-medical: None   Tobacco Use    Smoking status: Former Smoker     Last attempt to quit: 10/16/2002     Years since quittin 8    Smokeless tobacco: Never Used   Substance and Sexual Activity    Alcohol use: Yes     Comment: rarely    Drug use: No    Sexual activity: None   Lifestyle    Physical activity     Days per week: None     Minutes per session: None    Stress: None   Relationships    Social connections     Talks on phone: None     Gets together: None     Attends Scientology service: None     Active member of club or organization: None     Attends meetings of clubs or organizations: None     Relationship status: None    Intimate partner violence     Fear of current or ex partner: None     Emotionally abused: None     Physically abused: None     Forced sexual activity: None   Other Topics Concern    None   Social History Narrative    None      Medications and Allergies:     Current Outpatient Medications   Medication Sig Dispense Refill    anastrozole (ARIMIDEX) 1 mg tablet Take 1 tablet (1 mg total) by mouth daily 90 tablet 3    aspirin 81 MG tablet Take 1 tablet (81 mg total) by mouth daily 30 tablet 6    Cholecalciferol (VITAMIN D3) 2000 units TABS Take 1 tablet by mouth daily      levothyroxine 112 mcg tablet Take 1 tablet (112 mcg total) by mouth daily 90 tablet 3    losartan (COZAAR) 100 MG tablet Take 1 tablet (100 mg total) by mouth daily 90 tablet 3    pravastatin (PRAVACHOL) 20 mg tablet Take 1 tablet (20 mg total) by mouth daily 90 tablet 3     No current facility-administered medications for this visit  Allergies   Allergen Reactions    Sulfa Antibiotics Dizziness     Other reaction(s): feeling "off-balance"  Category: Adverse Reaction;       Immunizations:     Immunization History   Administered Date(s) Administered    INFLUENZA 10/10/2014, 09/19/2018    Influenza Split High Dose Preservative Free IM 10/30/2013    Influenza TIV (IM) 09/01/2015, 10/13/2016, 10/04/2017    Pneumococcal Conjugate 13-Valent 12/15/2015    Pneumococcal Polysaccharide PPV23 06/18/2014    influenza, trivalent, adjuvanted 09/17/2019      Health Maintenance:         Topic Date Due    MAMMOGRAM  10/14/2020    Hepatitis C Screening  Completed         Topic Date Due    DTaP,Tdap,and Td Vaccines (1 - Tdap) 03/02/1969    Influenza Vaccine  07/01/2020      Medicare Health Risk Assessment:     /80 (BP Location: Left arm, Patient Position: Sitting, Cuff Size: Standard)   Pulse 74   Temp 97 8 °F (36 6 °C) (Oral)   Resp 14   Ht 5' 5 5" (1 664 m)   Wt 76 2 kg (168 lb)   SpO2 96%   BMI 27 53 kg/m²      Fadia Felt is here for her Subsequent Wellness visit  Health Risk Assessment:   Patient rates overall health as good  Patient feels that their physical health rating is same  Eyesight was rated as same  Hearing was rated as same  Patient feels that their emotional and mental health rating is same  Pain experienced in the last 7 days has been none  Patient states that she has experienced no weight loss or gain in last 6 months  Depression Screening:   PHQ-2 Score: 0      Fall Risk Screening: In the past year, patient has experienced: history of falling in past year    Number of falls: 1  Injured during fall?: No    Feels unsteady when standing or walking?: No    Worried about falling?: No      Urinary Incontinence Screening:   Patient has not leaked urine accidently in the last six months  Home Safety:  Patient does not have trouble with stairs inside or outside of their home   Patient has working smoke alarms and has no working carbon monoxide detector  Home safety hazards include: none  Nutrition:   Current diet is Regular  Medications:   Patient is currently taking over-the-counter supplements  OTC medications include: see medication list  Patient is able to manage medications  Activities of Daily Living (ADLs)/Instrumental Activities of Daily Living (IADLs):   Walk and transfer into and out of bed and chair?: Yes  Dress and groom yourself?: Yes    Bathe or shower yourself?: Yes    Feed yourself? Yes  Do your laundry/housekeeping?: Yes  Manage your money, pay your bills and track your expenses?: Yes  Make your own meals?: Yes    Do your own shopping?: Yes    Previous Hospitalizations:   Any hospitalizations or ED visits within the last 12 months?: No      Advance Care Planning:   Living will: No    Durable POA for healthcare: No    Advanced directive: No    Advanced directive counseling given: Yes    Five wishes given: Yes      Cognitive Screening:   Provider or family/friend/caregiver concerned regarding cognition?: No    PREVENTIVE SCREENINGS      Cardiovascular Screening:    General: Screening Not Indicated and History Lipid Disorder      Diabetes Screening:     General: Screening Current      Colorectal Cancer Screening:     General: Risks and Benefits Discussed      Breast Cancer Screening:     General: History Breast Cancer      Cervical Cancer Screening:    General: Screening Not Indicated      Osteoporosis Screening:    General: Screening Not Indicated and History Osteoporosis      Abdominal Aortic Aneurysm (AAA) Screening:        General: Screening Not Indicated      Lung Cancer Screening:     General: Screening Not Indicated      Hepatitis C Screening:    General: Screening Current    Other Counseling Topics:   Car/seat belt/driving safety, skin self-exam, sunscreen and calcium and vitamin D intake and regular weightbearing exercise         Angeli Durand MD

## 2020-08-17 NOTE — ASSESSMENT & PLAN NOTE
Patient stopped smoking 18 years ago  She was a smoker for 40 years  Will continue annual LDCT for lung cancer screening

## 2020-08-17 NOTE — ASSESSMENT & PLAN NOTE
Patient is S/P right lumpectomy with sentinel lymph node biopsy in December 2018  Currently taking Anastrozole 1 mg daily  Follow- up with medical oncologist Dr Vida Nance and surgical oncology Dr Tammie House

## 2020-08-17 NOTE — ASSESSMENT & PLAN NOTE
Continue Losartan 100 mg daily  Patient c/o occasional lightheadedness  Recommended to stay well hydrated, avoid salty foods  Start checking BP at home and call with BP log in 1 week

## 2020-08-27 ENCOUNTER — TELEPHONE (OUTPATIENT)
Dept: FAMILY MEDICINE CLINIC | Facility: CLINIC | Age: 70
End: 2020-08-27

## 2020-08-27 NOTE — TELEPHONE ENCOUNTER
----- Message from Mohit Rushing MD sent at 8/26/2020  9:44 PM EDT -----  Please call patient  I   reviewed BP log  BP is well controlled  Continue Losartan 100 mg daily

## 2020-09-28 DIAGNOSIS — E03.9 ACQUIRED HYPOTHYROIDISM: ICD-10-CM

## 2020-09-28 RX ORDER — LEVOTHYROXINE SODIUM 112 UG/1
112 TABLET ORAL DAILY
Qty: 90 TABLET | Refills: 3 | Status: SHIPPED | OUTPATIENT
Start: 2020-09-28 | End: 2021-09-22 | Stop reason: SDUPTHER

## 2020-10-15 ENCOUNTER — HOSPITAL ENCOUNTER (OUTPATIENT)
Dept: MAMMOGRAPHY | Facility: CLINIC | Age: 70
Discharge: HOME | End: 2020-10-15
Payer: COMMERCIAL

## 2020-10-15 VITALS — WEIGHT: 171 LBS | TEMPERATURE: 97.6 F | BODY MASS INDEX: 27.48 KG/M2 | HEIGHT: 66 IN

## 2020-10-15 DIAGNOSIS — Z17.0 MALIGNANT NEOPLASM OF LOWER-INNER QUADRANT OF RIGHT BREAST OF FEMALE, ESTROGEN RECEPTOR POSITIVE (HCC): ICD-10-CM

## 2020-10-15 DIAGNOSIS — C50.311 MALIGNANT NEOPLASM OF LOWER-INNER QUADRANT OF RIGHT BREAST OF FEMALE, ESTROGEN RECEPTOR POSITIVE (HCC): ICD-10-CM

## 2020-10-15 PROCEDURE — 77066 DX MAMMO INCL CAD BI: CPT

## 2020-10-15 PROCEDURE — G0279 TOMOSYNTHESIS, MAMMO: HCPCS

## 2020-11-06 ENCOUNTER — TELEPHONE (OUTPATIENT)
Dept: SURGICAL ONCOLOGY | Facility: CLINIC | Age: 70
End: 2020-11-06

## 2020-11-10 ENCOUNTER — OFFICE VISIT (OUTPATIENT)
Dept: SURGICAL ONCOLOGY | Facility: CLINIC | Age: 70
End: 2020-11-10
Payer: COMMERCIAL

## 2020-11-10 VITALS
HEART RATE: 78 BPM | TEMPERATURE: 97.8 F | HEIGHT: 66 IN | SYSTOLIC BLOOD PRESSURE: 150 MMHG | WEIGHT: 173 LBS | BODY MASS INDEX: 27.8 KG/M2 | DIASTOLIC BLOOD PRESSURE: 70 MMHG | RESPIRATION RATE: 16 BRPM

## 2020-11-10 DIAGNOSIS — Z17.0 MALIGNANT NEOPLASM OF LOWER-INNER QUADRANT OF RIGHT BREAST OF FEMALE, ESTROGEN RECEPTOR POSITIVE (HCC): Primary | ICD-10-CM

## 2020-11-10 DIAGNOSIS — C50.311 MALIGNANT NEOPLASM OF LOWER-INNER QUADRANT OF RIGHT BREAST OF FEMALE, ESTROGEN RECEPTOR POSITIVE (HCC): Primary | ICD-10-CM

## 2020-11-10 DIAGNOSIS — Z79.811 USE OF ANASTROZOLE (ARIMIDEX): ICD-10-CM

## 2020-11-10 PROCEDURE — 1160F RVW MEDS BY RX/DR IN RCRD: CPT | Performed by: NURSE PRACTITIONER

## 2020-11-10 PROCEDURE — 99214 OFFICE O/P EST MOD 30 MIN: CPT | Performed by: NURSE PRACTITIONER

## 2020-11-10 PROCEDURE — 3008F BODY MASS INDEX DOCD: CPT | Performed by: NURSE PRACTITIONER

## 2020-11-10 PROCEDURE — 3078F DIAST BP <80 MM HG: CPT | Performed by: NURSE PRACTITIONER

## 2020-11-10 PROCEDURE — 3077F SYST BP >= 140 MM HG: CPT | Performed by: NURSE PRACTITIONER

## 2021-01-14 ENCOUNTER — VBI (OUTPATIENT)
Dept: ADMINISTRATIVE | Facility: OTHER | Age: 71
End: 2021-01-14

## 2021-02-10 ENCOUNTER — LAB (OUTPATIENT)
Dept: LAB | Facility: HOSPITAL | Age: 71
End: 2021-02-10
Payer: COMMERCIAL

## 2021-02-10 DIAGNOSIS — I10 ESSENTIAL HYPERTENSION: ICD-10-CM

## 2021-02-10 DIAGNOSIS — E78.2 MIXED HYPERLIPIDEMIA: ICD-10-CM

## 2021-02-10 DIAGNOSIS — E03.9 ACQUIRED HYPOTHYROIDISM: ICD-10-CM

## 2021-02-10 LAB
ALBUMIN SERPL BCP-MCNC: 3.5 G/DL (ref 3.5–5)
ALP SERPL-CCNC: 110 U/L (ref 46–116)
ALT SERPL W P-5'-P-CCNC: 16 U/L (ref 12–78)
ANION GAP SERPL CALCULATED.3IONS-SCNC: 3 MMOL/L (ref 4–13)
AST SERPL W P-5'-P-CCNC: 11 U/L (ref 5–45)
BASOPHILS # BLD AUTO: 0.03 THOUSANDS/ΜL (ref 0–0.1)
BASOPHILS NFR BLD AUTO: 1 % (ref 0–1)
BILIRUB SERPL-MCNC: 0.61 MG/DL (ref 0.2–1)
BUN SERPL-MCNC: 19 MG/DL (ref 5–25)
CALCIUM SERPL-MCNC: 9.4 MG/DL (ref 8.3–10.1)
CHLORIDE SERPL-SCNC: 112 MMOL/L (ref 100–108)
CHOLEST SERPL-MCNC: 174 MG/DL (ref 50–200)
CO2 SERPL-SCNC: 28 MMOL/L (ref 21–32)
CREAT SERPL-MCNC: 0.8 MG/DL (ref 0.6–1.3)
EOSINOPHIL # BLD AUTO: 0.14 THOUSAND/ΜL (ref 0–0.61)
EOSINOPHIL NFR BLD AUTO: 3 % (ref 0–6)
ERYTHROCYTE [DISTWIDTH] IN BLOOD BY AUTOMATED COUNT: 12.8 % (ref 11.6–15.1)
GFR SERPL CREATININE-BSD FRML MDRD: 74 ML/MIN/1.73SQ M
GLUCOSE P FAST SERPL-MCNC: 78 MG/DL (ref 65–99)
HCT VFR BLD AUTO: 38.5 % (ref 34.8–46.1)
HDLC SERPL-MCNC: 64 MG/DL
HGB BLD-MCNC: 12.5 G/DL (ref 11.5–15.4)
IMM GRANULOCYTES # BLD AUTO: 0.01 THOUSAND/UL (ref 0–0.2)
IMM GRANULOCYTES NFR BLD AUTO: 0 % (ref 0–2)
LDLC SERPL CALC-MCNC: 96 MG/DL (ref 0–100)
LYMPHOCYTES # BLD AUTO: 1.35 THOUSANDS/ΜL (ref 0.6–4.47)
LYMPHOCYTES NFR BLD AUTO: 30 % (ref 14–44)
MCH RBC QN AUTO: 33.2 PG (ref 26.8–34.3)
MCHC RBC AUTO-ENTMCNC: 32.5 G/DL (ref 31.4–37.4)
MCV RBC AUTO: 102 FL (ref 82–98)
MONOCYTES # BLD AUTO: 0.38 THOUSAND/ΜL (ref 0.17–1.22)
MONOCYTES NFR BLD AUTO: 8 % (ref 4–12)
NEUTROPHILS # BLD AUTO: 2.63 THOUSANDS/ΜL (ref 1.85–7.62)
NEUTS SEG NFR BLD AUTO: 58 % (ref 43–75)
NONHDLC SERPL-MCNC: 110 MG/DL
NRBC BLD AUTO-RTO: 0 /100 WBCS
PLATELET # BLD AUTO: 329 THOUSANDS/UL (ref 149–390)
PMV BLD AUTO: 8.9 FL (ref 8.9–12.7)
POTASSIUM SERPL-SCNC: 4.1 MMOL/L (ref 3.5–5.3)
PROT SERPL-MCNC: 6.8 G/DL (ref 6.4–8.2)
RBC # BLD AUTO: 3.76 MILLION/UL (ref 3.81–5.12)
SODIUM SERPL-SCNC: 143 MMOL/L (ref 136–145)
TRIGL SERPL-MCNC: 70 MG/DL
TSH SERPL DL<=0.05 MIU/L-ACNC: 0.9 UIU/ML (ref 0.36–3.74)
WBC # BLD AUTO: 4.54 THOUSAND/UL (ref 4.31–10.16)

## 2021-02-10 PROCEDURE — 85025 COMPLETE CBC W/AUTO DIFF WBC: CPT

## 2021-02-10 PROCEDURE — 80061 LIPID PANEL: CPT

## 2021-02-10 PROCEDURE — 84443 ASSAY THYROID STIM HORMONE: CPT

## 2021-02-10 PROCEDURE — 80053 COMPREHEN METABOLIC PANEL: CPT

## 2021-02-10 PROCEDURE — 36415 COLL VENOUS BLD VENIPUNCTURE: CPT

## 2021-02-17 ENCOUNTER — OFFICE VISIT (OUTPATIENT)
Dept: FAMILY MEDICINE CLINIC | Facility: CLINIC | Age: 71
End: 2021-02-17
Payer: COMMERCIAL

## 2021-02-17 VITALS
SYSTOLIC BLOOD PRESSURE: 122 MMHG | BODY MASS INDEX: 28.12 KG/M2 | RESPIRATION RATE: 14 BRPM | WEIGHT: 175 LBS | OXYGEN SATURATION: 97 % | DIASTOLIC BLOOD PRESSURE: 78 MMHG | TEMPERATURE: 98.4 F | HEIGHT: 66 IN | HEART RATE: 64 BPM

## 2021-02-17 DIAGNOSIS — R91.8 PULMONARY NODULES: ICD-10-CM

## 2021-02-17 DIAGNOSIS — I77.1 SUBCLAVIAN ARTERY STENOSIS (HCC): ICD-10-CM

## 2021-02-17 DIAGNOSIS — M85.89 OSTEOPENIA OF MULTIPLE SITES: ICD-10-CM

## 2021-02-17 DIAGNOSIS — Z12.11 SCREENING FOR COLON CANCER: ICD-10-CM

## 2021-02-17 DIAGNOSIS — E03.9 ACQUIRED HYPOTHYROIDISM: ICD-10-CM

## 2021-02-17 DIAGNOSIS — I10 ESSENTIAL HYPERTENSION: Primary | ICD-10-CM

## 2021-02-17 DIAGNOSIS — C50.311 MALIGNANT NEOPLASM OF LOWER-INNER QUADRANT OF RIGHT BREAST OF FEMALE, ESTROGEN RECEPTOR POSITIVE (HCC): ICD-10-CM

## 2021-02-17 DIAGNOSIS — Z17.0 MALIGNANT NEOPLASM OF LOWER-INNER QUADRANT OF RIGHT BREAST OF FEMALE, ESTROGEN RECEPTOR POSITIVE (HCC): ICD-10-CM

## 2021-02-17 DIAGNOSIS — Z12.11 SCREENING FOR COLORECTAL CANCER: ICD-10-CM

## 2021-02-17 DIAGNOSIS — R20.2 PARESTHESIA OF FOOT, BILATERAL: ICD-10-CM

## 2021-02-17 DIAGNOSIS — R09.89 DECREASED PEDAL PULSES: ICD-10-CM

## 2021-02-17 DIAGNOSIS — E78.2 MIXED HYPERLIPIDEMIA: ICD-10-CM

## 2021-02-17 DIAGNOSIS — Z12.12 SCREENING FOR COLORECTAL CANCER: ICD-10-CM

## 2021-02-17 DIAGNOSIS — Z87.891 HISTORY OF TOBACCO USE: ICD-10-CM

## 2021-02-17 PROCEDURE — 3008F BODY MASS INDEX DOCD: CPT | Performed by: FAMILY MEDICINE

## 2021-02-17 PROCEDURE — 3725F SCREEN DEPRESSION PERFORMED: CPT | Performed by: FAMILY MEDICINE

## 2021-02-17 PROCEDURE — 1160F RVW MEDS BY RX/DR IN RCRD: CPT | Performed by: FAMILY MEDICINE

## 2021-02-17 PROCEDURE — 99214 OFFICE O/P EST MOD 30 MIN: CPT | Performed by: FAMILY MEDICINE

## 2021-02-17 PROCEDURE — 3074F SYST BP LT 130 MM HG: CPT | Performed by: FAMILY MEDICINE

## 2021-02-17 PROCEDURE — 3078F DIAST BP <80 MM HG: CPT | Performed by: FAMILY MEDICINE

## 2021-02-17 PROCEDURE — 1036F TOBACCO NON-USER: CPT | Performed by: FAMILY MEDICINE

## 2021-02-17 NOTE — ASSESSMENT & PLAN NOTE
Patient smoked for 40 years  She quit smoking 18 years ago  Continue annual LDCT for lung cancer screening

## 2021-02-17 NOTE — PROGRESS NOTES
Chief Complaint   Patient presents with    Follow-up     6 month follow up     Health Maintenance   Topic Date Due    DTaP,Tdap,and Td Vaccines (1 - Tdap) 03/02/1969    Colorectal Cancer Screening  03/02/1998    BMI: Followup Plan  07/10/2020    Fall Risk  08/17/2021    Medicare Annual Wellness Visit (AWV)  08/17/2021    MAMMOGRAM  10/15/2021    Depression Screening PHQ  02/17/2022    BMI: Adult  02/17/2022    Hepatitis C Screening  Completed    Pneumococcal Vaccine: 65+ Years  Completed    Influenza Vaccine  Completed    HIB Vaccine  Aged Out    Hepatitis B Vaccine  Aged Out    IPV Vaccine  Aged Out    Hepatitis A Vaccine  Aged Out    Meningococcal ACWY Vaccine  Aged Out    HPV Vaccine  Aged Out         BMI Counseling: Body mass index is 28 68 kg/m²  The BMI is above normal  Nutrition recommendations include encouraging healthy choices of fruits and vegetables, decreasing fast food intake, consuming healthier snacks, limiting drinks that contain sugar, moderation in carbohydrate intake, increasing intake of lean protein, reducing intake of saturated and trans fat and reducing intake of cholesterol  Exercise recommendations include exercising 3-5 times per week  No pharmacotherapy was ordered  Assessment/Plan:    Hypertension  BP is adequately controlled  Continue Losartan 100 mg 1 tablet daily  Hypothyroidism  Continue Levothyroxine 112 mcg daily  Hyperlipidemia  Continue Pravastatin 20 mg daily  Osteopenia of multiple sites  Patient has history of osteoporosis  DEXA scan from August 2019 showed osteopenia  Patient declined to continue Prolia injections  Continue weight-bearing exercise, calcium with Vit D supplementation  Subclavian artery stenosis [facility])  Patient had carotid Doppler done in March 2020  Continue aspirin, statin therapy  Schedule Carotid Doppler in March  Pulmonary nodules  Patient smoked for 40 years    She quit smoking 18 years ago     Continue annual LDCT for lung cancer screening  Malignant neoplasm of lower-inner quadrant of right breast of female, estrogen receptor positive ([facility] )  Patient is status post right lumpectomy with sentinel lymph node biopsy in December 2018  Currently on Anastrozole 1 mg daily  Follow-up with medical oncologist Dr Oriana Olsen and surgical oncology Dr Jose De Jesus Morelos  Decreased pedal pulses  Recommended to schedule arterial Doppler BL LE to r/o PVD  Paresthesia of foot, bilateral  R/o neuropathy  Recommended to start Vit B complex 1 tab  daily  Call if symptoms persist       HM: patient declined colonoscopy  Will order Cologuard test     Schedule follow-up office visit in 6 months  Check labs prior to next visit  Diagnoses and all orders for this visit:    Essential hypertension  -     Comprehensive metabolic panel; Future  -     CBC and differential; Future    Acquired hypothyroidism  -     TSH, 3rd generation with Free T4 reflex; Future    Mixed hyperlipidemia  -     Comprehensive metabolic panel; Future  -     Lipid panel; Future    Osteopenia of multiple sites    Subclavian artery stenosis (HCC)  -     VAS carotid complete study; Future    Pulmonary nodules    History of tobacco use  -     CT lung screening program; Future    Screening for colorectal cancer    Screening for colon cancer  -     Cologuard; Future    Malignant neoplasm of lower-inner quadrant of right breast of female, estrogen receptor positive (HCC)    Decreased pedal pulses  -     VAS lower limb arterial duplex, complete bilateral; Future    Paresthesia of foot, bilateral          Subjective:      Patient ID: Cristian Calderón is a 65 y o  female  HPI     Patient presents for 6 month follow-up of chronic medical conditions  Reviewed current medications, blood test results from February 10, 2021  TSH 0 899, RBC 3 76 thousands, Hemoglobin 12 5, potassium 4 1, creatinine 0 80    Cholesterol 174, HDL 64, LDL 96     HTN - blood pressure remains stable  Patient takes Losartan 100 mg daily  Reports no chest pain, dizziness  C/o occasional mild shortness of breath  Hypothyroidism - on Levothyroxine 112 mcg daily  Denies fatigue, constipation  Hyperlipidemia - patient takes Lovastatin 40 mg daily  Reports no side effects  Patient is a former smoker  She smoked for 40 years, quit 18 years ago  LDCT done in March 2020 showed stable background emphysema with benign 4 mm nodule in the left lower lobe  Patient denies cough, wheezing  Right breast CA, S/P lumpectomy in December 2018  Currently taking Anastrozole 1 mg daily  C/o stiffness joints  Patient has been followed by surgical oncologist Dr Max Cuba and medical oncologist Dr Berna Amezcua  bilateral diagnostic mammogram done October 2020 showed no evidence malignancy  Patient c/osome tingling, numbness in feet  Feet feel cold  Denies pain in calves with walking  Flu shot done in 8/20, Prevnar 13 in 12/2015  The following portions of the patient's history were reviewed and updated as appropriate: allergies, past family history, past medical history, past social history, past surgical history and problem list     Review of Systems   Constitutional: Negative for activity change, appetite change, chills, fatigue and fever  HENT: Negative for congestion, ear pain, hearing loss, mouth sores, nosebleeds, sore throat, tinnitus and trouble swallowing  Eyes: Negative for pain, discharge, redness, itching and visual disturbance  Respiratory: Positive for shortness of breath (occasional mild SOB)  Negative for cough, chest tightness and wheezing  Cardiovascular: Positive for leg swelling (mild BL ankle swelling)  Negative for chest pain and palpitations  Gastrointestinal: Negative for abdominal pain, blood in stool, constipation, diarrhea, nausea and vomiting     Genitourinary: Negative for difficulty urinating, dysuria, flank pain, frequency and hematuria  Musculoskeletal: Positive for arthralgias  Negative for back pain, gait problem, joint swelling and neck pain  Stiffness in joints   Skin: Negative for rash  Neurological: Negative for dizziness, syncope and headaches  Hematological: Negative for adenopathy  Bruises/bleeds easily  Psychiatric/Behavioral: Negative for dysphoric mood and sleep disturbance  The patient is not nervous/anxious  Objective:      /78 (BP Location: Left arm, Patient Position: Sitting, Cuff Size: Adult)   Pulse 64   Temp 98 4 °F (36 9 °C) (Tympanic)   Resp 14   Ht 5' 5 5" (1 664 m)   Wt 79 4 kg (175 lb)   SpO2 97%   BMI 28 68 kg/m²          Physical Exam  Vitals signs and nursing note reviewed  Constitutional:       Appearance: Normal appearance  HENT:      Head: Normocephalic and atraumatic  Eyes:      Conjunctiva/sclera: Conjunctivae normal       Pupils: Pupils are equal, round, and reactive to light  Neck:      Vascular: Carotid bruit (left carotid bruits) present  Cardiovascular:      Rate and Rhythm: Normal rate and regular rhythm  Heart sounds: No murmur  Comments: Trace BL ankle edema  DP pulsed decreased BL  Pulmonary:      Effort: Pulmonary effort is normal       Breath sounds: Normal breath sounds  No wheezing or rales  Abdominal:      General: There is no distension  Palpations: Abdomen is soft  Tenderness: There is no abdominal tenderness  Musculoskeletal: Normal range of motion  General: No tenderness or deformity  Skin:     General: Skin is warm and dry  Findings: No rash  Neurological:      General: No focal deficit present  Mental Status: She is alert  Motor: No weakness        Gait: Gait normal    Psychiatric:         Mood and Affect: Mood normal  Ftsg Text: The defect edges were debeveled with a #15 scalpel blade.  Given the location of the defect, shape of the defect and the proximity to free margins a full thickness skin graft was deemed most appropriate.  Using a sterile surgical marker, the primary defect shape was transferred to the donor site. The area thus outlined was incised deep to adipose tissue with a #15 scalpel blade.  The harvested graft was then trimmed of adipose tissue until only dermis and epidermis was left.  The skin margins of the secondary defect were undermined to an appropriate distance in all directions utilizing iris scissors.  The secondary defect was closed with interrupted buried subcutaneous sutures.  The skin edges were then re-apposed with running  sutures.  The skin graft was then placed in the primary defect and oriented appropriately.

## 2021-02-17 NOTE — ASSESSMENT & PLAN NOTE
Patient is status post right lumpectomy with sentinel lymph node biopsy in December 2018  Currently on Anastrozole 1 mg daily  Follow-up with medical oncologist Dr Hermelindo Mendoza and surgical oncology Dr Lona Schmitt

## 2021-02-17 NOTE — ASSESSMENT & PLAN NOTE
Patient has history of osteoporosis  DEXA scan from August 2019 showed osteopenia  Patient declined to continue Prolia injections  Continue weight-bearing exercise, calcium with Vit D supplementation

## 2021-02-17 NOTE — ASSESSMENT & PLAN NOTE
Patient had carotid Doppler done in March 2020  Continue aspirin, statin therapy  Schedule Carotid Doppler in March

## 2021-03-02 DIAGNOSIS — Z23 ENCOUNTER FOR IMMUNIZATION: ICD-10-CM

## 2021-03-05 ENCOUNTER — IMMUNIZATIONS (OUTPATIENT)
Dept: FAMILY MEDICINE CLINIC | Facility: HOSPITAL | Age: 71
End: 2021-03-05

## 2021-03-05 DIAGNOSIS — Z23 ENCOUNTER FOR IMMUNIZATION: Primary | ICD-10-CM

## 2021-03-05 PROCEDURE — 0001A SARS-COV-2 / COVID-19 MRNA VACCINE (PFIZER-BIONTECH) 30 MCG: CPT

## 2021-03-05 PROCEDURE — 91300 SARS-COV-2 / COVID-19 MRNA VACCINE (PFIZER-BIONTECH) 30 MCG: CPT

## 2021-03-12 ENCOUNTER — HOSPITAL ENCOUNTER (OUTPATIENT)
Dept: NON INVASIVE DIAGNOSTICS | Facility: CLINIC | Age: 71
Discharge: HOME | End: 2021-03-12
Payer: COMMERCIAL

## 2021-03-12 DIAGNOSIS — I77.1 SUBCLAVIAN ARTERY STENOSIS (HCC): ICD-10-CM

## 2021-03-12 DIAGNOSIS — R09.89 DECREASED PEDAL PULSES: ICD-10-CM

## 2021-03-12 PROCEDURE — 93925 LOWER EXTREMITY STUDY: CPT

## 2021-03-12 PROCEDURE — 93923 UPR/LXTR ART STDY 3+ LVLS: CPT

## 2021-03-12 PROCEDURE — 93925 LOWER EXTREMITY STUDY: CPT | Performed by: SURGERY

## 2021-03-12 PROCEDURE — 93880 EXTRACRANIAL BILAT STUDY: CPT | Performed by: SURGERY

## 2021-03-12 PROCEDURE — 93922 UPR/L XTREMITY ART 2 LEVELS: CPT | Performed by: SURGERY

## 2021-03-12 PROCEDURE — 93880 EXTRACRANIAL BILAT STUDY: CPT

## 2021-03-15 ENCOUNTER — TELEPHONE (OUTPATIENT)
Dept: FAMILY MEDICINE CLINIC | Facility: CLINIC | Age: 71
End: 2021-03-15

## 2021-03-15 DIAGNOSIS — M25.675 JOINT STIFFNESS OF BOTH FEET: ICD-10-CM

## 2021-03-15 DIAGNOSIS — M25.674 JOINT STIFFNESS OF BOTH FEET: ICD-10-CM

## 2021-03-15 DIAGNOSIS — R20.2 PARESTHESIA OF FOOT, BILATERAL: Primary | ICD-10-CM

## 2021-03-15 NOTE — RESULT ENCOUNTER NOTE
I reviewed the results with patient  She was still wondering as to why she experiences the numbness, tingling, pain sensation, since this came back normal? Could it be related to the nerves instead? What else can she do for it? Please advise

## 2021-03-15 NOTE — TELEPHONE ENCOUNTER
I submitted the authorization online via [employer], and got the authorization approved       Date of Service: 3/26/2021   CPT: 19611   Authorization #: [id number]   Valid Dates: 3/15/2021-9/11/2021

## 2021-03-15 NOTE — TELEPHONE ENCOUNTER
Pt called to let us know the NPI [id number] for her CT Lung Screening program due that she was a smoker   Pt is requesting an authorization her DOS is 03/26/21 at 8:30 at [address]  [facility] [address]

## 2021-03-26 ENCOUNTER — HOSPITAL ENCOUNTER (OUTPATIENT)
Dept: RADIOLOGY | Facility: HOSPITAL | Age: 71
Discharge: HOME | End: 2021-03-26
Payer: COMMERCIAL

## 2021-03-26 DIAGNOSIS — Z87.891 HISTORY OF TOBACCO USE: ICD-10-CM

## 2021-03-26 PROCEDURE — 71271 CT THORAX LUNG CANCER SCR C-: CPT

## 2021-03-30 ENCOUNTER — IMMUNIZATIONS (OUTPATIENT)
Dept: FAMILY MEDICINE CLINIC | Facility: HOSPITAL | Age: 71
End: 2021-03-30

## 2021-03-30 DIAGNOSIS — Z23 ENCOUNTER FOR IMMUNIZATION: Primary | ICD-10-CM

## 2021-03-30 PROCEDURE — 0002A SARS-COV-2 / COVID-19 MRNA VACCINE (PFIZER-BIONTECH) 30 MCG: CPT

## 2021-03-30 PROCEDURE — 91300 SARS-COV-2 / COVID-19 MRNA VACCINE (PFIZER-BIONTECH) 30 MCG: CPT

## 2021-05-05 DIAGNOSIS — I10 ESSENTIAL HYPERTENSION: ICD-10-CM

## 2021-05-05 RX ORDER — LOSARTAN POTASSIUM 100 MG/1
100 TABLET ORAL DAILY
Qty: 90 TABLET | Refills: 3 | Status: SHIPPED | OUTPATIENT
Start: 2021-05-05 | End: 2022-05-01

## 2021-07-13 ENCOUNTER — OFFICE VISIT (OUTPATIENT)
Dept: HEMATOLOGY ONCOLOGY | Facility: CLINIC | Age: 71
End: 2021-07-13
Payer: COMMERCIAL

## 2021-07-13 VITALS
HEIGHT: 66 IN | DIASTOLIC BLOOD PRESSURE: 78 MMHG | WEIGHT: 174 LBS | BODY MASS INDEX: 27.97 KG/M2 | TEMPERATURE: 98.2 F | OXYGEN SATURATION: 96 % | SYSTOLIC BLOOD PRESSURE: 132 MMHG | RESPIRATION RATE: 18 BRPM | HEART RATE: 77 BPM

## 2021-07-13 DIAGNOSIS — C50.311 MALIGNANT NEOPLASM OF LOWER-INNER QUADRANT OF RIGHT BREAST OF FEMALE, ESTROGEN RECEPTOR POSITIVE (HCC): Primary | ICD-10-CM

## 2021-07-13 DIAGNOSIS — Z17.0 MALIGNANT NEOPLASM OF LOWER-INNER QUADRANT OF RIGHT BREAST OF FEMALE, ESTROGEN RECEPTOR POSITIVE (HCC): Primary | ICD-10-CM

## 2021-07-13 PROCEDURE — 99214 OFFICE O/P EST MOD 30 MIN: CPT | Performed by: INTERNAL MEDICINE

## 2021-07-13 RX ORDER — ANASTROZOLE 1 MG/1
1 TABLET ORAL DAILY
Qty: 90 TABLET | Refills: 3 | Status: CANCELLED | OUTPATIENT
Start: 2021-07-13

## 2021-07-13 RX ORDER — ANASTROZOLE 1 MG/1
1 TABLET ORAL DAILY
Qty: 90 TABLET | Refills: 3 | Status: SHIPPED | OUTPATIENT
Start: 2021-07-13 | End: 2022-07-05 | Stop reason: SDUPTHER

## 2021-07-13 NOTE — PROGRESS NOTES
Hematology / Oncology Outpatient Follow Up Note    Nilay Betts 66 y o  female :1950 GWT:[id number]         Date:  2021    Assessment / Plan:  A 75-year-old postmenopausal woman with stage IA right breast cancer, grade 1, % positive, TN 90% positive, HER2 negative disease   She underwent lumpectomy with sentinel lymph node biopsy, resulting in MANUEL    Since 2019, she has been on adjuvant hormonal therapy with anastrozole with no toxicity  Clinically, she has no evidence recurrent disease  I recommended her to continue with anastrozole 1 mg once a day  She is in agreement with my recommendation  I will see her again in a year for routine follow-up           Subjective:      HPI:  A 75-year-old postmenopausal woman who was found to have abnormality in her right breast, based on a screening mammography   She underwent right breast biopsy in 2018 which showed invasive ductal carcinoma, grade 1   Subsequently, she underwent lumpectomy with sentinel lymph node biopsy by Dr Delaney Means in 2018 which showed 11 x 5 mm of invasive ductal carcinoma, grade 1   There was no evidence of lymphovascular invasion   One sentinel lymph node was negative for metastatic disease   This was % positive, TN 90% positive, HER2 negative disease   She presents today to discuss adjuvant treatment options   She has no complaint of pain   She feels well   She has history of osteoporosis for which she is on denosumab injection every 6 months for at least 3-4 years  Macario Zuniga has improvement of bone density on denosumab    She has well-controlled hypertension  Macario Zuniga was a smoker until 15 years ago  Macario Zuniga is on lung cancer screening by CT scan   She has no respiratory symptoms such as cough, sputum production or shortness of breath   Her weight is stable   She has normal performance status            Interval History:  A 66year-old postmenopausal woman with stage IA right breast cancer, grade 1, ER 100% positive, DE 90% positive, HER2 negative disease   She underwent lumpectomy with sentinel lymph node biopsy, resulting in MANUEL    Since January 2019, she has been on adjuvant hormonal therapy with anastrozole   She presents today for routine follow-up  She has no new complaint  She has very occasional minimal musculoskeletal symptoms  She denied hot flashes  She has no complaint of pain  She is on lung cancer screening program since she was a smoker until 18 years ago  Recent CT scan of chest was negative  Her performance status is normal       Objective:      Primary Diagnosis:     1    Right breast cancer, stage IA (pT1c, pN0, M0) grade 1, % positive, DE 90% positive, HER2 negative disease    Diagnosed in December 2018  2    History of osteoporosis      Cancer Staging:  Cancer Staging  No matching staging information was found for the patient         Previous Hematologic/ Oncologic Treatment:            Current Hematologic/ Oncologic Treatment:       Adjuvant hormonal therapy with anastrozole since January 2019      Disease Status:      MANUEL status post lumpectomy and sentinel lymph node biopsy      Test Results:     Pathology:     11 x 5 mm of invasive ductal carcinoma, grade 1   No evidence of lymphovascular invasion   One sentinel lymph node was negative for metastatic disease   % positive, DE 90% positive, HER2 negative disease   Stage IA (pT1c, pN0, M0)     Radiology:     Mammography in October 2020 was benign  BI-RADS 2  DEXA scan in August 2019 showed T-score-1 8, consistent with osteopenia      Laboratory:     See below      Physical Exam:        General Appearance:    Alert, oriented          Eyes:    PERRL   Ears:    Normal external ear canals, both ears   Nose:   Nares normal, septum midline   Throat:   Mucosa moist  Pharynx without injection      Neck:   Supple         Lungs:     Clear to auscultation bilaterally   Chest Wall:    No tenderness or deformity    Heart:    Regular rate and rhythm         Abdomen:     Soft, non-tender, bowel sounds +, no organomegaly               Extremities:   Extremities no cyanosis or edema         Skin:   no rash or icterus  Lymph nodes:   Cervical, supraclavicular, and axillary nodes normal   Neurologic:   CNII-XII intact, normal strength, sensation and reflexes     Throughout             Breast exam:   Lumpectomy scar at 3:00 position in her right breast with no palpable abnormalities  Left breast exam is negative  ROS: Review of Systems   All other systems reviewed and are negative  Imaging: No results found  Labs:   Lab Results   Component Value Date    WBC 4 54 02/10/2021    HGB 12 5 02/10/2021    HCT 38 5 02/10/2021     (H) 02/10/2021     02/10/2021     Lab Results   Component Value Date     12/09/2015    K 4 1 02/10/2021     (H) 02/10/2021    CO2 28 02/10/2021    ANIONGAP 6 12/09/2015    BUN 19 02/10/2021    CREATININE 0 80 02/10/2021    GLUCOSE 81 12/09/2015    GLUF 78 02/10/2021    CALCIUM 9 4 02/10/2021    AST 11 02/10/2021    ALT 16 02/10/2021    ALKPHOS 110 02/10/2021    PROT 7 0 12/09/2015    BILITOT 0 57 12/09/2015    EGFR 74 02/10/2021           Current Medications: Reviewed  Allergies: Reviewed  PMH/FH/SH:  Reviewed      Vital Sign:    Body surface area is 1 87 meters squared      Wt Readings from Last 3 Encounters:   07/13/21 78 9 kg (174 lb)   02/17/21 79 4 kg (175 lb)   11/10/20 78 5 kg (173 lb)        Temp Readings from Last 3 Encounters:   07/13/21 98 2 °F (36 8 °C) (Tympanic Core)   02/17/21 98 4 °F (36 9 °C) (Tympanic)   11/10/20 97 8 °F (36 6 °C)        BP Readings from Last 3 Encounters:   07/13/21 132/78   02/17/21 122/78   11/10/20 150/70         Pulse Readings from Last 3 Encounters:   07/13/21 77   02/17/21 64   11/10/20 78     @LASTSAO2(3)@

## 2021-07-14 ENCOUNTER — OFFICE VISIT (OUTPATIENT)
Dept: PODIATRY | Facility: CLINIC | Age: 71
End: 2021-07-14
Payer: COMMERCIAL

## 2021-07-14 VITALS
WEIGHT: 174 LBS | HEART RATE: 65 BPM | DIASTOLIC BLOOD PRESSURE: 76 MMHG | HEIGHT: 66 IN | SYSTOLIC BLOOD PRESSURE: 187 MMHG | BODY MASS INDEX: 27.97 KG/M2

## 2021-07-14 DIAGNOSIS — L84 CORNS: Primary | ICD-10-CM

## 2021-07-14 DIAGNOSIS — E78.2 MIXED HYPERLIPIDEMIA: ICD-10-CM

## 2021-07-14 DIAGNOSIS — L60.3 NAIL DYSTROPHY: ICD-10-CM

## 2021-07-14 DIAGNOSIS — G62.9 PERIPHERAL NERVE DISORDER: ICD-10-CM

## 2021-07-14 PROCEDURE — 1160F RVW MEDS BY RX/DR IN RCRD: CPT | Performed by: PODIATRIST

## 2021-07-14 PROCEDURE — 99202 OFFICE O/P NEW SF 15 MIN: CPT | Performed by: PODIATRIST

## 2021-07-14 PROCEDURE — 1036F TOBACCO NON-USER: CPT | Performed by: PODIATRIST

## 2021-07-14 PROCEDURE — 3077F SYST BP >= 140 MM HG: CPT | Performed by: PODIATRIST

## 2021-07-14 PROCEDURE — 3078F DIAST BP <80 MM HG: CPT | Performed by: PODIATRIST

## 2021-07-14 RX ORDER — PRAVASTATIN SODIUM 20 MG
20 TABLET ORAL DAILY
Qty: 90 TABLET | Refills: 3 | Status: SHIPPED | OUTPATIENT
Start: 2021-07-14 | End: 2022-05-06

## 2021-07-14 NOTE — PROGRESS NOTES
Assessment/Plan:      Explained the patient that she is symptoms of peripheral neuropathy of unknown etiology  Recommended gabapentin 300 mg HS but patient prefers to avoid medication at this time  Trimmed hyperkeratotic lesion right foot  Explained to patient that she has a dystrophic left great toenail due to prior trauma  No treatment needed due to paucity of symptoms  No problem-specific Assessment & Plan notes found for this encounter  Diagnoses and all orders for this visit:    Corns    Peripheral nerve disorder    Nail dystrophy          Subjective:      Patient ID: Fran Gale is a 66 y o  female  HPI      patient, a 75-year-old female presents with multiple pedal concerns  Her chief complaint is a peculiar sensation present in the ball of the right foot  She relates intermittent numbness and tingling in the right forefoot primarily when she tries to sleep  Patient also has a callus beneath the right 5th metatarsal head  This lesion has been chronic  Lastly, patient has a thick and brittle left great toenail  There is a history of trauma to this nail  The toenail is not currently painful  Patient has tried topical antifungals to no avail  The following portions of the patient's history were reviewed and updated as appropriate: allergies, current medications, past family history, past medical history, past social history, past surgical history and problem list     Review of Systems   Gastrointestinal: Negative  Musculoskeletal: Negative  Skin: Negative  Neurological:        Paresthesia             Objective:      BP (!) 187/76   Pulse 65   Ht 5' 5 5" (1 664 m)   Wt 78 9 kg (174 lb)   BMI 28 51 kg/m²          Physical Exam  Constitutional:       Appearance: Normal appearance  Cardiovascular:      Pulses: Normal pulses  Musculoskeletal:         General: Normal range of motion  Skin:     Findings: Lesion present        Comments: Hyperkeratotic lesion beneath right 5th metatarsal head and distal to right 2nd metatarsal head  Neurological:      General: No focal deficit present  Mental Status: She is oriented to person, place, and time

## 2021-08-09 ENCOUNTER — APPOINTMENT (OUTPATIENT)
Dept: LAB | Facility: HOSPITAL | Age: 71
End: 2021-08-09
Payer: COMMERCIAL

## 2021-08-09 DIAGNOSIS — E78.2 MIXED HYPERLIPIDEMIA: ICD-10-CM

## 2021-08-09 DIAGNOSIS — I10 ESSENTIAL HYPERTENSION: ICD-10-CM

## 2021-08-09 DIAGNOSIS — E03.9 ACQUIRED HYPOTHYROIDISM: ICD-10-CM

## 2021-08-09 LAB
ALBUMIN SERPL BCP-MCNC: 3.2 G/DL (ref 3.5–5)
ALP SERPL-CCNC: 86 U/L (ref 46–116)
ALT SERPL W P-5'-P-CCNC: 18 U/L (ref 12–78)
ANION GAP SERPL CALCULATED.3IONS-SCNC: 0 MMOL/L (ref 4–13)
AST SERPL W P-5'-P-CCNC: 17 U/L (ref 5–45)
BASOPHILS # BLD AUTO: 0.04 THOUSANDS/ΜL (ref 0–0.1)
BASOPHILS NFR BLD AUTO: 1 % (ref 0–1)
BILIRUB SERPL-MCNC: 0.72 MG/DL (ref 0.2–1)
BUN SERPL-MCNC: 17 MG/DL (ref 5–25)
CALCIUM ALBUM COR SERPL-MCNC: 9.8 MG/DL (ref 8.3–10.1)
CALCIUM SERPL-MCNC: 9.2 MG/DL (ref 8.3–10.1)
CHLORIDE SERPL-SCNC: 110 MMOL/L (ref 100–108)
CHOLEST SERPL-MCNC: 168 MG/DL (ref 50–200)
CO2 SERPL-SCNC: 28 MMOL/L (ref 21–32)
CREAT SERPL-MCNC: 0.77 MG/DL (ref 0.6–1.3)
EOSINOPHIL # BLD AUTO: 0.12 THOUSAND/ΜL (ref 0–0.61)
EOSINOPHIL NFR BLD AUTO: 2 % (ref 0–6)
ERYTHROCYTE [DISTWIDTH] IN BLOOD BY AUTOMATED COUNT: 12.9 % (ref 11.6–15.1)
GFR SERPL CREATININE-BSD FRML MDRD: 77 ML/MIN/1.73SQ M
GLUCOSE P FAST SERPL-MCNC: 81 MG/DL (ref 65–99)
HCT VFR BLD AUTO: 39.2 % (ref 34.8–46.1)
HDLC SERPL-MCNC: 67 MG/DL
HGB BLD-MCNC: 12.7 G/DL (ref 11.5–15.4)
IMM GRANULOCYTES # BLD AUTO: 0.01 THOUSAND/UL (ref 0–0.2)
IMM GRANULOCYTES NFR BLD AUTO: 0 % (ref 0–2)
LDLC SERPL CALC-MCNC: 89 MG/DL (ref 0–100)
LYMPHOCYTES # BLD AUTO: 1.4 THOUSANDS/ΜL (ref 0.6–4.47)
LYMPHOCYTES NFR BLD AUTO: 28 % (ref 14–44)
MCH RBC QN AUTO: 34 PG (ref 26.8–34.3)
MCHC RBC AUTO-ENTMCNC: 32.4 G/DL (ref 31.4–37.4)
MCV RBC AUTO: 105 FL (ref 82–98)
MONOCYTES # BLD AUTO: 0.37 THOUSAND/ΜL (ref 0.17–1.22)
MONOCYTES NFR BLD AUTO: 7 % (ref 4–12)
NEUTROPHILS # BLD AUTO: 3.05 THOUSANDS/ΜL (ref 1.85–7.62)
NEUTS SEG NFR BLD AUTO: 62 % (ref 43–75)
NONHDLC SERPL-MCNC: 101 MG/DL
NRBC BLD AUTO-RTO: 0 /100 WBCS
PLATELET # BLD AUTO: 315 THOUSANDS/UL (ref 149–390)
PMV BLD AUTO: 9.1 FL (ref 8.9–12.7)
POTASSIUM SERPL-SCNC: 4.5 MMOL/L (ref 3.5–5.3)
PROT SERPL-MCNC: 6.7 G/DL (ref 6.4–8.2)
RBC # BLD AUTO: 3.73 MILLION/UL (ref 3.81–5.12)
SODIUM SERPL-SCNC: 138 MMOL/L (ref 136–145)
TRIGL SERPL-MCNC: 59 MG/DL
TSH SERPL DL<=0.05 MIU/L-ACNC: 1.3 UIU/ML (ref 0.36–3.74)
WBC # BLD AUTO: 4.99 THOUSAND/UL (ref 4.31–10.16)

## 2021-08-09 PROCEDURE — 36415 COLL VENOUS BLD VENIPUNCTURE: CPT

## 2021-08-09 PROCEDURE — 80053 COMPREHEN METABOLIC PANEL: CPT

## 2021-08-09 PROCEDURE — 85025 COMPLETE CBC W/AUTO DIFF WBC: CPT

## 2021-08-09 PROCEDURE — 80061 LIPID PANEL: CPT

## 2021-08-09 PROCEDURE — 84443 ASSAY THYROID STIM HORMONE: CPT

## 2021-08-16 ENCOUNTER — OFFICE VISIT (OUTPATIENT)
Dept: FAMILY MEDICINE CLINIC | Facility: CLINIC | Age: 71
End: 2021-08-16
Payer: COMMERCIAL

## 2021-08-16 VITALS
BODY MASS INDEX: 28 KG/M2 | WEIGHT: 174.2 LBS | DIASTOLIC BLOOD PRESSURE: 80 MMHG | HEIGHT: 66 IN | SYSTOLIC BLOOD PRESSURE: 134 MMHG | TEMPERATURE: 98.9 F | RESPIRATION RATE: 16 BRPM | HEART RATE: 68 BPM

## 2021-08-16 DIAGNOSIS — E78.2 MIXED HYPERLIPIDEMIA: ICD-10-CM

## 2021-08-16 DIAGNOSIS — R20.2 PARESTHESIA OF BOTH FEET: ICD-10-CM

## 2021-08-16 DIAGNOSIS — M85.89 OSTEOPENIA OF MULTIPLE SITES: ICD-10-CM

## 2021-08-16 DIAGNOSIS — C50.311 MALIGNANT NEOPLASM OF LOWER-INNER QUADRANT OF RIGHT BREAST OF FEMALE, ESTROGEN RECEPTOR POSITIVE (HCC): ICD-10-CM

## 2021-08-16 DIAGNOSIS — Z17.0 MALIGNANT NEOPLASM OF LOWER-INNER QUADRANT OF RIGHT BREAST OF FEMALE, ESTROGEN RECEPTOR POSITIVE (HCC): ICD-10-CM

## 2021-08-16 DIAGNOSIS — I10 ESSENTIAL HYPERTENSION: Primary | ICD-10-CM

## 2021-08-16 DIAGNOSIS — E03.9 ACQUIRED HYPOTHYROIDISM: ICD-10-CM

## 2021-08-16 DIAGNOSIS — I77.1 SUBCLAVIAN ARTERY STENOSIS (HCC): ICD-10-CM

## 2021-08-16 PROCEDURE — 3075F SYST BP GE 130 - 139MM HG: CPT | Performed by: FAMILY MEDICINE

## 2021-08-16 PROCEDURE — 3008F BODY MASS INDEX DOCD: CPT | Performed by: FAMILY MEDICINE

## 2021-08-16 PROCEDURE — 1160F RVW MEDS BY RX/DR IN RCRD: CPT | Performed by: FAMILY MEDICINE

## 2021-08-16 PROCEDURE — 99214 OFFICE O/P EST MOD 30 MIN: CPT | Performed by: FAMILY MEDICINE

## 2021-08-16 PROCEDURE — 3079F DIAST BP 80-89 MM HG: CPT | Performed by: FAMILY MEDICINE

## 2021-08-16 NOTE — PROGRESS NOTES
Chief Complaint   Patient presents with    Follow-up     6 months and review labs  Health Maintenance   Topic Date Due    DTaP,Tdap,and Td Vaccines (1 - Tdap) Never done    Colorectal Cancer Screening  Never done    Fall Risk  08/17/2021   [facility] Medicare Annual Wellness Visit (AWV)  08/17/2021    Influenza Vaccine (1) 09/01/2021    Breast Cancer Screening: Mammogram  10/15/2021    Depression Screening PHQ  02/17/2022    BMI: Followup Plan  02/17/2022    BMI: Adult  08/16/2022    Hepatitis C Screening  Completed    Pneumococcal Vaccine: 65+ Years  Completed    COVID-19 Vaccine  Completed    HIB Vaccine  Aged Out    Hepatitis B Vaccine  Aged Out    IPV Vaccine  Aged Out    Hepatitis A Vaccine  Aged Out    Meningococcal ACWY Vaccine  Aged Out    HPV Vaccine  Aged Out           Assessment/Plan:    Hypertension  BP remains stable  Continue Losartan 100 mg daily  Hyperlipidemia  Continue Pravastatin 20 mg daily  Hypothyroidism  Continue replacement with Levothyroxine 112 mcg daily  Subclavian artery stenosis [facility])  Patient is asymptomatic  Continue aspirin 81 mg daily, statin  Recheck Carotid Doppler in March 2022  Osteopenia of multiple sites  DEXA scan done in August 2019 showed osteopenia  Continue weight-bearing exercise, calcium with Vit D supplementation  Malignant neoplasm of lower-inner quadrant of right breast of female, estrogen receptor positive ([address] )  Patient is S/P  right lumpectomy with sentinel lymph node biopsy in December 2018  Management per medical oncologist Dr Sabas Crespo and surgical oncology Dr Madhuri Irby  Continue Anastrozole 1 mg daily  Paresthesia of foot, bilateral  Patient was evaluated by podiatrist Dr Hailey Badillo last month  Declined to start Gabapentin for peripheral neuropathy  Start Vit B complex 1 tablet daily  Check Vit B12 level with next blood work  HM: patient refusing colonoscopy    Recommended to check Cologuard test   She has for 40 years  Quit smoking over 18 years ago  LDCT done in March 2021 showed mild diffuse emphysematous lung changes, stable 4 mm  left lower lobe pulmonary nodule  Patient reports no cough, no wheezing  C/o mild tingling, numbness in feet  She was seen by podiatry Dr Radha Huerta last month  Dr Radha Huerta offered to start on Gabapentin for peripheral neuropathy but patient declined  Refusing colonoscopy  Completed COVID vaccination in 3/2021  Reports no side effects  The following portions of the patient's history were reviewed and updated as appropriate: allergies, current medications, past family history, past social history, past surgical history and problem list     Review of Systems   Constitutional: Negative for activity change, appetite change, chills, fatigue and fever  HENT: Negative for congestion, ear pain, hearing loss, mouth sores, nosebleeds, sore throat, tinnitus and trouble swallowing  Eyes: Negative for pain, discharge, redness, itching and visual disturbance  Respiratory: Positive for shortness of breath  Negative for cough (mild stable exertional SOB), chest tightness and wheezing  Genitourinary: Negative for difficulty urinating, dysuria, flank pain, frequency and hematuria  Musculoskeletal: Positive for arthralgias  Negative for back pain, gait problem, joint swelling and neck pain  Skin: Negative for rash  Neurological: Negative for dizziness, syncope and headaches  Hematological: Negative for adenopathy  Bruises/bleeds easily  Psychiatric/Behavioral: Negative for dysphoric mood and sleep disturbance  The patient is not nervous/anxious  Objective:      /80 (BP Location: Left arm, Patient Position: Sitting, Cuff Size: Standard)   Pulse 68   Temp 98 9 °F (37 2 °C) (Tympanic)   Resp 16   Ht 5' 5 5" (1 664 m)   Wt 79 kg (174 lb 3 2 oz)   BMI 28 55 kg/m²          Physical Exam  Vitals and nursing note reviewed     Constitutional: Appearance: Normal appearance  HENT:      Head: Normocephalic and atraumatic  Right Ear: Tympanic membrane and external ear normal       Left Ear: Tympanic membrane and external ear normal    Eyes:      Pupils: Pupils are equal, round, and reactive to light  Neck:      Vascular: Carotid bruit (left) present  Cardiovascular:      Rate and Rhythm: Normal rate and regular rhythm  Heart sounds: No murmur heard  Pulmonary:      Effort: Pulmonary effort is normal       Breath sounds: Normal breath sounds  Abdominal:      General: Bowel sounds are normal  There is no distension  Palpations: Abdomen is soft  Tenderness: There is no abdominal tenderness  Musculoskeletal:         General: No swelling, tenderness or deformity  Normal range of motion  Cervical back: Normal range of motion and neck supple  Right lower leg: No edema  Left lower leg: No edema  Skin:     General: Skin is warm and dry  Findings: No rash  Neurological:      Mental Status: She is alert     Psychiatric:         Mood and Affect: Mood normal

## 2021-08-17 NOTE — ASSESSMENT & PLAN NOTE
Patient is S/P  right lumpectomy with sentinel lymph node biopsy in December 2018  Management per medical oncologist Dr Edmundo Grossman and surgical oncology Dr Nivia Franco  Continue Anastrozole 1 mg daily

## 2021-08-17 NOTE — ASSESSMENT & PLAN NOTE
Patient was evaluated by podiatrist Dr Partha Chavez last month  Declined to start Gabapentin for peripheral neuropathy  Start Vit B complex 1 tablet daily  Check Vit B12 level with next blood work

## 2021-08-17 NOTE — ASSESSMENT & PLAN NOTE
Patient is asymptomatic  Continue aspirin 81 mg daily, statin  Recheck Carotid Doppler in March 2022

## 2021-08-17 NOTE — ASSESSMENT & PLAN NOTE
DEXA scan done in August 2019 showed osteopenia  Continue weight-bearing exercise, calcium with Vit D supplementation

## 2021-09-15 ENCOUNTER — VBI (OUTPATIENT)
Dept: ADMINISTRATIVE | Facility: OTHER | Age: 71
End: 2021-09-15

## 2021-09-22 DIAGNOSIS — E03.9 ACQUIRED HYPOTHYROIDISM: ICD-10-CM

## 2021-09-22 RX ORDER — LEVOTHYROXINE SODIUM 112 UG/1
112 TABLET ORAL DAILY
Qty: 90 TABLET | Refills: 3 | Status: SHIPPED | OUTPATIENT
Start: 2021-09-22 | End: 2022-07-25

## 2021-09-29 ENCOUNTER — OFFICE VISIT (OUTPATIENT)
Dept: SURGICAL ONCOLOGY | Facility: CLINIC | Age: 71
End: 2021-09-29
Payer: COMMERCIAL

## 2021-09-29 VITALS
TEMPERATURE: 98.2 F | HEART RATE: 60 BPM | BODY MASS INDEX: 29.16 KG/M2 | RESPIRATION RATE: 16 BRPM | OXYGEN SATURATION: 97 % | SYSTOLIC BLOOD PRESSURE: 134 MMHG | HEIGHT: 65 IN | WEIGHT: 175 LBS | DIASTOLIC BLOOD PRESSURE: 62 MMHG

## 2021-09-29 DIAGNOSIS — Z17.0 MALIGNANT NEOPLASM OF LOWER-INNER QUADRANT OF RIGHT BREAST OF FEMALE, ESTROGEN RECEPTOR POSITIVE (HCC): Primary | ICD-10-CM

## 2021-09-29 DIAGNOSIS — C50.311 MALIGNANT NEOPLASM OF LOWER-INNER QUADRANT OF RIGHT BREAST OF FEMALE, ESTROGEN RECEPTOR POSITIVE (HCC): Primary | ICD-10-CM

## 2021-09-29 DIAGNOSIS — Z79.811 USE OF ANASTROZOLE (ARIMIDEX): ICD-10-CM

## 2021-09-29 PROCEDURE — 99214 OFFICE O/P EST MOD 30 MIN: CPT | Performed by: NURSE PRACTITIONER

## 2021-09-29 PROCEDURE — 3008F BODY MASS INDEX DOCD: CPT | Performed by: FAMILY MEDICINE

## 2021-09-29 NOTE — PROGRESS NOTES
Surgical Oncology Follow Up       [address] [place] [address],[address] [facility]  [facility] [place]  [address]  [address]  [facility] [address]    Velna Goodell Christ  1950  [id number]  [address] [facility],[address] [place] [address]  [facility] [place]  [address] [address]    Chief Complaint   Patient presents with    Follow-up       Assessment/Plan:  1  Malignant neoplasm of lower-inner quadrant of right breast of female, estrogen receptor positive ([facility] [address] )  - 6 mo f/u visit  - Mammo diagnostic bilateral w 3d & cad; Future    2  Use of anastrozole (Arimidex)  - Continue use per medical oncology    Discussion/Summary: Patient is a 15-year-old female who presents today for a six-month follow-up visit for right breast cancer diagnosed in November 2018  Her pathology revealed invasive ductal carcinoma, %, NM 90%, her 2-   She underwent genetic testing which was negative   She underwent a right lumpectomy and sentinel node biopsy by Dr Shreyas Mayenshira Salguero is currently taking anastrozole  Fabian Kerns did not receive radiation therapy   She had a bilateral 3D diagnostic mammogram performed on October 15, 2020 which was BI-RADS 2, category 3 density  She offers no new complaints today and there are no concerns on today's exam   I will order her mammogram for next month and plan to see her back in 6 months or sooner should the need arise  She was instructed to call with any new concerns or symptoms prior to that time  All of her questions were answered today      History of Present Illness:     Oncology History   Malignant neoplasm of lower-inner quadrant of right breast of female, estrogen receptor positive ([facility] [address] )   11/1/2018 Biopsy    Right breast biopsy  3-4 o'clock, 5 cmfn  Invasive ductal carcinoma  Grade 1  8 mm on ultrasound    NM 90  Her 2 1 +     11/12/2018 Genetic Testing    BRCA testing-invitae  Negative     12/18/2018 Surgery    Right breast lumpectomy with sentinel lymph node biopsy  Invasive ductal carcinoma  Grade 1  1 1 cm  Margins negative   0/1 lymph node    LA 90  Her 2 1+  Stage IA     1/23/2019 -  Hormone Therapy    Anastrozole 1 mg daily for 5 years  With Dr Bigg Wolfe          -Interval History:  Patient presents today for follow-up visit for right breast cancer diagnosed in 2018  She notices no changes on her self breast exam   She continues on anastrozole  Reports some arthralgias but denies new or persistent headaches, back pain or bone pain, cough or shortness of breath, abdominal pain  Review of Systems:  Review of Systems   Constitutional: Negative for activity change, appetite change, chills, fatigue, fever and unexpected weight change  Respiratory: Negative for cough and shortness of breath  Cardiovascular: Negative for chest pain  Gastrointestinal: Negative for abdominal pain, constipation, diarrhea, nausea and vomiting  Musculoskeletal: Positive for arthralgias  Negative for back pain, gait problem and myalgias  Skin: Negative for color change and rash  Neurological: Negative for dizziness and headaches  Hematological: Negative for adenopathy  Psychiatric/Behavioral: Negative for agitation and confusion  All other systems reviewed and are negative        Patient Active Problem List   Diagnosis    Allergic rhinitis    Bradycardia    Heart murmur    Hyperlipidemia    Hypertension    Hypothyroidism    Osteoporosis    Pulmonary nodules    Subclavian artery stenosis (HCC)    Vitamin D deficiency    Malignant neoplasm of lower-inner quadrant of right breast of female, estrogen receptor positive ([facility] [address] )    Medicare annual wellness visit, subsequent    Use of anastrozole (Arimidex)    Osteopenia of multiple sites    Decreased pedal pulses    History of tobacco use    Paresthesia of foot, bilateral     Past Medical History:   Diagnosis Date    Acute pain of left shoulder     History    Acute recurrent maxillary sinusitis     History  Acute upper respiratory infection     History    BRCA negative 11/12/2018    Inv[facility]ae    BRCA1 negative     BRCA2 negative     Breast cancer (HCC) 11/01/2018    Right    Callus     Cancer ([address] )     Carotid artery narrowing     stenosis    Disease of thyroid gland     High arches     History of Graves' disease     status post 131 treatment    History of lateral epicondylitis of left elbow     History of onychomycosis     of toenail    History of perforation of tympanic membrane     Hyperlipidemia     Hypertension     Ingrown toenail     Left otitis media     History    Medicare annual wellness visit, subsequent      Past Surgical History:   Procedure Laterality Date    BREAST BIOPSY Right 11/01/2018    U/S BX    BREAST LUMPECTOMY Right 12/18/2018    Procedure: BREAST LUMPECTOMY; BREAST NEEDLE LOCALIZATION (NEEDLE LOC AT 0700); Surgeon: Erick Veliz MD;  [facility];  Service: Surgical Oncology    BREAST LUMPECTOMY      ELBOW FRACTURE SURGERY  09/1960    Open Treatment of Fracture of proximal Radius    LYMPH NODE BIOPSY Right 12/18/2018    Procedure: SENTINEL LYMPH NODE BIOPSY; LYMPHATIC MAPPING WITH BLUE DYE AND RADIOACTIVE DYE (INJECT AT 0800 BY DR PRADO IN THE OR); Surgeon: Erick Veliz MD;  [facility];  Service: Surgical Oncology    SINUS SURGERY Left 1996    nasal polyps(?)-large papilloma left maxillary sinus with extension into posterior nasopharynx completed by Dr Julianna Tidwell      THYROID CYST EXCISION      ablation    TONSILLECTOMY AND ADENOIDECTOMY      US BREAST NEEDLE LOC RIGHT Right 12/18/2018    US GUIDED BREAST BIOPSY RIGHT COMPLETE Right 11/1/2018     Family History   Problem Relation Age of Onset    Breast cancer Mother     Pancreatic cancer Mother     Cancer Mother     Aortic aneurysm Father     Aortic aneurysm Family         Abdominal    Breast cancer Family     Breast cancer Maternal Grandmother      Social History     Socioeconomic History    Marital status: /Civil Union     Spouse name: Not on file    Number of children: Not on file    Years of education: Not on file    Highest education level: Not on file   Occupational History    Not on file   Tobacco Use    Smoking status: Former Smoker     Packs/day: 0 00     Years: 0 00     Pack years: 0 00     Quit date: 10/16/2002     Years since quittin 9    Smokeless tobacco: Never Used   Substance and Sexual Activity    Alcohol use: Not Currently     Alcohol/week: 0 0 standard drinks     Comment: rarely    Drug use: No    Sexual activity: Not on file   Other Topics Concern    Not on file   Social History Narrative    Not on file     Social Determinants of Health     Financial Resource Strain:     Difficulty of Paying Living Expenses:    Food Insecurity:     Worried About Running Out of Food in the Last Year:     [address] in the Last Year:    Transportation Needs:     Lack of Transportation (Medical):      Lack of Transportation (Non-Medical):    Physical Activity:     Days of Exercise per Week:     Minutes of Exercise per Session:    Stress:     Feeling of Stress :    Social Connections:     Frequency of Communication with Friends and Family:     Frequency of Social Gatherings with Friends and Family:     Attends Alevism Services:     Active Member of Clubs or Organizations:     Attends Club or Organization Meetings:     Marital Status:    Intimate Partner Violence:     Fear of Current or Ex-Partner:     Emotionally Abused:     Physically Abused:     Sexually Abused:        Current Outpatient Medications:     anastrozole (ARIMIDEX) 1 mg tablet, Take 1 tablet (1 mg total) by mouth daily, Disp: 90 tablet, Rfl: 3    aspirin 81 MG tablet, Take 1 tablet (81 mg total) by mouth daily, Disp: 30 tablet, Rfl: 6    Cholecalciferol (VITAMIN D3) 2000 units TABS, Take 1 tablet by mouth daily, Disp: , Rfl:     levothyroxine 112 mcg tablet, Take 1 tablet (112 mcg total) by mouth daily, Disp: 90 tablet, Rfl: 3    pravastatin (PRAVACHOL) 20 mg tablet, Take 1 tablet (20 mg total) by mouth daily, Disp: 90 tablet, Rfl: 3    losartan (COZAAR) 100 MG tablet, Take 1 tablet (100 mg total) by mouth daily, Disp: 90 tablet, Rfl: 3  Allergies   Allergen Reactions    Sulfa Antibiotics Dizziness     Other reaction(s): feeling "off-balance"  Category: Adverse Reaction;      Vitals:    09/29/21 0927   BP: 134/62   Pulse: 60   Resp: 16   Temp: 98 2 °F (36 8 °C)   SpO2: 97%       Physical Exam  Vitals reviewed  Constitutional:       General: She is not in acute distress  Appearance: Normal appearance  She is well-developed  She is not diaphoretic  HENT:      Head: Normocephalic and atraumatic  Cardiovascular:      Rate and Rhythm: Normal rate and regular rhythm  Heart sounds: Normal heart sounds  Pulmonary:      Effort: Pulmonary effort is normal       Breath sounds: Normal breath sounds  Chest:      Breasts:         Right: Skin change (surgical scar- breast and axilla) present  No swelling, bleeding, inverted nipple, mass, nipple discharge or tenderness  Left: No swelling, bleeding, inverted nipple, mass, nipple discharge, skin change or tenderness  Abdominal:      Palpations: Abdomen is soft  There is no mass  Tenderness: There is no abdominal tenderness  Musculoskeletal:         General: Normal range of motion  Cervical back: Normal range of motion  Lymphadenopathy:      Upper Body:      Right upper body: No supraclavicular or axillary adenopathy  Left upper body: No supraclavicular or axillary adenopathy  Skin:     General: Skin is warm and dry  Findings: No rash  Neurological:      Mental Status: She is alert and oriented to person, place, and time  Psychiatric:         Speech: Speech normal          Advance Care Planning/Advance Directives:  Discussed disease status, cancer treatment plans and/or cancer treatment goals with the patient

## 2021-10-19 ENCOUNTER — HOSPITAL ENCOUNTER (OUTPATIENT)
Dept: MAMMOGRAPHY | Facility: CLINIC | Age: 71
Discharge: HOME | End: 2021-10-19
Payer: COMMERCIAL

## 2021-10-19 VITALS — WEIGHT: 175 LBS | BODY MASS INDEX: 29.16 KG/M2 | HEIGHT: 65 IN

## 2021-10-19 DIAGNOSIS — Z17.0 MALIGNANT NEOPLASM OF LOWER-INNER QUADRANT OF RIGHT BREAST OF FEMALE, ESTROGEN RECEPTOR POSITIVE (HCC): ICD-10-CM

## 2021-10-19 DIAGNOSIS — C50.311 MALIGNANT NEOPLASM OF LOWER-INNER QUADRANT OF RIGHT BREAST OF FEMALE, ESTROGEN RECEPTOR POSITIVE (HCC): ICD-10-CM

## 2021-10-19 PROCEDURE — G0279 TOMOSYNTHESIS, MAMMO: HCPCS

## 2021-10-19 PROCEDURE — 77066 DX MAMMO INCL CAD BI: CPT

## 2021-10-29 ENCOUNTER — APPOINTMENT (EMERGENCY)
Dept: RADIOLOGY | Facility: HOSPITAL | Age: 71
End: 2021-10-29
Payer: COMMERCIAL

## 2021-10-29 ENCOUNTER — HOSPITAL ENCOUNTER (EMERGENCY)
Facility: HOSPITAL | Age: 71
Discharge: HOME | End: 2021-10-29
Attending: EMERGENCY MEDICINE
Payer: COMMERCIAL

## 2021-10-29 VITALS
TEMPERATURE: 98 F | OXYGEN SATURATION: 99 % | SYSTOLIC BLOOD PRESSURE: 171 MMHG | HEART RATE: 77 BPM | DIASTOLIC BLOOD PRESSURE: 78 MMHG | WEIGHT: 175 LBS | BODY MASS INDEX: 29.12 KG/M2 | RESPIRATION RATE: 18 BRPM

## 2021-10-29 DIAGNOSIS — T07.XXXA ABRASIONS OF MULTIPLE SITES: ICD-10-CM

## 2021-10-29 DIAGNOSIS — S00.93XA TRAUMATIC HEMATOMA OF HEAD, INITIAL ENCOUNTER: Primary | ICD-10-CM
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
## 2021-10-29 PROCEDURE — 99284 EMERGENCY DEPT VISIT MOD MDM: CPT

## 2021-10-29 PROCEDURE — 90471 IMMUNIZATION ADMIN: CPT

## 2021-10-29 PROCEDURE — 72125 CT NECK SPINE W/O DYE: CPT

## 2021-10-29 PROCEDURE — 70450 CT HEAD/BRAIN W/O DYE: CPT

## 2021-10-29 PROCEDURE — 90715 TDAP VACCINE 7 YRS/> IM: CPT | Performed by: EMERGENCY MEDICINE

## 2021-10-29 PROCEDURE — 73070 X-RAY EXAM OF ELBOW: CPT

## 2021-10-29 PROCEDURE — G1004 CDSM NDSC: HCPCS

## 2021-10-29 PROCEDURE — 99284 EMERGENCY DEPT VISIT MOD MDM: CPT | Performed by: EMERGENCY MEDICINE

## 2021-10-29 PROCEDURE — 73564 X-RAY EXAM KNEE 4 OR MORE: CPT

## 2021-10-29 RX ADMIN — TETANUS TOXOID, REDUCED DIPHTHERIA TOXOID AND ACELLULAR PERTUSSIS VACCINE, ADSORBED 0.5 ML: 5; 2.5; 8; 8; 2.5 SUSPENSION INTRAMUSCULAR at 12:24

## 2021-11-03 ENCOUNTER — OFFICE VISIT (OUTPATIENT)
Dept: FAMILY MEDICINE CLINIC | Facility: CLINIC | Age: 71
End: 2021-11-03
Payer: COMMERCIAL

## 2021-11-03 VITALS
DIASTOLIC BLOOD PRESSURE: 78 MMHG | SYSTOLIC BLOOD PRESSURE: 120 MMHG | OXYGEN SATURATION: 98 % | HEIGHT: 65 IN | TEMPERATURE: 98.4 F | WEIGHT: 175 LBS | RESPIRATION RATE: 14 BRPM | BODY MASS INDEX: 29.16 KG/M2 | HEART RATE: 60 BPM

## 2021-11-03 DIAGNOSIS — Z91.81 STATUS POST FALL: ICD-10-CM

## 2021-11-03 DIAGNOSIS — S00.93XD TRAUMATIC HEMATOMA OF HEAD, SUBSEQUENT ENCOUNTER: Primary | ICD-10-CM

## 2021-11-03 DIAGNOSIS — I10 PRIMARY HYPERTENSION: ICD-10-CM

## 2021-11-03 PROBLEM — S00.83XA TRAUMATIC ECCHYMOSIS OF FACE: Status: ACTIVE | Noted: 2021-11-03

## 2021-11-03 PROBLEM — S00.93XA TRAUMATIC HEMATOMA OF HEAD: Status: ACTIVE | Noted: 2021-11-03

## 2021-11-03 PROCEDURE — 3288F FALL RISK ASSESSMENT DOCD: CPT | Performed by: FAMILY MEDICINE

## 2021-11-03 PROCEDURE — 3074F SYST BP LT 130 MM HG: CPT | Performed by: FAMILY MEDICINE

## 2021-11-03 PROCEDURE — 3078F DIAST BP <80 MM HG: CPT | Performed by: FAMILY MEDICINE

## 2021-11-03 PROCEDURE — 3008F BODY MASS INDEX DOCD: CPT | Performed by: FAMILY MEDICINE

## 2021-11-03 PROCEDURE — 1160F RVW MEDS BY RX/DR IN RCRD: CPT | Performed by: FAMILY MEDICINE

## 2021-11-03 PROCEDURE — 3725F SCREEN DEPRESSION PERFORMED: CPT | Performed by: FAMILY MEDICINE

## 2021-11-03 PROCEDURE — 1036F TOBACCO NON-USER: CPT | Performed by: FAMILY MEDICINE

## 2021-11-03 PROCEDURE — 99214 OFFICE O/P EST MOD 30 MIN: CPT | Performed by: FAMILY MEDICINE

## 2021-11-03 PROCEDURE — 1100F PTFALLS ASSESS-DOCD GE2>/YR: CPT | Performed by: FAMILY MEDICINE

## 2021-11-20 ENCOUNTER — IMMUNIZATIONS (OUTPATIENT)
Dept: FAMILY MEDICINE CLINIC | Facility: HOSPITAL | Age: 71
End: 2021-11-20

## 2021-11-20 DIAGNOSIS — Z23 ENCOUNTER FOR IMMUNIZATION: Primary | ICD-10-CM

## 2021-11-20 PROCEDURE — 0001A COVID-19 PFIZER VACC 0.3 ML: CPT

## 2021-11-20 PROCEDURE — 91300 COVID-19 PFIZER VACC 0.3 ML: CPT

## 2021-12-28 ENCOUNTER — VBI (OUTPATIENT)
Dept: ADMINISTRATIVE | Facility: OTHER | Age: 71
End: 2021-12-28

## 2022-01-15 PROBLEM — Z91.81 STATUS POST FALL: Status: RESOLVED | Noted: 2021-11-03 | Resolved: 2022-01-15

## 2022-01-15 PROBLEM — H25.9 AGE-RELATED CATARACT OF RIGHT EYE: Status: ACTIVE | Noted: 2022-01-15

## 2022-01-15 PROBLEM — S00.83XA TRAUMATIC ECCHYMOSIS OF FACE: Status: RESOLVED | Noted: 2021-11-03 | Resolved: 2022-01-15

## 2022-01-15 PROBLEM — Z01.810 PREOP CARDIOVASCULAR EXAM: Status: ACTIVE | Noted: 2019-01-04

## 2022-01-18 ENCOUNTER — OFFICE VISIT (OUTPATIENT)
Dept: FAMILY MEDICINE CLINIC | Facility: CLINIC | Age: 72
End: 2022-01-18
Payer: COMMERCIAL

## 2022-01-18 VITALS
TEMPERATURE: 98.2 F | HEIGHT: 65 IN | RESPIRATION RATE: 15 BRPM | HEART RATE: 83 BPM | WEIGHT: 172.8 LBS | SYSTOLIC BLOOD PRESSURE: 138 MMHG | DIASTOLIC BLOOD PRESSURE: 82 MMHG | BODY MASS INDEX: 28.79 KG/M2

## 2022-01-18 DIAGNOSIS — E03.9 ACQUIRED HYPOTHYROIDISM: ICD-10-CM

## 2022-01-18 DIAGNOSIS — I77.1 SUBCLAVIAN ARTERY STENOSIS (HCC): ICD-10-CM

## 2022-01-18 DIAGNOSIS — Z01.810 PREOP CARDIOVASCULAR EXAM: Primary | ICD-10-CM

## 2022-01-18 DIAGNOSIS — E78.2 MIXED HYPERLIPIDEMIA: ICD-10-CM

## 2022-01-18 DIAGNOSIS — I10 PRIMARY HYPERTENSION: ICD-10-CM

## 2022-01-18 DIAGNOSIS — H25.9 SENILE CATARACT OF RIGHT EYE, UNSPECIFIED AGE-RELATED CATARACT TYPE: ICD-10-CM

## 2022-01-18 PROBLEM — H26.40 AFTER CATARACT, RIGHT EYE: Status: ACTIVE | Noted: 2022-01-18

## 2022-01-18 PROBLEM — H26.40 AFTER CATARACT, RIGHT EYE: Status: RESOLVED | Noted: 2022-01-18 | Resolved: 2022-01-18

## 2022-01-18 PROCEDURE — 99214 OFFICE O/P EST MOD 30 MIN: CPT | Performed by: FAMILY MEDICINE

## 2022-01-18 PROCEDURE — 1125F AMNT PAIN NOTED PAIN PRSNT: CPT | Performed by: FAMILY MEDICINE

## 2022-01-18 PROCEDURE — 3288F FALL RISK ASSESSMENT DOCD: CPT | Performed by: FAMILY MEDICINE

## 2022-01-18 PROCEDURE — 3079F DIAST BP 80-89 MM HG: CPT | Performed by: FAMILY MEDICINE

## 2022-01-18 PROCEDURE — 1170F FXNL STATUS ASSESSED: CPT | Performed by: FAMILY MEDICINE

## 2022-01-18 PROCEDURE — 3008F BODY MASS INDEX DOCD: CPT | Performed by: FAMILY MEDICINE

## 2022-01-18 PROCEDURE — 1036F TOBACCO NON-USER: CPT | Performed by: FAMILY MEDICINE

## 2022-01-18 PROCEDURE — 3075F SYST BP GE 130 - 139MM HG: CPT | Performed by: FAMILY MEDICINE

## 2022-01-18 PROCEDURE — 1160F RVW MEDS BY RX/DR IN RCRD: CPT | Performed by: FAMILY MEDICINE

## 2022-01-18 NOTE — PATIENT INSTRUCTIONS
Medicare Preventive Visit Patient Instructions  Thank you for completing your Welcome to Medicare Visit or Medicare Annual Wellness Visit today  Your next wellness visit will be due in one year (1/19/2023)  The screening/preventive services that you may require over the next 5-10 years are detailed below  Some tests may not apply to you based off risk factors and/or age  Screening tests ordered at today's visit but not completed yet may show as past due  Also, please note that scanned in results may not display below  Preventive Screenings:  Service Recommendations Previous Testing/Comments   Colorectal Cancer Screening  * Colonoscopy    * Fecal Occult Blood Test (FOBT)/Fecal Immunochemical Test (FIT)  * Fecal DNA/Cologuard Test  * Flexible Sigmoidoscopy Age: 54-65 years old   Colonoscopy: every 10 years (may be performed more frequently if at higher risk)  OR  FOBT/FIT: every 1 year  OR  Cologuard: every 3 years  OR  Sigmoidoscopy: every 5 years  Screening may be recommended earlier than age 48 if at higher risk for colorectal cancer  Also, an individualized decision between you and your healthcare provider will decide whether screening between the ages of 74-80 would be appropriate  Colonoscopy: Not on file  FOBT/FIT: Not on file  Cologuard: Not on file  Sigmoidoscopy: Not on file          Breast Cancer Screening Age: 36 years old  Frequency: every 1-2 years  Not required if history of left and right mastectomy Mammogram: 10/19/2021    History Breast Cancer   Cervical Cancer Screening Between the ages of 21-29, pap smear recommended once every 3 years  Between the ages of 33-67, can perform pap smear with HPV co-testing every 5 years     Recommendations may differ for women with a history of total hysterectomy, cervical cancer, or abnormal pap smears in past  Pap Smear: Not on file    Screening Not Indicated   Hepatitis C Screening Once for adults born between 1945 and 1965  More frequently in patients at high risk for Hepatitis C Hep C Antibody: 02/03/2020    Screening Current   Diabetes Screening 1-2 times per year if you're at risk for diabetes or have pre-diabetes Fasting glucose: 81 mg/dL   A1C: No results in last 5 years    Screening Current   Cholesterol Screening Once every 5 years if you don't have a lipid disorder  May order more often based on risk factors  Lipid panel: 08/09/2021    Screening Not Indicated  History Lipid Disorder     Other Preventive Screenings Covered by Medicare:  1  Abdominal Aortic Aneurysm (AAA) Screening: covered once if your at risk  You're considered to be at risk if you have a family history of AAA  2  Lung Cancer Screening: covers low dose CT scan once per year if you meet all of the following conditions: (1) Age 50-69; (2) No signs or symptoms of lung cancer; (3) Current smoker or have quit smoking within the last 15 years; (4) You have a tobacco smoking history of at least 30 pack years (packs per day multiplied by number of years you smoked); (5) You get a written order from a healthcare provider  3  Glaucoma Screening: covered annually if you're considered high risk: (1) You have diabetes OR (2) Family history of glaucoma OR (3)  aged 48 and older OR (3)  American aged 72 and older  3  Osteoporosis Screening: covered every 2 years if you meet one of the following conditions: (1) You're estrogen deficient and at risk for osteoporosis based off medical history and other findings; (2) Have a vertebral abnormality; (3) On glucocorticoid therapy for more than 3 months; (4) Have primary hyperparathyroidism; (5) On osteoporosis medications and need to assess response to drug therapy  · Last bone density test (DXA Scan): 08/22/2019   5  HIV Screening: covered annually if you're between the age of 15-65  Also covered annually if you are younger than 13 and older than 72 with risk factors for HIV infection   For pregnant patients, it is covered up to 3 times per pregnancy  Immunizations:  Immunization Recommendations   Influenza Vaccine Annual influenza vaccination during flu season is recommended for all persons aged >= 6 months who do not have contraindications   Pneumococcal Vaccine (Prevnar and Pneumovax)  * Prevnar = PCV13  * Pneumovax = PPSV23   Adults 25-60 years old: 1-3 doses may be recommended based on certain risk factors  Adults 72 years old: Prevnar (PCV13) vaccine recommended followed by Pneumovax (PPSV23) vaccine  If already received PPSV23 since turning 65, then PCV13 recommended at least one year after PPSV23 dose  Hepatitis B Vaccine 3 dose series if at intermediate or high risk (ex: diabetes, end stage renal disease, liver disease)   Tetanus (Td) Vaccine - COST NOT COVERED BY MEDICARE PART B Following completion of primary series, a booster dose should be given every 10 years to maintain immunity against tetanus  Td may also be given as tetanus wound prophylaxis  Tdap Vaccine - COST NOT COVERED BY MEDICARE PART B Recommended at least once for all adults  For pregnant patients, recommended with each pregnancy  Shingles Vaccine (Shingrix) - COST NOT COVERED BY MEDICARE PART B  2 shot series recommended in those aged 48 and above     Health Maintenance Due:      Topic Date Due    Colorectal Cancer Screening  Never done    Breast Cancer Screening: Mammogram  10/19/2022    Hepatitis C Screening  Completed     Immunizations Due:  There are no preventive care reminders to display for this patient  Advance Directives   What are advance directives? Advance directives are legal documents that state your wishes and plans for medical care  These plans are made ahead of time in case you lose your ability to make decisions for yourself  Advance directives can apply to any medical decision, such as the treatments you want, and if you want to donate organs  What are the types of advance directives?   There are many types of advance directives, and each state has rules about how to use them  You may choose a combination of any of the following:  · Living will: This is a written record of the treatment you want  You can also choose which treatments you do not want, which to limit, and which to stop at a certain time  This includes surgery, medicine, IV fluid, and tube feedings  · Durable power of  for healthcare [facility]): This is a written record that states who you want to make healthcare choices for you when you are unable to make them for yourself  This person, called a proxy, is usually a family member or a friend  You may choose more than 1 proxy  · Do not resuscitate (DNR) order:  A DNR order is used in case your heart stops beating or you stop breathing  It is a request not to have certain forms of treatment, such as CPR  A DNR order may be included in other types of advance directives  · Medical directive: This covers the care that you want if you are in a coma, near death, or unable to make decisions for yourself  You can list the treatments you want for each condition  Treatment may include pain medicine, surgery, blood transfusions, dialysis, IV or tube feedings, and a ventilator (breathing machine)  · Values history: This document has questions about your views, beliefs, and how you feel and think about life  This information can help others choose the care that you would choose  Why are advance directives important? An advance directive helps you control your care  Although spoken wishes may be used, it is better to have your wishes written down  Spoken wishes can be misunderstood, or not followed  Treatments may be given even if you do not want them  An advance directive may make it easier for your family to make difficult choices about your care  Fall Prevention    Fall prevention  includes ways to make your home and other areas safer  It also includes ways you can move more carefully to prevent a fall   Health conditions that cause changes in your blood pressure, vision, or muscle strength and coordination may increase your risk for falls  Medicines may also increase your risk for falls if they make you dizzy, weak, or sleepy  Fall prevention tips:   · Stand or sit up slowly  · Use assistive devices as directed  · Wear shoes that fit well and have soles that   · Wear a personal alarm  · Stay active  · Manage your medical conditions  Home Safety Tips:  · Add items to prevent falls in the bathroom  · Keep paths clear  · Install bright lights in your home  · Keep items you use often on shelves within reach  · Paint or place reflective tape on the edges of your stairs  Weight Management   Why it is important to manage your weight:  Being overweight increases your risk of health conditions such as heart disease, high blood pressure, type 2 diabetes, and certain types of cancer  It can also increase your risk for osteoarthritis, sleep apnea, and other respiratory problems  Aim for a slow, steady weight loss  Even a small amount of weight loss can lower your risk of health problems  How to lose weight safely:  A safe and healthy way to lose weight is to eat fewer calories and get regular exercise  You can lose up about 1 pound a week by decreasing the number of calories you eat by 500 calories each day  Healthy meal plan for weight management:  A healthy meal plan includes a variety of foods, contains fewer calories, and helps you stay healthy  A healthy meal plan includes the following:  · Eat whole-grain foods more often  A healthy meal plan should contain fiber  Fiber is the part of grains, fruits, and vegetables that is not broken down by your body  Whole-grain foods are healthy and provide extra fiber in your diet  Some examples of whole-grain foods are whole-wheat breads and pastas, oatmeal, brown rice, and bulgur  · Eat a variety of vegetables every day    Include dark, leafy greens such as spinach, kale, jean carlos greens, and mustard greens  Eat yellow and orange vegetables such as carrots, sweet potatoes, and winter squash  · Eat a variety of fruits every day  Choose fresh or canned fruit (canned in its own juice or light syrup) instead of juice  Fruit juice has very little or no fiber  · Eat low-fat dairy foods  Drink fat-free (skim) milk or 1% milk  Eat fat-free yogurt and low-fat cottage cheese  Try low-fat cheeses such as mozzarella and other reduced-fat cheeses  · Choose meat and other protein foods that are low in fat  Choose beans or other legumes such as split peas or lentils  Choose fish, skinless poultry (chicken or turkey), or lean cuts of red meat (beef or pork)  Before you cook meat or poultry, cut off any visible fat  · Use less fat and oil  Try baking foods instead of frying them  Add less fat, such as margarine, sour cream, regular salad dressing and mayonnaise to foods  Eat fewer high-fat foods  Some examples of high-fat foods include french fries, doughnuts, ice cream, and cakes  · Eat fewer sweets  Limit foods and drinks that are high in sugar  This includes candy, cookies, regular soda, and sweetened drinks  Exercise:  Exercise at least 30 minutes per day on most days of the week  Some examples of exercise include walking, biking, dancing, and swimming  You can also fit in more physical activity by taking the stairs instead of the elevator or parking farther away from stores  Ask your healthcare provider about the best exercise plan for you  © Copyright [address] 2018 Information is for End User's use only and may not be sold, redistributed or otherwise used for commercial purposes   All illustrations and images included in CareNotes® are the copyrighted property of A D A M [employer]  or [address] [place] [address]

## 2022-01-18 NOTE — PROGRESS NOTES
Assessment and Plan:     Problem List Items Addressed This Visit        Endocrine    Hypothyroidism       Cardiovascular and Mediastinum    Hypertension    Subclavian artery stenosis (HCC)       Other    Hyperlipidemia    Preop cardiovascular exam - Primary    Age-related cataract of right eye      Other Visit Diagnoses     Medicare annual wellness visit, subsequent               Preventive health issues were discussed with patient, and age appropriate screening tests were ordered as noted in patient's After Visit Summary  Personalized health advice and appropriate referrals for health education or preventive services given if needed, as noted in patient's After Visit Summary       History of Present Illness:     Patient presents for Medicare Annual Wellness visit    Patient Care Team:  Adali Mckeon MD as PCP - General  MD Rosalina Wolf MD as Surgeon (Surgical Oncology)  Keegan Madrigal RN as Registered Nurse (Oncology)  Annmarie Bowman as Registered Nurse (Surgical Oncology)  Wilmer Edmonds MD (Hematology)     Problem List:     Patient Active Problem List   Diagnosis    Allergic rhinitis    Bradycardia    Heart murmur    Hyperlipidemia    Hypertension    Hypothyroidism    Osteoporosis    Pulmonary nodules    Subclavian artery stenosis ([address] )    Vitamin D deficiency    Malignant neoplasm of lower-inner quadrant of right breast of female, estrogen receptor positive ([address] )    Preop cardiovascular exam    Use of anastrozole (Arimidex)    Osteopenia of multiple sites    Decreased pedal pulses    History of tobacco use    Paresthesia of foot, bilateral    Age-related cataract of right eye      Past Medical and Surgical History:     Past Medical History:   Diagnosis Date    Acute pain of left shoulder     History    Acute recurrent maxillary sinusitis     History    Acute upper respiratory infection     History    BRCA negative 11/12/2018    [facility]    BRCA1 negative     BRCA2 negative     [facility] cancer [facility] 11/01/2018    Right    Callus     Cancer ([facility]     Carotid artery narrowing     stenosis    Disease of thyroid gland     High arches     History of Graves' disease     status post 131 treatment    History of lateral epicondylitis of left elbow     History of onychomycosis     of toenail    History of perforation of tympanic membrane     Hyperlipidemia     Hypertension     Ingrown toenail     Left otitis media     History    Medicare annual wellness visit, subsequent      Past Surgical History:   Procedure Laterality Date    BREAST BIOPSY Right 11/01/2018    U/S BX    BREAST LUMPECTOMY Right 12/18/2018    Procedure: BREAST LUMPECTOMY; BREAST NEEDLE LOCALIZATION (NEEDLE LOC AT 0700); Surgeon: Shannon Bright MD;  [facility];  Service: Surgical Oncology    BREAST LUMPECTOMY      ELBOW FRACTURE SURGERY  09/1960    Open Treatment of Fracture of proximal Radius    LYMPH NODE BIOPSY Right 12/18/2018    Procedure: SENTINEL LYMPH NODE BIOPSY; LYMPHATIC MAPPING WITH BLUE DYE AND RADIOACTIVE DYE (INJECT AT 0800 BY DR PRADO IN THE OR); Surgeon: Shannon Bright MD;  [facility];  Service: Surgical Oncology    SINUS SURGERY Left 1996    nasal polyps(?)-large papilloma left maxillary sinus with extension into posterior nasopharynx completed by Dr Armando Morales      THYROID CYST EXCISION      ablation    TONSILLECTOMY AND ADENOIDECTOMY      US BREAST NEEDLE LOC RIGHT Right 12/18/2018    US GUIDED BREAST BIOPSY RIGHT COMPLETE Right 11/1/2018      Family History:     Family History   Problem Relation Age of Onset    Breast cancer Mother     Pancreatic cancer Mother     Cancer Mother     Aortic aneurysm Father     Aortic aneurysm Family         Abdominal    Breast cancer Family     Breast cancer Maternal Grandmother       Social History:     Social History     Socioeconomic History    Marital status: /Civil Union     Spouse name: None    Number of children: None    Years of education: None    Highest education level: None   Occupational History    None   Tobacco Use    Smoking status: Former Smoker     Packs/day: 0 00     Years: 0 00     Pack years: 0 00     Quit date: 10/16/2002     Years since quittin 2    Smokeless tobacco: Never Used   Substance and Sexual Activity    Alcohol use: Not Currently     Alcohol/week: 0 0 standard drinks     Comment: rarely    Drug use: No    Sexual activity: None   Other Topics Concern    None   Social History Narrative    None     Social Determinants of Health     Financial Resource Strain: Not on file   Food Insecurity: Not on file   Transportation Needs: Not on file   Physical Activity: Not on file   Stress: Not on file   Social Connections: Not on file   Intimate Partner Violence: Not on file   Housing Stability: Not on file      Medications and Allergies:     Current Outpatient Medications   Medication Sig Dispense Refill    anastrozole (ARIMIDEX) 1 mg tablet Take 1 tablet (1 mg total) by mouth daily 90 tablet 3    aspirin 81 MG tablet Take 1 tablet (81 mg total) by mouth daily 30 tablet 6    Cholecalciferol (VITAMIN D3) 2000 units TABS Take 1 tablet by mouth daily      levothyroxine 112 mcg tablet Take 1 tablet (112 mcg total) by mouth daily 90 tablet 3    losartan (COZAAR) 100 MG tablet Take 1 tablet (100 mg total) by mouth daily 90 tablet 3    pravastatin (PRAVACHOL) 20 mg tablet Take 1 tablet (20 mg total) by mouth daily 90 tablet 3     No current facility-administered medications for this visit  Allergies   Allergen Reactions    Sulfa Antibiotics Dizziness     Other reaction(s): feeling "off-balance"  Category:  Adverse Reaction;       Immunizations:     Immunization History   Administered Date(s) Administered    COVID-19 PFIZER VACCINE 0 3 ML IM 2021, 2021, 2021    INFLUENZA 10/10/2014, 2018, 2020, 2021    Influenza Split High Dose Preservative Free IM 10/30/2013    Influenza, seasonal, injectable 09/01/2015, 10/13/2016, 10/04/2017    Pneumococcal Conjugate 13-Valent 12/15/2015    Pneumococcal Polysaccharide PPV23 06/18/2014    Tdap 10/29/2021    influenza, trivalent, adjuvanted 09/17/2019      Health Maintenance:         Topic Date Due    Colorectal Cancer Screening  Never done    Breast Cancer Screening: Mammogram  10/19/2022    Hepatitis C Screening  Completed     There are no preventive care reminders to display for this patient  Medicare Health Risk Assessment:     /82   Pulse 83   Temp 98 2 °F (36 8 °C) (Tympanic)   Resp 15   Ht 5' 5" (1 651 m)   Wt 78 4 kg (172 lb 12 8 oz)   BMI 28 76 kg/m²          Health Risk Assessment:   Patient rates overall health as good  Patient feels that their physical health rating is same  Patient is satisfied with their life  Eyesight was rated as slightly worse  Hearing was rated as same  Patient feels that their emotional and mental health rating is same  Patients states they are never, rarely angry  Patient states they are sometimes unusually tired/fatigued  Pain experienced in the last 7 days has been none  Patient states that she has experienced no weight loss or gain in last 6 months  Fall Risk Screening: In the past year, patient has experienced: history of falling in past year    Number of falls: 1  Injured during fall?: No    Feels unsteady when standing or walking?: No    Worried about falling?: No      Urinary Incontinence Screening:   Patient has not leaked urine accidently in the last six months  Home Safety:  Patient does not have trouble with stairs inside or outside of their home  Patient has working smoke alarms and has working carbon monoxide detector  Home safety hazards include: none  Nutrition:   Current diet is Regular and Limited junk food  Medications:   Patient is currently taking over-the-counter supplements   OTC medications include: see medication list  Patient is able to manage medications  Activities of Daily Living (ADLs)/Instrumental Activities of Daily Living (IADLs):   Walk and transfer into and out of bed and chair?: Yes  Dress and groom yourself?: Yes    Bathe or shower yourself?: Yes    Feed yourself?  Yes  Do your laundry/housekeeping?: Yes  Manage your money, pay your bills and track your expenses?: Yes  Make your own meals?: Yes    Do your own shopping?: Yes    Advance Care Planning:   Living will: No      PREVENTIVE SCREENINGS      Cardiovascular Screening:    General: Screening Not Indicated and History Lipid Disorder      Diabetes Screening:     General: Screening Current      Breast Cancer Screening:     General: History Breast Cancer      Cervical Cancer Screening:    General: Screening Not Indicated      Osteoporosis Screening:    General: Screening Not Indicated and History Osteoporosis      Lung Cancer Screening:     General: Screening Not Indicated      Hepatitis C Screening:    General: Screening Current    Screening, Brief Intervention, and Referral to Treatment (SBIRT)    Screening      Single Item Drug Screening:  How often have you used an illegal drug (including marijuana) or a prescription medication for non-medical reasons in the past year? never    Single Item Drug Screen Score: 0  Interpretation: Negative screen for possible drug use disorder      Mohit Rushing MD

## 2022-01-18 NOTE — PROGRESS NOTES
Assessment/Plan:    Preop cardiovascular exam  Cardiopulmonary status is stable  Patient is medical stable to proceed with right eye cataract surgery scheduled with ophthalmologist Dr Josep Decker on 1/25/22 and left eye cataract surgery on 2/8/22  Recommended to avoid Aspirin, NSAID's 1 week prior to surgery  Hypertension  BP is stable  Continue Losartan 100 mg daily  Hyperlipidemia  Continue Pravastatin 20 mg daily  Hypothyroidism  Continue Levothyroxine 112 mcg daily  Subclavian artery stenosis [facility])  Patient is asymptomatic  Continue Aspirin 81 mg daily, statin therapy  Keep follow-up visit as scheduled in February   Diagnoses and all orders for this visit:    Preop cardiovascular exam    Primary hypertension    Mixed hyperlipidemia    Acquired hypothyroidism    Subclavian artery stenosis (HCC)    Senile cataract of right eye, unspecified age-related cataract type          Subjective:      Patient ID: Lilly Randolph is a 66 y o  female  HPI     Patient presents for pre-op evaluation prior to right eye cataract surgery scheduled with ophthalmologist Arielle Cardenas on 1/25/22 and left eye cataract surgery on 2/8/22  C/o decreased vision in both eyes  Denies allergy to anesthesia drugs  No easy bruising or bleeding  PMHx: HTN, Hypothyroidism, Hyperlipidemia, carotid artery stenosis, left subclavian artery stenosis, osteopenia, pulmonary nodule, right breast CA, S/P lumpectomy in December 2018       Reviewed current medications, last blood test results from August 2021  Hb 12 7  Fasting blood sugar 81, creatinine 0 77, TSH 1 300  LDL 89  HTN - blood pressure at home ranges in 140's/80  Denies chest pain, dizziness  Patient takes Losartan 100 mg daily  Hyperlipidemia -on Pravastatin 20 mg daily  Hypothyroidism - currently taking Levothyroxine 112 mcg daily  Denies tobacco use      The following portions of the patient's history were reviewed and updated as appropriate: allergies, current medications, past family history, past medical history, past surgical history and problem list     Review of Systems   Constitutional: Negative for activity change, appetite change, chills, fatigue and fever  HENT: Negative for congestion, ear pain, hearing loss, mouth sores, nosebleeds, sore throat, tinnitus and trouble swallowing  Eyes: Positive for visual disturbance (decreased vision in both eyes)  Negative for pain, discharge, redness and itching  Respiratory: Positive for shortness of breath  Negative for cough and chest tightness  Stridor: stable mild exertional SOB  Cardiovascular: Negative for chest pain, palpitations and leg swelling  Gastrointestinal: Negative for abdominal pain, blood in stool, constipation, diarrhea, nausea and vomiting  Genitourinary: Negative for difficulty urinating, dysuria, flank pain, frequency and hematuria  Musculoskeletal: Negative for arthralgias, back pain, gait problem and myalgias  Skin: Negative for rash  Neurological: Negative for dizziness and syncope  Hematological: Negative  Psychiatric/Behavioral: Negative for dysphoric mood and sleep disturbance  The patient is not nervous/anxious  Objective:      /82   Pulse 83   Temp 98 2 °F (36 8 °C) (Tympanic)   Resp 15   Ht 5' 5" (1 651 m)   Wt 78 4 kg (172 lb 12 8 oz)   BMI 28 76 kg/m²          Physical Exam  Vitals and nursing note reviewed  Constitutional:       Appearance: Normal appearance  HENT:      Head: Normocephalic and atraumatic  Eyes:      Conjunctiva/sclera: Conjunctivae normal       Pupils: Pupils are equal, round, and reactive to light  Neck:      Vascular: Carotid bruit (left) present  Cardiovascular:      Rate and Rhythm: Normal rate and regular rhythm  Heart sounds: No murmur heard  Pulmonary:      Effort: Pulmonary effort is normal       Breath sounds: Normal breath sounds     Abdominal:      General: Bowel sounds are normal  There is no distension  Palpations: Abdomen is soft  Tenderness: There is no abdominal tenderness  Musculoskeletal:         General: Tenderness present  No swelling or deformity  Normal range of motion  Cervical back: Normal range of motion and neck supple  Right lower leg: No edema  Left lower leg: No edema  Skin:     General: Skin is warm and dry  Findings: No rash  Neurological:      General: No focal deficit present  Mental Status: She is alert and oriented to person, place, and time  Cranial Nerves: No cranial nerve deficit        Gait: Gait normal    Psychiatric:         Mood and Affect: Mood normal

## 2022-01-19 NOTE — ASSESSMENT & PLAN NOTE
Cardiopulmonary status is stable  Patient is medical stable to proceed with right eye cataract surgery scheduled with ophthalmologist Dr Burton Thornton on 1/25/22 and left eye cataract surgery on 2/8/22  Recommended to avoid Aspirin, NSAID's 1 week prior to surgery

## 2022-02-11 ENCOUNTER — RA CDI HCC (OUTPATIENT)
Dept: OTHER | Facility: HOSPITAL | Age: 72
End: 2022-02-11

## 2022-02-11 NOTE — PROGRESS NOTES
[facility]  coding opportunities             Chart Reviewed * (Number of) Inbasket suggestions sent to Provider: 1                  Patients insurance company: [employer] (Med[employer] Advantage and Commercial)

## 2022-02-16 ENCOUNTER — APPOINTMENT (OUTPATIENT)
Dept: LAB | Facility: HOSPITAL | Age: 72
End: 2022-02-16
Payer: COMMERCIAL

## 2022-02-16 DIAGNOSIS — R20.2 PARESTHESIA OF BOTH FEET: ICD-10-CM

## 2022-02-16 DIAGNOSIS — E78.2 MIXED HYPERLIPIDEMIA: ICD-10-CM

## 2022-02-16 DIAGNOSIS — E03.9 ACQUIRED HYPOTHYROIDISM: ICD-10-CM

## 2022-02-16 DIAGNOSIS — I10 ESSENTIAL HYPERTENSION: ICD-10-CM

## 2022-02-16 LAB
ALBUMIN SERPL BCP-MCNC: 3.3 G/DL (ref 3.5–5)
ALP SERPL-CCNC: 78 U/L (ref 46–116)
ALT SERPL W P-5'-P-CCNC: 12 U/L (ref 12–78)
ANION GAP SERPL CALCULATED.3IONS-SCNC: 3 MMOL/L (ref 4–13)
AST SERPL W P-5'-P-CCNC: 9 U/L (ref 5–45)
BILIRUB SERPL-MCNC: 0.53 MG/DL (ref 0.2–1)
BUN SERPL-MCNC: 20 MG/DL (ref 5–25)
CALCIUM ALBUM COR SERPL-MCNC: 9.7 MG/DL (ref 8.3–10.1)
CALCIUM SERPL-MCNC: 9.1 MG/DL (ref 8.3–10.1)
CHLORIDE SERPL-SCNC: 108 MMOL/L (ref 100–108)
CHOLEST SERPL-MCNC: 180 MG/DL
CO2 SERPL-SCNC: 27 MMOL/L (ref 21–32)
CREAT SERPL-MCNC: 0.99 MG/DL (ref 0.6–1.3)
GFR SERPL CREATININE-BSD FRML MDRD: 56 ML/MIN/1.73SQ M
GLUCOSE P FAST SERPL-MCNC: 84 MG/DL (ref 65–99)
HDLC SERPL-MCNC: 63 MG/DL
LDLC SERPL CALC-MCNC: 106 MG/DL (ref 0–100)
NONHDLC SERPL-MCNC: 117 MG/DL
POTASSIUM SERPL-SCNC: 4.1 MMOL/L (ref 3.5–5.3)
PROT SERPL-MCNC: 7.1 G/DL (ref 6.4–8.2)
SODIUM SERPL-SCNC: 138 MMOL/L (ref 136–145)
TRIGL SERPL-MCNC: 57 MG/DL
TSH SERPL DL<=0.05 MIU/L-ACNC: 2.79 UIU/ML (ref 0.36–3.74)
VIT B12 SERPL-MCNC: 296 PG/ML (ref 100–900)

## 2022-02-16 PROCEDURE — 82607 VITAMIN B-12: CPT

## 2022-02-16 PROCEDURE — 84443 ASSAY THYROID STIM HORMONE: CPT

## 2022-02-16 PROCEDURE — 80061 LIPID PANEL: CPT

## 2022-02-16 PROCEDURE — 80053 COMPREHEN METABOLIC PANEL: CPT

## 2022-02-16 PROCEDURE — 36415 COLL VENOUS BLD VENIPUNCTURE: CPT

## 2022-02-23 ENCOUNTER — OFFICE VISIT (OUTPATIENT)
Dept: FAMILY MEDICINE CLINIC | Facility: CLINIC | Age: 72
End: 2022-02-23
Payer: COMMERCIAL

## 2022-02-23 VITALS
RESPIRATION RATE: 20 BRPM | WEIGHT: 173.8 LBS | BODY MASS INDEX: 29.67 KG/M2 | SYSTOLIC BLOOD PRESSURE: 170 MMHG | HEART RATE: 88 BPM | OXYGEN SATURATION: 96 % | TEMPERATURE: 98.5 F | DIASTOLIC BLOOD PRESSURE: 84 MMHG | HEIGHT: 64 IN

## 2022-02-23 DIAGNOSIS — E03.9 ACQUIRED HYPOTHYROIDISM: ICD-10-CM

## 2022-02-23 DIAGNOSIS — Z12.11 SCREENING FOR COLORECTAL CANCER: ICD-10-CM

## 2022-02-23 DIAGNOSIS — E78.2 MIXED HYPERLIPIDEMIA: ICD-10-CM

## 2022-02-23 DIAGNOSIS — I77.1 SUBCLAVIAN ARTERY STENOSIS (HCC): ICD-10-CM

## 2022-02-23 DIAGNOSIS — C50.311 MALIGNANT NEOPLASM OF LOWER-INNER QUADRANT OF RIGHT BREAST OF FEMALE, ESTROGEN RECEPTOR POSITIVE (HCC): ICD-10-CM

## 2022-02-23 DIAGNOSIS — M85.89 OSTEOPENIA OF MULTIPLE SITES: ICD-10-CM

## 2022-02-23 DIAGNOSIS — Z17.0 MALIGNANT NEOPLASM OF LOWER-INNER QUADRANT OF RIGHT BREAST OF FEMALE, ESTROGEN RECEPTOR POSITIVE (HCC): ICD-10-CM

## 2022-02-23 DIAGNOSIS — Z78.0 MENOPAUSE: ICD-10-CM

## 2022-02-23 DIAGNOSIS — I10 PRIMARY HYPERTENSION: Primary | ICD-10-CM

## 2022-02-23 DIAGNOSIS — R91.8 PULMONARY NODULES: ICD-10-CM

## 2022-02-23 DIAGNOSIS — Z12.12 SCREENING FOR COLORECTAL CANCER: ICD-10-CM

## 2022-02-23 DIAGNOSIS — Z00.00 MEDICARE ANNUAL WELLNESS VISIT, SUBSEQUENT: ICD-10-CM

## 2022-02-23 PROCEDURE — 1125F AMNT PAIN NOTED PAIN PRSNT: CPT | Performed by: FAMILY MEDICINE

## 2022-02-23 PROCEDURE — 1036F TOBACCO NON-USER: CPT | Performed by: FAMILY MEDICINE

## 2022-02-23 PROCEDURE — 1160F RVW MEDS BY RX/DR IN RCRD: CPT | Performed by: FAMILY MEDICINE

## 2022-02-23 PROCEDURE — G0439 PPPS, SUBSEQ VISIT: HCPCS | Performed by: FAMILY MEDICINE

## 2022-02-23 PROCEDURE — 3077F SYST BP >= 140 MM HG: CPT | Performed by: FAMILY MEDICINE

## 2022-02-23 PROCEDURE — 3725F SCREEN DEPRESSION PERFORMED: CPT | Performed by: FAMILY MEDICINE

## 2022-02-23 PROCEDURE — 3079F DIAST BP 80-89 MM HG: CPT | Performed by: FAMILY MEDICINE

## 2022-02-23 PROCEDURE — 99214 OFFICE O/P EST MOD 30 MIN: CPT | Performed by: FAMILY MEDICINE

## 2022-02-23 PROCEDURE — 3288F FALL RISK ASSESSMENT DOCD: CPT | Performed by: FAMILY MEDICINE

## 2022-02-23 PROCEDURE — 1170F FXNL STATUS ASSESSED: CPT | Performed by: FAMILY MEDICINE

## 2022-02-23 PROCEDURE — 3008F BODY MASS INDEX DOCD: CPT | Performed by: FAMILY MEDICINE

## 2022-02-23 NOTE — PROGRESS NOTES
Assessment/Plan:    Hypertension  BP on retake 170/84  Patient reports no chest pain, headache, dizziness  Continue Losartan 100 mg daily  Recommended to follow a low-sodium diet, avoid processed foods  Start checking blood pressure at home and call with blood pressure log in a few days  Hypothyroidism  TSH 2 790  Continue Levothyroxine 112 mcg daily  Subclavian artery stenosis [facility])  Patient is asymptomatic  Continue aspirin 81 mg daily, statin therapy  Schedule Carotid Doppler  Hyperlipidemia  Continue Pravastatin 20 mg daily  Follow a low-cholesterol diet, regular exercise  Osteopenia of multiple sites  Schedule DEXA scan  Continue weight-bearing exercise, calcium with Vit D supplementation  Pulmonary nodules  Patient is a former smoker  LDCT done in March 2021 showed mild diffuse emphysematos lung changes, stable 4 mm left lower lobe pulmonary nodule  Will continue annual LDCT for lung cancer screening  Malignant neoplasm of lower-inner quadrant of right breast of female, estrogen receptor positive ([facility] [address] )  Patient is status post right lumpectomy with sentinel lymph node biopsy in December 2018  Currently taking Anastrozole 1 mg daily  Follow-up with medical oncology Dr Debbie De Oliveira and surgical oncology Dr Von Alexis  HM: recommended to schedule screening colonoscopy with Dr Gris Manzo  Schedule follow-up office visit in 6 months  Check labs prior to next visit  Diagnoses and all orders for this visit:    Primary hypertension  -     CBC and differential; Future  -     Comprehensive metabolic panel; Future    Acquired hypothyroidism  -     TSH, 3rd generation with Free T4 reflex; Future    Subclavian artery stenosis (HCC)  -     VAS carotid complete study; Future    Mixed hyperlipidemia  -     Comprehensive metabolic panel; Future  -     Lipid panel; Future    Osteopenia of multiple sites  -     DXA bone density spine hip and pelvis;  Future    Pulmonary nodules    Malignant neoplasm of lower-inner quadrant of right breast of female, estrogen receptor positive ([address] )    Medicare annual wellness visit, subsequent    Screening for colorectal cancer  -     Ambulatory Referral to Colorectal Surgery; Future    Menopause  -     DXA bone density spine hip and pelvis; Future          Subjective:      Patient ID: Amaya Park is a 66 y o  female  HPI     Patient is 55-year-old female with PMHx of HTN, Hypothyroidism, Hyperlipidemia, carotid artery stenosis, left subclavian artery stenosis, osteopenia, pulmonary nodule, right breast CA, S/P lumpectomy in December 2018  Patient presents for 6 month follow-up office visit  Reviewed current medications, blood test results from February 16, 2022  TSH 2 790, Vit B12  296, fasting blood sugar 84, potassium 4 1  Creatinine 0 99  Cholesterol 180, HDL 63, triglycerides 57,   HTN - patient's blood pressure is elevated in the office today  She denies chest pain, shortness of breath, headache, dizziness  Currently taking Losartan 100 mg daily  Patient had left eye cataract surgery on February 8, 2021  Using eyedrops  She states that her blood pressure was elevated after eye surgery  Echocardiogram done in January 2018 showed EF 60%  Hyperlipidemia - on Pravastatin 20 mg daily  Reports no side effects  Hypothyroidism -patient takes Levothyroxine 112 mcg daily  With a Doppler done in March 2021 showed < 50 % stenosis in ICA bilaterally, >  50% stenosis in left subclavian artery  Patient is a former smoker  Quit smoking over 18 years ago  LDCT done in March 2021 showed mild diffuse emphysematous lung changes, stable 4 mm left lower lobe pulmonary nodule  Patient denies cough  She has mild stable exertional shortness of breath  Patient has been refusing colonoscopy    Due to experiencing abdominal discomfort in R LQ recently asking for order for colonoscopy with Dr Esther Huang  Patient had bilateral diagnostic mammogram in October 2021 which showed no evidence of malignancy  Received COVID booster in  11/2021  The following portions of the patient's history were reviewed and updated as appropriate: current medications, past family history, past medical history, past social history, past surgical history and problem list   Depression Screening and Follow-up Plan: Patient was screened for depression during today's encounter  They screened negative with a PHQ-2 score of 1  Falls Plan of Care: balance, strength, and gait training instructions were provided  Vitamin D supplementation was recommended  Home safety education provided       Review of Systems   Constitutional: Negative for activity change, appetite change, chills, fatigue and fever  HENT: Negative for congestion, ear pain, hearing loss, sore throat and trouble swallowing  Eyes: Negative for pain, discharge, redness, itching and visual disturbance  Respiratory: Positive for shortness of breath (mild stable exertional SOB)  Negative for cough, chest tightness and wheezing  Cardiovascular: Negative for chest pain, palpitations and leg swelling  Gastrointestinal: Positive for abdominal pain (discomfort in R LQ)  Negative for blood in stool, constipation, diarrhea, nausea and vomiting  Genitourinary: Negative for difficulty urinating, dysuria, flank pain, frequency and hematuria  Musculoskeletal: Negative for arthralgias, back pain, gait problem and myalgias  Skin: Negative for rash  Neurological: Negative for dizziness, syncope and headaches  Hematological: Negative  Psychiatric/Behavioral: Negative for dysphoric mood and sleep disturbance  The patient is not nervous/anxious            Objective:      /84 (BP Location: Left arm, Patient Position: Sitting, Cuff Size: Standard)   Pulse 88   Temp 98 5 °F (36 9 °C) (Tympanic)   Resp 20   Ht 5' 4" (1 626 m)   Wt 78 8 kg (173 lb 12 8 oz)   SpO2 96%   BMI 29 83 kg/m²          Physical Exam  Vitals and nursing note reviewed  Constitutional:       Appearance: Normal appearance  Comments: Overweight   HENT:      Head: Normocephalic and atraumatic  Eyes:      Conjunctiva/sclera: Conjunctivae normal       Pupils: Pupils are equal, round, and reactive to light  Neck:      Vascular: Carotid bruit (BL) present  Cardiovascular:      Rate and Rhythm: Normal rate and regular rhythm  Heart sounds: No murmur heard  Pulmonary:      Effort: Pulmonary effort is normal       Breath sounds: Normal breath sounds  Abdominal:      General: Bowel sounds are normal  There is no distension  Palpations: Abdomen is soft  Tenderness: There is no abdominal tenderness  Musculoskeletal:         General: No swelling or tenderness  Normal range of motion  Cervical back: Normal range of motion and neck supple  Right lower leg: No edema  Left lower leg: No edema  Skin:     General: Skin is warm and dry  Findings: No rash  Neurological:      General: No focal deficit present  Mental Status: She is alert and oriented to person, place, and time  Motor: No weakness        Gait: Gait normal    Psychiatric:         Mood and Affect: Mood normal          Answers for HPI/ROS submitted by the patient on 2/22/2022  How would you rate your overall health?: good  Compared to last year, how is your physical health?: same  In general, how satisfied are you with your life?: satisfied  Compared to last year, how is your eyesight?: same  Compared to last year, how is your hearing?: same  Compared to last year, how is your emotional/mental health?: same  How often is anger a problem for you?: never, rarely  How often do you feel unusually tired/fatigued?: sometimes  In the past 7 days, how much pain have you experienced?: none  In the past 6 months, have you lost or gained 10 pounds without trying?: No  Additional Comments: Had cataract surgery so eyesight has improved  One or more falls in the last year: Yes  In the past 6 months, have you accidentally leaked urine?: No  Do you have trouble with the stairs inside or outside your home?: No  Does your home have working smoke alarms?: Yes  Does your home have a carbon monoxide monitor?: No  Which safety hazards (if any) have you experienced in your home? Please select all that apply : none  How would you describe your current diet? Please select all that apply : Regular  In addition to prescription medications, are you taking any over-the-counter supplements?: Yes  If yes, what supplements are you taking?: Vitamin D, low dose aspirin  Can you manage your medications?: Yes  Are you currently taking any opioid medications?: No  Can you walk and transfer into and out of your bed and chair?: Yes  Can you dress and groom yourself?: Yes  Can you bathe or shower yourself?: Yes  Can you feed yourself?: Yes  Can you do your laundry/ housekeeping?: Yes  Can you manage your money, pay your bills, and track your expenses?: Yes  Can you make your own meals?: Yes  Can you do your own shopping?: Yes  Within the last 12 months, have you had any hospitalizations or Emergency Department visits?: No  Additional Comments: er visit because of fall    Do you have a living will?: No  Do you have a Durable POA (Power of ) for healthcare decisions?: No  Do you have an Advanced Directive for end of life decisions?: No  How often have you used an illegal drug (including marijuana) or a prescription medication for non-medical reasons in the past year?: never  What is the typical number of drinks you consume in a day?: 0  What is the typical number of drinks you consume in a week?: 0  How often did you have a drink containing alcohol in the past year?: never  How many drinks did you have on a typical day  when you were drinking in the past year?: 0  How often did you have 6 or more drinks on one occasion in the past year?: never

## 2022-02-23 NOTE — PROGRESS NOTES
Chief Complaint   Patient presents with   [facility] Wellness Visit     Subsequent   Follow-up     6 months and review labs  Health Maintenance   Topic Date Due    Colorectal Cancer Screening  Never done   [facility] Annual Wellness Visit (AWV)  08/17/2021    BMI: Followup Plan  02/17/2022    Breast Cancer Screening: Mammogram  10/19/2022    Fall Risk  11/03/2022    Depression Screening  11/03/2022    Falls: Plan of Care  11/03/2022    BMI: Adult  01/18/2023    DTaP,Tdap,and Td Vaccines (2 - Td or Tdap) 10/29/2031    Hepatitis C Screening  Completed    Osteoporosis Screening  Completed    Pneumococcal Vaccine: 65+ Years  Completed    Influenza Vaccine  Completed    COVID-19 Vaccine  Completed    HIB Vaccine  Aged Out    Hepatitis B Vaccine  Aged Out    IPV Vaccine  Aged Out    Hepatitis A Vaccine  Aged Out    Meningococcal ACWY Vaccine  Aged Out    HPV Vaccine  Aged Out      Assessment and Plan:     Problem List Items Addressed This Visit        Endocrine    Hypothyroidism       Cardiovascular and Mediastinum    Hypertension - Primary    Subclavian artery stenosis ([facility] [address] )       Musculoskeletal and Integument    Osteopenia of multiple sites       Other    Hyperlipidemia    Pulmonary nodules    Malignant neoplasm of lower-inner quadrant of right breast of female, estrogen receptor positive ([facility] [address] )    Medicare annual wellness visit, subsequent    History of tobacco use      Other Visit Diagnoses     Screening for colorectal cancer            BMI Counseling: Body mass index is 29 83 kg/m²  The BMI is above normal  Nutrition recommendations include encouraging healthy choices of fruits and vegetables, decreasing fast food intake, consuming healthier snacks, limiting drinks that contain sugar, moderation in carbohydrate intake, increasing intake of lean protein, reducing intake of saturated and trans fat and reducing intake of cholesterol   Exercise recommendations include exercising 3-5 times per week  No pharmacotherapy was ordered  Rationale for BMI follow-up plan is due to patient being overweight or obese  Preventive health issues were discussed with patient, and age appropriate screening tests were ordered as noted in patient's After Visit Summary  Personalized health advice and appropriate referrals for health education or preventive services given if needed, as noted in patient's After Visit Summary       History of Present Illness:     Patient presents for Medicare Annual Wellness visit    Patient Care Team:  Gorge Wilson MD as PCP - General  Mendel Angel, MD Lansing Quail, MD as Surgeon (Surgical Oncology)  Russ Ayala RN as Registered Nurse (Oncology)  Javid Matias as Registered Nurse (Surgical Oncology)  Gopi Louis MD (Hematology)     Problem List:     Patient Active Problem List   Diagnosis    Allergic rhinitis    Bradycardia    Heart murmur    Hyperlipidemia    Hypertension    Hypothyroidism    Osteoporosis    Pulmonary nodules    Subclavian artery stenosis ([facility] [address] )    Vitamin D deficiency    Malignant neoplasm of lower-inner quadrant of right breast of female, estrogen receptor positive ([facility] [address] )    Medicare annual wellness visit, subsequent    Use of anastrozole (Arimidex)    Osteopenia of multiple sites    Decreased pedal pulses    History of tobacco use    Paresthesia of foot, bilateral    Age-related cataract of left eye    Age-related cataract of right eye      Past Medical and Surgical History:     Past Medical History:   Diagnosis Date    Acute pain of left shoulder     History    Acute recurrent maxillary sinusitis     History    Acute upper respiratory infection     History    BRCA negative 11/12/2018    [facility]    BRCA1 negative     BRCA2 negative     Breast cancer ([facility] [address] ) 11/01/2018    Right    Callus     Cancer ([facility] [address] )     Carotid artery narrowing     stenosis    Disease of thyroid gland     High arches     History of Graves' disease status post 131 treatment    History of lateral epicondylitis of left elbow     History of onychomycosis     of toenail    History of perforation of tympanic membrane     Hyperlipidemia     Hypertension     Ingrown toenail     Left otitis media     History    Medicare annual wellness visit, subsequent      Past Surgical History:   Procedure Laterality Date    BREAST BIOPSY Right 11/01/2018    U/S BX    BREAST LUMPECTOMY Right 12/18/2018    Procedure: BREAST LUMPECTOMY; BREAST NEEDLE LOCALIZATION (NEEDLE LOC AT 0700); Surgeon: Rafaela Ochoa MD;  [facility];  Service: Surgical Oncology    BREAST LUMPECTOMY      ELBOW FRACTURE SURGERY  09/1960    Open Treatment of Fracture of proximal Radius    LYMPH NODE BIOPSY Right 12/18/2018    Procedure: SENTINEL LYMPH NODE BIOPSY; LYMPHATIC MAPPING WITH BLUE DYE AND RADIOACTIVE DYE (INJECT AT 0800 BY DR PRADO IN THE OR); Surgeon: Rafaela Ochoa MD;  [facility];  Service: Surgical Oncology    SINUS SURGERY Left 1996    nasal polyps(?)-large papilloma left maxillary sinus with extension into posterior nasopharynx completed by Dr Prabhjot Alcaraz      THYROID CYST EXCISION      ablation    TONSILLECTOMY AND ADENOIDECTOMY      US BREAST NEEDLE LOC RIGHT Right 12/18/2018    US GUIDED BREAST BIOPSY RIGHT COMPLETE Right 11/1/2018      Family History:     Family History   Problem Relation Age of Onset    Breast cancer Mother     Pancreatic cancer Mother     Cancer Mother     Aortic aneurysm Father     Aortic aneurysm Family         Abdominal    Breast cancer Family     Breast cancer Maternal Grandmother       Social History:     Social History     Socioeconomic History    Marital status: /Civil Union     Spouse name: Not on file    Number of children: Not on file    Years of education: Not on file    Highest education level: Not on file   Occupational History    Not on file   Tobacco Use    Smoking status: Former Smoker     Packs/day: 0 00 Years: 0 00     Pack years: 0 00     Quit date: 10/16/2002     Years since quittin 3    Smokeless tobacco: Never Used   Substance and Sexual Activity    Alcohol use: Not Currently     Alcohol/week: 0 0 standard drinks     Comment: rarely    Drug use: No    Sexual activity: Not on file   Other Topics Concern    Not on file   Social History Narrative    Not on file     Social Determinants of Health     Financial Resource Strain: Not on file   Food Insecurity: Not on file   Transportation Needs: Not on file   Physical Activity: Not on file   Stress: Not on file   Social Connections: Not on file   Intimate Partner Violence: Not on file   Housing Stability: Not on file      Medications and Allergies:     Current Outpatient Medications   Medication Sig Dispense Refill    anastrozole (ARIMIDEX) 1 mg tablet Take 1 tablet (1 mg total) by mouth daily 90 tablet 3    aspirin 81 MG tablet Take 1 tablet (81 mg total) by mouth daily 30 tablet 6    Cholecalciferol (VITAMIN D3) 2000 units TABS Take 1 tablet by mouth daily      levothyroxine 112 mcg tablet Take 1 tablet (112 mcg total) by mouth daily 90 tablet 3    losartan (COZAAR) 100 MG tablet Take 1 tablet (100 mg total) by mouth daily 90 tablet 3    pravastatin (PRAVACHOL) 20 mg tablet Take 1 tablet (20 mg total) by mouth daily 90 tablet 3     No current facility-administered medications for this visit  Allergies   Allergen Reactions    Sulfa Antibiotics Dizziness     Other reaction(s): feeling "off-balance"  Category:  Adverse Reaction;       Immunizations:     Immunization History   Administered Date(s) Administered    COVID-19 PFIZER VACCINE 0 3 ML IM 2021, 2021, 2021    INFLUENZA 10/10/2014, 2018, 2020, 2021    Influenza Split High Dose Preservative Free IM 10/30/2013    Influenza, seasonal, injectable 2015, 10/13/2016, 10/04/2017    Pneumococcal Conjugate 13-Valent 12/15/2015    Pneumococcal Polysaccharide PPV23 06/18/2014    Tdap 10/29/2021    influenza, trivalent, adjuvanted 09/17/2019      Health Maintenance:         Topic Date Due    Colorectal Cancer Screening  Never done    Breast Cancer Screening: Mammogram  10/19/2022    Hepatitis C Screening  Completed     There are no preventive care reminders to display for this patient  Medicare Health Risk Assessment:     There were no vitals taken for this visit  Hermila Velez is here for her Subsequent Wellness visit  Health Risk Assessment:   Patient rates overall health as good  Patient feels that their physical health rating is same  Patient is satisfied with their life  Eyesight was rated as same  Hearing was rated as same  Patient feels that their emotional and mental health rating is same  Patients states they are never, rarely angry  Patient states they are sometimes unusually tired/fatigued  Pain experienced in the last 7 days has been none  Patient states that she has experienced no weight loss or gain in last 6 months  Had cataract surgery so eyesight has improved  Fall Risk Screening: In the past year, patient has experienced: history of falling in past year    Number of falls: 1  Injured during fall?: Yes    Feels unsteady when standing or walking?: No    Worried about falling?: No      Urinary Incontinence Screening:   Patient has not leaked urine accidently in the last six months  Home Safety:  Patient does not have trouble with stairs inside or outside of their home  Patient has working smoke alarms and has no working carbon monoxide detector  Home safety hazards include: none  Nutrition:   Current diet is Regular  Medications:   Patient is currently taking over-the-counter supplements  OTC medications include: Vitamin D, low dose aspirin    Patient is able to manage medications       Activities of Daily Living (ADLs)/Instrumental Activities of Daily Living (IADLs):   Walk and transfer into and out of bed and chair?: Yes  Dress and groom yourself?: Yes    Bathe or shower yourself?: Yes    Feed yourself? Yes  Do your laundry/housekeeping?: Yes  Manage your money, pay your bills and track your expenses?: Yes  Make your own meals?: Yes    Do your own shopping?: Yes    Previous Hospitalizations:   Any hospitalizations or ED visits within the last 12 months?: No      Hospitalization Comments: er visit because of fall  Advance Care Planning:   Living will: No    Durable POA for healthcare: No    Advanced directive: No    Advanced directive counseling given: Yes      Cognitive Screening:   Provider or family/friend/caregiver concerned regarding cognition?: No    PREVENTIVE SCREENINGS      Cardiovascular Screening:    General: Screening Not Indicated and History Lipid Disorder      Diabetes Screening:     General: Screening Current      Colorectal Cancer Screening:     General: Risks and Benefits Discussed      Breast Cancer Screening:     General: History Breast Cancer      Cervical Cancer Screening:    General: Screening Not Indicated      Osteoporosis Screening:    General: Screening Not Indicated and History Osteoporosis      Abdominal Aortic Aneurysm (AAA) Screening:        General: Screening Not Indicated      Lung Cancer Screening:     General: Screening Not Indicated      Hepatitis C Screening:    General: Screening Current    Screening, Brief Intervention, and Referral to Treatment (SBIRT)    Screening  Typical number of drinks in a day: 0  Typical number of drinks in a week: 0  Interpretation: Low risk drinking behavior      AUDIT-C Screenin) How often did you have a drink containing alcohol in the past year? never  2) How many drinks did you have on a typical day when you were drinking in the past year? 0  3) How often did you have 6 or more drinks on one occasion in the past year? never    AUDIT-C Score: 0  Interpretation: Score 0-2 (female): Negative screen for alcohol misuse    Single Item Drug Screening:  How often have you used an illegal drug (including marijuana) or a prescription medication for non-medical reasons in the past year? never    Single Item Drug Screen Score: 0  Interpretation: Negative screen for possible drug use disorder    Other Counseling Topics:   Car/seat belt/driving safety, skin self-exam and calcium and vitamin D intake and regular weightbearing exercise         Lorie Joe MD

## 2022-02-24 ENCOUNTER — TELEPHONE (OUTPATIENT)
Dept: FAMILY MEDICINE CLINIC | Facility: CLINIC | Age: 72
End: 2022-02-24

## 2022-02-24 NOTE — ASSESSMENT & PLAN NOTE
Patient is a former smoker  LDCT done in March 2021 showed mild diffuse emphysematos lung changes, stable 4 mm left lower lobe pulmonary nodule  Will continue annual LDCT for lung cancer screening

## 2022-02-24 NOTE — ASSESSMENT & PLAN NOTE
BP on retake 170/84  Patient reports no chest pain, headache, dizziness  Continue Losartan 100 mg daily  Recommended to follow a low-sodium diet, avoid processed foods  Start checking blood pressure at home and call with blood pressure log in a few days

## 2022-02-24 NOTE — ASSESSMENT & PLAN NOTE
Patient is status post right lumpectomy with sentinel lymph node biopsy in December 2018  Currently taking Anastrozole 1 mg daily  Follow-up with medical oncology Dr John Elliott and surgical oncology Dr Eden Valenzuela

## 2022-02-24 NOTE — TELEPHONE ENCOUNTER
----- Message from Mini Escobedo MD sent at 2/23/2022  9:22 PM EST -----  Patient is due for DEXA scan  Order placed in chart  Recommend patient to call central scheduling [phone]

## 2022-03-14 ENCOUNTER — HOSPITAL ENCOUNTER (OUTPATIENT)
Dept: NON INVASIVE DIAGNOSTICS | Facility: CLINIC | Age: 72
Discharge: HOME | End: 2022-03-14
Payer: COMMERCIAL

## 2022-03-14 DIAGNOSIS — I77.1 SUBCLAVIAN ARTERY STENOSIS (HCC): ICD-10-CM

## 2022-03-14 PROCEDURE — 93880 EXTRACRANIAL BILAT STUDY: CPT | Performed by: SURGERY

## 2022-03-14 PROCEDURE — 93880 EXTRACRANIAL BILAT STUDY: CPT

## 2022-03-15 DIAGNOSIS — R30.0 DYSURIA: Primary | ICD-10-CM

## 2022-03-15 DIAGNOSIS — I10 PRIMARY HYPERTENSION: ICD-10-CM

## 2022-03-15 RX ORDER — AMLODIPINE BESYLATE 5 MG/1
5 TABLET ORAL DAILY
Qty: 30 TABLET | Refills: 5 | Status: SHIPPED | OUTPATIENT
Start: 2022-03-15 | End: 2022-04-07 | Stop reason: SDUPTHER

## 2022-03-16 ENCOUNTER — APPOINTMENT (OUTPATIENT)
Dept: LAB | Facility: HOSPITAL | Age: 72
End: 2022-03-16
Payer: COMMERCIAL

## 2022-03-16 DIAGNOSIS — R30.0 DYSURIA: ICD-10-CM

## 2022-03-16 LAB
BACTERIA UR QL AUTO: ABNORMAL /HPF
BILIRUB UR QL STRIP: NEGATIVE
CLARITY UR: CLEAR
COLOR UR: YELLOW
GLUCOSE UR STRIP-MCNC: ABNORMAL MG/DL
HGB UR QL STRIP.AUTO: ABNORMAL
HYALINE CASTS #/AREA URNS LPF: ABNORMAL /LPF
KETONES UR STRIP-MCNC: NEGATIVE MG/DL
LEUKOCYTE ESTERASE UR QL STRIP: NEGATIVE
MUCOUS THREADS UR QL AUTO: ABNORMAL
NITRITE UR QL STRIP: NEGATIVE
NON-SQ EPI CELLS URNS QL MICRO: ABNORMAL /HPF
PH UR STRIP.AUTO: 5 [PH]
PROT UR STRIP-MCNC: ABNORMAL MG/DL
RBC #/AREA URNS AUTO: ABNORMAL /HPF
SP GR UR STRIP.AUTO: 1.02 (ref 1–1.03)
UROBILINOGEN UR STRIP-ACNC: <2 MG/DL
WBC #/AREA URNS AUTO: ABNORMAL /HPF

## 2022-03-16 PROCEDURE — 87086 URINE CULTURE/COLONY COUNT: CPT

## 2022-03-16 PROCEDURE — 81001 URINALYSIS AUTO W/SCOPE: CPT | Performed by: FAMILY MEDICINE

## 2022-03-17 LAB — BACTERIA UR CULT: NORMAL

## 2022-04-06 ENCOUNTER — TELEPHONE (OUTPATIENT)
Dept: FAMILY MEDICINE CLINIC | Facility: CLINIC | Age: 72
End: 2022-04-06

## 2022-04-06 NOTE — TELEPHONE ENCOUNTER
I called patient  Recommended to take Amlodipine 5 mg tonight  Check blood pressure in the morning and if BP > 140/85 take Amlodipine 5 mg and Losartan 100 mg  Will see patient for follow-up visit tomorrow

## 2022-04-06 NOTE — TELEPHONE ENCOUNTER
Patient phoned to state she was to update her if her blood pressure became elevated  It read 179/86 today  Patient is scheduled for 4/7/22 with Dr Kem Szymanski  Please call patient at [phone] if there is anything else she should do

## 2022-04-06 NOTE — TELEPHONE ENCOUNTER
Spoke with pt she takes her amlodipine in the evenings so I advised her to take it tonight and then one in the morning  Unless you want her to take both tonight   Please advise/call pt

## 2022-04-07 ENCOUNTER — OFFICE VISIT (OUTPATIENT)
Dept: FAMILY MEDICINE CLINIC | Facility: CLINIC | Age: 72
End: 2022-04-07
Payer: COMMERCIAL

## 2022-04-07 VITALS
OXYGEN SATURATION: 97 % | SYSTOLIC BLOOD PRESSURE: 154 MMHG | RESPIRATION RATE: 16 BRPM | HEIGHT: 64 IN | WEIGHT: 174.2 LBS | BODY MASS INDEX: 29.74 KG/M2 | HEART RATE: 80 BPM | TEMPERATURE: 98.9 F | DIASTOLIC BLOOD PRESSURE: 78 MMHG

## 2022-04-07 DIAGNOSIS — I10 PRIMARY HYPERTENSION: Primary | ICD-10-CM

## 2022-04-07 DIAGNOSIS — I77.1 SUBCLAVIAN ARTERY STENOSIS (HCC): ICD-10-CM

## 2022-04-07 DIAGNOSIS — E03.9 ACQUIRED HYPOTHYROIDISM: ICD-10-CM

## 2022-04-07 DIAGNOSIS — E78.2 MIXED HYPERLIPIDEMIA: ICD-10-CM

## 2022-04-07 PROCEDURE — 99214 OFFICE O/P EST MOD 30 MIN: CPT | Performed by: FAMILY MEDICINE

## 2022-04-07 PROCEDURE — 93000 ELECTROCARDIOGRAM COMPLETE: CPT | Performed by: FAMILY MEDICINE

## 2022-04-07 RX ORDER — AMLODIPINE BESYLATE 5 MG/1
TABLET ORAL
Qty: 60 TABLET | Refills: 5
Start: 2022-04-07 | End: 2022-04-11 | Stop reason: SDUPTHER

## 2022-04-07 RX ORDER — HYDROCHLOROTHIAZIDE 12.5 MG/1
12.5 TABLET ORAL DAILY
Qty: 30 TABLET | Refills: 5 | Status: SHIPPED | OUTPATIENT
Start: 2022-04-07 | End: 2022-05-06

## 2022-04-07 NOTE — PROGRESS NOTES
Chief Complaint   Patient presents with    Hypertension     Elevated BP  Health Maintenance   Topic Date Due    Colorectal Cancer Screening  Never done    Breast Cancer Screening: Mammogram  10/19/2022    Fall Risk  02/23/2023    Depression Screening  02/23/2023    Medicare Annual Wellness Visit (AWV)  02/23/2023    BMI: Followup Plan  02/23/2023    Falls: Plan of Care  02/23/2023    BMI: Adult  04/07/2023    DTaP,Tdap,and Td Vaccines (2 - Td or Tdap) 10/29/2031    Hepatitis C Screening  Completed    Osteoporosis Screening  Completed    Pneumococcal Vaccine: 65+ Years  Completed    Influenza Vaccine  Completed    COVID-19 Vaccine  Completed    HIB Vaccine  Aged Out    Hepatitis B Vaccine  Aged Out    IPV Vaccine  Aged Out    Hepatitis A Vaccine  Aged Out    Meningococcal ACWY Vaccine  Aged Out    HPV Vaccine  Aged Out           Assessment/Plan:    Hypertension  Blood pressure at home yesterday was 179/86  Patient reports elevated blood pressure reading since February 2022  C/o feeling lightheaded, nauseous, chest discomfort when blood pressure goes up  EKG done in the office today showed sinus rhythm, 67 bpm    No acute S T wave changes  BP with patient's monitor 154/77  BP with office cuff 154/78  Recommended to continue Amlodipine 5 mg twice daily, Losartan 100 mg daily  Start HCTZ 12 5 mg daily  Monitor BP at home and call with BP log in 1 week  Will order echocardiogram to assess left ventricular systolic function, heart valves  Family history is positive for CAD, MI's in her grandfather  Father had ascending aortic aneurysm  Will refer to cardiology for further evaluation  Subclavian artery stenosis (HCC)  Continue aspirin 81 mg daily, statin therapy  Hypothyroidism  Continue Levothyroxine 112 mcg daily  Hyperlipidemia  Continue Pravastatin 20 mg daily           Diagnoses and all orders for this visit:    Primary hypertension  -     POCT ECG  - hydrochlorothiazide (HYDRODIURIL) 12 5 mg tablet; Take 1 tablet (12 5 mg total) by mouth daily  -     Basic metabolic panel; Future  -     Ambulatory Referral to Cardiology; Future  -     Echo complete w/ contrast if indicated; Future  -     amLODIPine (NORVASC) 5 mg tablet; Take 1 tab  twice daily    Subclavian artery stenosis (HCC)    Acquired hypothyroidism    Mixed hyperlipidemia          Subjective:      Patient ID: Kaleb Ann is a 74 y o  female  HPI     Patient presents to the office c/o elevated blood pressure readings since 2/2022  BP yesterday was 179/86  Patient was advised to increase dose of Amlodipine 5 mg to 1 tablet twice daily, continue Losartan 100 mg daily  Patient c/o feeling lightheaded, nauseous, chest discomfort when blood pressure goes up  Reports no headache  Echocardiogram done in January 2018 showed EF 60%  No significant heart valve abnormalities  Family history is positive for MI's in her paternal grandfather  Father had ascending aortic aneurysm  PMHx:  HTN, Hypothyroidism, Hyperlipidemia, carotid artery stenosis, left subclavian artery stenosis, osteopenia, pulmonary nodule, right breast CA, S/P lumpectomy in December 2018      Reviewed last blood test results from February 2022  Creatinine 0 99, potassium 4 1, TSH 2 790,   Hyperlipidemia - currently taking Pravastatin 20 mg daily  Hypothyroidism - on Levothyroxine 112 mcg daily  Patient is a former smoker  Quit smoking 20 years ago  The following portions of the patient's history were reviewed and updated as appropriate: allergies, past family history, past medical history, past social history, past surgical history and problem list     Review of Systems   Constitutional: Positive for fatigue (mild)  Negative for activity change, appetite change, chills and fever  HENT: Negative for congestion and nosebleeds  Eyes: Negative      Respiratory: Positive for shortness of breath  Negative for cough (mild stable exertional SOB) and chest tightness  Cardiovascular: Negative for chest pain, palpitations and leg swelling  Gastrointestinal: Negative for abdominal pain, diarrhea and vomiting  Genitourinary: Negative for difficulty urinating, dysuria and hematuria  Musculoskeletal: Positive for back pain (occasional  )  Negative for joint swelling  Neurological: Positive for light-headedness  Negative for syncope and headaches  Hematological: Negative  Psychiatric/Behavioral: Negative for dysphoric mood and sleep disturbance  The patient is not nervous/anxious  Objective:      /78 (BP Location: Left arm, Patient Position: Sitting, Cuff Size: Standard)   Pulse 80   Temp 98 9 °F (37 2 °C) (Tympanic)   Resp 16   Ht 5' 4" (1 626 m)   Wt 79 kg (174 lb 3 2 oz)   SpO2 97%   BMI 29 90 kg/m²          Physical Exam  Vitals and nursing note reviewed  Constitutional:       Appearance: Normal appearance  HENT:      Head: Normocephalic and atraumatic  Eyes:      Conjunctiva/sclera: Conjunctivae normal       Pupils: Pupils are equal, round, and reactive to light  Neck:      Vascular: Carotid bruit (left) present  Cardiovascular:      Rate and Rhythm: Normal rate and regular rhythm  Heart sounds: No murmur heard  Pulmonary:      Effort: Pulmonary effort is normal       Breath sounds: Normal breath sounds  Abdominal:      General: There is no distension  Palpations: Abdomen is soft  Tenderness: There is no abdominal tenderness  Musculoskeletal:         General: No swelling or tenderness  Normal range of motion  Cervical back: Normal range of motion and neck supple  Right lower leg: No edema  Left lower leg: No edema  Skin:     General: Skin is warm and dry  Findings: No rash  Neurological:      Mental Status: She is alert     Psychiatric:         Mood and Affect: Mood normal

## 2022-04-07 NOTE — ASSESSMENT & PLAN NOTE
Blood pressure at home yesterday was 179/86  Patient reports elevated blood pressure reading since February 2022  C/o feeling lightheaded, nauseous, chest discomfort when blood pressure goes up  EKG done in the office today showed sinus rhythm, 67 bpm    No acute S T wave changes  BP with patient's monitor 154/77  BP with office cuff 154/78  Recommended to continue Amlodipine 5 mg twice daily, Losartan 100 mg daily  Start HCTZ 12 5 mg daily  Monitor BP at home and call with BP log in 1 week  Will order echocardiogram to assess left ventricular systolic function, heart valves  Family history is positive for CAD, MI's in her grandfather  Father had ascending aortic aneurysm  Will refer to cardiology for further evaluation

## 2022-04-11 ENCOUNTER — TELEPHONE (OUTPATIENT)
Dept: FAMILY MEDICINE CLINIC | Facility: CLINIC | Age: 72
End: 2022-04-11

## 2022-04-11 DIAGNOSIS — I10 ESSENTIAL HYPERTENSION: ICD-10-CM

## 2022-04-11 DIAGNOSIS — I10 PRIMARY HYPERTENSION: ICD-10-CM

## 2022-04-11 RX ORDER — AMLODIPINE BESYLATE 5 MG/1
TABLET ORAL
Qty: 60 TABLET | Refills: 5
Start: 2022-04-11 | End: 2022-04-11 | Stop reason: SDUPTHER

## 2022-04-11 RX ORDER — AMLODIPINE BESYLATE 5 MG/1
TABLET ORAL
Qty: 180 TABLET | Refills: 3
Start: 2022-04-11 | End: 2022-04-12 | Stop reason: SDUPTHER

## 2022-04-12 RX ORDER — AMLODIPINE BESYLATE 5 MG/1
TABLET ORAL
Qty: 180 TABLET | Refills: 3 | Status: SHIPPED | OUTPATIENT
Start: 2022-04-12 | End: 2022-05-06 | Stop reason: SDUPTHER

## 2022-04-12 NOTE — TELEPHONE ENCOUNTER
Patient ([phone]) called to report that she had contacted pharmacy and script for amlodipine was not received  No receipt shoe for med in chart  Patient is out of medication

## 2022-04-13 ENCOUNTER — TELEPHONE (OUTPATIENT)
Dept: HEMATOLOGY ONCOLOGY | Facility: CLINIC | Age: 72
End: 2022-04-13

## 2022-04-13 ENCOUNTER — OFFICE VISIT (OUTPATIENT)
Dept: SURGICAL ONCOLOGY | Facility: CLINIC | Age: 72
End: 2022-04-13
Payer: COMMERCIAL

## 2022-04-13 VITALS
WEIGHT: 171.5 LBS | TEMPERATURE: 97.3 F | HEIGHT: 64 IN | RESPIRATION RATE: 16 BRPM | SYSTOLIC BLOOD PRESSURE: 158 MMHG | BODY MASS INDEX: 29.28 KG/M2 | HEART RATE: 67 BPM | OXYGEN SATURATION: 97 % | DIASTOLIC BLOOD PRESSURE: 82 MMHG

## 2022-04-13 DIAGNOSIS — Z17.0 MALIGNANT NEOPLASM OF LOWER-INNER QUADRANT OF RIGHT BREAST OF FEMALE, ESTROGEN RECEPTOR POSITIVE (HCC): Primary | ICD-10-CM

## 2022-04-13 DIAGNOSIS — Z79.811 USE OF ANASTROZOLE (ARIMIDEX): ICD-10-CM

## 2022-04-13 DIAGNOSIS — C50.311 MALIGNANT NEOPLASM OF LOWER-INNER QUADRANT OF RIGHT BREAST OF FEMALE, ESTROGEN RECEPTOR POSITIVE (HCC): Primary | ICD-10-CM

## 2022-04-13 PROCEDURE — 99214 OFFICE O/P EST MOD 30 MIN: CPT | Performed by: NURSE PRACTITIONER

## 2022-04-13 PROCEDURE — 3008F BODY MASS INDEX DOCD: CPT | Performed by: PODIATRIST

## 2022-04-13 NOTE — TELEPHONE ENCOUNTER
Notified patient of date, time & location of mammo on 10/21/22   Stated appointment info is also available on [employer]

## 2022-04-13 NOTE — PROGRESS NOTES
Surgical Oncology Follow Up       [address] [facility],[address] [facility]  [facility] [place]  [address]   [facility] [id number]    Samantha Betts  1950  [id number]  [address] [facility],[address] [place] [address]  [facility] [place]  [address] [address]    Chief Complaint   Patient presents with    Follow-up       Assessment/Plan:  1  Malignant neoplasm of lower-inner quadrant of right breast of female, estrogen receptor positive ([facility] [address] )  - six-month follow-up  - Mammo diagnostic bilateral w 3d & cad; Future    2  Use of anastrozole (Arimidex)  - continue use per Medical Oncology      Discussion/Summary:  Patient is a 57-year-old female presenting today for right breast cancer diagnosed in November of 2018  Pathology revealed invasive ductal carcinoma %, MA 90%, HER2 negative  She underwent genetic testing which was BRCA negative  She underwent a right breast lumpectomy with sentinel node biopsy with Dr Bello Mccollum  She is currently on anastrozole hormone therapy  She had a bilateral diagnostic mammogram on 10/19/2021 which was BI-RADS 2 category 3 density  I will place her mammogram for this year at this time  There are no concerns on her breast exam today  I will see her back in 6 months or sooner should the need arise  She is instructed to call with any questions or concerns prior to that time  All questions were answered today      History of Present Illness:     Oncology History   Malignant neoplasm of lower-inner quadrant of right breast of female, estrogen receptor positive ([facility] [address] )   11/1/2018 Biopsy    Right breast biopsy  3-4 o'clock, 5 cmfn  Invasive ductal carcinoma  Grade 1  8 mm on ultrasound    MA 90  Her 2 1 +     11/12/2018 Genetic Testing    BRCA testing-invitae  Negative     12/18/2018 Surgery    Right breast lumpectomy with sentinel lymph node biopsy  Invasive ductal carcinoma  Grade 1  1 1 cm  Margins negative   0/1 lymph node    FL 90  Her 2 1+  Stage IA     1/23/2019 -  Hormone Therapy    Anastrozole 1 mg daily for 5 years  With Dr Bong Sidhu          -Interval History: Patient is a 22-year-old female presenting today for right breast cancer diagnosed in November of 2018  Pathology revealed invasive ductal carcinoma %, FL 90%, HER2 negative  She is currently on anastrozole hormone therapy  She had a bilateral diagnostic mammogram on 10/19/2021 which was BI-RADS 2 category 3 density  Pt denied changes on her breast exam  She denies persistent headache, bone pain, back pain, sob, or abd pain  Review of Systems:  Review of Systems   Constitutional: Negative for activity change, appetite change, fatigue and unexpected weight change  Respiratory: Negative for cough and shortness of breath  Cardiovascular: Negative for chest pain  Gastrointestinal: Negative for abdominal pain, diarrhea, nausea and vomiting  Endocrine: Negative for heat intolerance  Musculoskeletal: Negative for arthralgias, back pain and myalgias  Skin: Negative for rash  Neurological: Negative for weakness and headaches  Hematological: Negative for adenopathy         Patient Active Problem List   Diagnosis    Allergic rhinitis    Bradycardia    Heart murmur    Hyperlipidemia    Hypertension    Hypothyroidism    Osteoporosis    Pulmonary nodules    Subclavian artery stenosis (HCC)    Vitamin D deficiency    Malignant neoplasm of lower-inner quadrant of right breast of female, estrogen receptor positive ([address] )    Medicare annual wellness visit, subsequent    Use of anastrozole (Arimidex)    Osteopenia of multiple sites    Decreased pedal pulses    History of tobacco use    Paresthesia of foot, bilateral    Age-related cataract of left eye    Age-related cataract of right eye     Past Medical History:   Diagnosis Date    Acute pain of left shoulder     History    Acute recurrent maxillary sinusitis     History    Acute upper respiratory infection     History    BRCA negative 11/12/2018    Inv[facility]ae    BRCA1 negative     BRCA2 negative     Breast cancer (HCC) 11/01/2018    Right    Callus     Cancer ([address] )     Carotid artery narrowing     stenosis    Disease of thyroid gland     High arches     History of Graves' disease     status post 131 treatment    History of lateral epicondylitis of left elbow     History of onychomycosis     of toenail    History of perforation of tympanic membrane     Hyperlipidemia     Hypertension     Ingrown toenail     Left otitis media     History    Medicare annual wellness visit, subsequent      Past Surgical History:   Procedure Laterality Date    BREAST BIOPSY Right 11/01/2018    U/S BX    BREAST LUMPECTOMY Right 12/18/2018    Procedure: BREAST LUMPECTOMY; BREAST NEEDLE LOCALIZATION (NEEDLE LOC AT 0700); Surgeon: Sebastian Boyer MD;  [facility];  Service: Surgical Oncology    BREAST LUMPECTOMY      ELBOW FRACTURE SURGERY  09/1960    Open Treatment of Fracture of proximal Radius    LYMPH NODE BIOPSY Right 12/18/2018    Procedure: SENTINEL LYMPH NODE BIOPSY; LYMPHATIC MAPPING WITH BLUE DYE AND RADIOACTIVE DYE (INJECT AT 0800 BY DR PRADO IN THE OR); Surgeon: Sebastian Boyer MD;  [facility];  Service: Surgical Oncology    SINUS SURGERY Left 1996    nasal polyps(?)-large papilloma left maxillary sinus with extension into posterior nasopharynx completed by Dr Iesha Humphrey      THYROID CYST EXCISION      ablation    TONSILLECTOMY AND ADENOIDECTOMY      US BREAST NEEDLE LOC RIGHT Right 12/18/2018    US GUIDED BREAST BIOPSY RIGHT COMPLETE Right 11/1/2018     Family History   Problem Relation Age of Onset    Breast cancer Mother     Pancreatic cancer Mother     Cancer Mother     Aortic aneurysm Father     Aortic aneurysm Family         Abdominal    Breast cancer Family     Breast cancer Maternal Grandmother      Social History     Socioeconomic History    Marital status: /Civil [employer]     Spouse name: Not on file    Number of children: Not on file    Years of education: Not on file    Highest education level: Not on file   Occupational History    Not on file   Tobacco Use    Smoking status: Former Smoker     Packs/day: 0 00     Years: 0 00     Pack years: 0 00     Quit date: 10/16/2002     Years since quittin 5    Smokeless tobacco: Never Used   Substance and Sexual Activity    Alcohol use: Not Currently     Alcohol/week: 0 0 standard drinks     Comment: rarely    Drug use: No    Sexual activity: Not on file   Other Topics Concern    Not on file   Social History Narrative    Not on file     Social Determinants of Health     Financial Resource Strain: Not on file   Food Insecurity: Not on file   Transportation Needs: Not on file   Physical Activity: Not on file   Stress: Not on file   Social Connections: Not on file   Intimate Partner Violence: Not on file   Housing Stability: Not on file       Current Outpatient Medications:     amLODIPine (NORVASC) 5 mg tablet, Take 1 tab  twice daily, Disp: 180 tablet, Rfl: 3    anastrozole (ARIMIDEX) 1 mg tablet, Take 1 tablet (1 mg total) by mouth daily, Disp: 90 tablet, Rfl: 3    aspirin 81 MG tablet, Take 1 tablet (81 mg total) by mouth daily, Disp: 30 tablet, Rfl: 6    Cholecalciferol (VITAMIN D3) 2000 units TABS, Take 1 tablet by mouth daily, Disp: , Rfl:     hydrochlorothiazide (HYDRODIURIL) 12 5 mg tablet, Take 1 tablet (12 5 mg total) by mouth daily, Disp: 30 tablet, Rfl: 5    levothyroxine 112 mcg tablet, Take 1 tablet (112 mcg total) by mouth daily, Disp: 90 tablet, Rfl: 3    losartan (COZAAR) 100 MG tablet, Take 1 tablet (100 mg total) by mouth daily, Disp: 90 tablet, Rfl: 3    pravastatin (PRAVACHOL) 20 mg tablet, Take 1 tablet (20 mg total) by mouth daily, Disp: 90 tablet, Rfl: 3  Allergies   Allergen Reactions    Sulfa Antibiotics Dizziness     Other reaction(s): feeling "off-balance"  Category: Adverse Reaction; There were no vitals filed for this visit  Physical Exam  Constitutional:       General: She is not in acute distress  Appearance: Normal appearance  Cardiovascular:      Rate and Rhythm: Normal rate and regular rhythm  Pulses: Normal pulses  Heart sounds: Normal heart sounds  Pulmonary:      Effort: Pulmonary effort is normal       Breath sounds: Normal breath sounds  Chest:      Chest wall: No mass  Breasts:      Right: No swelling, bleeding, inverted nipple, mass, nipple discharge, skin change, tenderness, axillary adenopathy or supraclavicular adenopathy  Left: No swelling, bleeding, inverted nipple, mass, nipple discharge, skin change, tenderness, axillary adenopathy or supraclavicular adenopathy  Comments: Right breast lumpectomy scar and right axilla SNB site scar  No masses, nodularity, skin changes, nipple changes/discharge, adenopathy  Abdominal:      General: Abdomen is flat  Palpations: Abdomen is soft  Lymphadenopathy:      Upper Body:      Right upper body: No supraclavicular, axillary or pectoral adenopathy  Left upper body: No supraclavicular, axillary or pectoral adenopathy  Skin:     General: Skin is warm  Neurological:      General: No focal deficit present  Mental Status: She is alert and oriented to person, place, and time  Psychiatric:         Mood and Affect: Mood normal          Behavior: Behavior normal            Results:    Imaging  VAS carotid complete study    Result Date: 3/14/2022  Narrative:  [facility] REPORT CLINICAL: Indications: Yearly surveillance of carotid artery disease  Patient reports uncontrolled high blood pressure  Operative History: 2018-12-18 Right Breast Lumpectomy Risk Factors The patient has history of HTN, Hyperlipidemia, breast cancer and previous smoking (quit >10yrs ago)  Clinical Right Pressure: Hx breast sx, Left Pressure:  178/80 mm Hg    FINDINGS:  Right Impression  PSV  EDV (cm/s)  Direction of Flow  Ratio  Dist  ICA                 82          19                      1 63  Mid  ICA                  80          18                      1 58  Prox  ICA    1 - 49%      76          15                      1 51  Dist CCA                  43           6                            Mid CCA                   50           8                      0 65  Prox CCA                  78          12                      0 78  Ext Carotid              131          12                      2 60  Prox Vert                 84          13  Antegrade                 Subclavian               190           0                            Innominate               100           0                             Left         Impression  PSV  EDV (cm/s)  Direction of Flow  Ratio  Dist  ICA                 74          15                      1 03  Mid  ICA                  86          15                      1 20  Prox  ICA    1 - 49%      85          14                      1 19  Dist CCA                  53          10                            Mid CCA                   72           9                      0 85  Prox CCA                  85           8                            Ext Carotid              129          11                      1 81  Prox Vert                 69          11  Antegrade                 Subclavian   >50%        317           0                               CONCLUSION:  Impression  RIGHT: There is <50% stenosis noted in the internal carotid artery  Plaque is heterogenous/calcified and irregular  Vertebral artery flow is antegrade  There is no significant subclavian artery disease  LEFT: There is <50% stenosis noted in the internal carotid artery  Plaque is heterogenous and irregular  Vertebral artery flow is antegrade  There is evidence suggestive of >50% stenosis in the proximal subclavian artery    Compared to previous study on 3/12/2021, there is no significant interval change in the disease process  Recommend repeat duplex in 2 years to follow up on plaque progression  Internal carotid artery stenosis determination by consensus criteria from: Sly Patel et al  Carotid Artery Stenosis: Gray-Scale and Doppler US Diagnosis - Society of Radiologists in [address] [place] [address], Radiology 2003; [phone]  SIGNATURE: Electronically Signed by: Mau Rausch MD, RPVI on 2022-03-14 03:59:57 PM      I reviewed the above imaging data  Advance Care Planning/Advance Directives:  Discussed disease status, cancer treatment plans and/or cancer treatment goals with the patient

## 2022-04-18 ENCOUNTER — HOSPITAL ENCOUNTER (EMERGENCY)
Facility: HOSPITAL | Age: 72
Discharge: HOME | End: 2022-04-18
Attending: EMERGENCY MEDICINE | Admitting: EMERGENCY MEDICINE
Payer: COMMERCIAL

## 2022-04-18 ENCOUNTER — APPOINTMENT (EMERGENCY)
Dept: RADIOLOGY | Facility: HOSPITAL | Age: 72
End: 2022-04-18
Payer: COMMERCIAL

## 2022-04-18 VITALS
OXYGEN SATURATION: 99 % | RESPIRATION RATE: 18 BRPM | HEART RATE: 90 BPM | DIASTOLIC BLOOD PRESSURE: 92 MMHG | BODY MASS INDEX: 29.35 KG/M2 | WEIGHT: 171 LBS | TEMPERATURE: 98 F | SYSTOLIC BLOOD PRESSURE: 171 MMHG

## 2022-04-18 DIAGNOSIS — S92.353A FRACTURE OF 5TH METATARSAL: Primary | ICD-10-CM

## 2022-04-18 PROCEDURE — 73630 X-RAY EXAM OF FOOT: CPT

## 2022-04-18 PROCEDURE — 99284 EMERGENCY DEPT VISIT MOD MDM: CPT | Performed by: EMERGENCY MEDICINE

## 2022-04-18 PROCEDURE — 99283 EMERGENCY DEPT VISIT LOW MDM: CPT

## 2022-04-18 RX ORDER — ACETAMINOPHEN 325 MG/1
975 TABLET ORAL ONCE
Status: DISCONTINUED | OUTPATIENT
Start: 2022-04-18 | End: 2022-04-18 | Stop reason: HOSPADM

## 2022-04-18 NOTE — DISCHARGE INSTRUCTIONS
Patient Instructions: Today you were seen in the emergency department for pain in your R foot  We reviewed your X-ray and found a fracture (broken bone) in your 5th metatarsal and determined that you would be able to be discharged  Please try to use crutches to stay off your foot  You should take ibuprofen and tylenol as needed for your pain  You should follow up with podiatry  Please return to the emergency department if your symptoms get worse, you develop a fever, or you have any other symptoms we discussed today prior to discharge  Nice to meet you! Best of luck with everything!

## 2022-04-18 NOTE — ED NOTES
Pt updated awaiting for size small ortho shoe   Verbalized understanding      Radha Case RN  04/18/22 005

## 2022-04-18 NOTE — ED ATTENDING ATTESTATION
4/18/2022  I saw and evaluated the patient  I have discussed the patient with the resident physician and agree with the resident's findings, assessment and plan as documented in the resident physician's note, unless otherwise documented below  All available laboratory and imaging studies were reviewed by myself  I was present for key portions of any procedure(s) performed by the resident and I was immediately available to provide assistance  I agree with the current assessment done in the Emergency Department  I have conducted an independent evaluation of this patient  Emergency Department Note- Pelon Betts 74 y o  female MRN: [id number]    Unit/Bed#: ED 26 Encounter: [id number]    Chief Complaint   Patient presents with    Foot Pain     pt tripped over slipper last night and landed on side of right foot  pt states she heard a crack and c/o pain and swelling to area  denies hitting head/LOC  Rebeka Arellano is a 74 y o  female with past medical history of hypertension, hyperlipidemia, breast cancer, presenting with right foot injury  Patient tripped over a slipper yesterday, twisting her right foot and landing onto her right side  She did not strike her head or lose consciousness  She was able to get up and walk away after the fall, however, had persistent pain on the outer aspect of her right foot  This morning, there is significant bruising to the area  She continues to have pain with any ambulation or touch of the outer aspect of right foot  No other injuries  No chest pain  No shortness of breath parent  Movement makes pain worse      REVIEW OF SYSTEMS    Constitutional: denies fevers, chills  Visual/Eyes: no changes in vision  HENT: no rhinorrhea, no sore throat  Cardiac: no chest pain  Respiratory: no shortness of breath, no cough  GI: no abdominal pain  MSK: As above  Heme/Onc: no easy bruising, is on aspirin  Endocrine: no diabetes  Neuro: no focal weakness or numbness, no headache    Ten systems reviewed and negative unless otherwise noted in HPI and above    PAST MEDICAL HISTORY  Past Medical History:   Diagnosis Date    Acute pain of left shoulder     History    Acute recurrent maxillary sinusitis     History    Acute upper respiratory infection     History    BRCA negative 11/12/2018    [facility]ae    BRCA1 negative     BRCA2 negative     Breast cancer ([facility] [address] ) 11/01/2018    Right    Callus     Cancer ([facility] [address] )     Carotid artery narrowing     stenosis    Disease of thyroid gland     High arches     History of Graves' disease     status post 131 treatment    History of lateral epicondylitis of left elbow     History of onychomycosis     of toenail    History of perforation of tympanic membrane     Hyperlipidemia     Hypertension     Ingrown toenail     Left otitis media     History    Medicare annual wellness visit, subsequent        SURGICAL HISTORY  Past Surgical History:   Procedure Laterality Date    BREAST BIOPSY Right 11/01/2018    U/S BX    BREAST LUMPECTOMY Right 12/18/2018    Procedure: BREAST LUMPECTOMY; BREAST NEEDLE LOCALIZATION (NEEDLE LOC AT 0700); Surgeon: Cathy Myers MD;  [facility];  Service: Surgical Oncology    BREAST LUMPECTOMY      ELBOW FRACTURE SURGERY  09/1960    Open Treatment of Fracture of proximal Radius    LYMPH NODE BIOPSY Right 12/18/2018    Procedure: SENTINEL LYMPH NODE BIOPSY; LYMPHATIC MAPPING WITH BLUE DYE AND RADIOACTIVE DYE (INJECT AT 0800 BY DR PRADO IN THE OR); Surgeon: Cathy Myers MD;  [facility];  Service: Surgical Oncology    SINUS SURGERY Left 1996    nasal polyps(?)-large papilloma left maxillary sinus with extension into posterior nasopharynx completed by Dr Alin Cantu      THYROID CYST EXCISION      ablation    TONSILLECTOMY AND ADENOIDECTOMY      US BREAST NEEDLE LOC RIGHT Right 12/18/2018    US GUIDED BREAST BIOPSY RIGHT COMPLETE Right 11/1/2018       FAMILY HISTORY  Family History   Problem Relation Age of Onset    Breast cancer Mother     Pancreatic cancer Mother     Cancer Mother     Aortic aneurysm Father     Aortic aneurysm Family         Abdominal    Breast cancer Family     Breast cancer Maternal Grandmother         CURRENT MEDICATIONS  No current facility-administered medications on file prior to encounter  Current Outpatient Medications on File Prior to Encounter   Medication Sig    amLODIPine (NORVASC) 5 mg tablet Take 1 tab  twice daily    anastrozole (ARIMIDEX) 1 mg tablet Take 1 tablet (1 mg total) by mouth daily    aspirin 81 MG tablet Take 1 tablet (81 mg total) by mouth daily    Cholecalciferol (VITAMIN D3) 2000 units TABS Take 1 tablet by mouth daily    hydrochlorothiazide (HYDRODIURIL) 12 5 mg tablet Take 1 tablet (12 5 mg total) by mouth daily    levothyroxine 112 mcg tablet Take 1 tablet (112 mcg total) by mouth daily    losartan (COZAAR) 100 MG tablet Take 1 tablet (100 mg total) by mouth daily    pravastatin (PRAVACHOL) 20 mg tablet Take 1 tablet (20 mg total) by mouth daily       ALLERGIES  Allergies   Allergen Reactions    Sulfa Antibiotics Dizziness     Other reaction(s): feeling "off-balance"  Category:  Adverse Reaction;        SOCIAL HISTORY  Social History     Socioeconomic History    Marital status: /Civil Union     Spouse name: None    Number of children: None    Years of education: None    Highest education level: None   Occupational History    None   Tobacco Use    Smoking status: Former Smoker     Packs/day: 0 00     Years: 0 00     Pack years: 0 00     Quit date: 10/16/2002     Years since quittin 5    Smokeless tobacco: Never Used   Substance and Sexual Activity    Alcohol use: Not Currently     Alcohol/week: 0 0 standard drinks     Comment: rarely    Drug use: No    Sexual activity: None   Other Topics Concern    None   Social History Narrative    None     Social Determinants of Health     Financial Resource Strain: Not on file   Food Insecurity: Not on file   Transportation Needs: Not on file   Physical Activity: Not on file   Stress: Not on file   Social Connections: Not on file   Intimate Partner Violence: Not on file   Housing Stability: Not on file       PHYSICAL EXAM  BP (!) 171/92   Pulse 90   Temp 98 °F (36 7 °C)   Resp 18   Wt 77 6 kg (171 lb)   SpO2 99%   BMI 29 35 kg/m²   Vital signs and nursing notes reviewed    Constitutional:  Awake, alert, oriented  No acute distress  HEENT:  Normocephalic, atraumatic  Sclera anicteric, conjunctiva not injected  Moist oral mucosa  Cardiac:  Appears well-perfused  Respiratory:  Breathing comfortably on room air  Abdomen:  Nondistended  Extremities:  Examination of right lower extremity reveals ecchymosis to the right mid and distal lateral foot  There is tenderness with palpation along 5th metatarsal, no tenderness at the base of the 5th metatarsal   Patient is able to wiggle her toes  2+ dorsalis pedis and posterior tibial pulses on the right  No tenderness over medial or lateral malleoli, fibular head or knee, or right hip  Integument:  No rashes over exposed areas, cap refill less than 2 seconds  Neurologic:  Awake, alert, and oriented x3  Nonfocal exam   Psychiatric:  Normal affect        DIAGNOSTIC STUDIES  Results Reviewed     None          XR foot 3+ views RIGHT   ED Interpretation   Abnormal   5th metatarsal fx      Final Result      Comminuted fracture of the right 5th metatarsal shaft  Findings concur with the referring clinician's preliminary interpretation already in the patient's electronic health record  Workstation performed: [id number]             PROCEDURES  Procedures            ED COURSE  Medications - No data to display    68-year-old female presenting with right foot injury after a mechanical fall yesterday  Vital signs reviewed, hypertensive, within normal limits otherwise  Patient appears to have isolated right foot injury  She did not strike her head or lose consciousness  She has no tenderness in her neck  She has no tenderness in her hip  Concerning right foot injury, differential includes contusion, fracture, dislocation, versus another etiology of symptoms  X-ray of right foot obtained, to my review there is a comminuted right 5th metatarsal diaphyseal fracture  Will place the patient in a surgical postop shoe and referred to Podiatry  Patient discharged home with recommendations for rest, ice, elevation, follow-up with podiatry, recommendations for pain control and return precautions  Patient may cautiously bear weight to right lower extremity  We did offer crutches but patient declines at this time which I happen to agree with since I am concerned that patient would be at high risk for fall      CLINICAL IMPRESSION  Final diagnoses:   Fracture of 5th metatarsal

## 2022-04-18 NOTE — ED PROVIDER NOTES
Final Diagnosis:  1  Fracture of 5th metatarsal         Chief Complaint   Patient presents with    Foot Pain     pt tripped over slipper last night and landed on side of right foot  pt states she heard a crack and c/o pain and swelling to area  denies hitting head/LOC  ASSESSMENT + PLAN:   - Nursing note reviewed  1  R foot pain  --x-ray, analgesia, reassess  --X-ray showed 5th metatarsal fx  --Offered splint and crutches but patient unlikely to be able to ambulate in them, tried CAM boot but patient unable to ambulate in them, will trial post op ortho shoe        Procedures       Final Dispo   Patient was reassessed  Vital signs stable  Patient and/or family given discharge instructions and return precautions  Patient and/or family was reassured  The patient and/or family vocalizes understanding  Answered all of the patient's and/or family's questions  Will follow up with podiatry  Patient and/or family are agreeable to the plan  Medications   acetaminophen (TYLENOL) tablet 975 mg (0 mg Oral Not Given 4/18/22 5810)     Time reflects when diagnosis was documented in both MDM as applicable and the Disposition within this note     Time User Action Codes Description Comment    4/18/2022  7:01 AM Awa Kincaid Add [S92 353A] Fracture of 5th metatarsal       ED Disposition     ED Disposition Condition Date/Time Comment    Discharge Stable Mon Apr 18, 2022  7:00 AM [address] discharge to home/self care              Follow-up Information     Follow up With Specialties Details Why Contact Info Additional Information    [facility] Call   [address] [phone]  [address] [facility], [address] [place] [address], [address]         Discharge Medication List as of 4/18/2022  7:26 AM      CONTINUE these medications which have NOT CHANGED    Details   amLODIPine (NORVASC) 5 mg tablet Take 1 tab  twice daily, Normal anastrozole (ARIMIDEX) 1 mg tablet Take 1 tablet (1 mg total) by mouth daily, Starting Tue 7/13/2021, Normal      aspirin 81 MG tablet Take 1 tablet (81 mg total) by mouth daily, Starting Fri 1/4/2019, No Print      Cholecalciferol (VITAMIN D3) 2000 units TABS Take 1 tablet by mouth daily, Starting Wed 6/17/2015, Historical Med      hydrochlorothiazide (HYDRODIURIL) 12 5 mg tablet Take 1 tablet (12 5 mg total) by mouth daily, Starting Thu 4/7/2022, Normal      levothyroxine 112 mcg tablet Take 1 tablet (112 mcg total) by mouth daily, Starting Wed 9/22/2021, Until Tue 12/21/2021, Normal      losartan (COZAAR) 100 MG tablet Take 1 tablet (100 mg total) by mouth daily, Starting Wed 5/5/2021, Until Wed 11/3/2021, Normal      pravastatin (PRAVACHOL) 20 mg tablet Take 1 tablet (20 mg total) by mouth daily, Starting Wed 7/14/2021, Until Wed 11/3/2021, Normal           No discharge procedures on file  Prior to Admission Medications   Prescriptions Last Dose Informant Patient Reported? Taking? Cholecalciferol (VITAMIN D3) 2000 units TABS  Self Yes No   Sig: Take 1 tablet by mouth daily   amLODIPine (NORVASC) 5 mg tablet   No No   Sig: Take 1 tab  twice daily   anastrozole (ARIMIDEX) 1 mg tablet  Self No No   Sig: Take 1 tablet (1 mg total) by mouth daily   aspirin 81 MG tablet  Self No No   Sig: Take 1 tablet (81 mg total) by mouth daily   hydrochlorothiazide (HYDRODIURIL) 12 5 mg tablet   No No   Sig: Take 1 tablet (12 5 mg total) by mouth daily   levothyroxine 112 mcg tablet  Self No No   Sig: Take 1 tablet (112 mcg total) by mouth daily   losartan (COZAAR) 100 MG tablet  Self No No   Sig: Take 1 tablet (100 mg total) by mouth daily   pravastatin (PRAVACHOL) 20 mg tablet  Self No No   Sig: Take 1 tablet (20 mg total) by mouth daily      Facility-Administered Medications: None       History of Present Illness:    This is a 74 y o  female PMH significant for osteoporosis coming in today with complaint of right foot pain     Onset:  Acute  Location:  Right lateral foot  Duration:  1 day  Radiation:  None  Associated Symptoms:  Patient does have bruising over her right foot  Severity:  Mild to moderate  Attempted Treatments:  Has not taken anything for pain  Historical Elements:  Patient tripped over her slipper yesterday and landed on her right lateral foot  She says that she also hit her hip, but has had very little trouble ambulating  She says that she has been walking gingerly on her foot  She noticed some bruising over her right dorsal surface of her foot today and presented to the emergency department  She does have a history of osteoporosis  No other injuries  She patient did not hit her head  She is on aspirin  No loss of consciousness  Relevant Social History  Lives at home    - No language barrier    - History obtained from patient and chart   - Reviewed and documented relevant past medical/family/social history  - There are no limitations to the history obtained  - Previous charting was reviewed  Some data reviewed included below for ease of access whether or not it is relevant to this patient encounter  Past Medical, Past Surgical History:    has a past medical history of Acute pain of left shoulder, Acute recurrent maxillary sinusitis, Acute upper respiratory infection, BRCA negative (11/12/2018), BRCA1 negative, BRCA2 negative, Breast cancer ([facility] [address] ) (11/01/2018), Callus, Cancer ([facility] [address] ), Carotid artery narrowing, Disease of thyroid gland, High arches, History of Graves' disease, History of lateral epicondylitis of left elbow, History of onychomycosis, History of perforation of tympanic membrane, Hyperlipidemia, Hypertension, Ingrown toenail, Left otitis media, and Medicare annual wellness visit, subsequent    has a past surgical history that includes Sinus surgery (Left, 1996); Tonsillectomy and adenoidectomy; US guided breast biopsy right complete (Right, 11/1/2018);  Thyroid cyst excision; Elbow fracture surgery (1960); US breast needle loc right (Right, 2018); Lymph node biopsy (Right, 2018); Breast lumpectomy (Right, 2018); Breast lumpectomy; and Breast biopsy (Right, 2018)  Allergies: Allergies   Allergen Reactions    Sulfa Antibiotics Dizziness     Other reaction(s): feeling "off-balance"  Category: Adverse Reaction;        Social and Family History:     Social History     Substance and Sexual Activity   Alcohol Use Not Currently    Alcohol/week: 0 0 standard drinks    Comment: rarely     Social History     Tobacco Use   Smoking Status Former Smoker    Packs/day: 0 00    Years: 0 00    Pack years: 0 00    Quit date: 10/16/2002    Years since quittin 5   Smokeless Tobacco Never Used     Social History     Substance and Sexual Activity   Drug Use No       Review of Systems:   Review of Systems   Constitutional: Negative  Negative for chills and fever  HENT: Negative  Eyes: Negative  Respiratory: Negative  Negative for shortness of breath  Cardiovascular: Negative  Negative for chest pain  Gastrointestinal: Negative  Negative for abdominal pain, nausea and vomiting  Endocrine: Negative  Genitourinary: Negative  Musculoskeletal: Positive for arthralgias and joint swelling  Skin: Negative  Negative for rash  Neurological: Negative  Negative for headaches  Hematological: Negative  Psychiatric/Behavioral: Negative  All other systems reviewed and are negative  Physical Examination     Vitals:    22 0631   BP: (!) 171/92   Pulse: 90   Resp: 18   Temp: 98 °F (36 7 °C)   SpO2: 99%   Weight: 77 6 kg (171 lb)     Vitals reviewed by me  Physical Exam  Vitals and nursing note reviewed  Constitutional:       General: She is not in acute distress  Appearance: She is not ill-appearing  HENT:      Head: Atraumatic        Right Ear: External ear normal       Left Ear: External ear normal       Nose: Nose normal    Eyes: General: No scleral icterus  Cardiovascular:      Rate and Rhythm: Normal rate  Pulmonary:      Effort: Pulmonary effort is normal  No respiratory distress  Abdominal:      General: There is no distension  Musculoskeletal:         General: Tenderness present  No deformity  Normal range of motion  Comments: 2+ DP and PT, 5/5 extremity lower extremity strength (RLE), decreased sensation on dorsal and plantar aspects, but patient does have a history of neuropathy, same on both sides  Bruising on lateral aspect of her dorsal surface of her foot as pictured  Tenderness along 4th and 5th metatarsals  No tenderness along navicular, bilateral malleoli, normal range of motion of her knee, no tenderness in her hip   Full range of motion of her hip   Skin:     Findings: No rash  Neurological:      General: No focal deficit present  Psychiatric:         Mood and Affect: Mood normal                  Risk Stratification Tools                   XR foot 3+ views RIGHT   ED Interpretation   Abnormal   5th metatarsal fx        Orders Placed This Encounter   Procedures    XR foot 3+ views RIGHT       Labs:   Labs Reviewed - No data to display    Imaging:   No results found  Final Diagnosis:  1  Fracture of 5th metatarsal        Code Status: No Order    Portions of the record may have been created with voice recognition software  Occasional wrong word or "sound a like" substitutions may have occurred due to the inherent limitations of voice recognition software  Read the chart carefully and recognize, using context, where substitutions have occurred      Electronically signed by:  Mary Laws, PGY 3, MD Monroe Hughes MD  04/18/22 8671

## 2022-04-19 ENCOUNTER — OFFICE VISIT (OUTPATIENT)
Dept: PODIATRY | Facility: CLINIC | Age: 72
End: 2022-04-19
Payer: COMMERCIAL

## 2022-04-19 VITALS
BODY MASS INDEX: 29.35 KG/M2 | HEIGHT: 64 IN | DIASTOLIC BLOOD PRESSURE: 71 MMHG | SYSTOLIC BLOOD PRESSURE: 115 MMHG | HEART RATE: 71 BPM

## 2022-04-19 DIAGNOSIS — S92.351A CLOSED DISPLACED FRACTURE OF FIFTH METATARSAL BONE OF RIGHT FOOT, INITIAL ENCOUNTER: Primary | ICD-10-CM

## 2022-04-19 PROCEDURE — 3074F SYST BP LT 130 MM HG: CPT | Performed by: PODIATRIST

## 2022-04-19 PROCEDURE — 3078F DIAST BP <80 MM HG: CPT | Performed by: PODIATRIST

## 2022-04-19 PROCEDURE — 1036F TOBACCO NON-USER: CPT | Performed by: PODIATRIST

## 2022-04-19 PROCEDURE — 1160F RVW MEDS BY RX/DR IN RCRD: CPT | Performed by: PODIATRIST

## 2022-04-19 PROCEDURE — 99213 OFFICE O/P EST LOW 20 MIN: CPT | Performed by: PODIATRIST

## 2022-04-19 NOTE — PROGRESS NOTES
Assessment/Plan:    Explained to patient that she has a displaced right 5th metatarsal shaft fracture  Discussed treatment options contrasting ORIF with conservative nonsurgical care  Explained to patient that surgery will decrease the shortening that will occur at the 5th ray and also will decrease the likelihood of metatarsalgia in the future  However, there are risks of surgery including nonunion and infection  Patient prefers to avoid surgical intervention at this time  Dispensed proper fitting surgical shoe which is a women's large and Ace wrap  Explained that typically clinical findings precede x-ray healing  Patient will be reassessed and re-x-rayed in 3 weeks  No problem-specific Assessment & Plan notes found for this encounter  Diagnoses and all orders for this visit:    Closed displaced fracture of fifth metatarsal bone of right foot, initial encounter          Subjective:      Patient ID: Zack Bradshaw is a 74 y o  female  HPI     Patient, a 70-year-old female presents having been referred from the ED with a ill-fitting surgical shoe on her right foot  Patient tripped at home on April 17th injuring the right foot  X-rays taken at the hospital were positive for a displaced fracture at the 5th metatarsal shaft and neck  Patient currently having minimal pain  I personally reviewed x-rays taken on 04/18/2022  They were positive for displaced fracture at the 5th metatarsal shaft right foot  I personally reviewed notes taken from the emergency department on 04/18/2022  It is noted that they try to utilize a cam walker and recommended that patient be nonweightbearing but she could not tolerate these approaches        The following portions of the patient's history were reviewed and updated as appropriate: allergies, current medications, past family history, past medical history, past social history, past surgical history and problem list     Review of Systems Gastrointestinal: Negative  Musculoskeletal:        Osteoporosis   Psychiatric/Behavioral: Negative  Objective:      /71   Pulse 71   Ht 5' 4" (1 626 m)   BMI 29 35 kg/m²          Physical Exam  Constitutional:       Appearance: Normal appearance  Cardiovascular:      Pulses: Normal pulses  Musculoskeletal:         General: Tenderness and signs of injury present  Comments: Mild discomfort with palpation 5th ray right foot  Mild edema and mild bruising present  Right 5th toe has shortened compared to the position of the left 5th toe  Skin:     General: Skin is warm  Neurological:      General: No focal deficit present  Mental Status: She is oriented to person, place, and time

## 2022-04-25 ENCOUNTER — APPOINTMENT (OUTPATIENT)
Dept: LAB | Facility: HOSPITAL | Age: 72
End: 2022-04-25
Payer: COMMERCIAL

## 2022-04-25 DIAGNOSIS — I10 PRIMARY HYPERTENSION: ICD-10-CM

## 2022-04-25 LAB
ANION GAP SERPL CALCULATED.3IONS-SCNC: 0 MMOL/L (ref 4–13)
BUN SERPL-MCNC: 21 MG/DL (ref 5–25)
CALCIUM SERPL-MCNC: 9.4 MG/DL (ref 8.3–10.1)
CHLORIDE SERPL-SCNC: 106 MMOL/L (ref 100–108)
CO2 SERPL-SCNC: 33 MMOL/L (ref 21–32)
CREAT SERPL-MCNC: 0.95 MG/DL (ref 0.6–1.3)
GFR SERPL CREATININE-BSD FRML MDRD: 59 ML/MIN/1.73SQ M
GLUCOSE P FAST SERPL-MCNC: 90 MG/DL (ref 65–99)
POTASSIUM SERPL-SCNC: 3.6 MMOL/L (ref 3.5–5.3)
SODIUM SERPL-SCNC: 139 MMOL/L (ref 136–145)

## 2022-04-25 PROCEDURE — 80048 BASIC METABOLIC PNL TOTAL CA: CPT

## 2022-04-25 PROCEDURE — 36415 COLL VENOUS BLD VENIPUNCTURE: CPT

## 2022-05-01 DIAGNOSIS — I10 ESSENTIAL HYPERTENSION: ICD-10-CM

## 2022-05-01 RX ORDER — LOSARTAN POTASSIUM 100 MG/1
TABLET ORAL
Qty: 90 TABLET | Refills: 3 | Status: SHIPPED | OUTPATIENT
Start: 2022-05-01

## 2022-05-05 ENCOUNTER — HOSPITAL ENCOUNTER (OUTPATIENT)
Dept: NON INVASIVE DIAGNOSTICS | Facility: CLINIC | Age: 72
Discharge: HOME | End: 2022-05-05
Payer: COMMERCIAL

## 2022-05-05 VITALS
WEIGHT: 171 LBS | DIASTOLIC BLOOD PRESSURE: 71 MMHG | HEIGHT: 64 IN | HEART RATE: 70 BPM | BODY MASS INDEX: 29.19 KG/M2 | SYSTOLIC BLOOD PRESSURE: 115 MMHG

## 2022-05-05 DIAGNOSIS — I10 PRIMARY HYPERTENSION: ICD-10-CM

## 2022-05-05 LAB
AORTIC ROOT: 2.9 CM
APICAL FOUR CHAMBER EJECTION FRACTION: 62 %
E WAVE DECELERATION TIME: 283 MS
FRACTIONAL SHORTENING: 29 % (ref 28–44)
INTERVENTRICULAR SEPTUM IN DIASTOLE (PARASTERNAL SHORT AXIS VIEW): 0.7 CM
INTERVENTRICULAR SEPTUM: 0.7 CM (ref 0.52–0.98)
LAAS-AP2: 13.7 CM2
LAAS-AP4: 11.5 CM2
LEFT ATRIUM AREA SYSTOLE SINGLE PLANE A4C: 11.1 CM2
LEFT ATRIUM SIZE: 2.4 CM
LEFT INTERNAL DIMENSION IN SYSTOLE: 2.4 CM (ref 2.67–4.04)
LEFT VENTRICLE DIASTOLIC VOLUME (MOD BIPLANE): 61 ML (ref 87.86–197.86)
LEFT VENTRICLE SYSTOLIC VOLUME (MOD BIPLANE): 24 ML
LEFT VENTRICULAR INTERNAL DIMENSION IN DIASTOLE: 3.4 CM (ref 4.37–6.51)
LEFT VENTRICULAR POSTERIOR WALL IN END DIASTOLE: 0.6 CM (ref 0.51–0.96)
LEFT VENTRICULAR STROKE VOLUME: 27 ML
LV EF: 60 %
LVSV (TEICH): 27 ML
MV E'TISSUE VEL-SEP: 12 CM/S
MV PEAK A VEL: 0.71 M/S
MV PEAK E VEL: 65 CM/S
MV STENOSIS PRESSURE HALF TIME: 82 MS
MV VALVE AREA P 1/2 METHOD: 2.68 CM2
RIGHT ATRIUM AREA SYSTOLE A4C: 9.7 CM2
RIGHT VENTRICLE ID DIMENSION: 2.9 CM
SL CV LEFT ATRIUM LENGTH A2C: 5 CM
SL CV LV DIAS VOL ENDO Z SCORE: -2.98
SL CV LV EF: 60
SL CV PED ECHO LEFT VENTRICLE DIASTOLIC VOLUME (MOD BIPLANE) 2D: 47 ML
SL CV PED ECHO LEFT VENTRICLE SYSTOLIC VOLUME (MOD BIPLANE) 2D: 20 ML
TR MAX PG: 25 MMHG
TR PEAK VELOCITY: 2.5 M/S
TRICUSPID VALVE PEAK REGURGITATION VELOCITY: 2.51 M/S
Z-SCORE OF INTERVENTRICULAR SEPTUM IN END DIASTOLE: -0.43
Z-SCORE OF LEFT VENTRICULAR DIMENSION IN END DIASTOLE: -4.5
Z-SCORE OF LEFT VENTRICULAR DIMENSION IN END SYSTOLE: -2.48
Z-SCORE OF LEFT VENTRICULAR POSTERIOR WALL IN END DIASTOLE: -1.18

## 2022-05-05 PROCEDURE — 93306 TTE W/DOPPLER COMPLETE: CPT

## 2022-05-05 PROCEDURE — 93306 TTE W/DOPPLER COMPLETE: CPT | Performed by: INTERNAL MEDICINE

## 2022-05-06 ENCOUNTER — OFFICE VISIT (OUTPATIENT)
Dept: CARDIOLOGY CLINIC | Facility: CLINIC | Age: 72
End: 2022-05-06
Payer: COMMERCIAL

## 2022-05-06 VITALS
WEIGHT: 172 LBS | OXYGEN SATURATION: 98 % | HEART RATE: 70 BPM | HEIGHT: 65 IN | SYSTOLIC BLOOD PRESSURE: 150 MMHG | DIASTOLIC BLOOD PRESSURE: 68 MMHG | BODY MASS INDEX: 28.66 KG/M2

## 2022-05-06 DIAGNOSIS — I77.1 SUBCLAVIAN ARTERY STENOSIS (HCC): ICD-10-CM

## 2022-05-06 DIAGNOSIS — I10 PRIMARY HYPERTENSION: Primary | ICD-10-CM

## 2022-05-06 PROBLEM — N18.30 STAGE 3 CHRONIC KIDNEY DISEASE, UNSPECIFIED WHETHER STAGE 3A OR 3B CKD (HCC): Status: ACTIVE | Noted: 2022-05-06

## 2022-05-06 PROBLEM — N18.30 STAGE 3 CHRONIC KIDNEY DISEASE, UNSPECIFIED WHETHER STAGE 3A OR 3B CKD (HCC): Status: RESOLVED | Noted: 2022-05-06 | Resolved: 2022-05-06

## 2022-05-06 PROCEDURE — 3008F BODY MASS INDEX DOCD: CPT | Performed by: PODIATRIST

## 2022-05-06 PROCEDURE — 99204 OFFICE O/P NEW MOD 45 MIN: CPT | Performed by: INTERNAL MEDICINE

## 2022-05-06 RX ORDER — AMLODIPINE BESYLATE 5 MG/1
5 TABLET ORAL DAILY
Qty: 90 TABLET | Refills: 3 | Status: SHIPPED | OUTPATIENT
Start: 2022-05-06

## 2022-05-06 RX ORDER — ROSUVASTATIN CALCIUM 20 MG/1
20 TABLET, COATED ORAL DAILY
Qty: 90 TABLET | Refills: 3 | Status: SHIPPED | OUTPATIENT
Start: 2022-05-06

## 2022-05-06 RX ORDER — AMLODIPINE BESYLATE 5 MG/1
5 TABLET ORAL DAILY
Qty: 90 TABLET | Refills: 3 | Status: SHIPPED | OUTPATIENT
Start: 2022-05-06 | End: 2022-05-06

## 2022-05-06 NOTE — PROGRESS NOTES
[facility]  Office Consultation  Avtar Florence 74 y o  female MRN: [id number]        Chief Complaint    Chief Complaint   Patient presents with    Consult     pt referred by pcp, for bp issues       Referring Provider: Wendy Bolton MD    Impression & Plan:    1  Primary hypertension   Her blood pressures may be labile rapid addition of additional antihypertensives without the opportunity for the medications to reach steady state  Peak time to effect for medications such as amlodipine may be in the weeks range, therefore up titrating the dose based on blood pressures checked within a week may result in over shooting goal BP  Stop HCTZ  Maintain losartan 100 mg daily and amlodipine 5 mg daily only  Allow 3 weeks for steady state  Then BP log  Patient will call in 4 weeks with BP log representing weeks 3-4 of current therapy  At that time, we may consider increasing amlodipine   - Ambulatory Referral to Cardiology  - amLODIPine (NORVASC) 5 mg tablet; Take 1 tablet (5 mg total) by mouth daily  Dispense: 90 tablet; Refill: 3  - VAS upper limb arterial duplex, complete bilateral, graft; Future    2  Subclavian artery stenosis (HCC)   Although she was noted to have left subclavian artery stenosis, her blood pressure is actually higher in the left arm today, about 146/60 on BP recheck, and 124/54 on the right arm  We will check a full Doppler study of the upper extremities to determine what degree of PAD may exist    She would benefit from a higher intensity statin given both her 10 year ASCVD risk and the presence of peripheral arterial disease  Change pravastatin 20 mg to rosuvastatin 20 mg daily  - VAS upper limb arterial duplex, complete bilateral, graft; Future  - rosuvastatin (CRESTOR) 20 MG tablet; Take 1 tablet (20 mg total) by mouth daily  Dispense: 90 tablet;  Refill: 3    The 10-year ASCVD risk score (Shailesh Figueroa et al , 2013) is: 24 8%    Values used to calculate the score: Age: 74 years      Sex: Female      Is Non- : No      Diabetic: No      Tobacco smoker: No      Systolic Blood Pressure: 786 mmHg      Is BP treated: Yes      HDL Cholesterol: 63 mg/dL      Total Cholesterol: 180 mg/dL    We reviewed the ACC ASCVD Risk  together, discussed the patient's risk factors, and reviewed the risk impacts of various therapies or interventions  We will intensify statin therapy, as above  We will see Jose Maria Johnson back in 6 months for routine follow-up  HPI: Jose Maria Johnson is a 74y o  year old female who presents for evaluation of labile HTN  She has been on the losartan 100mg at least 10-12 years  In March started amlodipine5 mg daily  In April increased amlodipine to 5mg twice daily  On April 7 added HCTZ 12 5 mg  On 4/12 she had a BP of 90/53 and reduced amlodipine to 5 mg daily again but continued HCTZ 12 5 mg daily  She has been increasing her water intake because of taking the HCTZ  Now she finds herself urinating all night  Her carotid artery Dopplers on 03/14/2022 showed less than 50% bilateral carotid artery stenosis with heterogenous and irregular plaques  Incidentally, she was found I have over 50 % stenosis of the proximal left subclavian artery  No cardiac symptoms      Review of Systems   Constitutional: Negative for activity change and fatigue  HENT: Negative for facial swelling  Eyes: Negative for visual disturbance  Respiratory: Negative for chest tightness and shortness of breath  Cardiovascular: Negative for chest pain, palpitations and leg swelling  Gastrointestinal: Negative for nausea and vomiting  Musculoskeletal: Negative for myalgias  Skin: Negative for pallor  Allergic/Immunologic: Negative for immunocompromised state  Neurological: Negative for dizziness, syncope and light-headedness  Psychiatric/Behavioral: Negative for agitation and confusion   The patient is not nervous/anxious  Past Medical History:   Diagnosis Date    Acute pain of left shoulder     History    Acute recurrent maxillary sinusitis     History    Acute upper respiratory infection     History    BRCA negative 11/12/2018    [facility]    BRCA1 negative     BRCA2 negative     Breast cancer (HCC) 11/01/2018    Right    Callus     Cancer ([address] )     Carotid artery narrowing     stenosis    Disease of thyroid gland     High arches     History of Graves' disease     status post 131 treatment    History of lateral epicondylitis of left elbow     History of onychomycosis     of toenail    History of perforation of tympanic membrane     Hyperlipidemia     Hypertension     Ingrown toenail     Left otitis media     History    Medicare annual wellness visit, subsequent      Past Surgical History:   Procedure Laterality Date    BREAST BIOPSY Right 11/01/2018    U/S BX    BREAST LUMPECTOMY Right 12/18/2018    Procedure: BREAST LUMPECTOMY; BREAST NEEDLE LOCALIZATION (NEEDLE LOC AT 0700); Surgeon: Brandy Wisdom MD;  [facility]: [facility];  Service: Surgical Oncology    BREAST LUMPECTOMY      ELBOW FRACTURE SURGERY  09/1960    Open Treatment of Fracture of proximal Radius    LYMPH NODE BIOPSY Right 12/18/2018    Procedure: SENTINEL LYMPH NODE BIOPSY; LYMPHATIC MAPPING WITH BLUE DYE AND RADIOACTIVE DYE (INJECT AT 0800 BY DR PRADO IN THE OR); Surgeon: Brandy Wisdom MD;  [facility];  Service: Surgical Oncology    SINUS SURGERY Left 1996    nasal polyps(?)-large papilloma left maxillary sinus with extension into posterior nasopharynx completed by Dr Smita Murrell      THYROID CYST EXCISION      ablation    TONSILLECTOMY AND ADENOIDECTOMY      US BREAST NEEDLE LOC RIGHT Right 12/18/2018    US GUIDED BREAST BIOPSY RIGHT COMPLETE Right 11/1/2018     Social History     Substance and Sexual Activity   Alcohol Use Not Currently    Alcohol/week: 0 0 standard drinks    Comment: rarely     Social History Substance and Sexual Activity   Drug Use No     Social History     Tobacco Use   Smoking Status Former Smoker    Packs/day: 0 00    Years: 0 00    Pack years: 0 00    Quit date: 10/16/2002    Years since quittin 5   Smokeless Tobacco Never Used     Family History   Problem Relation Age of Onset   Lobo Hernandez Breast cancer Mother     Pancreatic cancer Mother     Cancer Mother     Aortic aneurysm Father     Aortic aneurysm Family         Abdominal    Breast cancer Family     Breast cancer Maternal Grandmother        Allergies: Allergies   Allergen Reactions    Sulfa Antibiotics Dizziness     Other reaction(s): feeling "off-balance"  Category: Adverse Reaction;        Medications (as of START of this encounter): Outpatient Medications Prior to Visit   Medication Sig Dispense Refill    anastrozole (ARIMIDEX) 1 mg tablet Take 1 tablet (1 mg total) by mouth daily 90 tablet 3    Cholecalciferol (VITAMIN D3) 2000 units TABS Take 1 tablet by mouth daily      levothyroxine 112 mcg tablet Take 1 tablet (112 mcg total) by mouth daily 90 tablet 3    losartan (COZAAR) 100 MG tablet take 1 tablet by mouth once daily 90 tablet 3    pravastatin (PRAVACHOL) 20 mg tablet Take 1 tablet (20 mg total) by mouth daily 90 tablet 3    amLODIPine (NORVASC) 5 mg tablet Take 1 tab  twice daily (Patient taking differently: daily  ) 180 tablet 3    aspirin 81 MG tablet Take 1 tablet (81 mg total) by mouth daily 30 tablet 6    hydrochlorothiazide (HYDRODIURIL) 12 5 mg tablet Take 1 tablet (12 5 mg total) by mouth daily 30 tablet 5     No facility-administered medications prior to visit           Vitals:    22 0939   BP: 150/68   Pulse: 70   SpO2: 98%     Weight (last 2 days)     Date/Time Weight    22 0939 78 (172)          General: Cammy Aggarwal is a well appearing female, in no acute distress, sitting comfortably  HEENT: moist mucous membranes, EOMI  Neck:  No JVD, supple, trachea midline   Cardiovascular: unremarkable S1/S2, regular rate and rhythm, no murmurs, rubs or gallops   Pulmonary: normal respiratory effort, CTAB   Abdomen: soft and nondistended  Extremities: No lower extremity edema  Warm and well perfused extremities  Neuro: no focal motor deficits, AAOx3 (person, place, time)  Psych: Normal mood and affect, cooperative      Laboratory Studies:    Laboratory studies personally reviewed  Lipid panel 02/16/2022 , TG 57, HDL 63,     Cardiac testing:     EKG reviewed personally:   N/A          Time Spent:  Total time (face-to-face and non-face-to-face) spent on today's visit was 40 minutes  This includes preparation for the visits (i e  reviewing test results), performance of a medically appropriate history and examination, and orders for medications, tests or other procedures  This time is exclusive of procedures performed and time spent teaching  Jazmyn Nolen MD    This note was completed in part utilizing [employer] direct voice recognition software  Grammatical errors, random word insertion, spelling mistakes, occasional wrong word or "sound-alike" substitutions and incomplete sentences may be an occasional consequence of the system secondary to software limitations, ambient noise and hardware issues  At the time of dictation, efforts were made to edit, clarify and /or correct errors  Please read the chart carefully and recognize, using context, where substitutions have occurred  If you have any questions or concerns about the context, text or information contained within the body of this dictation, please contact myself, the provider, for further clarification

## 2022-05-06 NOTE — PATIENT INSTRUCTIONS
In 2-3 weeks start recording blood pressure again  Once daily, in morning, after 5 minutes of sitting quietly  Then call or send message with 7 days of BP log to determine next steps

## 2022-05-10 ENCOUNTER — OFFICE VISIT (OUTPATIENT)
Dept: PODIATRY | Facility: CLINIC | Age: 72
End: 2022-05-10
Payer: COMMERCIAL

## 2022-05-10 VITALS
SYSTOLIC BLOOD PRESSURE: 161 MMHG | DIASTOLIC BLOOD PRESSURE: 73 MMHG | HEART RATE: 64 BPM | HEIGHT: 65 IN | BODY MASS INDEX: 28.62 KG/M2

## 2022-05-10 DIAGNOSIS — S92.351D CLOSED DISPLACED FRACTURE OF FIFTH METATARSAL BONE OF RIGHT FOOT WITH ROUTINE HEALING, SUBSEQUENT ENCOUNTER: ICD-10-CM

## 2022-05-10 DIAGNOSIS — M81.0 AGE-RELATED OSTEOPOROSIS WITHOUT CURRENT PATHOLOGICAL FRACTURE: Primary | ICD-10-CM

## 2022-05-10 PROCEDURE — 99212 OFFICE O/P EST SF 10 MIN: CPT | Performed by: PODIATRIST

## 2022-05-10 PROCEDURE — 1160F RVW MEDS BY RX/DR IN RCRD: CPT | Performed by: PODIATRIST

## 2022-05-10 RX ORDER — SODIUM FLUORIDE 6 MG/ML
PASTE, DENTIFRICE DENTAL
COMMUNITY
Start: 2022-04-20

## 2022-05-10 NOTE — PROGRESS NOTES
Patient presents for assessment of right 5th metatarsal shaft fracture  She is approximately 3 weeks post injury  She rates her pain as 1 or 2/10 but still has significant pain with palpation  Sensorium is intact per [employer] monofilament  Vascular supply within normal limits  Patient told to continue in surgical shoe  She will be reassessed in 3 weeks  X-rays will be taken at that time

## 2022-05-11 NOTE — TELEPHONE ENCOUNTER
Message sent via [employer] Please call patient  Recommend to increase dose of Amlodipine 5 mg to 1 tab  twice daily  Continue Losartan 100 mg daily  Will see patient tomorrow  Ask patient to bring her BP monitor

## 2022-05-19 ENCOUNTER — HOSPITAL ENCOUNTER (OUTPATIENT)
Dept: NON INVASIVE DIAGNOSTICS | Facility: CLINIC | Age: 72
Discharge: HOME | End: 2022-05-19
Payer: COMMERCIAL

## 2022-05-19 DIAGNOSIS — I77.1 SUBCLAVIAN ARTERY STENOSIS (HCC): ICD-10-CM

## 2022-05-19 DIAGNOSIS — I10 PRIMARY HYPERTENSION: ICD-10-CM

## 2022-05-19 PROCEDURE — 93930 UPPER EXTREMITY STUDY: CPT

## 2022-05-19 PROCEDURE — 93930 UPPER EXTREMITY STUDY: CPT | Performed by: SURGERY

## 2022-05-31 ENCOUNTER — OFFICE VISIT (OUTPATIENT)
Dept: PODIATRY | Facility: CLINIC | Age: 72
End: 2022-05-31
Payer: COMMERCIAL

## 2022-05-31 VITALS
DIASTOLIC BLOOD PRESSURE: 79 MMHG | HEART RATE: 82 BPM | BODY MASS INDEX: 28.62 KG/M2 | HEIGHT: 65 IN | SYSTOLIC BLOOD PRESSURE: 169 MMHG

## 2022-05-31 DIAGNOSIS — S92.351D CLOSED DISPLACED FRACTURE OF FIFTH METATARSAL BONE OF RIGHT FOOT WITH ROUTINE HEALING, SUBSEQUENT ENCOUNTER: Primary | ICD-10-CM

## 2022-05-31 PROCEDURE — 99213 OFFICE O/P EST LOW 20 MIN: CPT | Performed by: PODIATRIST

## 2022-05-31 NOTE — PROGRESS NOTES
Assessment/Plan:    I personally reviewed x-rays of the right foot  Visible fracture line persists on AP view but oblique reveal significant healing  Patient told that she may return to a running shoe pain permitted  She will contact me should she developed additional symptoms in the future  No problem-specific Assessment & Plan notes found for this encounter  Diagnoses and all orders for this visit:    Closed displaced fracture of fifth metatarsal bone of right foot with routine healing, subsequent encounter  -     X-ray foot right 3+ views; Future          Subjective:      Patient ID: Angelina Carrasco is a 74 y o  female  HPI     Patient presents for follow-up regarding 5th metatarsal shaft fracture right foot  Patient is wearing an Ace wrap with a surgical shoe  She relates minimal if any pain at the fracture site  The following portions of the patient's history were reviewed and updated as appropriate: allergies, current medications, past family history, past medical history, past social history, past surgical history and problem list     Review of Systems   Musculoskeletal:        Osteoporosis   Psychiatric/Behavioral: Negative  Objective:      /79   Pulse 82   Ht 5' 5" (1 651 m)   BMI 28 62 kg/m²          Physical Exam  Constitutional:       Appearance: Normal appearance  Cardiovascular:      Pulses: Normal pulses  Musculoskeletal:         General: Signs of injury present  Comments: Mild right foot edema noted  No pain with palpation 5th ray right foot   Skin:     General: Skin is warm  Neurological:      General: No focal deficit present  Mental Status: She is oriented to person, place, and time

## 2022-06-06 ENCOUNTER — TELEPHONE (OUTPATIENT)
Dept: FAMILY MEDICINE CLINIC | Facility: CLINIC | Age: 72
End: 2022-06-06

## 2022-06-06 NOTE — TELEPHONE ENCOUNTER
Patient went to  her hydrochlorothiazide 12 5 mg and was told it was cancelled    Please contact patient at [phone] to confirm this and if so, why

## 2022-06-06 NOTE — TELEPHONE ENCOUNTER
Please call patient  She was seen by cardiology on May 7, 2022  As per cardiology note ,HCTZ was d/c  Recommend to call cardiology to clarify current medical regimen for HTN

## 2022-06-09 ENCOUNTER — OFFICE VISIT (OUTPATIENT)
Dept: FAMILY MEDICINE CLINIC | Facility: CLINIC | Age: 72
End: 2022-06-09
Payer: COMMERCIAL

## 2022-06-09 VITALS
DIASTOLIC BLOOD PRESSURE: 60 MMHG | HEART RATE: 78 BPM | OXYGEN SATURATION: 98 % | BODY MASS INDEX: 29.09 KG/M2 | RESPIRATION RATE: 16 BRPM | SYSTOLIC BLOOD PRESSURE: 132 MMHG | HEIGHT: 65 IN | WEIGHT: 174.6 LBS | TEMPERATURE: 99.3 F

## 2022-06-09 DIAGNOSIS — J01.00 ACUTE NON-RECURRENT MAXILLARY SINUSITIS: Primary | ICD-10-CM

## 2022-06-09 PROCEDURE — 1036F TOBACCO NON-USER: CPT | Performed by: FAMILY MEDICINE

## 2022-06-09 PROCEDURE — 1160F RVW MEDS BY RX/DR IN RCRD: CPT | Performed by: FAMILY MEDICINE

## 2022-06-09 PROCEDURE — 3008F BODY MASS INDEX DOCD: CPT | Performed by: FAMILY MEDICINE

## 2022-06-09 PROCEDURE — 3075F SYST BP GE 130 - 139MM HG: CPT | Performed by: FAMILY MEDICINE

## 2022-06-09 PROCEDURE — 3078F DIAST BP <80 MM HG: CPT | Performed by: FAMILY MEDICINE

## 2022-06-09 PROCEDURE — 99213 OFFICE O/P EST LOW 20 MIN: CPT | Performed by: FAMILY MEDICINE

## 2022-06-09 RX ORDER — AMOXICILLIN 875 MG/1
TABLET, COATED ORAL
Qty: 20 TABLET | Refills: 0 | Status: SHIPPED | OUTPATIENT
Start: 2022-06-09 | End: 2022-06-19

## 2022-06-09 RX ORDER — FLUTICASONE PROPIONATE 50 MCG
1 SPRAY, SUSPENSION (ML) NASAL DAILY
Qty: 16 G | Refills: 0 | Status: SHIPPED | OUTPATIENT
Start: 2022-06-09

## 2022-06-09 NOTE — PROGRESS NOTES
Chief Complaint   Patient presents with    Sinusitis     Health Maintenance   Topic Date Due    Colorectal Cancer Screening  Never done    COVID-19 Vaccine (4 - Booster for Aden Darrin series) 03/20/2022    Breast Cancer Screening: Mammogram  10/19/2022    Fall Risk  02/23/2023    Depression Screening  02/23/2023    Medicare Annual Wellness Visit (AWV)  02/23/2023    BMI: Followup Plan  02/23/2023    Falls: Plan of Care  02/23/2023    BMI: Adult  06/09/2023    DTaP,Tdap,and Td Vaccines (2 - Td or Tdap) 10/29/2031    Hepatitis C Screening  Completed    Osteoporosis Screening  Completed    Pneumococcal Vaccine: 65+ Years  Completed    Influenza Vaccine  Completed    HIB Vaccine  Aged Out    Hepatitis B Vaccine  Aged Out    IPV Vaccine  Aged Out    Hepatitis A Vaccine  Aged Out    Meningococcal ACWY Vaccine  Aged Out    HPV Vaccine  Aged Out      Assessment/Plan:    Acute non-recurrent maxillary sinusitis  Will start Amoxil 875 mg 1 tablet twice daily for 7 -10 days  Recommended to increase fluid intake  Take Tylenol PRN for headache  Use Flonase nasal spray daily  Also may use saline nasal spray PRN  Call office if symptoms persist or worsen  Diagnoses and all orders for this visit:    Acute non-recurrent maxillary sinusitis  -     amoxicillin (AMOXIL) 875 mg tablet; Take 1 tab  twice daily with food  -     fluticasone (FLONASE) 50 mcg/act nasal spray; 1 spray into each nostril daily          Subjective:      Patient ID: Tiffany Salcedo is a 74 y o  female  HPI     Patient presents to the office c/o nasal congestion, sinus pressure on right side, postnasal drip for the past 10 days  Denies fever  No chest congestion or cough  Patient has not been taking any OTC cold medications  Patient had negative at-home Covid test yesterday      The following portions of the patient's history were reviewed and updated as appropriate: allergies, past family history, past medical history, past social history, past surgical history and problem list     Review of Systems   Constitutional: Positive for fatigue  Negative for activity change, appetite change, chills and fever  HENT: Positive for congestion, postnasal drip, sinus pressure and sore throat  Negative for mouth sores and nosebleeds  Eyes: Negative for pain, discharge, redness, itching and visual disturbance  Respiratory: Negative for cough, chest tightness, shortness of breath and wheezing  Cardiovascular: Negative for chest pain  Gastrointestinal: Negative for diarrhea, nausea and vomiting  Neurological: Positive for headaches  Negative for dizziness  Hematological: Negative  Objective:      /60 (BP Location: Left arm, Patient Position: Sitting)   Pulse 78   Temp 99 3 °F (37 4 °C) (Tympanic)   Resp 16   Ht 5' 5" (1 651 m)   Wt 79 2 kg (174 lb 9 6 oz)   SpO2 98%   BMI 29 05 kg/m²          Physical Exam  Vitals and nursing note reviewed  Constitutional:       Appearance: Normal appearance  HENT:      Head: Normocephalic and atraumatic  Nose: Congestion present  Mouth/Throat:      Pharynx: Posterior oropharyngeal erythema present  Eyes:      Conjunctiva/sclera: Conjunctivae normal       Pupils: Pupils are equal, round, and reactive to light  Cardiovascular:      Rate and Rhythm: Normal rate and regular rhythm  Heart sounds: No murmur heard  Pulmonary:      Effort: Pulmonary effort is normal       Breath sounds: Normal breath sounds  Musculoskeletal:         General: No swelling  Cervical back: Normal range of motion and neck supple  Right lower leg: No edema  Left lower leg: No edema  Skin:     General: Skin is warm and dry  Findings: No rash  Neurological:      Mental Status: She is alert     Psychiatric:         Mood and Affect: Mood normal

## 2022-06-09 NOTE — ASSESSMENT & PLAN NOTE
Will start Amoxil 875 mg 1 tablet twice daily for 7 -10 days  Recommended to increase fluid intake  Take Tylenol PRN for headache  Use Flonase nasal spray daily  Also may use saline nasal spray PRN  Call office if symptoms persist or worsen

## 2022-06-17 ENCOUNTER — APPOINTMENT (OUTPATIENT)
Dept: LAB | Facility: HOSPITAL | Age: 72
End: 2022-06-17
Attending: INTERNAL MEDICINE
Payer: COMMERCIAL

## 2022-06-17 DIAGNOSIS — I10 PRIMARY HYPERTENSION: ICD-10-CM

## 2022-06-17 LAB
ANION GAP SERPL CALCULATED.3IONS-SCNC: 2 MMOL/L (ref 4–13)
BUN SERPL-MCNC: 22 MG/DL (ref 5–25)
CALCIUM SERPL-MCNC: 9.3 MG/DL (ref 8.3–10.1)
CHLORIDE SERPL-SCNC: 104 MMOL/L (ref 100–108)
CO2 SERPL-SCNC: 32 MMOL/L (ref 21–32)
CREAT SERPL-MCNC: 1.02 MG/DL (ref 0.6–1.3)
GFR SERPL CREATININE-BSD FRML MDRD: 54 ML/MIN/1.73SQ M
GLUCOSE P FAST SERPL-MCNC: 88 MG/DL (ref 65–99)
POTASSIUM SERPL-SCNC: 3.2 MMOL/L (ref 3.5–5.3)
SODIUM SERPL-SCNC: 138 MMOL/L (ref 136–145)

## 2022-06-17 PROCEDURE — 80048 BASIC METABOLIC PNL TOTAL CA: CPT

## 2022-06-17 PROCEDURE — 36415 COLL VENOUS BLD VENIPUNCTURE: CPT

## 2022-07-05 ENCOUNTER — TELEPHONE (OUTPATIENT)
Dept: CARDIOLOGY CLINIC | Facility: CLINIC | Age: 72
End: 2022-07-05

## 2022-07-05 DIAGNOSIS — I10 PRIMARY HYPERTENSION: Primary | ICD-10-CM

## 2022-07-05 DIAGNOSIS — Z17.0 MALIGNANT NEOPLASM OF LOWER-INNER QUADRANT OF RIGHT BREAST OF FEMALE, ESTROGEN RECEPTOR POSITIVE (HCC): ICD-10-CM

## 2022-07-05 DIAGNOSIS — C50.311 MALIGNANT NEOPLASM OF LOWER-INNER QUADRANT OF RIGHT BREAST OF FEMALE, ESTROGEN RECEPTOR POSITIVE (HCC): ICD-10-CM

## 2022-07-05 RX ORDER — ANASTROZOLE 1 MG/1
1 TABLET ORAL DAILY
Qty: 90 TABLET | Refills: 0 | Status: SHIPPED | OUTPATIENT
Start: 2022-07-05 | End: 2022-10-10

## 2022-07-05 RX ORDER — POTASSIUM CHLORIDE 20 MEQ/1
20 TABLET, EXTENDED RELEASE ORAL DAILY
Qty: 90 TABLET | Refills: 1 | Status: SHIPPED | OUTPATIENT
Start: 2022-07-05 | End: 2022-08-22 | Stop reason: ALTCHOICE

## 2022-07-05 NOTE — TELEPHONE ENCOUNTER
----- Message from Radhames Nguyen MD sent at 7/4/2022 10:47 PM EDT -----  Please call patient  Her HCTZ has dropped her potassium slightly  Can we start potassium chloride 20 meq daily  Thanks

## 2022-07-11 ENCOUNTER — RA CDI HCC (OUTPATIENT)
Dept: OTHER | Facility: HOSPITAL | Age: 72
End: 2022-07-11

## 2022-07-11 NOTE — PROGRESS NOTES
[address]  coding opportunities       Chart reviewed, no opportunity found: CHART REVIEWED, NO OPPORTUNITY FOUND        Patients Insurance     Medicare Insurance: Medicare

## 2022-07-13 ENCOUNTER — OFFICE VISIT (OUTPATIENT)
Dept: HEMATOLOGY ONCOLOGY | Facility: CLINIC | Age: 72
End: 2022-07-13
Payer: COMMERCIAL

## 2022-07-13 VITALS
HEART RATE: 92 BPM | TEMPERATURE: 98 F | HEIGHT: 65 IN | WEIGHT: 173 LBS | OXYGEN SATURATION: 98 % | BODY MASS INDEX: 28.82 KG/M2 | DIASTOLIC BLOOD PRESSURE: 76 MMHG | SYSTOLIC BLOOD PRESSURE: 132 MMHG | RESPIRATION RATE: 14 BRPM

## 2022-07-13 DIAGNOSIS — Z17.0 MALIGNANT NEOPLASM OF LOWER-INNER QUADRANT OF RIGHT BREAST OF FEMALE, ESTROGEN RECEPTOR POSITIVE (HCC): Primary | ICD-10-CM

## 2022-07-13 DIAGNOSIS — C50.311 MALIGNANT NEOPLASM OF LOWER-INNER QUADRANT OF RIGHT BREAST OF FEMALE, ESTROGEN RECEPTOR POSITIVE (HCC): Primary | ICD-10-CM

## 2022-07-13 PROCEDURE — 99214 OFFICE O/P EST MOD 30 MIN: CPT | Performed by: INTERNAL MEDICINE

## 2022-07-13 PROCEDURE — 1160F RVW MEDS BY RX/DR IN RCRD: CPT | Performed by: INTERNAL MEDICINE

## 2022-07-13 NOTE — PROGRESS NOTES
Hematology / Oncology Outpatient Follow Up Note    Rowena Betts 74 y o  female YTA:[id number] CHR:[id number]         Date:  7/13/2022    Assessment / Plan:  A 75-year-old postmenopausal woman with stage IA right breast cancer, grade 1, % positive, MA 90% positive, HER2 negative disease   She underwent lumpectomy with sentinel lymph node biopsy, resulting in MANUEL    Since January 2019, she has been on adjuvant hormonal therapy with anastrozole with no toxicity  She had no evidence of recurrent disease, based on her symptoms and physical examination  I recommended her to continue anastrozole until January 2024 to complete 5 years of adjuvant hormonal therapy  She is in agreement with my recommendation  I will see her again in a year for routine follow-up        Subjective:      HPI:  A 75-year-old postmenopausal woman who was found to have abnormality in her right breast, based on a screening mammography   She underwent right breast biopsy in November 1, 2018 which showed invasive ductal carcinoma, grade 1   Subsequently, she underwent lumpectomy with sentinel lymph node biopsy by Dr Greg Griffiths in December 18, 2018 which showed 11 x 5 mm of invasive ductal carcinoma, grade 1   There was no evidence of lymphovascular invasion   One sentinel lymph node was negative for metastatic disease   This was % positive, MA 90% positive, HER2 negative disease   She presents today to discuss adjuvant treatment options   She has no complaint of pain   She feels well   She has history of osteoporosis for which she is on denosumab injection every 6 months for at least 3-4 years  [place] Session has improvement of bone density on denosumab    She has well-controlled hypertension  [place] Session was a smoker until 15 years ago  [place] Session is on lung cancer screening by CT scan   She has no respiratory symptoms such as cough, sputum production or shortness of breath   Her weight is stable   She has normal performance status            Interval History:  A 68-year-old postmenopausal woman with stage IA right breast cancer, grade 1, % positive, DE 90% positive, HER2 negative disease   She underwent lumpectomy with sentinel lymph node biopsy, resulting in MANUEL    Since January 2019, she has been on adjuvant hormonal therapy with anastrozole   She presents today for routine follow-up  She feels well with no new complaint  She has no complaint of hot flashes  She denied any bone pain  She has no respiratory symptoms  Her last mammography was on October 2021 which was benign  Her performance status is normal        Objective:      Primary Diagnosis:     1    Right breast cancer, stage IA (pT1c, pN0, M0) grade 1, % positive, DE 90% positive, HER2 negative disease    Diagnosed in December 2018  2    History of osteoporosis      Cancer Staging:  Cancer Staging  No matching staging information was found for the patient         Previous Hematologic/ Oncologic Treatment:            Current Hematologic/ Oncologic Treatment:       Adjuvant hormonal therapy with anastrozole since January 2019      Disease Status:      MANUEL status post lumpectomy and sentinel lymph node biopsy      Test Results:     Pathology:     11 x 5 mm of invasive ductal carcinoma, grade 1   No evidence of lymphovascular invasion   One sentinel lymph node was negative for metastatic disease   % positive, DE 90% positive, HER2 negative disease   Stage IA (pT1c, pN0, M0)     Radiology:     Mammography in October 2021 was benign   BI-RADS 2  DEXA scan in August 2019 showed T-score-1 8, consistent with osteopenia      Laboratory:     See below      Physical Exam:        General Appearance:    Alert, oriented          Eyes:    PERRL   Ears:    Normal external ear canals, both ears   Nose:   Nares normal, septum midline   Throat:   Mucosa moist  Pharynx without injection      Neck:   Supple         Lungs:     Clear to auscultation bilaterally   Chest Wall:    No tenderness or deformity  Heart:    Regular rate and rhythm         Abdomen:     Soft, non-tender, bowel sounds +, no organomegaly               Extremities:   Extremities no cyanosis or edema         Skin:   no rash or icterus  Lymph nodes:   Cervical, supraclavicular, and axillary nodes normal   Neurologic:   CNII-XII intact, normal strength, sensation and reflexes     Throughout             Breast exam:   Lumpectomy scar at 3:00 position in her right breast with no palpable abnormalities  Left breast exam is negative  ROS: Review of Systems   All other systems reviewed and are negative  Imaging: No results found  Labs:   Lab Results   Component Value Date    WBC 4 99 08/09/2021    HGB 12 7 08/09/2021    HCT 39 2 08/09/2021     (H) 08/09/2021     08/09/2021     Lab Results   Component Value Date     12/09/2015    K 3 2 (L) 06/17/2022     06/17/2022    CO2 32 06/17/2022    ANIONGAP 6 12/09/2015    BUN 22 06/17/2022    CREATININE 1 02 06/17/2022    GLUCOSE 81 12/09/2015    GLUF 88 06/17/2022    CALCIUM 9 3 06/17/2022    CORRECTEDCA 9 7 02/16/2022    AST 9 02/16/2022    ALT 12 02/16/2022    ALKPHOS 78 02/16/2022    PROT 7 0 12/09/2015    BILITOT 0 57 12/09/2015    EGFR 54 06/17/2022         Lab Results   Component Value Date    XNSANEHN21 296 02/16/2022         Current Medications: Reviewed  Allergies: Reviewed  PMH/FH/SH:  Reviewed      Vital Sign:    Body surface area is 1 86 meters squared      Wt Readings from Last 3 Encounters:   07/13/22 78 5 kg (173 lb)   06/09/22 79 2 kg (174 lb 9 6 oz)   05/06/22 78 kg (172 lb)        Temp Readings from Last 3 Encounters:   07/13/22 98 °F (36 7 °C) (Temporal)   06/09/22 99 3 °F (37 4 °C) (Tympanic)   04/18/22 98 °F (36 7 °C)        BP Readings from Last 3 Encounters:   07/13/22 132/76   06/09/22 132/60   05/31/22 169/79         Pulse Readings from Last 3 Encounters:   07/13/22 92   06/09/22 78   05/31/22 82     @LASTSAO2(3)@

## 2022-07-25 ENCOUNTER — OFFICE VISIT (OUTPATIENT)
Dept: URGENT CARE | Facility: CLINIC | Age: 72
End: 2022-07-25
Payer: COMMERCIAL

## 2022-07-25 ENCOUNTER — APPOINTMENT (OUTPATIENT)
Dept: RADIOLOGY | Facility: CLINIC | Age: 72
End: 2022-07-25
Payer: COMMERCIAL

## 2022-07-25 VITALS
BODY MASS INDEX: 28.99 KG/M2 | HEIGHT: 65 IN | DIASTOLIC BLOOD PRESSURE: 72 MMHG | HEART RATE: 87 BPM | SYSTOLIC BLOOD PRESSURE: 175 MMHG | RESPIRATION RATE: 20 BRPM | WEIGHT: 174 LBS | OXYGEN SATURATION: 96 % | TEMPERATURE: 97.4 F

## 2022-07-25 DIAGNOSIS — S99.921A TOE INJURY, RIGHT, INITIAL ENCOUNTER: Primary | ICD-10-CM

## 2022-07-25 DIAGNOSIS — S99.921A TOE INJURY, RIGHT, INITIAL ENCOUNTER: ICD-10-CM

## 2022-07-25 PROCEDURE — 73630 X-RAY EXAM OF FOOT: CPT

## 2022-07-25 PROCEDURE — 99213 OFFICE O/P EST LOW 20 MIN: CPT | Performed by: PHYSICIAN ASSISTANT

## 2022-07-25 NOTE — PROGRESS NOTES
[facility]        NAME: Debbie Tatum is a 74 y o  female  : 1950    MRN: [id number]  DATE: 2022  TIME: 11:50 AM    Assessment and Plan   Toe injury, right, initial encounter [U21 [id number]]  1  Toe injury, right, initial encounter  XR foot 3+ vw right     XR showed only the old fracture of the R 5th metatarsal without any new acute abnormalties      Patient Instructions   Advised conservative treatment including rest ice elevation NSAIDs bengay and supportive footwear/boot from ortho    Follow up with PCP in 3-5 days  Proceed to  ER if symptoms worsen  Chief Complaint     Chief Complaint   Patient presents with    Toe Injury     Right 5th toe pain x today, hit it on the edge of the bathtub, broke the same toe this past , reports she pushed the toe back into alignment          History of Present Illness       Pt is a 74year old female PMH significant for breast cancer and right 5th metatarsal fx 2 mo ago who presents to the  today c/o R 5th toe pain x several hours  Pt states she was in the bathroom this morning when her R 5th toe got caught on the side of the bathtub and got yanked laterally  She states she had some pain and swelling immediately after that and wants to ensure its not broke since she recently broke her R 5th metatarsal in April and is unsure if it is completely healed  Pt denies any temperature changes or numbness/tingling, decreased sensation in her R foot or 5th toe but does report decreased ROM of that toe (decreased flexion)  Pt denies any other injuries, hitting her head, falling, CP or SOB or syncope or leg pain  Review of Systems   Review of Systems   Constitutional: Negative for chills, fatigue and fever  HENT: Negative for ear pain and sore throat  Eyes: Negative for pain and visual disturbance  Respiratory: Negative for cough and shortness of breath  Cardiovascular: Negative for chest pain and palpitations     Gastrointestinal: Negative for abdominal pain and vomiting  Genitourinary: Negative for dysuria and hematuria  Musculoskeletal: Positive for arthralgias, joint swelling and myalgias  Negative for back pain and gait problem  Skin: Negative for color change and rash  Neurological: Negative for seizures and syncope  All other systems reviewed and are negative  Current Medications       Current Outpatient Medications:     amLODIPine (NORVASC) 5 mg tablet, Take 1 tablet (5 mg total) by mouth daily, Disp: 90 tablet, Rfl: 3    anastrozole (ARIMIDEX) 1 mg tablet, Take 1 tablet (1 mg total) by mouth daily, Disp: 90 tablet, Rfl: 0    Cholecalciferol (VITAMIN D3) 2000 units TABS, Take 1 tablet by mouth daily, Disp: , Rfl:     fluticasone (FLONASE) 50 mcg/act nasal spray, 1 spray into each nostril daily, Disp: 16 g, Rfl: 0    hydrochlorothiazide (HYDRODIURIL) 25 mg tablet, Take 1 tablet (25 mg total) by mouth daily, Disp: 30 tablet, Rfl: 11    levothyroxine 112 mcg tablet, Take 1 tablet (112 mcg total) by mouth daily, Disp: 90 tablet, Rfl: 3    losartan (COZAAR) 100 MG tablet, take 1 tablet by mouth once daily, Disp: 90 tablet, Rfl: 3    potassium chloride (K-DUR,KLOR-CON) 20 mEq tablet, Take 1 tablet (20 mEq total) by mouth daily (Patient not taking: No sig reported), Disp: 90 tablet, Rfl: 1    rosuvastatin (CRESTOR) 20 MG tablet, Take 1 tablet (20 mg total) by mouth daily, Disp: 90 tablet, Rfl: 3    Sodium Fluoride 5000 PPM 1 1 % PSTE, BRUSH NORMALLY AND EXPECTORATE BUT DO NOT RINSE AFTER  DO NOT EAT     (REFER TO PRESCRIPTION NOTES)  , Disp: , Rfl:     Current Allergies     Allergies as of 07/25/2022 - Reviewed 07/25/2022   Allergen Reaction Noted    Sulfa antibiotics Dizziness 06/19/2012            The following portions of the patient's history were reviewed and updated as appropriate: allergies, current medications, past family history, past medical history, past social history, past surgical history and problem list  Past Medical History:   Diagnosis Date    Acute pain of left shoulder     History    Acute recurrent maxillary sinusitis     History    Acute upper respiratory infection     History    BRCA negative 11/12/2018    [facility]    BRCA1 negative     BRCA2 negative     Breast cancer (HCC) 11/01/2018    Right    Callus     Cancer ([address] )     Carotid artery narrowing     stenosis    Disease of thyroid gland     High arches     History of Graves' disease     status post 131 treatment    History of lateral epicondylitis of left elbow     History of onychomycosis     of toenail    History of perforation of tympanic membrane     Hyperlipidemia     Hypertension     Ingrown toenail     Left otitis media     History    Medicare annual wellness visit, subsequent        Past Surgical History:   Procedure Laterality Date    BREAST BIOPSY Right 11/01/2018    U/S BX    BREAST LUMPECTOMY Right 12/18/2018    Procedure: BREAST LUMPECTOMY; BREAST NEEDLE LOCALIZATION (NEEDLE LOC AT 0700); Surgeon: Karen Brown MD;  [facility];  Service: Surgical Oncology    BREAST LUMPECTOMY      ELBOW FRACTURE SURGERY  09/1960    Open Treatment of Fracture of proximal Radius    LYMPH NODE BIOPSY Right 12/18/2018    Procedure: SENTINEL LYMPH NODE BIOPSY; LYMPHATIC MAPPING WITH BLUE DYE AND RADIOACTIVE DYE (INJECT AT 0800 BY DR PRADO IN THE OR); Surgeon: Karen Brown MD;  [facility];  Service: Surgical Oncology    SINUS SURGERY Left 1996    nasal polyps(?)-large papilloma left maxillary sinus with extension into posterior nasopharynx completed by Dr Kym Montes      THYROID CYST EXCISION      ablation    TONSILLECTOMY AND ADENOIDECTOMY      US BREAST NEEDLE LOC RIGHT Right 12/18/2018    US GUIDED BREAST BIOPSY RIGHT COMPLETE Right 11/1/2018       Family History   Problem Relation Age of Onset    Breast cancer Mother     Pancreatic cancer Mother     Cancer Mother     Aortic aneurysm Father     Aortic aneurysm Family         Abdominal    Breast cancer Family     Breast cancer Maternal Grandmother          Medications have been verified  Objective   BP (!) 175/72   Pulse 87   Temp (!) 97 4 °F (36 3 °C)   Resp 20   Ht 5' 5" (1 651 m)   Wt 78 9 kg (174 lb)   SpO2 96%   BMI 28 96 kg/m²   No LMP recorded  Patient is postmenopausal        Physical Exam     Physical Exam  Vitals and nursing note reviewed  Constitutional:       Appearance: Normal appearance  She is well-developed  She is not diaphoretic  HENT:      Head: Normocephalic and atraumatic  Mouth/Throat:      Pharynx: No oropharyngeal exudate  Eyes:      Pupils: Pupils are equal, round, and reactive to light  Cardiovascular:      Rate and Rhythm: Normal rate and regular rhythm  Heart sounds: Normal heart sounds  No murmur heard  Pulmonary:      Effort: Pulmonary effort is normal  No respiratory distress  Breath sounds: Normal breath sounds  No wheezing  Abdominal:      General: Bowel sounds are normal  There is no distension  Palpations: Abdomen is soft  Abdomen is not rigid  There is no mass  Tenderness: There is no abdominal tenderness  There is no guarding or rebound  Musculoskeletal:         General: Swelling, tenderness and signs of injury present  Cervical back: Normal range of motion and neck supple  Right lower leg: Normal  No edema  Left lower leg: Normal  No edema  Right ankle: Normal       Left ankle: Normal       Right foot: Decreased range of motion  Normal capillary refill  Swelling, tenderness and bony tenderness present  No crepitus  Normal pulse  Left foot: Normal       Comments: R 5th toe decreased flexion   R 5th toe TTP along the MTP joint and directly below it along the distal end of the 5th right metatarsal   Lymphadenopathy:      Cervical: No cervical adenopathy  Skin:     General: Skin is warm and dry  Findings: No rash     Neurological:      Mental Status: She is alert and oriented to person, place, and time     Psychiatric:         Behavior: Behavior normal

## 2022-08-19 ENCOUNTER — APPOINTMENT (OUTPATIENT)
Dept: LAB | Facility: CLINIC | Age: 72
End: 2022-08-19
Payer: COMMERCIAL

## 2022-08-19 DIAGNOSIS — E03.9 ACQUIRED HYPOTHYROIDISM: ICD-10-CM

## 2022-08-19 DIAGNOSIS — I10 PRIMARY HYPERTENSION: ICD-10-CM

## 2022-08-19 DIAGNOSIS — E78.2 MIXED HYPERLIPIDEMIA: ICD-10-CM

## 2022-08-19 LAB
ALBUMIN SERPL BCP-MCNC: 3.1 G/DL (ref 3.5–5)
ALP SERPL-CCNC: 71 U/L (ref 46–116)
ALT SERPL W P-5'-P-CCNC: 21 U/L (ref 12–78)
ANION GAP SERPL CALCULATED.3IONS-SCNC: 5 MMOL/L (ref 4–13)
AST SERPL W P-5'-P-CCNC: 22 U/L (ref 5–45)
BASOPHILS # BLD AUTO: 0.03 THOUSANDS/ΜL (ref 0–0.1)
BASOPHILS NFR BLD AUTO: 1 % (ref 0–1)
BILIRUB SERPL-MCNC: 0.57 MG/DL (ref 0.2–1)
BUN SERPL-MCNC: 21 MG/DL (ref 5–25)
CALCIUM ALBUM COR SERPL-MCNC: 10 MG/DL (ref 8.3–10.1)
CALCIUM SERPL-MCNC: 9.3 MG/DL (ref 8.3–10.1)
CHLORIDE SERPL-SCNC: 106 MMOL/L (ref 96–108)
CHOLEST SERPL-MCNC: 154 MG/DL
CO2 SERPL-SCNC: 29 MMOL/L (ref 21–32)
CREAT SERPL-MCNC: 1.01 MG/DL (ref 0.6–1.3)
EOSINOPHIL # BLD AUTO: 0.13 THOUSAND/ΜL (ref 0–0.61)
EOSINOPHIL NFR BLD AUTO: 3 % (ref 0–6)
ERYTHROCYTE [DISTWIDTH] IN BLOOD BY AUTOMATED COUNT: 12.8 % (ref 11.6–15.1)
GFR SERPL CREATININE-BSD FRML MDRD: 54 ML/MIN/1.73SQ M
GLUCOSE P FAST SERPL-MCNC: 87 MG/DL (ref 65–99)
HCT VFR BLD AUTO: 37.3 % (ref 34.8–46.1)
HDLC SERPL-MCNC: 63 MG/DL
HGB BLD-MCNC: 12.3 G/DL (ref 11.5–15.4)
IMM GRANULOCYTES # BLD AUTO: 0.01 THOUSAND/UL (ref 0–0.2)
IMM GRANULOCYTES NFR BLD AUTO: 0 % (ref 0–2)
LDLC SERPL CALC-MCNC: 78 MG/DL (ref 0–100)
LYMPHOCYTES # BLD AUTO: 1.51 THOUSANDS/ΜL (ref 0.6–4.47)
LYMPHOCYTES NFR BLD AUTO: 29 % (ref 14–44)
MCH RBC QN AUTO: 33.7 PG (ref 26.8–34.3)
MCHC RBC AUTO-ENTMCNC: 33 G/DL (ref 31.4–37.4)
MCV RBC AUTO: 102 FL (ref 82–98)
MONOCYTES # BLD AUTO: 0.48 THOUSAND/ΜL (ref 0.17–1.22)
MONOCYTES NFR BLD AUTO: 9 % (ref 4–12)
NEUTROPHILS # BLD AUTO: 3.14 THOUSANDS/ΜL (ref 1.85–7.62)
NEUTS SEG NFR BLD AUTO: 58 % (ref 43–75)
NONHDLC SERPL-MCNC: 91 MG/DL
NRBC BLD AUTO-RTO: 0 /100 WBCS
PLATELET # BLD AUTO: 326 THOUSANDS/UL (ref 149–390)
PMV BLD AUTO: 8.7 FL (ref 8.9–12.7)
POTASSIUM SERPL-SCNC: 3.9 MMOL/L (ref 3.5–5.3)
PROT SERPL-MCNC: 6.9 G/DL (ref 6.4–8.4)
RBC # BLD AUTO: 3.65 MILLION/UL (ref 3.81–5.12)
SODIUM SERPL-SCNC: 140 MMOL/L (ref 135–147)
TRIGL SERPL-MCNC: 66 MG/DL
TSH SERPL DL<=0.05 MIU/L-ACNC: 1.1 UIU/ML (ref 0.45–4.5)
WBC # BLD AUTO: 5.3 THOUSAND/UL (ref 4.31–10.16)

## 2022-08-19 PROCEDURE — 80053 COMPREHEN METABOLIC PANEL: CPT

## 2022-08-19 PROCEDURE — 80061 LIPID PANEL: CPT

## 2022-08-19 PROCEDURE — 84443 ASSAY THYROID STIM HORMONE: CPT

## 2022-08-19 PROCEDURE — 36415 COLL VENOUS BLD VENIPUNCTURE: CPT

## 2022-08-19 PROCEDURE — 85025 COMPLETE CBC W/AUTO DIFF WBC: CPT

## 2022-08-21 ENCOUNTER — ANESTHESIA (OUTPATIENT)
Dept: ANESTHESIOLOGY | Facility: HOSPITAL | Age: 72
End: 2022-08-21

## 2022-08-21 ENCOUNTER — ANESTHESIA EVENT (OUTPATIENT)
Dept: ANESTHESIOLOGY | Facility: HOSPITAL | Age: 72
End: 2022-08-21

## 2022-08-21 NOTE — ANESTHESIA PREPROCEDURE EVALUATION
Procedure:  PRE-OP ONLY    Relevant Problems   CARDIO   (+) Heart murmur   (+) Hyperlipidemia   (+) Hypertension      ENDO   (+) Hypothyroidism      GYN   (+) Malignant neoplasm of lower-inner quadrant of right breast of female, estrogen receptor positive (HCC)      NEURO/PSYCH   (+) Paresthesia of foot, bilateral      Upper Extremity Arterial Duplex 5/19/2022:  RIGHT UPPER LIMB:  This resting evaluation shows no evidence of significant upper extremity  arterial occlusive disease  Forearm/Upper arm index: 0 82,  Digit pressure of 83mmHg, which is within limits for healing potential  (DBI:  0 47 )     LEFT UPPER LIMB:  There is a 50-75%stenosis in the proximal subclavian artery  Forearm/Upper arm index : 0 81  Digit pressure of 0 81mmHg, which is within limits for healing potential  (DBI:  0 92 )     Compared to previous cerebrovascular duplex study on 3/14/2022 , there is no  significant change in subclavian artery stenosis  TTE 5/5/2022:    Left Ventricle: Left ventricular cavity size is normal  Wall thickness is normal  The left ventricular ejection fraction is 60%  Systolic function is normal  Wall motion is normal  Diastolic function is normal for age      Right Ventricle: Right ventricular cavity size is normal  Systolic function is normal       Lab Results   Component Value Date    WBC 5 30 08/19/2022    HGB 12 3 08/19/2022    HCT 37 3 08/19/2022     (H) 08/19/2022     08/19/2022     Lab Results   Component Value Date    SODIUM 140 08/19/2022    K 3 9 08/19/2022     08/19/2022    CO2 29 08/19/2022    BUN 21 08/19/2022    CREATININE 1 01 08/19/2022    GLUC 89 12/03/2018    CALCIUM 9 3 08/19/2022     No results found for: INR, PROTIME  No results found for: HGBA1C

## 2022-08-22 ENCOUNTER — ANESTHESIA EVENT (OUTPATIENT)
Dept: GASTROENTEROLOGY | Facility: HOSPITAL | Age: 72
End: 2022-08-22

## 2022-08-22 ENCOUNTER — ANESTHESIA (OUTPATIENT)
Dept: GASTROENTEROLOGY | Facility: HOSPITAL | Age: 72
End: 2022-08-22

## 2022-08-22 ENCOUNTER — NURSE TRIAGE (OUTPATIENT)
Dept: OTHER | Facility: OTHER | Age: 72
End: 2022-08-22

## 2022-08-22 ENCOUNTER — HOSPITAL ENCOUNTER (OUTPATIENT)
Dept: GASTROENTEROLOGY | Facility: HOSPITAL | Age: 72
Setting detail: OUTPATIENT SURGERY
Discharge: HOME | End: 2022-08-22
Attending: COLON & RECTAL SURGERY | Admitting: COLON & RECTAL SURGERY
Payer: COMMERCIAL

## 2022-08-22 VITALS
TEMPERATURE: 96.6 F | HEART RATE: 77 BPM | RESPIRATION RATE: 20 BRPM | OXYGEN SATURATION: 99 % | DIASTOLIC BLOOD PRESSURE: 60 MMHG | WEIGHT: 174 LBS | HEIGHT: 65 IN | SYSTOLIC BLOOD PRESSURE: 128 MMHG | BODY MASS INDEX: 28.99 KG/M2

## 2022-08-22 DIAGNOSIS — Z12.11 SCREENING FOR COLON CANCER: ICD-10-CM

## 2022-08-22 PROBLEM — C50.919 BREAST CANCER (HCC): Status: ACTIVE | Noted: 2018-11-01

## 2022-08-22 PROCEDURE — 99203 OFFICE O/P NEW LOW 30 MIN: CPT | Performed by: COLON & RECTAL SURGERY

## 2022-08-22 PROCEDURE — G0121 COLON CA SCRN NOT HI RSK IND: HCPCS | Performed by: COLON & RECTAL SURGERY

## 2022-08-22 RX ORDER — LIDOCAINE HYDROCHLORIDE 10 MG/ML
INJECTION, SOLUTION EPIDURAL; INFILTRATION; INTRACAUDAL; PERINEURAL AS NEEDED
Status: DISCONTINUED | OUTPATIENT
Start: 2022-08-22 | End: 2022-08-22

## 2022-08-22 RX ORDER — SODIUM CHLORIDE 9 MG/ML
INJECTION, SOLUTION INTRAVENOUS CONTINUOUS PRN
Status: DISCONTINUED | OUTPATIENT
Start: 2022-08-22 | End: 2022-08-22

## 2022-08-22 RX ORDER — PROPOFOL 10 MG/ML
INJECTION, EMULSION INTRAVENOUS AS NEEDED
Status: DISCONTINUED | OUTPATIENT
Start: 2022-08-22 | End: 2022-08-22

## 2022-08-22 RX ADMIN — PROPOFOL 50 MG: 10 INJECTION, EMULSION INTRAVENOUS at 10:17

## 2022-08-22 RX ADMIN — SODIUM CHLORIDE: 0.9 INJECTION, SOLUTION INTRAVENOUS at 10:14

## 2022-08-22 RX ADMIN — LIDOCAINE HYDROCHLORIDE 50 MG: 10 INJECTION, SOLUTION EPIDURAL; INFILTRATION; INTRACAUDAL at 10:17

## 2022-08-22 RX ADMIN — PROPOFOL 50 MG: 10 INJECTION, EMULSION INTRAVENOUS at 10:21

## 2022-08-22 RX ADMIN — PROPOFOL 50 MG: 10 INJECTION, EMULSION INTRAVENOUS at 10:25

## 2022-08-22 RX ADMIN — PROPOFOL 50 MG: 10 INJECTION, EMULSION INTRAVENOUS at 10:18

## 2022-08-22 NOTE — ANESTHESIA POSTPROCEDURE EVALUATION
Post-Op Assessment Note    CV Status:  Stable  Pain Score: 0    Pain management: adequate     Mental Status:  Alert   Hydration Status:  Euvolemic   PONV Controlled:  None   Airway Patency:  Patent      Post Op Vitals Reviewed: Yes      Staff: CRNA         No complications documented      /57 (08/22/22 1033)    Temp (!) 96 6 °F (35 9 °C) (08/22/22 1033)    Pulse 72 (08/22/22 1033)   Resp 20 (08/22/22 1033)    SpO2 99 % (08/22/22 1033)

## 2022-08-22 NOTE — TELEPHONE ENCOUNTER
Patient was concerned because her colonoscopy is at 0915 this morning and she is still having bowel movements  She reports they are yellow in color with some debris  She was reassured that it is normal to still be going and as long as her stools are clear/yellow, it should not be an issue as far as the procedure is concerned  Patient is worried about having an accident and being able to get ready for her procedure  She was provided with the number of her provider (private provider) and the number for the endoscopy center       Reason for Disposition   Bowel prep for colonoscopy, questions about    Protocols used: COLONOSCOPY SYMPTOMS AND QUESTIONS-ADULT-

## 2022-08-22 NOTE — H&P
History and Physical   Colon and Rectal Surgery   Temi Betts 74 y o  female MRN: [id number]  Unit/Bed#:  Encounter: [id number]  08/22/22   @NOW    No chief complaint on file  History of Present Illness   HPI:  Aishwarya Gibson is a 74 y o  female who presents for screening colonoscopy  Historical Information   Past Medical History:   Diagnosis Date    Acute pain of left shoulder     History    Acute recurrent maxillary sinusitis     History    Acute upper respiratory infection     History    BRCA negative 11/12/2018    [facility]    BRCA1 negative     BRCA2 negative     Breast cancer (HCC) 11/01/2018    Right    Callus     Cancer ([address] )     Carotid artery narrowing     stenosis    Disease of thyroid gland     High arches     History of Graves' disease     status post 131 treatment    History of lateral epicondylitis of left elbow     History of onychomycosis     of toenail    History of perforation of tympanic membrane     Hyperlipidemia     Hypertension     Ingrown toenail     Left otitis media     History    Medicare annual wellness visit, subsequent      Past Surgical History:   Procedure Laterality Date    BREAST BIOPSY Right 11/01/2018    U/S BX    BREAST LUMPECTOMY Right 12/18/2018    Procedure: BREAST LUMPECTOMY; BREAST NEEDLE LOCALIZATION (NEEDLE LOC AT 0700); Surgeon: Jacquelin Mcgraw MD;  [facility];  Service: Surgical Oncology    BREAST LUMPECTOMY      ELBOW FRACTURE SURGERY  09/1960    Open Treatment of Fracture of proximal Radius    LYMPH NODE BIOPSY Right 12/18/2018    Procedure: SENTINEL LYMPH NODE BIOPSY; LYMPHATIC MAPPING WITH BLUE DYE AND RADIOACTIVE DYE (INJECT AT 0800 BY DR PRADO IN THE OR); Surgeon: Jacquelin Mcgraw MD;  [facility];  Service: Surgical Oncology    SINUS SURGERY Left 1996    nasal polyps(?)-large papilloma left maxillary sinus with extension into posterior nasopharynx completed by Dr Daniela Lopez      THYROID CYST EXCISION      ablation    TONSILLECTOMY AND ADENOIDECTOMY      US BREAST NEEDLE LOC RIGHT Right 2018    US GUIDED BREAST BIOPSY RIGHT COMPLETE Right 2018       Meds/Allergies     (Not in a hospital admission)        Current Outpatient Medications:     potassium chloride (K-DUR,KLOR-CON) 20 mEq tablet, Take 1 tablet (20 mEq total) by mouth daily (Patient not taking: No sig reported), Disp: 90 tablet, Rfl: 1    amLODIPine (NORVASC) 5 mg tablet, Take 1 tablet (5 mg total) by mouth daily, Disp: 90 tablet, Rfl: 3    anastrozole (ARIMIDEX) 1 mg tablet, Take 1 tablet (1 mg total) by mouth daily, Disp: 90 tablet, Rfl: 0    Cholecalciferol (VITAMIN D3) 2000 units TABS, Take 1 tablet by mouth daily, Disp: , Rfl:     fluticasone (FLONASE) 50 mcg/act nasal spray, 1 spray into each nostril daily, Disp: 16 g, Rfl: 0    hydrochlorothiazide (HYDRODIURIL) 25 mg tablet, Take 1 tablet (25 mg total) by mouth daily, Disp: 30 tablet, Rfl: 11    levothyroxine 112 mcg tablet, Take 1 tablet (112 mcg total) by mouth daily, Disp: 90 tablet, Rfl: 3    losartan (COZAAR) 100 MG tablet, take 1 tablet by mouth once daily, Disp: 90 tablet, Rfl: 3    rosuvastatin (CRESTOR) 20 MG tablet, Take 1 tablet (20 mg total) by mouth daily, Disp: 90 tablet, Rfl: 3    Sodium Fluoride 5000 PPM 1 1 % PSTE, BRUSH NORMALLY AND EXPECTORATE BUT DO NOT RINSE AFTER  DO NOT EAT     (REFER TO PRESCRIPTION NOTES)  , Disp: , Rfl:     Allergies   Allergen Reactions    Sulfa Antibiotics Dizziness     Other reaction(s): feeling "off-balance"  Category:  Adverse Reaction;          Social History   Social History     Substance and Sexual Activity   Alcohol Use Not Currently    Alcohol/week: 0 0 standard drinks    Comment: rarely     Social History     Substance and Sexual Activity   Drug Use No     Social History     Tobacco Use   Smoking Status Former Smoker    Packs/day: 0 00    Years: 0 00    Pack years: 0 00    Quit date: 10/16/2002    Years since quittin 8 Smokeless Tobacco Never Used         Family History:   Family History   Problem Relation Age of Onset   Clifm Rufino Breast cancer Mother     Pancreatic cancer Mother     Cancer Mother     Aortic aneurysm Father     Aortic aneurysm Family         Abdominal    Breast cancer Family     Breast cancer Maternal Grandmother          Objective     Current Vitals:      No intake or output data in the 24 hours ending 08/22/22 0933    Physical Exam:  General:  Resting comfortably in bed   Eyes:Sclera anicteric  ENT: Trachea midline  Pulm:  Symmetric chest raise  No respiratory Distress  CV:  Regular on monitor  Abdomen:  Soft NT ND  Extremities:  No clubbing/ cyanosis/ edema    Lab Results: I have personally reviewed pertinent lab results  Imaging: I have personally reviewed pertinent reports  ASSESSMENT:  Shlomo Arellano is a 74 y o  female who presents for outpatient colonoscopy  PLAN:  For colonoscopy    Risks/ Benefits reviewed to include but not limited to anesthesia, bleeding, missed lesions, and colonoscopic perforation requiring surgery

## 2022-08-22 NOTE — TELEPHONE ENCOUNTER
Regarding: colonoscopy  prep- appointment today   ----- Message from [facility] sent at 8/22/2022  7:06 AM EDT -----  "My wife is experiencing difficulty with her colonoscopy  prep  She has an appointment @ [phone] today  She is still experiencing going to the bathroom   I am not sure if we should keep appointment "

## 2022-08-22 NOTE — ANESTHESIA PREPROCEDURE EVALUATION
Procedure:  COLONOSCOPY    Relevant Problems   ANESTHESIA (within normal limits)      CARDIO   (+) Heart murmur   (+) Hyperlipidemia   (+) Hypertension   (+) Subclavian artery stenosis (HCC)      ENDO   (+) Hypothyroidism      GI/HEPATIC (within normal limits)      /RENAL (within normal limits)      GYN   (+) Breast cancer (HCC)   (+) Malignant neoplasm of lower-inner quadrant of right breast of female, estrogen receptor positive (HCC)      HEMATOLOGY (within normal limits)      NEURO/PSYCH   (+) History of Graves' disease   (+) Paresthesia of foot, bilateral      PULMONARY (within normal limits)      Respiratory   (+) Allergic rhinitis      Other   (+) Bradycardia   (+) Pulmonary nodules      Upper Extremity Arterial Duplex 5/19/2022:  RIGHT UPPER LIMB:  This resting evaluation shows no evidence of significant upper extremity  arterial occlusive disease  Forearm/Upper arm index: 0 82,  Digit pressure of 83mmHg, which is within limits for healing potential  (DBI:  0 47 )     LEFT UPPER LIMB:  There is a 50-75%stenosis in the proximal subclavian artery  Forearm/Upper arm index : 0 81  Digit pressure of 0 81mmHg, which is within limits for healing potential  (DBI:  0 92 )     Compared to previous cerebrovascular duplex study on 3/14/2022 , there is no  significant change in subclavian artery stenosis  TTE 5/5/2022:    Left Ventricle: Left ventricular cavity size is normal  Wall thickness is normal  The left ventricular ejection fraction is 60%  Systolic function is normal  Wall motion is normal  Diastolic function is normal for age      Right Ventricle: Right ventricular cavity size is normal  Systolic function is normal       Lab Results   Component Value Date    WBC 5 30 08/19/2022    HGB 12 3 08/19/2022    HCT 37 3 08/19/2022     (H) 08/19/2022     08/19/2022     Lab Results   Component Value Date    SODIUM 140 08/19/2022    K 3 9 08/19/2022     08/19/2022    CO2 29 08/19/2022    BUN 21 08/19/2022    CREATININE 1 01 08/19/2022    GLUC 89 12/03/2018    CALCIUM 9 3 08/19/2022     No results found for: INR, PROTIME  No results found for: HGBA1C              Anesthesia Plan  ASA Score- 3     Anesthesia Type- IV sedation with anesthesia with ASA Monitors  Additional Monitors:   Airway Plan:           Plan Factors-    Chart reviewed  EKG reviewed  Existing labs reviewed  Patient summary reviewed  Induction- intravenous  Postoperative Plan-     Informed Consent- Anesthetic plan and risks discussed with patient  I personally reviewed this patient with the CRNA  Discussed and agreed on the Anesthesia Plan with the CRNA  Sonia Khan

## 2022-08-26 ENCOUNTER — OFFICE VISIT (OUTPATIENT)
Dept: FAMILY MEDICINE CLINIC | Facility: CLINIC | Age: 72
End: 2022-08-26
Payer: COMMERCIAL

## 2022-08-26 VITALS
SYSTOLIC BLOOD PRESSURE: 130 MMHG | DIASTOLIC BLOOD PRESSURE: 72 MMHG | HEART RATE: 56 BPM | WEIGHT: 172 LBS | BODY MASS INDEX: 28.66 KG/M2 | HEIGHT: 65 IN | RESPIRATION RATE: 16 BRPM | TEMPERATURE: 99.2 F

## 2022-08-26 DIAGNOSIS — Z87.891 HISTORY OF TOBACCO USE: ICD-10-CM

## 2022-08-26 DIAGNOSIS — E78.2 MIXED HYPERLIPIDEMIA: ICD-10-CM

## 2022-08-26 DIAGNOSIS — R01.1 HEART MURMUR: ICD-10-CM

## 2022-08-26 DIAGNOSIS — M85.89 OSTEOPENIA OF MULTIPLE SITES: ICD-10-CM

## 2022-08-26 DIAGNOSIS — C50.311 MALIGNANT NEOPLASM OF LOWER-INNER QUADRANT OF RIGHT BREAST OF FEMALE, ESTROGEN RECEPTOR POSITIVE (HCC): ICD-10-CM

## 2022-08-26 DIAGNOSIS — I10 PRIMARY HYPERTENSION: Primary | ICD-10-CM

## 2022-08-26 DIAGNOSIS — E03.9 ACQUIRED HYPOTHYROIDISM: ICD-10-CM

## 2022-08-26 DIAGNOSIS — Z86.39 HISTORY OF GRAVES' DISEASE: ICD-10-CM

## 2022-08-26 DIAGNOSIS — N18.31 STAGE 3A CHRONIC KIDNEY DISEASE (HCC): ICD-10-CM

## 2022-08-26 DIAGNOSIS — Z17.0 MALIGNANT NEOPLASM OF LOWER-INNER QUADRANT OF RIGHT BREAST OF FEMALE, ESTROGEN RECEPTOR POSITIVE (HCC): ICD-10-CM

## 2022-08-26 PROBLEM — J43.9 PULMONARY EMPHYSEMA, UNSPECIFIED EMPHYSEMA TYPE (HCC): Status: ACTIVE | Noted: 2022-08-26

## 2022-08-26 PROBLEM — N18.9 CKD (CHRONIC KIDNEY DISEASE): Status: ACTIVE | Noted: 2022-05-06

## 2022-08-26 PROCEDURE — 99214 OFFICE O/P EST MOD 30 MIN: CPT | Performed by: NURSE PRACTITIONER

## 2022-08-26 PROCEDURE — 3725F SCREEN DEPRESSION PERFORMED: CPT | Performed by: NURSE PRACTITIONER

## 2022-08-26 PROCEDURE — 1160F RVW MEDS BY RX/DR IN RCRD: CPT | Performed by: NURSE PRACTITIONER

## 2022-08-26 PROCEDURE — 3078F DIAST BP <80 MM HG: CPT | Performed by: NURSE PRACTITIONER

## 2022-08-26 PROCEDURE — 3075F SYST BP GE 130 - 139MM HG: CPT | Performed by: NURSE PRACTITIONER

## 2022-08-26 NOTE — ASSESSMENT & PLAN NOTE
The patient continues to follow with Surgical Oncology and Medical Oncology  She has a mammogram scheduled for September

## 2022-08-26 NOTE — ASSESSMENT & PLAN NOTE
Component      Latest Ref Rng & Units 8/19/2022   Cholesterol      See Comment mg/dL 154   Triglycerides      See Comment mg/dL 66   HDL      >=50 mg/dL 63   LDL Calculated      0 - 100 mg/dL 78   Non-HDL Cholesterol      mg/dl 91       The 10-year ASCVD risk score (Vic Bustamante et al , 2013) is: 18 9%    Values used to calculate the score:      Age: 74 years      Sex: Female      Is Non- : No      Diabetic: No      Tobacco smoker: No      Systolic Blood Pressure: 812 mmHg      Is BP treated: Yes      HDL Cholesterol: 63 mg/dL      Total Cholesterol: 154 mg/dL  Patient continues on her Crestor  Her most recent lipid panel is within normal limits  Lifestyle modifications discussed

## 2022-08-26 NOTE — PROGRESS NOTES
[facility]    NAME: Karin Betts  AGE: 74 y o  SEX: female  : 1950     DATE: 2022     Assessment and Plan:     Problem List Items Addressed This Visit        Endocrine    Hypothyroidism     Lab Results   Component Value Date    VTV1AEGUGGEB 1 100 2022   Patient's most current TSH is 1 1  She continues on her levothyroxine 112 mcg daily  She is asymptomatic  Cardiovascular and Mediastinum    Hypertension - Primary     The patient's blood pressure in the office today is 130/72  She continues on her amlodipine 5 mg daily along with her Cozaar 100 mg daily  Low-sodium diet recommended  Relevant Orders    CBC and differential    Comprehensive metabolic panel       Musculoskeletal and Integument    Osteopenia of multiple sites     The patient is scheduled for an upcoming DEXA scan in September  She previously had been on Prolia for 10 years  Genitourinary    CKD (chronic kidney disease)     Lab Results   Component Value Date    EGFR 54 2022    EGFR 54 2022    EGFR 59 2022    CREATININE 1 01 2022    CREATININE 1 02 2022    CREATININE 0 95 2022   Patient with history of chronic kidney disease  Information regarding chronic kidney disease with provided to the patient              Other    Heart murmur    Hyperlipidemia     Component      Latest Ref Rng & Units 2022   Cholesterol      See Comment mg/dL 154   Triglycerides      See Comment mg/dL 66   HDL      >=50 mg/dL 63   LDL Calculated      0 - 100 mg/dL 78   Non-HDL Cholesterol      mg/dl 91       The 10-year ASCVD risk score (Toro Randolph et al , 2013) is: 18 9%    Values used to calculate the score:      Age: 74 years      Sex: Female      Is Non- : No      Diabetic: No      Tobacco smoker: No      Systolic Blood Pressure: 593 mmHg      Is BP treated: Yes      HDL Cholesterol: 63 mg/dL      Total Cholesterol: 154 mg/dL  Patient continues on her Crestor  Her most recent lipid panel is within normal limits  Lifestyle modifications discussed  Relevant Orders    Lipid Panel with Direct LDL reflex    Malignant neoplasm of lower-inner quadrant of right breast of female, estrogen receptor positive ([address] )     The patient continues to follow with Surgical Oncology and Medical Oncology  She has a mammogram scheduled for September  History of tobacco use     The patient had a CT lung cancer screening performed in March of 2021  She is due for a annual screening  This has been ordered  The risks and benefits of CT lung cancer screening were discussed with the patient including radiation exposure, false positive results and unnecessary invasive procedures  Benefits include early diagnosis and treatment and prevention of death  The patient was also advised that screening does not mean continuing to smoke is okay as smoking can cause lung cancer, cardiac disease and other lung diseases  Relevant Orders    CT lung screening program    History of Graves' disease    Relevant Orders    TSH, 3rd generation with Free T4 reflex              Return in about 6 months (around 2/26/2023)  Chief Complaint:     Chief Complaint   Patient presents with    Follow-up     Routine check up         History of Present Illness:   Patient presents to the office today for follow-up  Overall she feels well  She continues to follow with Surgical Oncology, Medical Oncology, and Cardiology  Patient has not been able to exercise since April after sustaining a foot fracture  She has gained several lb  Lifestyle modifications discussed  Recent blood work has been reviewed with the patient  She will make an appointment to be seen back in the office in 6 months  Review of Systems:     Review of Systems   Constitutional: Negative for activity change, fatigue and fever     HENT: Negative for congestion, hearing loss, rhinorrhea, trouble swallowing and voice change  Eyes: Negative for photophobia, pain, discharge and visual disturbance  Respiratory: Negative for cough, chest tightness and shortness of breath  Cardiovascular: Negative for chest pain, palpitations and leg swelling  Gastrointestinal: Negative for abdominal pain, blood in stool, constipation, nausea and vomiting  Endocrine: Negative for cold intolerance and heat intolerance  Genitourinary: Negative for difficulty urinating, frequency, hematuria, urgency, vaginal bleeding and vaginal discharge  Musculoskeletal: Negative for arthralgias and myalgias  Skin: Negative  Neurological: Negative for dizziness, weakness, numbness and headaches  Psychiatric/Behavioral: Negative for decreased concentration  The patient is not nervous/anxious           Problem List:     Patient Active Problem List   Diagnosis    Allergic rhinitis    Bradycardia    Heart murmur    Hyperlipidemia    Hypertension    Hypothyroidism    Osteoporosis    Pulmonary nodules    Subclavian artery stenosis ([address] )    Vitamin D deficiency    Malignant neoplasm of lower-inner quadrant of right breast of female, estrogen receptor positive ([facility] )    Medicare annual wellness visit, subsequent    Use of anastrozole (Arimidex)    Osteopenia of multiple sites    Decreased pedal pulses    History of tobacco use    Paresthesia of foot, bilateral    Age-related cataract of left eye    Age-related cataract of right eye    CKD (chronic kidney disease)    Acute non-recurrent maxillary sinusitis    History of Graves' disease        Objective:     /72   Pulse 56   Temp 99 2 °F (37 3 °C) (Tympanic)   Resp 16   Ht 5' 5" (1 651 m)   Wt 78 kg (172 lb)   BMI 28 62 kg/m²     Current Outpatient Medications   Medication Instructions    amLODIPine (NORVASC) 5 mg, Oral, Daily    anastrozole (ARIMIDEX) 1 mg, Oral, Daily    Cholecalciferol (VITAMIN D3) 2000 units TABS 1 tablet, Oral, Daily    fluticasone (FLONASE) 50 mcg/act nasal spray 1 spray, Nasal, Daily    hydrochlorothiazide (HYDRODIURIL) 25 mg, Oral, Daily    levothyroxine 112 mcg, Oral, Daily    losartan (COZAAR) 100 MG tablet take 1 tablet by mouth once daily    rosuvastatin (CRESTOR) 20 mg, Oral, Daily    Sodium Fluoride 5000 PPM 1 1 % PSTE BRUSH NORMALLY AND EXPECTORATE BUT DO NOT RINSE AFTER  DO NOT EAT     (REFER TO PRESCRIPTION NOTES)  Physical Exam  Vitals reviewed  Constitutional:       Appearance: Normal appearance  She is obese  HENT:      Head: Normocephalic  Right Ear: Tympanic membrane, ear canal and external ear normal       Left Ear: Tympanic membrane, ear canal and external ear normal       Nose: Nose normal       Mouth/Throat:      Mouth: Mucous membranes are moist       Pharynx: Oropharynx is clear  Eyes:      Extraocular Movements: Extraocular movements intact  Pupils: Pupils are equal, round, and reactive to light  Cardiovascular:      Rate and Rhythm: Normal rate and regular rhythm  Heart sounds: Murmur heard  Pulmonary:      Effort: Pulmonary effort is normal       Breath sounds: Normal breath sounds  Musculoskeletal:         General: Normal range of motion  Skin:     General: Skin is warm and dry  Neurological:      General: No focal deficit present  Mental Status: She is alert and oriented to person, place, and time  Psychiatric:         Mood and Affect: Mood normal          Behavior: Behavior normal          Thought Content:  Thought content normal          Judgment: Judgment normal          Amanda Maki, [id number] [facility]

## 2022-08-26 NOTE — PATIENT INSTRUCTIONS
Chronic Kidney Disease   AMBULATORY CARE:   Chronic kidney disease (CKD)  is the gradual and permanent loss of kidney function  Normally, the kidneys remove fluid, chemicals, and waste from your blood  These wastes are turned into urine by your kidneys  CKD may worsen over time and lead to kidney failure  Common signs and symptoms include the following:   Changes in how often you need to urinate    Swelling in your arms, legs, or feet    Shortness of breath    Fatigue or weakness    Bad or bitter taste in your mouth    Nausea, vomiting, or loss of appetite    Call your local emergency number (911 in the [address] [facility]) if:   You have a seizure  You have shortness of breath  Seek care immediately if:   You are confused and very drowsy  Call your doctor or nephrologist if:   You suddenly gain or lose more weight than your healthcare provider has told you is okay  You have itchy skin or a rash  You urinate more or less than you normally do  You have blood in your urine  You have nausea and are vomiting  You have fatigue or muscle weakness  You have hiccups that will not stop  You have questions or concerns about your condition or care  How CKD is diagnosed:  CKD has 5 stages  Your healthcare provider will use results from the following tests to find the stage of CKD you have:  Blood and urine tests  show how well your kidneys are working  They may also show the cause of your CKD  Ultrasound, CT scan, or MRI  pictures may be used to check your kidneys  You may be given contrast liquid to help your kidneys show up better in the pictures  Tell the healthcare provider if you have ever had an allergic reaction to contrast liquid  Do not enter the MRI room with anything metal  Metal can cause serious injury  Tell the healthcare provider if you have any metal in or on your body  A biopsy  is a procedure to remove a small piece of tissue from your kidney   It is done to find the cause of your CKD     Treatment  can help control signs and symptoms, and prevent a worse stage of CKD  Your care team may include specialists, such as a dietitian or a heart specialist  This depends on the stage of your CKD and if you have other health conditions to manage  Healthcare providers will work with you to create a plan based on your decisions for treatment  Your treatment plan may include any of the following:  Medicines  may be given to decrease your blood pressure and get rid of extra fluid  You may also receive medicine to manage health conditions that may occur with CKD, such as anemia, diabetes, and heart disease  Dialysis  is a treatment to remove chemicals and waste from your blood when your kidneys can no longer do this  Surgery  may be needed to create an arteriovenous fistula (AVF) in your arm or insert a catheter into your abdomen  This is done so you can receive dialysis  A kidney transplant  may be done if your CKD becomes severe  What you can do to manage CKD: Management may include making some lifestyle changes  Tell your healthcare provider if you have any concerns about being able to make changes  He or she can help you find solutions, including working with specialists  Ask for help creating a plan to break large goals into smaller steps  Your plan may include any of the following:  Manage other health conditions  Your healthcare provider will work with you to make a care plan that meets your needs  You will be checked regularly for heart disease or other conditions that can make CKD worse, such as diabetes  Your blood pressure will be closely monitored  You will also get a target blood pressure and help making a plan to reach your target  This may include taking your blood pressure at home  Maintain a healthy weight  Your weight and body mass index (BMI) will be checked regularly  BMI helps find if your weight is healthy for your height   Your healthcare provider will use other tests to check your muscle and protein levels  Extra weight can strain your kidneys  A low weight or low muscle mass can make you feel more tired  You may have trouble doing your daily activities  Ask your provider what a healthy weight is for you  He or she can help you create a plan to lose or gain weight safely, if needed  The plan may include keeping a food diary  This is a list of foods and liquids you have each day  Your provider will use the diary to help you make changes, if needed  Changes are based on your health and any other conditions you have, such as diabetes  Create an exercise plan  Regular exercise can help you manage CKD, high blood pressure, and diabetes  Exercise also helps control weight  Your provider can help you create exercise goals and a plan to reach those goals  For example, your goal may be to exercise for 30 minutes in a day  Your plan can include breaking exercise into 10 minute sessions, 3 times during the day  Create a healthy eating plan  Your provider may tell you to eat food low in potassium, phosphorus, or protein  Your provider may also recommend vitamin or mineral supplements  Do not take any supplements without talking to your provider  A dietitian can help you plan meals if needed  Ask how much liquid to drink each day and which liquids are best for you  Limit sodium (salt) as directed  You may need to limit sodium to less than 2,300 milligrams (mg) each day  Ask your dietitian or healthcare provider how much sodium you can have each day  The amount depends on your stage of kidney disease  Table salt, canned foods, soups, salted snacks, and processed meats, like deli meats and sausage, are high in sodium  Your provider or a dietitian can show you how to read food labels for sodium  Limit alcohol as directed  Alcohol can cause fluid retention and can affect your kidneys  Ask how much alcohol is safe for you   A drink of alcohol is 12 ounces of beer, 5 ounces of wine, or 1½ ounces of liquor  Do not smoke  Nicotine and other chemicals in cigarettes and cigars can cause kidney damage  Ask your provider for information if you currently smoke and need help to quit  E-cigarettes or smokeless tobacco still contain nicotine  Talk to your provider before you use these products  Ask about over-the-counter medicines  Medicines such as NSAIDs and laxatives may harm your kidneys  Some cough and cold medicines can raise your blood pressure  Always ask if a medicine is safe before you take it  Ask about vaccines you may need  CKD can increase your risk for infections such as pneumonia, influenza, and hepatitis  Vaccines lower your risk for infection  Your healthcare provider will tell you which vaccines you need and when to get them  Follow up with your doctor or nephrologist as directed: You will need to return for tests to monitor your kidney and nerve function, and your parathyroid hormone level  Your medicines may be changed, based on certain test results  Write down your questions so you remember to ask them during your visits  © Copyright [employer] 2022 Information is for End User's use only and may not be sold, redistributed or otherwise used for commercial purposes  All illustrations and images included in CareNotes® are the copyrighted property of A D A M , Inc  or [address] [place] [address]  The above information is an  only  It is not intended as medical advice for individual conditions or treatments  Talk to your doctor, nurse or pharmacist before following any medical regimen to see if it is safe and effective for you  DASH Eating Plan   WHAT YOU NEED TO KNOW:   The DASH (Dietary Approaches to Stop Hypertension) Eating Plan is designed to help prevent or lower high blood pressure  It can also help to lower LDL (bad) cholesterol and decrease your risk for heart disease  The plan is low in sodium, sugar, unhealthy fats, and total fat   It is high in potassium, calcium, magnesium, and fiber  These nutrients are added when you eat more fruits, vegetables, and whole grains  With the DASH eating plan, you need to eat a certain number of servings from each food group  This will help you get enough of certain nutrients and limit others  The amount of servings you should eat depends on how many calories you need  Your dietitian can help you create meal plans with the right number of servings for each food group  DISCHARGE INSTRUCTIONS:   What you need to know about sodium:  Your dietitian will tell you how much sodium is safe for you to have each day  People with high blood pressure should have no more than 1,500 to 2,300 mg of sodium in a day  A teaspoon (tsp) of salt has 2,300 mg of sodium  This may seem like a difficult goal, but small changes to the foods you eat can make a big difference  Your healthcare provider or dietitian can help you create a meal plan that follows your sodium limit  Read food labels  Food labels can help you choose foods that are low in sodium  The amount of sodium is listed in milligrams (mg)  The % Daily Value (DV) column tells you how much of your daily needs are met by 1 serving of the food for each nutrient listed  Choose foods that have less than 5% of the DV of sodium  These foods are considered low in sodium  Foods that have 20% or more of the DV of sodium are considered high in sodium  Avoid foods that have more than 300 mg of sodium in each serving  Choose foods that say low-sodium, reduced-sodium, or no salt added on the food label  Limit added salt  Do not salt food at the table if you add salt when you cook  Use herbs and spices, such as onions, garlic, and salt-free seasonings to add flavor  Try lemon or lime juice or vinegar to add a tart flavor  Use hot peppers or a small amount of hot pepper sauce to add a spicy flavor   Limit foods high in added salt, such as the following:    Seasonings made with salt, such as garlic salt, celery salt, onion salt, seasoned salt, meat tenderizers, and monosodium glutamate (MSG)    Miso soup and canned or dried soup mixes    Regular soy sauce, barbecue sauce, teriyaki sauce, steak sauce, Worcestershire sauce, and most flavored vinegars    Snack foods, such as salted chips, popcorn, pretzels, pork rinds, salted crackers, and salted nuts    Frozen foods, such as dinners, entrees, vegetables with sauces, and breaded meats    Ask about salt substitutes  Ask your healthcare provider if you may use salt substitutes  Some salt substitutes have ingredients that can be harmful if you have certain health conditions  Choose foods carefully at restaurants  Meals from restaurants, especially fast food restaurants, are often high in sodium  Some restaurants have nutrition information that tells you the amount of sodium in their foods  Ask to have your food prepared with less, or no salt  What you need to know about fats:  Healthy fats include unsaturated fats and omega-3 fatty acids  Unhealthy fats include saturated fats and trans fats    Include healthy fats, such as the following:      Cooking oils, such as soybean, canola, olive, or sunflower    Fatty fish, such as salmon, tuna, mackerel, or sardines    Flaxseed oil or ground flaxseed    ½ cup of cooked beans, such as black beans, kidney beans, or sol beans    1½ ounces of low-sodium nuts, such as almonds or walnuts    Low-sugar, low-sodium peanut butter    Seeds such as abilio seeds or sunflower seeds       Limit or do not have unhealthy fats, such as the following:      Foods that contain fat from animals, such as fatty meats, whole milk, butter, and cream    Shortening, stick margarine, palm oil, and coconut oil    Full-fat or creamy salad dressing    Creamy soup    Crackers, chips, and baked goods made with margarine or shortening    Foods that are fried in unhealthy fats    Gravy and sauces, such as Omari or cheese sauces    What you need to know about carbohydrates (carbs): All carbs break down into sugar  Complex carbs contain more fiber than simple carbs  This means complex carbs go into the bloodstream more slowly and cause less of a blood sugar spike  Try to include more complex carbs and fewer simple carbs  Include complex carbs, such as the followin slice of whole-grain bread    1 ounce of dry cereal that does not contain added sugar    ½ cup of cooked oatmeal    2 ounces of cooked whole-grain pasta    ½ cup of cooked brown rice    Limit or do not have simple carbs, such as the following:      [employer], such as doughnuts, pastries, and cookies    Mixes for cornbread and biscuits    White rice and pasta mixes, such as boxed macaroni and cheese    Instant and cold cereals that contain sugar    Jelly, jam, and ice cream that contain sugar    Condiments such as ketchup    Drinks high in sugar, such as soft drinks, lemonade, and fruit juice    What you need to know about vegetables and fruits:  Vegetables and fruits can be fresh, frozen, or canned  If possible, try to choose low-sodium canned options    Include a variety of vegetables and fruits, such as the followin medium apple, pear, or peach (about ½ cup chopped)    ½ small banana    ½ cup berries, such as blueberries, strawberries, or blackberries    1 cup of raw leafy greens, such as lettuce, spinach, kale, or jean carlos greens    ½ cup of frozen or canned (no added salt) vegetables, such as green beans    ½ cup of fresh, frozen, or canned fruit (canned in light syrup or fruit juice)    ½ cup of vegetable or fruit juice    Limit or do not have vegetables and fruits made in the following ways:      Frozen fruit such as cherries that have added sugar    Fruit in cream or butter sauce    Canned vegetables that are high in sodium    Sauerkraut, pickled vegetables, and other foods prepared in brine    Fried vegetables or vegetables in butter or high-fat sauces    What you need to know about protein foods: Include lean or low-fat protein foods, such as the following:      Poultry (chicken, turkey) with no skin    Fish (especially fatty fish, such as salmon, fresh tuna, or mackerel)    Lean beef and pork (loin, round, extra lean hamburger)    Egg whites and egg substitutes    1 cup of nonfat (skim) or 1% milk    1½ ounces of fat-free or low-fat cheese    6 ounces of nonfat or low-fat yogurt    Limit or do not have high-fat protein foods, such as the following:      Smoked or cured meat, such as corned beef, bo, ham, hot dogs, and sausage    Canned beans and canned meats or spreads, such as potted meats, sardines, anchovies, and imitation seafood    Deli or lunch meats, such as bologna, ham, turkey, and roast beef    High-fat meat (T-bone steak, regular hamburger, and ribs)    Whole eggs and egg yolks    Whole milk, 2% milk, and cream    Regular cheese and processed cheese    Other guidelines to follow:   Maintain a healthy weight  Your risk for heart disease is higher if you are overweight  Your healthcare provider may suggest that you lose weight if you are overweight  You can lose weight by eating fewer calories and foods that have added sugars and fat  The DASH meal plan can help you do this  Decrease calories by eating smaller portions at each meal and fewer snacks  Ask your healthcare provider for more information about how to lose weight  Exercise regularly  Regular exercise can help you reach or maintain a healthy weight  Regular exercise can also help decrease your blood pressure and improve your cholesterol levels  Get 30 minutes or more of moderate exercise each day of the week  To lose weight, get at least 60 minutes of exercise  Talk to your healthcare provider about the best exercise program for you  Limit alcohol  Women should limit alcohol to 1 drink a day  Men should limit alcohol to 2 drinks a day   A drink of alcohol is 12 ounces of beer, 5 ounces of wine, or 1½ ounces of liquor  For more information:   [facility] and [address]  [address] [address]  [place] [address]  Phone: [phone] 770 [phone]  Web Address: [place] no    © [facility] [facility] 2022 Information is for End User's use only and may not be sold, redistributed or otherwise used for commercial purposes  All illustrations and images included in CareNotes® are the copyrighted property of A D A M , Inc  or [address] [place] [address]  The above information is an  only  It is not intended as medical advice for individual conditions or treatments  Talk to your doctor, nurse or pharmacist before following any medical regimen to see if it is safe and effective for you  Hypertension   WHAT YOU NEED TO KNOW:   Hypertension is high blood pressure  Your blood pressure is the force of your blood moving against the walls of your arteries  Hypertension causes your blood pressure to get so high that your heart has to work much harder than normal  This can damage your heart  The cause of hypertension may not be known  This is called essential or primary hypertension  Hypertension caused by another medical condition, such as kidney disease, is called secondary hypertension  DISCHARGE INSTRUCTIONS:   Call your local emergency number (911 in the [address] [place] [address]) or have someone call if:   You have chest pain  You have any of the following signs of a heart attack:      Squeezing, pressure, or pain in your chest    You may  also have any of the following:     Discomfort or pain in your back, neck, jaw, stomach, or arm    Shortness of breath    Nausea or vomiting    Lightheadedness or a sudden cold sweat    You become confused or have trouble speaking  You suddenly feel lightheaded or have trouble breathing  Return to the emergency department if:   You have a severe headache or vision loss      You have weakness in an arm or leg  Call your doctor or cardiologist if:   You feel faint, dizzy, confused, or drowsy  You have been taking your blood pressure medicine but your pressure is higher than your provider says it should be  You have questions or concerns about your condition or care  Medicines: You may  need any of the following:  Antihypertensives  may be used to help lower your blood pressure  Several kinds of medicines are available  Your healthcare provider will choose medicines based on the kind of hypertension you have  You may need more than one type of medicine  Take the medicine exactly as directed  Diuretics  help decrease extra fluid that collects in your body  This will help lower your blood pressure  You may urinate more often while you take this medicine  Cholesterol medicine  helps lower your cholesterol level  A low cholesterol level helps prevent heart disease and makes it easier to control your blood pressure  Take your medicine as directed  Contact your healthcare provider if you think your medicine is not helping or if you have side effects  Tell him or her if you are allergic to any medicine  Keep a list of the medicines, vitamins, and herbs you take  Include the amounts, and when and why you take them  Bring the list or the pill bottles to follow-up visits  Carry your medicine list with you in case of an emergency  Follow up with your doctor or cardiologist as directed: You will need to return to have your blood pressure checked and to have other lab tests done  Write down your questions so you remember to ask them during your visits  Stages of hypertension:       Normal blood pressure is 119/79 or lower   Your healthcare provider may only check your blood pressure each year if it stays at a normal level  Elevated blood pressure is 120/79 to 129/79   This is sometimes called prehypertension   Your healthcare provider may suggest lifestyle changes to help lower your blood pressure to a normal level  He or she may then check it again in 3 to 6 months  Stage 1 hypertension is 130/80  to 139/89   Your provider may recommend lifestyle changes, medication, and checks every 3 to 6 months until your blood pressure is controlled  Stage 2 hypertension is 140/90 or higher   Your provider will recommend lifestyle changes and have you take 2 kinds of hypertension medicines  You will also need to have your blood pressure checked monthly until it is controlled  Manage hypertension:   Check your blood pressure at home  Avoid smoking, caffeine, and exercise at least 30 minutes before checking your blood pressure  Sit and rest for 5 minutes before you take your blood pressure  Extend your arm and support it on a flat surface  Your arm should be at the same level as your heart  Follow the directions that came with your blood pressure monitor  Check your blood pressure 2 times, 1 minute apart, before you take your medicine in the morning  Also check your blood pressure before your evening meal  Keep a record of your readings and bring it to your follow-up visits  Ask your healthcare provider what your blood pressure should be  Manage any other health conditions you have  Health conditions such as diabetes can increase your risk for hypertension  Follow your healthcare provider's instructions and take all your medicines as directed  Ask about all medicines  Certain medicines can increase your blood pressure  Examples include oral birth control pills, decongestants, herbal supplements, and NSAIDs, such as ibuprofen  Your healthcare provider can tell you which medicines are safe for you to take  This includes prescription and over-the-counter medicines  Lifestyle changes you can make to manage hypertension:  Your healthcare provider may recommend you work with a team to manage hypertension   The team may include medical experts such as a dietitian, an exercise or physical therapist, and a behavior therapist  Your family members may be included in helping you create lifestyle changes  Limit sodium (salt) as directed  Too much sodium can affect your fluid balance  Check labels to find low-sodium or no-salt-added foods  Some low-sodium foods use potassium salts for flavor  Too much potassium can also cause health problems  Your healthcare provider will tell you how much sodium and potassium are safe for you to have in a day  He or she may recommend that you limit sodium to 2,300 mg a day  Follow the meal plan recommended by your healthcare provider  A dietitian or your provider can give you more information on low-sodium plans or the DASH (Dietary Approaches to Stop Hypertension) eating plan  The DASH plan is low in sodium, processed sugar, unhealthy fats, and total fat  It is high in potassium, calcium, and fiber  These can be found in vegetables, fruit, and whole-grain foods  Be physically active throughout the day  Physical activity, such as exercise, can help control your blood pressure and your weight  Be physically active for at least 30 minutes per day, on most days of the week  Include aerobic activity, such as walking or riding a bicycle  Also include strength training at least 2 times each week  Your healthcare providers can help you create a physical activity plan  Decrease stress  This may help lower your blood pressure  Learn ways to relax, such as deep breathing or listening to music  Limit alcohol as directed  Alcohol can increase your blood pressure  A drink of alcohol is 12 ounces of beer, 5 ounces of wine, or 1½ ounces of liquor  Do not smoke  Nicotine and other chemicals in cigarettes and cigars can increase your blood pressure and also cause lung damage  Ask your healthcare provider for information if you currently smoke and need help to quit  E-cigarettes or smokeless tobacco still contain nicotine   Talk to your healthcare provider before you use these products  © Copyright [employer] 2022 Information is for End User's use only and may not be sold, redistributed or otherwise used for commercial purposes  All illustrations and images included in CareNotes® are the copyrighted property of A D [employer] M [employer] Inc  or [employer]  The above information is an  only  It is not intended as medical advice for individual conditions or treatments  Talk to your doctor, nurse or pharmacist before following any medical regimen to see if it is safe and effective for you  Weight Management   WHAT YOU NEED TO KNOW:   Being overweight increases your risk of health conditions such as heart disease, high blood pressure, type 2 diabetes, and certain types of cancer  It can also increase your risk for osteoarthritis, sleep apnea, and other respiratory problems  Aim for a slow, steady weight loss  Even a small amount of weight loss can lower your risk of health problems  DISCHARGE INSTRUCTIONS:   How to lose weight safely:  A safe and healthy way to lose weight is to eat fewer calories and get regular exercise  You can lose up about 1 pound a week by decreasing the number of calories you eat by 500 calories each day  You can decrease calories by eating smaller portion sizes or by cutting out high-calorie foods  Read labels to find out how many calories are in the foods you eat  You can also burn calories with exercise such as walking, swimming, or biking  You will be more likely to keep weight off if you make these changes part of your lifestyle  Exercise at least 30 minutes per day on most days of the week  You can also fit in more physical activity by taking the stairs instead of the elevator or parking farther away from stores  Ask your healthcare provider about the best exercise plan for you         Healthy meal plan for weight management:  A healthy meal plan includes a variety of foods, contains fewer calories, and helps you stay healthy  A healthy meal plan includes the following:     Eat whole-grain foods more often  A healthy meal plan should contain fiber  Fiber is the part of grains, fruits, and vegetables that is not broken down by your body  Whole-grain foods are healthy and provide extra fiber in your diet  Some examples of whole-grain foods are whole-wheat breads and pastas, oatmeal, brown rice, and bulgur  Eat a variety of vegetables every day  Include dark, leafy greens such as spinach, kale, jean carlos greens, and mustard greens  Eat yellow and orange vegetables such as carrots, sweet potatoes, and winter squash  Eat a variety of fruits every day  Choose fresh or canned fruit (canned in its own juice or light syrup) instead of juice  Fruit juice has very little or no fiber  Eat low-fat dairy foods  Drink fat-free (skim) milk or 1% milk  Eat fat-free yogurt and low-fat cottage cheese  Try low-fat cheeses such as mozzarella and other reduced-fat cheeses  Choose meat and other protein foods that are low in fat  Choose beans or other legumes such as split peas or lentils  Choose fish, skinless poultry (chicken or turkey), or lean cuts of red meat (beef or pork)  Before you cook meat or poultry, cut off any visible fat  Use less fat and oil  Try baking foods instead of frying them  Add less fat, such as margarine, sour cream, regular salad dressing, and mayonnaise to foods  Eat fewer high-fat foods  Some examples of high-fat foods include french fries, doughnuts, ice cream, and cakes  Eat fewer sweets  Limit foods and drinks that are high in sugar  This includes candy, cookies, regular soda, and sweetened drinks  Ways to decrease calories:   Eat smaller portions  Use a small plate with smaller servings  Do not eat second helpings  When you eat at a restaurant, ask for a box and place half of your meal in the box before you eat  Share an entrée with someone else      Replace high-calorie snacks with healthy, low-calorie snacks  Choose fresh fruit, vegetables, fat-free rice cakes, or air-popped popcorn instead of potato chips, nuts, or chocolate  Choose water or calorie-free drinks instead of soda or sweetened drinks  Do not shop for groceries when you are hungry  You may be more likely to make unhealthy food choices  Take a grocery list of healthy foods and shop after you have eaten  Eat regular meals  Do not skip meals  Skipping meals can lead to overeating later in the day  This can make it harder for you to lose weight  Eat a healthy snack in place of a meal if you do not have time to eat a regular meal  Talk with a dietitian to help you create a meal plan and schedule that is right for you  Other things to consider as you try to lose weight:   Be aware of situations that may give you the urge to overeat, such as eating while watching television  Find ways to avoid these situations  For example, read a book, go for a walk, or do crafts  Meet with a weight loss support group or friends who are also trying to lose weight  This may help you stay motivated to continue working on your weight loss goals  © Copyright [employer] 2022 Information is for End User's use only and may not be sold, redistributed or otherwise used for commercial purposes  All illustrations and images included in CareNotes® are the copyrighted property of [employer]  or [employer]  The above information is an  only  It is not intended as medical advice for individual conditions or treatments  Talk to your doctor, nurse or pharmacist before following any medical regimen to see if it is safe and effective for you

## 2022-08-26 NOTE — ASSESSMENT & PLAN NOTE
The patient had a CT lung cancer screening performed in March of 2021  She is due for a annual screening  This has been ordered  The risks and benefits of CT lung cancer screening were discussed with the patient including radiation exposure, false positive results and unnecessary invasive procedures  Benefits include early diagnosis and treatment and prevention of death  The patient was also advised that screening does not mean continuing to smoke is okay as smoking can cause lung cancer, cardiac disease and other lung diseases

## 2022-08-26 NOTE — ASSESSMENT & PLAN NOTE
Lab Results   Component Value Date    [id number] 1 100 08/19/2022   Patient's most current TSH is 1 1  She continues on her levothyroxine 112 mcg daily  She is asymptomatic

## 2022-08-26 NOTE — ASSESSMENT & PLAN NOTE
Lab Results   Component Value Date    EGFR 54 08/19/2022    EGFR 54 06/17/2022    EGFR 59 04/25/2022    CREATININE 1 01 08/19/2022    CREATININE 1 02 06/17/2022    CREATININE 0 95 04/25/2022   Patient with history of chronic kidney disease  Information regarding chronic kidney disease with provided to the patient

## 2022-08-26 NOTE — ASSESSMENT & PLAN NOTE
The patient's blood pressure in the office today is 130/72  She continues on her amlodipine 5 mg daily along with her Cozaar 100 mg daily  Low-sodium diet recommended

## 2022-09-12 DIAGNOSIS — E03.9 ACQUIRED HYPOTHYROIDISM: ICD-10-CM

## 2022-09-12 RX ORDER — LEVOTHYROXINE SODIUM 112 UG/1
112 TABLET ORAL DAILY
Qty: 90 TABLET | Refills: 3 | Status: SHIPPED | OUTPATIENT
Start: 2022-09-12 | End: 2022-12-11

## 2022-10-09 DIAGNOSIS — Z17.0 MALIGNANT NEOPLASM OF LOWER-INNER QUADRANT OF RIGHT BREAST OF FEMALE, ESTROGEN RECEPTOR POSITIVE (HCC): ICD-10-CM

## 2022-10-09 DIAGNOSIS — C50.311 MALIGNANT NEOPLASM OF LOWER-INNER QUADRANT OF RIGHT BREAST OF FEMALE, ESTROGEN RECEPTOR POSITIVE (HCC): ICD-10-CM

## 2022-10-10 RX ORDER — ANASTROZOLE 1 MG/1
TABLET ORAL
Qty: 90 TABLET | Refills: 1 | Status: SHIPPED | OUTPATIENT
Start: 2022-10-10

## 2022-10-11 PROBLEM — Z00.00 MEDICARE ANNUAL WELLNESS VISIT, SUBSEQUENT: Status: RESOLVED | Noted: 2019-01-04 | Resolved: 2022-10-11

## 2022-10-11 PROBLEM — J01.00 ACUTE NON-RECURRENT MAXILLARY SINUSITIS: Status: RESOLVED | Noted: 2022-06-09 | Resolved: 2022-10-11

## 2022-10-21 ENCOUNTER — HOSPITAL ENCOUNTER (OUTPATIENT)
Dept: MAMMOGRAPHY | Facility: CLINIC | Age: 72
Discharge: HOME | End: 2022-10-21
Payer: COMMERCIAL

## 2022-10-21 VITALS — BODY MASS INDEX: 28.65 KG/M2 | HEIGHT: 65 IN | WEIGHT: 171.96 LBS

## 2022-10-21 DIAGNOSIS — C50.311 MALIGNANT NEOPLASM OF LOWER-INNER QUADRANT OF RIGHT BREAST OF FEMALE, ESTROGEN RECEPTOR POSITIVE (HCC): ICD-10-CM

## 2022-10-21 DIAGNOSIS — Z17.0 MALIGNANT NEOPLASM OF LOWER-INNER QUADRANT OF RIGHT BREAST OF FEMALE, ESTROGEN RECEPTOR POSITIVE (HCC): ICD-10-CM

## 2022-10-21 PROCEDURE — G0279 TOMOSYNTHESIS, MAMMO: HCPCS

## 2022-10-21 PROCEDURE — 77066 DX MAMMO INCL CAD BI: CPT

## 2022-11-02 ENCOUNTER — OFFICE VISIT (OUTPATIENT)
Dept: SURGICAL ONCOLOGY | Facility: CLINIC | Age: 72
End: 2022-11-02

## 2022-11-02 VITALS
RESPIRATION RATE: 18 BRPM | TEMPERATURE: 97.2 F | SYSTOLIC BLOOD PRESSURE: 157 MMHG | HEIGHT: 65 IN | WEIGHT: 178 LBS | OXYGEN SATURATION: 99 % | BODY MASS INDEX: 29.66 KG/M2 | DIASTOLIC BLOOD PRESSURE: 76 MMHG | HEART RATE: 76 BPM

## 2022-11-02 DIAGNOSIS — C50.311 MALIGNANT NEOPLASM OF LOWER-INNER QUADRANT OF RIGHT BREAST OF FEMALE, ESTROGEN RECEPTOR POSITIVE (HCC): Primary | ICD-10-CM

## 2022-11-02 DIAGNOSIS — Z79.811 USE OF ANASTROZOLE (ARIMIDEX): ICD-10-CM

## 2022-11-02 DIAGNOSIS — Z17.0 MALIGNANT NEOPLASM OF LOWER-INNER QUADRANT OF RIGHT BREAST OF FEMALE, ESTROGEN RECEPTOR POSITIVE (HCC): Primary | ICD-10-CM

## 2022-11-02 NOTE — PROGRESS NOTES
Surgical Oncology Follow Up       [address] [facility]  [facility] [place]  [address] [address]    Kit Able Alpesh  1950  [id number]  [address] [facility] [facility]  [facility] [place]  [address] [address]    Chief Complaint   Patient presents with   • office visit       Assessment/Plan:  1  Malignant neoplasm of lower-inner quadrant of right breast of female, estrogen receptor positive ([facility] [address] )  - 6 month follow up  - Mammo diagnostic bilateral w 3d & cad; Future    2  Use of anastrozole (Arimidex)  - continue use per medical oncology       Discussion/Summary: Patient is a 33-year-old female presenting today for right breast cancer diagnosed in November of 2018  Pathology revealed invasive ductal carcinoma ER/WI +, HER2 negative  She underwent genetic testing which was BRCA negative  She underwent a right breast lumpectomy with sentinel node biopsy with Dr Shruthi Anderson  She is currently on anastrozole  She had a bilateral diagnostic mammogram on 10/21/2022 which was BI-RADS 2 category 2 density  There were no concerns on her clinical breast exam  I will see the patient back in 6 months or sooner should the need arise  She was instructed to call with any questions or concerns prior to this time   All questions were answered today        History of Present Illness:     Oncology History   Malignant neoplasm of lower-inner quadrant of right breast of female, estrogen receptor positive ([facility] [address] )   11/1/2018 Biopsy    Right breast biopsy  3-4 o'clock, 5 cmfn  Invasive ductal carcinoma  Grade 1  8 mm on ultrasound    WI 90  Her 2 1 +     11/12/2018 Genetic Testing    BRCA testing-invitae  Negative     12/18/2018 Surgery    Right breast lumpectomy with sentinel lymph node biopsy  Invasive ductal carcinoma  Grade 1  1 1 cm  Margins negative   0/1 lymph node    WI 90  Her 2 1+  Stage IA     1/23/2019 -  Hormone Therapy    Anastrozole 1 mg daily for 5 years  With Dr Roger Styles          -Interval History: Patient is a 75-year-old female presenting today for right breast cancer diagnosed in November of 2018  She is currently on anastrozole  She had a bilateral diagnostic mammogram on 10/21/2022 which was BI-RADS 2 category 2 density  Patient denies changes on her breast exam  She denies persistent headaches, bone pain, back pain, shortness of breath, or abdominal pain  Review of Systems:  Review of Systems   Constitutional: Negative for activity change, appetite change, fatigue and unexpected weight change  Respiratory: Negative for cough and shortness of breath  Cardiovascular: Negative for chest pain  Gastrointestinal: Negative for abdominal pain, diarrhea, nausea and vomiting  Endocrine: Negative for heat intolerance  Musculoskeletal: Negative for arthralgias, back pain and myalgias  Skin: Negative for rash  Neurological: Negative for weakness and headaches  Hematological: Negative for adenopathy         Patient Active Problem List   Diagnosis   • Allergic rhinitis   • Bradycardia   • Heart murmur   • Hyperlipidemia   • Hypertension   • Hypothyroidism   • Osteoporosis   • Pulmonary nodules   • Subclavian artery stenosis (HCC)   • Vitamin D deficiency   • Malignant neoplasm of lower-inner quadrant of right breast of female, estrogen receptor positive (HCC)   • Use of anastrozole (Arimidex)   • Osteopenia of multiple sites   • Decreased pedal pulses   • History of tobacco use   • Paresthesia of foot, bilateral   • Age-related cataract of left eye   • Age-related cataract of right eye   • CKD (chronic kidney disease)   • History of Graves' disease     Past Medical History:   Diagnosis Date   • Acute pain of left shoulder     History   • Acute recurrent maxillary sinusitis     History   • Acute upper respiratory infection     History   • BRCA negative 11/12/2018    [place]   • BRCA1 negative    • BRCA2 negative    • Breast cancer ([facility] [address] ) 11/01/2018    Right   • Callus    • Cancer [facility])    • Carotid artery narrowing     stenosis   • Disease of thyroid gland    • High arches    • History of Graves' disease     status post 131 treatment   • History of lateral epicondylitis of left elbow    • History of onychomycosis     of toenail   • History of perforation of tympanic membrane    • Hyperlipidemia    • Hypertension    • Ingrown toenail    • Left otitis media     History   • Medicare annual wellness visit, subsequent      Past Surgical History:   Procedure Laterality Date   • BREAST BIOPSY Right 11/01/2018    U/S BX   • BREAST LUMPECTOMY Right 12/18/2018    Procedure: BREAST LUMPECTOMY; BREAST NEEDLE LOCALIZATION (NEEDLE LOC AT 0700); Surgeon: Candice Babcock MD;  Location: [facility];  Service: Surgical Oncology   • BREAST LUMPECTOMY     • ELBOW FRACTURE SURGERY  09/1960    Open Treatment of Fracture of proximal Radius   • LYMPH NODE BIOPSY Right 12/18/2018    Procedure: SENTINEL LYMPH NODE BIOPSY; LYMPHATIC MAPPING WITH BLUE DYE AND RADIOACTIVE DYE (INJECT AT 0800 BY DR PRADO IN THE OR); Surgeon: Candice Babcock MD;  Location: [facility];  Service: Surgical Oncology   • SINUS SURGERY Left 1996    nasal polyps(?)-large papilloma left maxillary sinus with extension into posterior nasopharynx completed by Dr Nasrin Gaming     • THYROID CYST EXCISION      ablation   • TONSILLECTOMY AND ADENOIDECTOMY     • US BREAST NEEDLE LOC RIGHT Right 12/18/2018   • US GUIDED BREAST BIOPSY RIGHT COMPLETE Right 11/1/2018     Family History   Problem Relation Age of Onset   • Breast cancer Mother    • Pancreatic cancer Mother    • Cancer Mother    • Aortic aneurysm Father    • Aortic aneurysm Family         Abdominal   • Breast cancer Family    • Breast cancer Maternal Grandmother      Social History     Socioeconomic History   • Marital status: /Civil Union     Spouse name: Not on file   • Number of children: Not on file   • Years of education: Not on file   • Highest education level: Not on file   Occupational History   • Not on file   Tobacco Use   • Smoking status: Former Smoker     Packs/day: 0 00     Years: 0 00     Pack years: 0 00     Quit date: 10/16/2002     Years since quittin 0   • Smokeless tobacco: Never Used   Vaping Use   • Vaping Use: Never used   Substance and Sexual Activity   • Alcohol use: Not Currently     Alcohol/week: 0 0 standard drinks     Comment: rarely   • Drug use: No   • Sexual activity: Not on file   Other Topics Concern   • Not on file   Social History Narrative   • Not on file     Social Determinants of Health     Financial Resource Strain: Not on file   Food Insecurity: Not on file   Transportation Needs: Not on file   Physical Activity: Not on file   Stress: Not on file   Social Connections: Not on file   Intimate Partner Violence: Not on file   Housing Stability: Not on file       Current Outpatient Medications:   •  amLODIPine (NORVASC) 5 mg tablet, Take 1 tablet (5 mg total) by mouth daily, Disp: 90 tablet, Rfl: 3  •  anastrozole (ARIMIDEX) 1 mg tablet, take 1 tablet by mouth once daily, Disp: 90 tablet, Rfl: 1  •  Cholecalciferol (VITAMIN D3) 2000 units TABS, Take 1 tablet by mouth daily, Disp: , Rfl:   •  hydrochlorothiazide (HYDRODIURIL) 25 mg tablet, Take 1 tablet (25 mg total) by mouth daily, Disp: 30 tablet, Rfl: 11  •  levothyroxine 112 mcg tablet, Take 1 tablet (112 mcg total) by mouth daily, Disp: 90 tablet, Rfl: 3  •  losartan (COZAAR) 100 MG tablet, take 1 tablet by mouth once daily, Disp: 90 tablet, Rfl: 3  •  rosuvastatin (CRESTOR) 20 MG tablet, Take 1 tablet (20 mg total) by mouth daily, Disp: 90 tablet, Rfl: 3  •  Sodium Fluoride 5000 PPM 1 1 % PSTE, BRUSH NORMALLY AND EXPECTORATE BUT DO NOT RINSE AFTER  DO NOT EAT     (REFER TO PRESCRIPTION NOTES)  , Disp: , Rfl:   •  fluticasone (FLONASE) 50 mcg/act nasal spray, 1 spray into each nostril daily, Disp: 16 g, Rfl: 0  Allergies   Allergen Reactions   • Sulfa Antibiotics Dizziness     Other reaction(s): feeling "off-balance"  Category: Adverse Reaction;      Vitals:    11/02/22 0934   BP: 157/76   Pulse: 76   Resp: 18   Temp: (!) 97 2 °F (36 2 °C)   SpO2: 99%       Physical Exam  Constitutional:       General: She is not in acute distress  Appearance: Normal appearance  Cardiovascular:      Rate and Rhythm: Normal rate and regular rhythm  Pulses: Normal pulses  Heart sounds: Normal heart sounds  Pulmonary:      Effort: Pulmonary effort is normal       Breath sounds: Normal breath sounds  Chest:      Chest wall: No mass  Breasts:      Right: No swelling, bleeding, inverted nipple, mass, nipple discharge, skin change, tenderness, axillary adenopathy or supraclavicular adenopathy  Left: No swelling, bleeding, inverted nipple, mass, nipple discharge, skin change, tenderness, axillary adenopathy or supraclavicular adenopathy  Comments: Right breast lumpectomy scar  No masses, nodularity, skin changes, nipple changes or discharge, or adenopathy appreciated on physical exam      Abdominal:      General: Abdomen is flat  Palpations: Abdomen is soft  Lymphadenopathy:      Upper Body:      Right upper body: No supraclavicular, axillary or pectoral adenopathy  Left upper body: No supraclavicular, axillary or pectoral adenopathy  Skin:     General: Skin is warm  Neurological:      General: No focal deficit present  Mental Status: She is alert and oriented to person, place, and time  Psychiatric:         Mood and Affect: Mood normal          Behavior: Behavior normal            Results:    Imaging  Mammo diagnostic bilateral w 3d & cad    Result Date: 10/21/2022  Narrative: DIAGNOSIS: Malignant neoplasm of lower-inner quadrant of right breast of female, estrogen receptor positive ([facility] [address] ) TECHNIQUE: Digital diagnostic mammography was performed  Computer Aided Detection (CAD) analyzed all applicable images   COMPARISONS: Multiple prior exams dating between 09/08/2005 and 10/19/2021  RELEVANT HISTORY: Family Breast Cancer History: History of breast cancer in Mother, Family, Maternal Grandmother  Family Medical History: Family medical history includes breast cancer in 3 relatives (family, maternal grandmother, mother)  Personal History: Hormone history includes birth control and other  Surgical history includes breast biopsy and lumpectomy  Medical history includes breast cancer, BRCA 1 negative, and BRCA 2 negative  RISK ASSESSMENT: Tyrer-zick risk assessment reporting was suppressed due to the patient's history and/or demographic factors  TISSUE DENSITY: There are scattered areas of fibroglandular density  INDICATION: Magdy Tan is a 74 y o  female presenting for annual  FINDINGS: RIGHT A) POST-SURGICAL FINDING: There are post-surgical findings from a previous lumpectomy seen in the inner region of the right breast  There has been no interval development of a suspicious mass, microcalcification, or architectural distortion  BILATERAL There are no suspicious masses, grouped microcalcifications or areas of unexplained architectural distortion  The skin and nipple areolar complex are unremarkable  Benign-appearing calcifications are noted in each breast      Impression:  No evidence of malignancy  ASSESSMENT/BI-RADS CATEGORY: Left: 2 - Benign Right: 2 - Benign Overall: 2 - Benign RECOMMENDATION:      - Diagnostic mammogram in 1 year for both breasts  Workstation ID: [id number]      I reviewed the above imaging data  Advance Care Planning/Advance Directives:  Discussed disease status, cancer treatment plans and/or cancer treatment goals with the patient

## 2022-11-05 ENCOUNTER — HOSPITAL ENCOUNTER (OUTPATIENT)
Dept: RADIOLOGY | Age: 72
Discharge: HOME | End: 2022-11-05

## 2022-11-05 DIAGNOSIS — Z78.0 MENOPAUSE: ICD-10-CM

## 2022-11-05 DIAGNOSIS — M85.89 OSTEOPENIA OF MULTIPLE SITES: ICD-10-CM

## 2023-02-24 ENCOUNTER — APPOINTMENT (OUTPATIENT)
Dept: LAB | Facility: CLINIC | Age: 73
End: 2023-02-24

## 2023-02-24 DIAGNOSIS — E78.2 MIXED HYPERLIPIDEMIA: ICD-10-CM

## 2023-02-24 DIAGNOSIS — I10 PRIMARY HYPERTENSION: ICD-10-CM

## 2023-02-24 DIAGNOSIS — Z86.39 HISTORY OF GRAVES' DISEASE: ICD-10-CM

## 2023-02-24 LAB
ALBUMIN SERPL BCP-MCNC: 3.4 G/DL (ref 3.5–5)
ALP SERPL-CCNC: 66 U/L (ref 46–116)
ALT SERPL W P-5'-P-CCNC: 18 U/L (ref 12–78)
ANION GAP SERPL CALCULATED.3IONS-SCNC: 2 MMOL/L (ref 4–13)
AST SERPL W P-5'-P-CCNC: 20 U/L (ref 5–45)
BASOPHILS # BLD AUTO: 0.05 THOUSANDS/ÂΜL (ref 0–0.1)
BASOPHILS NFR BLD AUTO: 1 % (ref 0–1)
BILIRUB SERPL-MCNC: 0.67 MG/DL (ref 0.2–1)
BUN SERPL-MCNC: 22 MG/DL (ref 5–25)
CALCIUM ALBUM COR SERPL-MCNC: 9.7 MG/DL (ref 8.3–10.1)
CALCIUM SERPL-MCNC: 9.2 MG/DL (ref 8.3–10.1)
CHLORIDE SERPL-SCNC: 107 MMOL/L (ref 96–108)
CHOLEST SERPL-MCNC: 136 MG/DL
CO2 SERPL-SCNC: 29 MMOL/L (ref 21–32)
CREAT SERPL-MCNC: 0.95 MG/DL (ref 0.6–1.3)
EOSINOPHIL # BLD AUTO: 0.14 THOUSAND/ÂΜL (ref 0–0.61)
EOSINOPHIL NFR BLD AUTO: 3 % (ref 0–6)
ERYTHROCYTE [DISTWIDTH] IN BLOOD BY AUTOMATED COUNT: 12.5 % (ref 11.6–15.1)
GFR SERPL CREATININE-BSD FRML MDRD: 59 ML/MIN/1.73SQ M
GLUCOSE P FAST SERPL-MCNC: 91 MG/DL (ref 65–99)
HCT VFR BLD AUTO: 37.8 % (ref 34.8–46.1)
HDLC SERPL-MCNC: 66 MG/DL
HGB BLD-MCNC: 12.6 G/DL (ref 11.5–15.4)
IMM GRANULOCYTES # BLD AUTO: 0.01 THOUSAND/UL (ref 0–0.2)
IMM GRANULOCYTES NFR BLD AUTO: 0 % (ref 0–2)
LDLC SERPL CALC-MCNC: 60 MG/DL (ref 0–100)
LYMPHOCYTES # BLD AUTO: 1.76 THOUSANDS/ÂΜL (ref 0.6–4.47)
LYMPHOCYTES NFR BLD AUTO: 35 % (ref 14–44)
MCH RBC QN AUTO: 33.7 PG (ref 26.8–34.3)
MCHC RBC AUTO-ENTMCNC: 33.3 G/DL (ref 31.4–37.4)
MCV RBC AUTO: 101 FL (ref 82–98)
MONOCYTES # BLD AUTO: 0.41 THOUSAND/ÂΜL (ref 0.17–1.22)
MONOCYTES NFR BLD AUTO: 8 % (ref 4–12)
NEUTROPHILS # BLD AUTO: 2.64 THOUSANDS/ÂΜL (ref 1.85–7.62)
NEUTS SEG NFR BLD AUTO: 53 % (ref 43–75)
NRBC BLD AUTO-RTO: 0 /100 WBCS
PLATELET # BLD AUTO: 338 THOUSANDS/UL (ref 149–390)
PMV BLD AUTO: 8.7 FL (ref 8.9–12.7)
POTASSIUM SERPL-SCNC: 3.7 MMOL/L (ref 3.5–5.3)
PROT SERPL-MCNC: 6.6 G/DL (ref 6.4–8.4)
RBC # BLD AUTO: 3.74 MILLION/UL (ref 3.81–5.12)
SODIUM SERPL-SCNC: 138 MMOL/L (ref 135–147)
TRIGL SERPL-MCNC: 49 MG/DL
TSH SERPL DL<=0.05 MIU/L-ACNC: 1.13 UIU/ML (ref 0.45–4.5)
WBC # BLD AUTO: 5.01 THOUSAND/UL (ref 4.31–10.16)

## 2023-02-28 ENCOUNTER — OFFICE VISIT (OUTPATIENT)
Dept: FAMILY MEDICINE CLINIC | Facility: CLINIC | Age: 73
End: 2023-02-28

## 2023-02-28 VITALS
HEART RATE: 62 BPM | BODY MASS INDEX: 29.49 KG/M2 | SYSTOLIC BLOOD PRESSURE: 136 MMHG | DIASTOLIC BLOOD PRESSURE: 72 MMHG | WEIGHT: 177 LBS | TEMPERATURE: 98.5 F | OXYGEN SATURATION: 98 % | HEIGHT: 65 IN | RESPIRATION RATE: 16 BRPM

## 2023-02-28 DIAGNOSIS — E78.2 MIXED HYPERLIPIDEMIA: ICD-10-CM

## 2023-02-28 DIAGNOSIS — Z17.0 MALIGNANT NEOPLASM OF LOWER-INNER QUADRANT OF RIGHT BREAST OF FEMALE, ESTROGEN RECEPTOR POSITIVE (HCC): ICD-10-CM

## 2023-02-28 DIAGNOSIS — N18.31 STAGE 3A CHRONIC KIDNEY DISEASE (HCC): ICD-10-CM

## 2023-02-28 DIAGNOSIS — I10 PRIMARY HYPERTENSION: Primary | ICD-10-CM

## 2023-02-28 DIAGNOSIS — I77.1 SUBCLAVIAN ARTERY STENOSIS (HCC): ICD-10-CM

## 2023-02-28 DIAGNOSIS — M81.0 AGE-RELATED OSTEOPOROSIS WITHOUT CURRENT PATHOLOGICAL FRACTURE: ICD-10-CM

## 2023-02-28 DIAGNOSIS — C50.311 MALIGNANT NEOPLASM OF LOWER-INNER QUADRANT OF RIGHT BREAST OF FEMALE, ESTROGEN RECEPTOR POSITIVE (HCC): ICD-10-CM

## 2023-02-28 DIAGNOSIS — E03.9 ACQUIRED HYPOTHYROIDISM: ICD-10-CM

## 2023-02-28 DIAGNOSIS — I10 ESSENTIAL HYPERTENSION: ICD-10-CM

## 2023-02-28 DIAGNOSIS — R49.0 HOARSENESS OF VOICE: ICD-10-CM

## 2023-02-28 DIAGNOSIS — Z00.00 MEDICARE ANNUAL WELLNESS VISIT, SUBSEQUENT: ICD-10-CM

## 2023-02-28 RX ORDER — LOSARTAN POTASSIUM 100 MG/1
100 TABLET ORAL DAILY
Qty: 90 TABLET | Refills: 3 | Status: SHIPPED | OUTPATIENT
Start: 2023-02-28

## 2023-02-28 NOTE — PATIENT INSTRUCTIONS
Medicare Preventive Visit Patient Instructions  Thank you for completing your Welcome to Medicare Visit or Medicare Annual Wellness Visit today  Your next wellness visit will be due in one year (2/29/2024)  The screening/preventive services that you may require over the next 5-10 years are detailed below  Some tests may not apply to you based off risk factors and/or age  Screening tests ordered at today's visit but not completed yet may show as past due  Also, please note that scanned in results may not display below  Preventive Screenings:  Service Recommendations Previous Testing/Comments   Colorectal Cancer Screening  * Colonoscopy    * Fecal Occult Blood Test (FOBT)/Fecal Immunochemical Test (FIT)  * Fecal DNA/Cologuard Test  * Flexible Sigmoidoscopy Age: 39-70 years old   Colonoscopy: every 10 years (may be performed more frequently if at higher risk)  OR  FOBT/FIT: every 1 year  OR  Cologuard: every 3 years  OR  Sigmoidoscopy: every 5 years  Screening may be recommended earlier than age 39 if at higher risk for colorectal cancer  Also, an individualized decision between you and your healthcare provider will decide whether screening between the ages of 74-80 would be appropriate  Colonoscopy: 08/22/2022  FOBT/FIT: Not on file  Cologuard: Not on file  Sigmoidoscopy: Not on file          Breast Cancer Screening Age: 36 years old  Frequency: every 1-2 years  Not required if history of left and right mastectomy Mammogram: 10/21/2022        Cervical Cancer Screening Between the ages of 21-29, pap smear recommended once every 3 years  Between the ages of 33-67, can perform pap smear with HPV co-testing every 5 years     Recommendations may differ for women with a history of total hysterectomy, cervical cancer, or abnormal pap smears in past  Pap Smear: Not on file        Hepatitis C Screening Once for adults born between [facility]  More frequently in patients at high risk for Hepatitis C Hep C Antibody: 02/03/2020        Diabetes Screening 1-2 times per year if you're at risk for diabetes or have pre-diabetes Fasting glucose: 91 mg/dL (2/24/2023)  A1C: No results in last 5 years (No results in last 5 years)      Cholesterol Screening Once every 5 years if you don't have a lipid disorder  May order more often based on risk factors  Lipid panel: 02/24/2023          Other Preventive Screenings Covered by Medicare:  1  Abdominal Aortic Aneurysm (AAA) Screening: covered once if your at risk  You're considered to be at risk if you have a family history of AAA  2  Lung Cancer Screening: covers low dose CT scan once per year if you meet all of the following conditions: (1) Age 50-69; (2) No signs or symptoms of lung cancer; (3) Current smoker or have quit smoking within the last 15 years; (4) You have a tobacco smoking history of at least 20 pack years (packs per day multiplied by number of years you smoked); (5) You get a written order from a healthcare provider  3  Glaucoma Screening: covered annually if you're considered high risk: (1) You have diabetes OR (2) Family history of glaucoma OR (3)  aged 48 and older OR (3)  American aged 72 and older  3  Osteoporosis Screening: covered every 2 years if you meet one of the following conditions: (1) You're estrogen deficient and at risk for osteoporosis based off medical history and other findings; (2) Have a vertebral abnormality; (3) On glucocorticoid therapy for more than 3 months; (4) Have primary hyperparathyroidism; (5) On osteoporosis medications and need to assess response to drug therapy  · Last bone density test (DXA Scan): 11/05/2022   5  HIV Screening: covered annually if you're between the age of 15-65  Also covered annually if you are younger than 13 and older than 72 with risk factors for HIV infection  For pregnant patients, it is covered up to 3 times per pregnancy      Immunizations:  Immunization Recommendations   Influenza Vaccine Annual influenza vaccination during flu season is recommended for all persons aged >= 6 months who do not have contraindications   Pneumococcal Vaccine   * Pneumococcal conjugate vaccine = PCV13 (Prevnar 13), PCV15 (Vaxneuvance), PCV20 (Prevnar 20)  * Pneumococcal polysaccharide vaccine = PPSV23 (Pneumovax) Adults 25-60 years old: 1-3 doses may be recommended based on certain risk factors  Adults 72 years old: 1-2 doses may be recommended based off what pneumonia vaccine you previously received   Hepatitis B Vaccine 3 dose series if at intermediate or high risk (ex: diabetes, end stage renal disease, liver disease)   Tetanus (Td) Vaccine - COST NOT COVERED BY MEDICARE PART B Following completion of primary series, a booster dose should be given every 10 years to maintain immunity against tetanus  Td may also be given as tetanus wound prophylaxis  Tdap Vaccine - COST NOT COVERED BY MEDICARE PART B Recommended at least once for all adults  For pregnant patients, recommended with each pregnancy  Shingles Vaccine (Shingrix) - COST NOT COVERED BY MEDICARE PART B  2 shot series recommended in those aged 48 and above     Health Maintenance Due:      Topic Date Due   • Breast Cancer Screening: Mammogram  10/21/2023   • Hepatitis C Screening  Completed   • Colorectal Cancer Screening  Discontinued     Immunizations Due:      Topic Date Due   • COVID-19 Vaccine (4 - Booster for Pfizer series) 01/15/2022   • Influenza Vaccine (1) 09/01/2022     Advance Directives   What are advance directives? Advance directives are legal documents that state your wishes and plans for medical care  These plans are made ahead of time in case you lose your ability to make decisions for yourself  Advance directives can apply to any medical decision, such as the treatments you want, and if you want to donate organs  What are the types of advance directives?   There are many types of advance directives, and each state has rules about how to use them  You may choose a combination of any of the following:  · Living will: This is a written record of the treatment you want  You can also choose which treatments you do not want, which to limit, and which to stop at a certain time  This includes surgery, medicine, IV fluid, and tube feedings  · Durable power of  for healthcare [facility]): This is a written record that states who you want to make healthcare choices for you when you are unable to make them for yourself  This person, called a proxy, is usually a family member or a friend  You may choose more than 1 proxy  · Do not resuscitate (DNR) order:  A DNR order is used in case your heart stops beating or you stop breathing  It is a request not to have certain forms of treatment, such as CPR  A DNR order may be included in other types of advance directives  · Medical directive: This covers the care that you want if you are in a coma, near death, or unable to make decisions for yourself  You can list the treatments you want for each condition  Treatment may include pain medicine, surgery, blood transfusions, dialysis, IV or tube feedings, and a ventilator (breathing machine)  · Values history: This document has questions about your views, beliefs, and how you feel and think about life  This information can help others choose the care that you would choose  Why are advance directives important? An advance directive helps you control your care  Although spoken wishes may be used, it is better to have your wishes written down  Spoken wishes can be misunderstood, or not followed  Treatments may be given even if you do not want them  An advance directive may make it easier for your family to make difficult choices about your care     Weight Management   Why it is important to manage your weight:  Being overweight increases your risk of health conditions such as heart disease, high blood pressure, type 2 diabetes, and certain types of cancer  It can also increase your risk for osteoarthritis, sleep apnea, and other respiratory problems  Aim for a slow, steady weight loss  Even a small amount of weight loss can lower your risk of health problems  How to lose weight safely:  A safe and healthy way to lose weight is to eat fewer calories and get regular exercise  You can lose up about 1 pound a week by decreasing the number of calories you eat by 500 calories each day  Healthy meal plan for weight management:  A healthy meal plan includes a variety of foods, contains fewer calories, and helps you stay healthy  A healthy meal plan includes the following:  · Eat whole-grain foods more often  A healthy meal plan should contain fiber  Fiber is the part of grains, fruits, and vegetables that is not broken down by your body  Whole-grain foods are healthy and provide extra fiber in your diet  Some examples of whole-grain foods are whole-wheat breads and pastas, oatmeal, brown rice, and bulgur  · Eat a variety of vegetables every day  Include dark, leafy greens such as spinach, kale, jean carlos greens, and mustard greens  Eat yellow and orange vegetables such as carrots, sweet potatoes, and winter squash  · Eat a variety of fruits every day  Choose fresh or canned fruit (canned in its own juice or light syrup) instead of juice  Fruit juice has very little or no fiber  · Eat low-fat dairy foods  Drink fat-free (skim) milk or 1% milk  Eat fat-free yogurt and low-fat cottage cheese  Try low-fat cheeses such as mozzarella and other reduced-fat cheeses  · Choose meat and other protein foods that are low in fat  Choose beans or other legumes such as split peas or lentils  Choose fish, skinless poultry (chicken or turkey), or lean cuts of red meat (beef or pork)  Before you cook meat or poultry, cut off any visible fat  · Use less fat and oil  Try baking foods instead of frying them   Add less fat, such as margarine, sour cream, regular salad dressing and mayonnaise to foods  Eat fewer high-fat foods  Some examples of high-fat foods include french fries, doughnuts, ice cream, and cakes  · Eat fewer sweets  Limit foods and drinks that are high in sugar  This includes candy, cookies, regular soda, and sweetened drinks  Exercise:  Exercise at least 30 minutes per day on most days of the week  Some examples of exercise include walking, biking, dancing, and swimming  You can also fit in more physical activity by taking the stairs instead of the elevator or parking farther away from stores  Ask your healthcare provider about the best exercise plan for you  © Copyright [employer] 2018 Information is for End User's use only and may not be sold, redistributed or otherwise used for commercial purposes   All illustrations and images included in CareNotes® are the copyrighted property of [employer]  or [address] [place] [address] [employer]

## 2023-02-28 NOTE — PROGRESS NOTES
Chief Complaint   Patient presents with   • Medicare Wellness Visit     Subsequent visit        Health Maintenance   Topic Date Due   • COVID-19 Vaccine (4 - Booster for Pfizer series) 01/15/2022   • Influenza Vaccine (1) 09/01/2022   • Medicare Annual Wellness Visit (AWV)  02/23/2023   • Breast Cancer Screening: Mammogram  10/21/2023   • Fall Risk  02/28/2024   • Depression Screening  02/28/2024   • Urinary Incontinence Screening  02/28/2024   • BMI: Followup Plan  02/28/2024   • BMI: Adult  02/28/2024   • Hepatitis C Screening  Completed   • Osteoporosis Screening  Completed   • Pneumococcal Vaccine: 65+ Years  Completed   • HIB Vaccine  Aged Out   • IPV Vaccine  Aged Out   • Hepatitis A Vaccine  Aged Out   • Meningococcal ACWY Vaccine  Aged Out   • HPV Vaccine  Aged Out   • Colorectal Cancer Screening  Discontinued        Assessment and Plan:     Problem List Items Addressed This Visit        Endocrine    Hypothyroidism     TSH 1 130  Continue Levothyroxine 112 mcg daily  Patient feels that her thyroid gland is enlarged, c/o some hoarseness of the voice, occasional difficulty swallowing  Will order ultrasound of the thyroid to rule out thyroid nodules  Relevant Orders    TSH, 3rd generation with Free T4 reflex    US thyroid       Cardiovascular and Mediastinum    Hypertension - Primary     Blood pressure remains stable  Continue amlodipine 5 mg daily, HCTZ 25 mg daily, losartan 100 mg daily  Relevant Medications    losartan (COZAAR) 100 MG tablet    Other Relevant Orders    Comprehensive metabolic panel    Subclavian artery stenosis [facility])     Patient is asymptomatic  Continue aspirin 81 mg daily, statin therapy  Schedule Carotid Doppler in May 2023  Relevant Orders    VAS carotid complete study       Musculoskeletal and Integument    Osteoporosis     DEXA scan done in November 2022 showed osteopenia  Continue Prolia injections every 6 months      Encouraged weightbearing exercise, continue calcium with vitamin D supplementation  Recheck DEXA scan in 10/2024  Genitourinary    CKD (chronic kidney disease)     Lab Results   Component Value Date    EGFR 59 02/24/2023    EGFR 54 08/19/2022    EGFR 54 06/17/2022    CREATININE 0 95 02/24/2023    CREATININE 1 01 08/19/2022    CREATININE 1 02 06/17/2022     Recommended to stay well-hydrated, avoid NSAIDs  Relevant Orders    CBC and differential    Comprehensive metabolic panel       Other    Hyperlipidemia     LDL 60 - at goal   Continue rosuvastatin 20 mg daily  Relevant Orders    Comprehensive metabolic panel    Lipid panel    Malignant neoplasm of lower-inner quadrant of right breast of female, estrogen receptor positive ([facility] [address] )     Followed by surgical oncology Dr Asher Eye and medical oncology Dr Linder Boast  Continue Anastrozole 1 mg daily  Medicare annual wellness visit, subsequent   Other Visit Diagnoses     Essential hypertension        Relevant Medications    losartan (COZAAR) 100 MG tablet    Hoarseness of voice        Relevant Orders    US thyroid        BMI Counseling: Body mass index is 29 45 kg/m²  The BMI is above normal  Nutrition recommendations include encouraging healthy choices of fruits and vegetables, decreasing fast food intake, consuming healthier snacks, limiting drinks that contain sugar, moderation in carbohydrate intake, increasing intake of lean protein, reducing intake of saturated and trans fat and reducing intake of cholesterol  Exercise recommendations include exercising 3-5 times per week  No pharmacotherapy was ordered  Rationale for BMI follow-up plan is due to patient being overweight or obese  Depression Screening and Follow-up Plan: Patient was screened for depression during today's encounter  They screened negative with a PHQ-2 score of 0        Preventive health issues were discussed with patient, and age appropriate screening tests were ordered as noted in patient's After Visit Summary  Personalized health advice and appropriate referrals for health education or preventive services given if needed, as noted in patient's After Visit Summary  Schedule follow-up visit in 6 months with labs prior to visit  History of Present Illness:     Patient presents for a Medicare Wellness Visit    HPI     Patient presents for 6 months follow-up of chronic medical conditions, Medicare AWV  PMHx: HTN, Hypothyroidism, Hyperlipidemia, carotid artery stenosis, left subclavian artery stenosis, osteopenia, pulmonary nodule, right breast CA, S/P lumpectomy in December 2018  Reviewed current medications, blood test results from February 20, 2022  Fasting blood sugar 91, creatinine 0 95, GFR 59  Potassium 4 4  Cholesterol 136, HDL 66, LDL 60, TSH 1 130  Patient remains active  Reports no memory problems  No falls  Patient states that she feels that her thyroid is enlarged, c/o some hoarseness of the voice, occasional difficulty swallowing  Currently on Levothyroxine 112 mcg daily  BL mammogram done in October 2022 showed no evidence of malignancy  Colonoscopy performed by colorectal surgeon Dr Yaritza Diaz in August 2022  No further colonoscopy recommended  Former smoker  DEXA scan done in November 2022 showed osteopenia  Patient received flu shot at [address] in the fall of 2022  Patient Care Team:  Emil Vincent MD as PCP - General  MD Donna Nolan MD as Surgeon (Surgical Oncology)  Kesha Ellis MD (Hematology)  Yani Abdalla DPM (Podiatry)  Avril Pleitez MD (Cardiology)     Review of Systems:     Review of Systems   Constitutional: Positive for fatigue (mild)  Negative for activity change, appetite change, chills and fever  HENT: Negative for congestion, ear pain, nosebleeds, sore throat and trouble swallowing  Eyes: Negative      Respiratory: Negative for cough, chest tightness, shortness of breath and wheezing  Cardiovascular: Negative for chest pain, palpitations and leg swelling  Gastrointestinal: Negative for abdominal pain, blood in stool, constipation, diarrhea, nausea and vomiting  Genitourinary: Negative for difficulty urinating, dysuria and hematuria  Musculoskeletal: Positive for arthralgias  Negative for back pain, gait problem and joint swelling  Skin: Negative for rash  Neurological: Negative for dizziness, syncope and headaches  Hematological: Negative  Psychiatric/Behavioral: Negative for dysphoric mood and sleep disturbance  The patient is not nervous/anxious           Problem List:     Patient Active Problem List   Diagnosis   • Allergic rhinitis   • Bradycardia   • Heart murmur   • Hyperlipidemia   • Hypertension   • Hypothyroidism   • Osteoporosis   • Pulmonary nodules   • Subclavian artery stenosis (HCC)   • Vitamin D deficiency   • Malignant neoplasm of lower-inner quadrant of right breast of female, estrogen receptor positive ([facility] )   • Medicare annual wellness visit, subsequent   • Use of anastrozole (Arimidex)   • Osteopenia of multiple sites   • Decreased pedal pulses   • History of tobacco use   • Paresthesia of foot, bilateral   • Age-related cataract of left eye   • Age-related cataract of right eye   • CKD (chronic kidney disease)   • History of Graves' disease      Past Medical and Surgical History:     Past Medical History:   Diagnosis Date   • Acute pain of left shoulder     History   • Acute recurrent maxillary sinusitis     History   • Acute upper respiratory infection     History   • BRCA negative 11/12/2018    Inv[facility]ae   • BRCA1 negative    • BRCA2 negative    • Breast cancer ([facility] ) 11/01/2018    Right   • Callus    • Cancer ([facility] )    • Carotid artery narrowing     stenosis   • Disease of thyroid gland    • High arches    • History of Graves' disease     status post 131 treatment   • History of lateral epicondylitis of left elbow    • History of onychomycosis     of toenail   • History of perforation of tympanic membrane    • Hyperlipidemia    • Hypertension    • Ingrown toenail    • Left otitis media     History   • Medicare annual wellness visit, subsequent      Past Surgical History:   Procedure Laterality Date   • BREAST BIOPSY Right 2018    U/S BX   • BREAST LUMPECTOMY Right 2018    Procedure: BREAST LUMPECTOMY; BREAST NEEDLE LOCALIZATION (NEEDLE LOC AT 0700); Surgeon: Wally Solomon MD;  Location: [facility];  Service: Surgical Oncology   • BREAST LUMPECTOMY     • ELBOW FRACTURE SURGERY  1960    Open Treatment of Fracture of proximal Radius   • LYMPH NODE BIOPSY Right 2018    Procedure: SENTINEL LYMPH NODE BIOPSY; LYMPHATIC MAPPING WITH BLUE DYE AND RADIOACTIVE DYE (INJECT AT 0800 BY DR PRADO IN THE OR); Surgeon: Wally Solomon MD;  Location: [facility];  Service: Surgical Oncology   • SINUS SURGERY Left     nasal polyps(?)-large papilloma left maxillary sinus with extension into posterior nasopharynx completed by Dr Katie Lucero     • THYROID CYST EXCISION      ablation   • TONSILLECTOMY AND ADENOIDECTOMY     • US BREAST NEEDLE LOC RIGHT Right 2018   • US GUIDED BREAST BIOPSY RIGHT COMPLETE Right 2018      Family History:     Family History   Problem Relation Age of Onset   • Breast cancer Mother    • Pancreatic cancer Mother    • Cancer Mother    • Aortic aneurysm Father    • Aortic aneurysm Family         Abdominal   • Breast cancer Family    • Breast cancer Maternal Grandmother       Social History:     Social History     Socioeconomic History   • Marital status: /Civil Union     Spouse name: None   • Number of children: None   • Years of education: None   • Highest education level: None   Occupational History   • None   Tobacco Use   • Smoking status: Former     Packs/day: 0 00     Years: 0 00     Pack years: 0 00     Types: Cigarettes     Quit date: 10/16/2002     Years since quittin 3   • Smokeless tobacco: Never   Vaping Use   • Vaping Use: Never used   Substance and Sexual Activity   • Alcohol use: Not Currently     Alcohol/week: 0 0 standard drinks     Comment: rarely   • Drug use: No   • Sexual activity: None   Other Topics Concern   • None   Social History Narrative   • None     Social Determinants of Health     Financial Resource Strain: Not on file   Food Insecurity: Not on file   Transportation Needs: Not on file   Physical Activity: Not on file   Stress: Not on file   Social Connections: Not on file   Intimate Partner Violence: Not on file   Housing Stability: Not on file      Medications and Allergies:     Current Outpatient Medications   Medication Sig Dispense Refill   • amLODIPine (NORVASC) 5 mg tablet Take 1 tablet (5 mg total) by mouth daily 90 tablet 3   • anastrozole (ARIMIDEX) 1 mg tablet take 1 tablet by mouth once daily 90 tablet 1   • Cholecalciferol (VITAMIN D3) 2000 units TABS Take 1 tablet by mouth daily     • hydrochlorothiazide (HYDRODIURIL) 25 mg tablet Take 1 tablet (25 mg total) by mouth daily 30 tablet 11   • losartan (COZAAR) 100 MG tablet Take 1 tablet (100 mg total) by mouth daily 90 tablet 3   • rosuvastatin (CRESTOR) 20 MG tablet Take 1 tablet (20 mg total) by mouth daily 90 tablet 3   • Sodium Fluoride 5000 PPM 1 1 % PSTE BRUSH NORMALLY AND EXPECTORATE BUT DO NOT RINSE AFTER  DO NOT EAT     (REFER TO PRESCRIPTION NOTES)  • levothyroxine 112 mcg tablet Take 1 tablet (112 mcg total) by mouth daily 90 tablet 3     No current facility-administered medications for this visit  Allergies   Allergen Reactions   • Sulfa Antibiotics Dizziness     Other reaction(s): feeling "off-balance"  Category:  Adverse Reaction;       Immunizations:     Immunization History   Administered Date(s) Administered   • COVID-19 PFIZER VACCINE 0 3 ML IM 03/05/2021, 03/30/2021, 11/20/2021   • INFLUENZA 10/10/2014, 09/19/2018, 08/24/2020, 09/28/2021   • Influenza Split High Dose Preservative Free IM 10/30/2013   • Influenza, seasonal, injectable 09/01/2015, 10/13/2016, 10/04/2017   • Pneumococcal Conjugate 13-Valent 12/15/2015   • Pneumococcal Polysaccharide PPV23 06/18/2014   • Tdap 10/29/2021   • influenza, trivalent, adjuvanted 09/17/2019      Health Maintenance:         Topic Date Due   • Breast Cancer Screening: Mammogram  10/21/2023   • Hepatitis C Screening  Completed   • Colorectal Cancer Screening  Discontinued         Topic Date Due   • COVID-19 Vaccine (4 - Booster for Pfizer series) 01/15/2022   • Influenza Vaccine (1) 09/01/2022      Medicare Screening Tests and Risk Assessments:     Ranjit Smith is here for her Subsequent Wellness visit  Health Risk Assessment:   Patient rates overall health as good  Patient feels that their physical health rating is same  Patient is satisfied with their life  Eyesight was rated as same  Hearing was rated as same  Patient feels that their emotional and mental health rating is same  Patients states they are never, rarely angry  Patient states they are never, rarely unusually tired/fatigued  Pain experienced in the last 7 days has been none  Patient states that she has experienced no weight loss or gain in last 6 months  Depression Screening:   PHQ-2 Score: 0      Fall Risk Screening: In the past year, patient has experienced: no history of falling in past year      Urinary Incontinence Screening:   Patient has not leaked urine accidently in the last six months  Home Safety:  Patient does not have trouble with stairs inside or outside of their home  Patient has working smoke alarms and has working carbon monoxide detector  Home safety hazards include: none  Nutrition:   Current diet is Regular  Medications:   Patient is not currently taking any over-the-counter supplements  Patient is able to manage medications       Activities of Daily Living (ADLs)/Instrumental Activities of Daily Living (IADLs):   Walk and transfer into and out of bed and chair?: Yes  Dress and groom yourself?: Yes    Bathe or shower yourself?: Yes    Feed yourself? Yes  Do your laundry/housekeeping?: Yes  Manage your money, pay your bills and track your expenses?: Yes  Make your own meals?: Yes    Do your own shopping?: Yes    Previous Hospitalizations:   Any hospitalizations or ED visits within the last 12 months?: No      Advance Care Planning:   Living will: No    Durable POA for healthcare: No    Advanced directive: No    Advanced directive counseling given: Yes      Cognitive Screening:   Provider or family/friend/caregiver concerned regarding cognition?: No    PREVENTIVE SCREENINGS      Cardiovascular Screening:    General: Screening Not Indicated and History Lipid Disorder      Diabetes Screening:     General: Screening Current      Colorectal Cancer Screening:     General: Screening Current      Breast Cancer Screening:     General: History Breast Cancer      Cervical Cancer Screening:    General: Screening Not Indicated      Osteoporosis Screening:    General: Screening Not Indicated and History Osteoporosis      Abdominal Aortic Aneurysm (AAA) Screening:        General: Screening Not Indicated      Lung Cancer Screening:     General: Screening Not Indicated      Hepatitis C Screening:    General: Screening Current    Screening, Brief Intervention, and Referral to Treatment (SBIRT)    Screening  Typical number of drinks in a day: 0  Typical number of drinks in a week: 0  Interpretation: Low risk drinking behavior  Single Item Drug Screening:  How often have you used an illegal drug (including marijuana) or a prescription medication for non-medical reasons in the past year? never    Single Item Drug Screen Score: 0  Interpretation: Negative screen for possible drug use disorder    Other Counseling Topics:   Car/seat belt/driving safety, skin self-exam and calcium and vitamin D intake and regular weightbearing exercise  No results found       Physical Exam:     /72 (BP Location: Left arm, Patient Position: Sitting, Cuff Size: Standard)   Pulse 62   Temp 98 5 °F (36 9 °C) (Tympanic)   Resp 16   Ht 5' 5" (1 651 m)   Wt 80 3 kg (177 lb)   SpO2 98%   BMI 29 45 kg/m²     Physical Exam  Vitals and nursing note reviewed  Constitutional:       Appearance: Normal appearance  HENT:      Head: Normocephalic and atraumatic  Eyes:      Conjunctiva/sclera: Conjunctivae normal       Pupils: Pupils are equal, round, and reactive to light  Neck:      Vascular: Carotid bruit present  Comments: Thyroid gland is not enlarged  No tenderness to palpation  Cardiovascular:      Rate and Rhythm: Normal rate and regular rhythm  Heart sounds: No murmur heard  Pulmonary:      Effort: Pulmonary effort is normal       Breath sounds: Normal breath sounds  Abdominal:      General: Bowel sounds are normal  There is no distension  Palpations: Abdomen is soft  Tenderness: There is no abdominal tenderness  Musculoskeletal:         General: No swelling, tenderness or deformity  Normal range of motion  Cervical back: Normal range of motion and neck supple  No tenderness  Right lower leg: No edema  Left lower leg: No edema  Lymphadenopathy:      Cervical: No cervical adenopathy  Skin:     General: Skin is warm and dry  Findings: No rash  Neurological:      General: No focal deficit present  Mental Status: She is alert and oriented to person, place, and time  Motor: No weakness        Gait: Gait normal    Psychiatric:         Mood and Affect: Mood normal           Usman Foote MD

## 2023-03-01 NOTE — ASSESSMENT & PLAN NOTE
Followed by surgical oncology Dr Radha Avery and medical oncology Dr Corrine Abdullahi  Continue Anastrozole 1 mg daily

## 2023-03-01 NOTE — ASSESSMENT & PLAN NOTE
Blood pressure remains stable  Continue amlodipine 5 mg daily, HCTZ 25 mg daily, losartan 100 mg daily

## 2023-03-01 NOTE — ASSESSMENT & PLAN NOTE
DEXA scan done in November 2022 showed osteopenia  Continue Prolia injections every 6 months  Encouraged weightbearing exercise, continue calcium with vitamin D supplementation  Recheck DEXA scan in 10/2024

## 2023-03-01 NOTE — ASSESSMENT & PLAN NOTE
Lab Results   Component Value Date    EGFR 59 02/24/2023    EGFR 54 08/19/2022    EGFR 54 06/17/2022    CREATININE 0 95 02/24/2023    CREATININE 1 01 08/19/2022    CREATININE 1 02 06/17/2022     Recommended to stay well-hydrated, avoid NSAIDs

## 2023-03-01 NOTE — ASSESSMENT & PLAN NOTE
Patient is asymptomatic  Continue aspirin 81 mg daily, statin therapy  Schedule Carotid Doppler in May 2023

## 2023-03-20 ENCOUNTER — HOSPITAL ENCOUNTER (OUTPATIENT)
Dept: RADIOLOGY | Facility: HOSPITAL | Age: 73
Discharge: HOME | End: 2023-03-20

## 2023-03-20 DIAGNOSIS — R49.0 HOARSENESS OF VOICE: ICD-10-CM

## 2023-03-20 DIAGNOSIS — E03.9 ACQUIRED HYPOTHYROIDISM: ICD-10-CM

## 2023-03-22 ENCOUNTER — HOSPITAL ENCOUNTER (OUTPATIENT)
Dept: NON INVASIVE DIAGNOSTICS | Facility: CLINIC | Age: 73
Discharge: HOME | End: 2023-03-22

## 2023-03-22 DIAGNOSIS — I77.1 SUBCLAVIAN ARTERY STENOSIS (HCC): ICD-10-CM

## 2023-03-29 DIAGNOSIS — C50.311 MALIGNANT NEOPLASM OF LOWER-INNER QUADRANT OF RIGHT BREAST OF FEMALE, ESTROGEN RECEPTOR POSITIVE (HCC): ICD-10-CM

## 2023-03-29 DIAGNOSIS — Z17.0 MALIGNANT NEOPLASM OF LOWER-INNER QUADRANT OF RIGHT BREAST OF FEMALE, ESTROGEN RECEPTOR POSITIVE (HCC): ICD-10-CM

## 2023-03-29 RX ORDER — ANASTROZOLE 1 MG/1
TABLET ORAL
Qty: 90 TABLET | Refills: 1 | Status: SHIPPED | OUTPATIENT
Start: 2023-03-29

## 2023-04-25 ENCOUNTER — TELEPHONE (OUTPATIENT)
Dept: HEMATOLOGY ONCOLOGY | Facility: CLINIC | Age: 73
End: 2023-04-25

## 2023-04-25 NOTE — TELEPHONE ENCOUNTER
Appointment Change  Cancel, Reschedule, Change to Virtual      Who are you speaking with? Patient   If it is not the patient, are they listed on an active communication consent form? N/A   Which provider is the appointment scheduled with? Dr Mike Hanna   When is the appointment scheduled? Please list date and time 7/25/23   At which location is the appointment scheduled to take place? [place]   Was the appointment rescheduled or changed from an in person visit to a virtual visit? If so, please list the details of the change  8/2/23   What is the reason for the appointment change? Provider on rounds   Was [facility] transport scheduled for this visit? No   Does [facility]AR transport need to be scheduled for the new visit (if applicable) No   Does the patient need an infusion appointment rescheduled? No   Does the patient have an infusion appointment scheduled? If so, when? No   Is the patient undergoing chemotherapy? No   Was the no-show policy reviewed for appointments being changed with less then 24 hours of notice?  Yes

## 2023-04-26 PROBLEM — Z00.00 MEDICARE ANNUAL WELLNESS VISIT, SUBSEQUENT: Status: RESOLVED | Noted: 2019-01-04 | Resolved: 2023-04-26

## 2023-05-08 ENCOUNTER — OFFICE VISIT (OUTPATIENT)
Dept: SURGICAL ONCOLOGY | Facility: CLINIC | Age: 73
End: 2023-05-08

## 2023-05-08 ENCOUNTER — OFFICE VISIT (OUTPATIENT)
Dept: FAMILY MEDICINE CLINIC | Facility: CLINIC | Age: 73
End: 2023-05-08

## 2023-05-08 VITALS
DIASTOLIC BLOOD PRESSURE: 74 MMHG | OXYGEN SATURATION: 98 % | SYSTOLIC BLOOD PRESSURE: 142 MMHG | HEIGHT: 65 IN | WEIGHT: 171 LBS | TEMPERATURE: 97.3 F | RESPIRATION RATE: 20 BRPM | BODY MASS INDEX: 28.49 KG/M2 | HEART RATE: 73 BPM

## 2023-05-08 VITALS
HEIGHT: 65 IN | OXYGEN SATURATION: 98 % | DIASTOLIC BLOOD PRESSURE: 70 MMHG | SYSTOLIC BLOOD PRESSURE: 130 MMHG | WEIGHT: 174 LBS | HEART RATE: 76 BPM | TEMPERATURE: 97.8 F | BODY MASS INDEX: 28.99 KG/M2 | RESPIRATION RATE: 16 BRPM

## 2023-05-08 DIAGNOSIS — L03.032 CELLULITIS OF TOE OF LEFT FOOT: Primary | ICD-10-CM

## 2023-05-08 DIAGNOSIS — C50.311 MALIGNANT NEOPLASM OF LOWER-INNER QUADRANT OF RIGHT BREAST OF FEMALE, ESTROGEN RECEPTOR POSITIVE (HCC): Primary | ICD-10-CM

## 2023-05-08 DIAGNOSIS — Z17.0 MALIGNANT NEOPLASM OF LOWER-INNER QUADRANT OF RIGHT BREAST OF FEMALE, ESTROGEN RECEPTOR POSITIVE (HCC): Primary | ICD-10-CM

## 2023-05-08 RX ORDER — CEPHALEXIN 500 MG/1
500 CAPSULE ORAL 2 TIMES DAILY
Qty: 14 CAPSULE | Refills: 0 | Status: SHIPPED | OUTPATIENT
Start: 2023-05-08 | End: 2023-05-15

## 2023-05-08 NOTE — ASSESSMENT & PLAN NOTE
Pain and swelling in left great toe starting over the weekend  Injured toe about ten years ago, toe nail thick and discolored since then  Redness around the nail bed  The patient and  believe this is more inflamed when compared to yesterday  Keflex sent to pharmacy  Patient instructed to soak foot in warm water and epsom salt 3-4 times daily  She will contact me in two days to make me aware of any improvement  If this redness substantially worsens or she develops any other worsening symptoms, she should be see in emergency room  Recommend she see a podiatrist for her thickened toe nail once infection cleared

## 2023-05-08 NOTE — PROGRESS NOTES
Surgical Oncology Follow Up       [address] [facility],[address] [facility]  [facility] [place]  [address]  [facility] [address]    [facility]  1950  [id number]  [address] [facility],[address] [place] [address]  [facility] [place]  [address] [facility] [address]    Chief Complaint   Patient presents with   • office visit       Assessment/Plan:  1  Malignant neoplasm of lower-inner quadrant of right breast of female, estrogen receptor positive ([address] )  - 6 month follow up    Discussion/Summary: Patient is a 56-year-old female presenting today for right breast cancer diagnosed in November of 2018  Pathology revealed invasive ductal carcinoma ER/AR +, HER2 negative  She underwent genetic testing which was BRCA negative  She underwent a right breast lumpectomy with sentinel node biopsy with Dr Hussain Pryor  She is currently on anastrozole  She is scheduled for a mammogram in October  There were no concerns on her clinical breast exam  I will see the patient back in 6 months or sooner should the need arise  She was instructed to call with any questions or concerns prior to this time  All questions were answered today        History of Present Illness:     Oncology History   Malignant neoplasm of lower-inner quadrant of right breast of female, estrogen receptor positive ([facility] [address] )   11/1/2018 Biopsy    Right breast biopsy  3-4 o'clock, 5 cmfn  Invasive ductal carcinoma  Grade 1  8 mm on ultrasound    AR 90  Her 2 1 +     11/12/2018 Genetic Testing    BRCA testing-invitae  Negative     12/18/2018 Surgery    Right breast lumpectomy with sentinel lymph node biopsy  Invasive ductal carcinoma  Grade 1  1 1 cm  Margins negative   0/1 lymph node    AR 90  Her 2 1+  Stage IA     1/23/2019 -  Hormone Therapy    Anastrozole 1 mg daily for 5 years  With Dr Annmarie Randolph          -Interval History: Patient is a 56-year-old female presenting today for right breast cancer diagnosed in November of 2018  She is currently on anastrozole  Patient denies changes on her breast exam  She denies persistent headaches, bone pain, back pain, shortness of breath, or abdominal pain  Review of Systems:  Review of Systems   Constitutional: Negative for activity change, appetite change, fatigue and unexpected weight change  Respiratory: Negative for cough and shortness of breath  Cardiovascular: Negative for chest pain  Gastrointestinal: Negative for abdominal pain, diarrhea, nausea and vomiting  Endocrine: Negative for heat intolerance  Musculoskeletal: Negative for arthralgias, back pain and myalgias  Skin: Negative for rash  Neurological: Negative for weakness and headaches  Hematological: Negative for adenopathy         Patient Active Problem List   Diagnosis   • Allergic rhinitis   • Bradycardia   • Heart murmur   • Hyperlipidemia   • Hypertension   • Hypothyroidism   • Osteoporosis   • Pulmonary nodules   • Subclavian artery stenosis (HCC)   • Vitamin D deficiency   • Malignant neoplasm of lower-inner quadrant of right breast of female, estrogen receptor positive (HCC)   • Use of anastrozole (Arimidex)   • Osteopenia of multiple sites   • Decreased pedal pulses   • History of tobacco use   • Paresthesia of foot, bilateral   • Age-related cataract of left eye   • Age-related cataract of right eye   • CKD (chronic kidney disease)   • History of Graves' disease     Past Medical History:   Diagnosis Date   • Acute pain of left shoulder     History   • Acute recurrent maxillary sinusitis     History   • Acute upper respiratory infection     History   • BRCA negative 11/12/2018    Invitae   • BRCA1 negative    • BRCA2 negative    • Breast cancer ([facility] ) 11/01/2018    Right   • Callus    • Cancer ([facility] )    • Carotid artery narrowing     stenosis   • Disease of thyroid gland    • High arches    • History of Graves' disease     status post 131 treatment   • History of lateral epicondylitis of left elbow    • History of onychomycosis     of toenail   • History of perforation of tympanic membrane    • Hyperlipidemia    • Hypertension    • Ingrown toenail    • Left otitis media     History   • Medicare annual wellness visit, subsequent      Past Surgical History:   Procedure Laterality Date   • BREAST BIOPSY Right 2018    U/S BX   • BREAST LUMPECTOMY Right 2018    Procedure: BREAST LUMPECTOMY; BREAST NEEDLE LOCALIZATION (NEEDLE LOC AT 0700); Surgeon: Chino Patiño MD;  Location: [facility];  Service: Surgical Oncology   • BREAST LUMPECTOMY     • ELBOW FRACTURE SURGERY  1960    Open Treatment of Fracture of proximal Radius   • LYMPH NODE BIOPSY Right 2018    Procedure: SENTINEL LYMPH NODE BIOPSY; LYMPHATIC MAPPING WITH BLUE DYE AND RADIOACTIVE DYE (INJECT AT 0800 BY DR PRADO IN THE OR); Surgeon: Chino Patiño MD;  Location: [facility];  Service: Surgical Oncology   • SINUS SURGERY Left     nasal polyps(?)-large papilloma left maxillary sinus with extension into posterior nasopharynx completed by Dr Yoli Garcia     • THYROID CYST EXCISION      ablation   • TONSILLECTOMY AND ADENOIDECTOMY     • US BREAST NEEDLE LOC RIGHT Right 2018   • US GUIDED BREAST BIOPSY RIGHT COMPLETE Right 2018     Family History   Problem Relation Age of Onset   • Breast cancer Mother    • Pancreatic cancer Mother    • Cancer Mother    • Aortic aneurysm Father    • Aortic aneurysm Family         Abdominal   • Breast cancer Family    • Breast cancer Maternal Grandmother      Social History     Socioeconomic History   • Marital status: /Civil Union     Spouse name: Not on file   • Number of children: Not on file   • Years of education: Not on file   • Highest education level: Not on file   Occupational History   • Not on file   Tobacco Use   • Smoking status: Former     Packs/day: 0 00     Years: 0 00     Pack years: 0 00     Types: Cigarettes     Quit date: 10/16/2002     Years since quittin 5   • "Smokeless tobacco: Never   Vaping Use   • Vaping Use: Never used   Substance and Sexual Activity   • Alcohol use: Not Currently     Alcohol/week: 0 0 standard drinks     Comment: rarely   • Drug use: No   • Sexual activity: Not on file   Other Topics Concern   • Not on file   Social History Narrative   • Not on file     Social Determinants of Health     Financial Resource Strain: Not on file   Food Insecurity: Not on file   Transportation Needs: Not on file   Physical Activity: Not on file   Stress: Not on file   Social Connections: Not on file   Intimate Partner Violence: Not on file   Housing Stability: Not on file       Current Outpatient Medications:   •  amLODIPine (NORVASC) 5 mg tablet, Take 1 tablet (5 mg total) by mouth daily, Disp: 90 tablet, Rfl: 3  •  anastrozole (ARIMIDEX) 1 mg tablet, take 1 tablet by mouth once daily, Disp: 90 tablet, Rfl: 1  •  Cholecalciferol (VITAMIN D3) 2000 units TABS, Take 1 tablet by mouth daily, Disp: , Rfl:   •  hydrochlorothiazide (HYDRODIURIL) 25 mg tablet, Take 1 tablet (25 mg total) by mouth daily, Disp: 30 tablet, Rfl: 11  •  levothyroxine 112 mcg tablet, Take 1 tablet (112 mcg total) by mouth daily, Disp: 90 tablet, Rfl: 3  •  losartan (COZAAR) 100 MG tablet, Take 1 tablet (100 mg total) by mouth daily, Disp: 90 tablet, Rfl: 3  •  rosuvastatin (CRESTOR) 20 MG tablet, Take 1 tablet (20 mg total) by mouth daily, Disp: 90 tablet, Rfl: 3  •  Sodium Fluoride 5000 PPM 1 1 % PSTE, BRUSH NORMALLY AND EXPECTORATE BUT DO NOT RINSE AFTER  DO NOT EAT     (REFER TO PRESCRIPTION NOTES)  , Disp: , Rfl:   Allergies   Allergen Reactions   • Sulfa Antibiotics Dizziness     Other reaction(s): feeling \"off-balance\"  Category: Adverse Reaction;      Vitals:    05/08/23 0925   BP: 142/74   Pulse: 73   Resp: 20   Temp: (!) 97 3 °F (36 3 °C)   SpO2: 98%       Physical Exam  Constitutional:       General: She is not in acute distress  Appearance: Normal appearance     Cardiovascular:      Rate " and Rhythm: Normal rate and regular rhythm  Pulses: Normal pulses  Heart sounds: Normal heart sounds  Pulmonary:      Effort: Pulmonary effort is normal       Breath sounds: Normal breath sounds  Chest:      Chest wall: No mass  Breasts:     Right: No swelling, bleeding, inverted nipple, mass, nipple discharge, skin change or tenderness  Left: No swelling, bleeding, inverted nipple, mass, nipple discharge, skin change or tenderness  Abdominal:      General: Abdomen is flat  Palpations: Abdomen is soft  Lymphadenopathy:      Upper Body:      Right upper body: No supraclavicular, axillary or pectoral adenopathy  Left upper body: No supraclavicular, axillary or pectoral adenopathy  Skin:     General: Skin is warm  Neurological:      General: No focal deficit present  Mental Status: She is alert and oriented to person, place, and time  Psychiatric:         Mood and Affect: Mood normal          Behavior: Behavior normal            Results:    Imaging  No results found  I reviewed the above imaging data  Advance Care Planning/Advance Directives:  Discussed disease status, cancer treatment plans and/or cancer treatment goals with the patient

## 2023-05-08 NOTE — PATIENT INSTRUCTIONS
Cellulitis   WHAT YOU NEED TO KNOW:   Cellulitis is a skin infection caused by bacteria  Cellulitis is common and can become severe  Cellulitis usually appears on the lower legs  It can also appear on the arms, face, and other areas  Cellulitis develops when bacteria enter a crack or break in your skin, such as a scratch, bite, or cut  DISCHARGE INSTRUCTIONS:   Return to the emergency department if:   Your wound gets larger and more painful  You feel a crackling under your skin when you touch it  You have purple dots or bumps on your skin, or you see bleeding under your skin  You see red streaks coming from the infected area  Call your doctor if:   The red, warm, swollen area gets larger  Your fever or pain does not go away or gets worse  The area does not get smaller after 3 days of antibiotics  You have questions or concerns about your condition or care  Medicines: You should start to see improvement in 3 days  If cellulitis is not treated, the infection can spread through your body and become life-threatening  You may need any of the following medicines:  Antibiotics  help treat a bacterial infection  Acetaminophen  decreases pain and fever  It is available without a doctor's order  Ask how much to take and how often to take it  Follow directions  Read the labels of all other medicines you are using to see if they also contain acetaminophen, or ask your doctor or pharmacist  Acetaminophen can cause liver damage if not taken correctly  NSAIDs , such as ibuprofen, help decrease swelling, pain, and fever  This medicine is available with or without a doctor's order  NSAIDs can cause stomach bleeding or kidney problems in certain people  If you take blood thinner medicine, always ask your healthcare provider if NSAIDs are safe for you  Always read the medicine label and follow directions  Take your medicine as directed    Contact your healthcare provider if you think your medicine is not helping or if you have side effects  Tell your provider if you are allergic to any medicine  Keep a list of the medicines, vitamins, and herbs you take  Include the amounts, and when and why you take them  Bring the list or the pill bottles to follow-up visits  Carry your medicine list with you in case of an emergency  Self-care:   Wash the area with soap and water every day  Gently pat dry  Use bandages if directed by your healthcare provider  Apply cream or ointment as directed  These help protect the area  Most over-the-counter products, such as petroleum jelly, are good to use  Ask your healthcare provider about specific creams or ointments you should use  Place a cool, damp cloth on the area  Use clean cloths and clean water  You can do this as often as you need to  Cool, damp cloths may help decrease pain  Elevate the area above the level of your heart  as often as you can  This will help decrease swelling and pain  Prop the area on pillows or blankets to keep it elevated comfortably  Prevent cellulitis:   Do not scratch bug bites or areas of injury  You increase your risk for cellulitis by scratching these areas  Do not share personal items, such as towels, clothing, and razors  Clean exercise equipment  with germ-killing  before and after you use it  Treat athlete's foot  This can help prevent the spread of a bacterial skin infection  Wash your hands often  Use soap and water  Wash your hands after you use the bathroom, change a child's diapers, or sneeze  Wash your hands before you prepare or eat food  Use lotion to prevent dry, cracked skin  Follow up with your doctor within 3 days, or as directed:  He or she will check if your cellulitis is getting better  Write down your questions so you remember to ask them during your visits    © Copyright [facility] 2022 Information is for End User's use only and may not be sold, redistributed or otherwise used for commercial purposes  The above information is an  only  It is not intended as medical advice for individual conditions or treatments  Talk to your doctor, nurse or pharmacist before following any medical regimen to see if it is safe and effective for you

## 2023-05-08 NOTE — PROGRESS NOTES
[facility]    NAME: Landon Schaeffer  AGE: 74 y o  SEX: female  : 1950     DATE: 2023     Assessment and Plan:     Problem List Items Addressed This Visit        Other    Cellulitis of toe of left foot - Primary     Pain and swelling in left great toe starting over the weekend  Injured toe about ten years ago, toe nail thick and discolored since then  Redness around the nail bed  The patient and  believe this is more inflamed when compared to yesterday  Keflex sent to pharmacy  Patient instructed to soak foot in warm water and epsom salt 3-4 times daily  She will contact me in two days to make me aware of any improvement  If this redness substantially worsens or she develops any other worsening symptoms, she should be see in emergency room  Recommend she see a podiatrist for her thickened toe nail once infection cleared  Relevant Medications    cephalexin (KEFLEX) 500 mg capsule           No follow-ups on file  Chief Complaint:     Chief Complaint   Patient presents with   • Toe Pain     Left foot, big toe  Possible infection x 2 days  History of Present Illness:     Yasir Escobar presents to the office today for a two day history of increased redness and swelling in her left great toe  No recent mechanism of injury  Will treat with antibiotics  Soak foot in epsom salt 3-4 times daily  She has been instructed to contact me in 48 hours to make me aware of improvement  Review of Systems:     Review of Systems   Constitutional: Negative for activity change, fatigue and fever  HENT: Negative for congestion, hearing loss, rhinorrhea, trouble swallowing and voice change  Eyes: Negative for photophobia, pain, discharge and visual disturbance  Respiratory: Negative for cough, chest tightness and shortness of breath  Cardiovascular: Negative for chest pain, palpitations and leg swelling     Gastrointestinal: Negative "for abdominal pain, blood in stool, constipation, nausea and vomiting  Endocrine: Negative for cold intolerance and heat intolerance  Genitourinary: Negative for difficulty urinating, frequency, hematuria, urgency, vaginal bleeding and vaginal discharge  Musculoskeletal: Negative for arthralgias and myalgias  Skin: Positive for color change and wound  Neurological: Negative for dizziness, weakness, numbness and headaches  Psychiatric/Behavioral: Negative for decreased concentration  The patient is not nervous/anxious           Problem List:     Patient Active Problem List   Diagnosis   • Allergic rhinitis   • Bradycardia   • Heart murmur   • Hyperlipidemia   • Hypertension   • Hypothyroidism   • Osteoporosis   • Pulmonary nodules   • Subclavian artery stenosis (HCC)   • Vitamin D deficiency   • Malignant neoplasm of lower-inner quadrant of right breast of female, estrogen receptor positive ([address] )   • Use of anastrozole (Arimidex)   • Osteopenia of multiple sites   • Decreased pedal pulses   • History of tobacco use   • Paresthesia of foot, bilateral   • Age-related cataract of left eye   • Age-related cataract of right eye   • CKD (chronic kidney disease)   • History of Graves' disease   • Cellulitis of toe of left foot        Objective:     /70 (BP Location: Left arm, Patient Position: Sitting, Cuff Size: Large)   Pulse 76   Temp 97 8 °F (36 6 °C)   Resp 16   Ht 5' 5\" (1 651 m)   Wt 78 9 kg (174 lb)   SpO2 98%   BMI 28 96 kg/m²     Current Outpatient Medications   Medication Sig Dispense Refill   • amLODIPine (NORVASC) 5 mg tablet Take 1 tablet (5 mg total) by mouth daily 90 tablet 3   • anastrozole (ARIMIDEX) 1 mg tablet take 1 tablet by mouth once daily 90 tablet 1   • cephalexin (KEFLEX) 500 mg capsule Take 1 capsule (500 mg total) by mouth 2 (two) times a day for 7 days 14 capsule 0   • Cholecalciferol (VITAMIN D3) 2000 units TABS Take 1 tablet by mouth daily     • hydrochlorothiazide " (HYDRODIURIL) 25 mg tablet Take 1 tablet (25 mg total) by mouth daily 30 tablet 11   • levothyroxine 112 mcg tablet Take 1 tablet (112 mcg total) by mouth daily 90 tablet 3   • losartan (COZAAR) 100 MG tablet Take 1 tablet (100 mg total) by mouth daily 90 tablet 3   • rosuvastatin (CRESTOR) 20 MG tablet Take 1 tablet (20 mg total) by mouth daily 90 tablet 3   • Sodium Fluoride 5000 PPM 1 1 % PSTE BRUSH NORMALLY AND EXPECTORATE BUT DO NOT RINSE AFTER  DO NOT EAT     (REFER TO PRESCRIPTION NOTES)  • amLODIPine (NORVASC) 5 mg tablet Take 1 tablet (5 mg total) by mouth daily 90 tablet 3   • Cholecalciferol (VITAMIN D3) 2000 units TABS Take 1 tablet by mouth daily     • hydrochlorothiazide (HYDRODIURIL) 25 mg tablet Take 1 tablet (25 mg total) by mouth daily 30 tablet 11   • rosuvastatin (CRESTOR) 20 MG tablet Take 1 tablet (20 mg total) by mouth daily 90 tablet 3   • Sodium Fluoride 5000 PPM 1 1 % PSTE BRUSH NORMALLY AND EXPECTORATE BUT DO NOT RINSE AFTER  DO NOT EAT     (REFER TO PRESCRIPTION NOTES)  No current facility-administered medications for this visit  Physical Exam  Musculoskeletal:        Feet:    Feet:      Left foot:      Toenail Condition: Left toenails are abnormally thick  Fungal disease present          Heriberto Ortez, [id number] [address]

## 2023-05-19 ENCOUNTER — OFFICE VISIT (OUTPATIENT)
Dept: FAMILY MEDICINE CLINIC | Facility: CLINIC | Age: 73
End: 2023-05-19

## 2023-05-19 VITALS
HEIGHT: 65 IN | HEART RATE: 60 BPM | WEIGHT: 173 LBS | OXYGEN SATURATION: 99 % | SYSTOLIC BLOOD PRESSURE: 120 MMHG | DIASTOLIC BLOOD PRESSURE: 70 MMHG | RESPIRATION RATE: 16 BRPM | TEMPERATURE: 98.2 F | BODY MASS INDEX: 28.82 KG/M2

## 2023-05-19 DIAGNOSIS — H10.33 ACUTE BACTERIAL CONJUNCTIVITIS OF BOTH EYES: Primary | ICD-10-CM

## 2023-05-19 DIAGNOSIS — J34.89 LESION OF NOSE: ICD-10-CM

## 2023-05-19 RX ORDER — TOBRAMYCIN 3 MG/ML
1 SOLUTION/ DROPS OPHTHALMIC
Qty: 5 ML | Refills: 0 | Status: SHIPPED | OUTPATIENT
Start: 2023-05-19

## 2023-05-19 NOTE — PROGRESS NOTES
Name: Bibiana Oreilly      : 1950      MRN: [id number]  Encounter Provider: Mulu Cantu MD  Encounter Date: 2023   Encounter department: [address] [place] [address]     1  Acute bacterial conjunctivitis of both eyes  -     tobramycin (TOBREX) 0 3 % SOLN; Administer 1 drop to both eyes every 4 (four) hours while awake    2  Lesion of nose  -     mupirocin (BACTROBAN) 2 % ointment; Apply topically 3 (three) times a day    May use OTC claritin for allergies  Subjective      HPI     Pt is here with her   C/o redness of eyes for 4 days  Crusty around eyes  Vision is good  No eye pain  Pt has running nose also, she wiped her nose a lot and got lesion on her nose  Sneezing  Denies fever, cough, SOB, CP, n/v/diarrhea  Tried OTC Refresh eye drops but no help  Review of Systems   Constitutional: Negative for appetite change, chills and fever  HENT: Negative for congestion, ear pain, mouth sores, sinus pain and sore throat  Eyes: Positive for discharge and itching  Negative for pain and visual disturbance  Respiratory: Negative for apnea, cough, chest tightness, shortness of breath and wheezing  Cardiovascular: Negative for chest pain, palpitations and leg swelling  Gastrointestinal: Negative for abdominal pain, anal bleeding, constipation, diarrhea, nausea and vomiting  Endocrine: Negative for cold intolerance, heat intolerance and polyuria  Genitourinary: Negative for difficulty urinating and dysuria  Musculoskeletal: Negative for arthralgias, back pain and myalgias  Skin: Negative for rash  Neurological: Negative for dizziness and headaches  Psychiatric/Behavioral: Negative for agitation         Current Outpatient Medications on File Prior to Visit   Medication Sig   • amLODIPine (NORVASC) 5 mg tablet Take 1 tablet (5 mg total) by mouth daily   • anastrozole (ARIMIDEX) 1 mg tablet take 1 tablet by mouth once daily   • "Cholecalciferol (VITAMIN D3) 2000 units TABS Take 1 tablet by mouth daily   • hydrochlorothiazide (HYDRODIURIL) 25 mg tablet Take 1 tablet (25 mg total) by mouth daily   • levothyroxine 112 mcg tablet Take 1 tablet (112 mcg total) by mouth daily   • losartan (COZAAR) 100 MG tablet Take 1 tablet (100 mg total) by mouth daily   • rosuvastatin (CRESTOR) 20 MG tablet Take 1 tablet (20 mg total) by mouth daily   • Sodium Fluoride 5000 PPM 1 1 % PSTE BRUSH NORMALLY AND EXPECTORATE BUT DO NOT RINSE AFTER  DO NOT EAT     (REFER TO PRESCRIPTION NOTES)  Objective     /70 (BP Location: Left arm, Patient Position: Sitting, Cuff Size: Large)   Pulse 60   Temp 98 2 °F (36 8 °C) (Temporal)   Resp 16   Ht 5' 5\" (1 651 m)   Wt 78 5 kg (173 lb)   SpO2 99%   BMI 28 79 kg/m²     Physical Exam  Constitutional:       General: She is not in acute distress  Appearance: She is well-developed  HENT:      Head: Normocephalic  Right Ear: External ear normal       Left Ear: External ear normal       Nose:      Comments: Nose lesion erythema, no exudate, size 1mm  Eyes:      General:         Right eye: Discharge present  Left eye: Discharge present  Comments: B/l conjunctiva mild erythema   Neck:      Thyroid: No thyromegaly  Cardiovascular:      Rate and Rhythm: Normal rate and regular rhythm  Heart sounds: Normal heart sounds  No murmur heard  No friction rub  No gallop  Pulmonary:      Effort: Pulmonary effort is normal  No respiratory distress  Breath sounds: Normal breath sounds  No wheezing or rales  Chest:      Chest wall: No tenderness  Abdominal:      General: Bowel sounds are normal  There is no distension  Palpations: Abdomen is soft  There is no mass  Tenderness: There is no abdominal tenderness  There is no guarding or rebound  Musculoskeletal:         General: No tenderness or deformity  Normal range of motion  Cervical back: Normal range of motion   " Lymphadenopathy:      Cervical: No cervical adenopathy  Neurological:      Mental Status: She is alert         Logan Díaz MD

## 2023-05-30 ENCOUNTER — OFFICE VISIT (OUTPATIENT)
Dept: URGENT CARE | Facility: CLINIC | Age: 73
End: 2023-05-30

## 2023-05-30 VITALS
DIASTOLIC BLOOD PRESSURE: 71 MMHG | RESPIRATION RATE: 16 BRPM | TEMPERATURE: 97.4 F | HEART RATE: 64 BPM | OXYGEN SATURATION: 98 % | SYSTOLIC BLOOD PRESSURE: 164 MMHG

## 2023-05-30 DIAGNOSIS — J01.00 ACUTE NON-RECURRENT MAXILLARY SINUSITIS: Primary | ICD-10-CM

## 2023-05-30 DIAGNOSIS — H10.13 ALLERGIC CONJUNCTIVITIS OF BOTH EYES: ICD-10-CM

## 2023-05-30 RX ORDER — KETOTIFEN FUMARATE 0.35 MG/ML
1 SOLUTION/ DROPS OPHTHALMIC 2 TIMES DAILY
Qty: 5 ML | Refills: 0 | Status: SHIPPED | OUTPATIENT
Start: 2023-05-30

## 2023-05-30 RX ORDER — AMOXICILLIN AND CLAVULANATE POTASSIUM 875; 125 MG/1; MG/1
1 TABLET, FILM COATED ORAL EVERY 12 HOURS SCHEDULED
Qty: 14 TABLET | Refills: 0 | Status: SHIPPED | OUTPATIENT
Start: 2023-05-30 | End: 2023-06-06

## 2023-05-30 NOTE — PROGRESS NOTES
[facility]        NAME: Aurora Manzano is a 74 y o  female  : 1950    MRN: [id number]  DATE: May 30, 2023  TIME: 10:50 AM    Assessment and Plan   Acute non-recurrent maxillary sinusitis [J01 00]  1  Acute non-recurrent maxillary sinusitis  amoxicillin-clavulanate (AUGMENTIN) 875-125 mg per tablet      2  Allergic conjunctivitis of both eyes  ketotifen (ZADITOR) 0 025 % ophthalmic solution            Patient Instructions   - Take ABX as directed  - Start Claritin daily    Follow up with PCP in 3-5 days  Proceed to  ER if symptoms worsen  Chief Complaint     Chief Complaint   Patient presents with   • Sinusitis     Pt reports eye pressure and congestion since Saturday, still not over pinkeye dx on   History of Present Illness       73 y/o F presents for sinus congestion and B/L red/itchy eyes  Pt dx with pink eye on   Pt admits watery eyes and red eyes stuck around  Has had sinus congestion and runny nose since then  Admits to sinus pain and pressure behind the eyes  Review of Systems   Review of Systems   Constitutional: Positive for chills  Negative for fever  HENT: Positive for congestion, rhinorrhea, sinus pressure and sinus pain  Negative for ear pain and sore throat  Eyes: Positive for pain and itching  Negative for discharge and visual disturbance           Current Medications       Current Outpatient Medications:   •  amoxicillin-clavulanate (AUGMENTIN) 875-125 mg per tablet, Take 1 tablet by mouth every 12 (twelve) hours for 7 days, Disp: 14 tablet, Rfl: 0  •  ketotifen (ZADITOR) 0 025 % ophthalmic solution, Administer 1 drop to both eyes 2 (two) times a day, Disp: 5 mL, Rfl: 0  •  amLODIPine (NORVASC) 5 mg tablet, Take 1 tablet (5 mg total) by mouth daily, Disp: 90 tablet, Rfl: 3  •  anastrozole (ARIMIDEX) 1 mg tablet, take 1 tablet by mouth once daily, Disp: 90 tablet, Rfl: 1  •  Cholecalciferol (VITAMIN D3) 2000 units TABS, Take 1 tablet by mouth daily, Disp: , Rfl:   •  hydrochlorothiazide (HYDRODIURIL) 25 mg tablet, Take 1 tablet (25 mg total) by mouth daily, Disp: 30 tablet, Rfl: 11  •  levothyroxine 112 mcg tablet, Take 1 tablet (112 mcg total) by mouth daily, Disp: 90 tablet, Rfl: 3  •  losartan (COZAAR) 100 MG tablet, Take 1 tablet (100 mg total) by mouth daily, Disp: 90 tablet, Rfl: 3  •  mupirocin (BACTROBAN) 2 % ointment, Apply topically 3 (three) times a day, Disp: 22 g, Rfl: 0  •  rosuvastatin (CRESTOR) 20 MG tablet, Take 1 tablet (20 mg total) by mouth daily, Disp: 90 tablet, Rfl: 3  •  Sodium Fluoride 5000 PPM 1 1 % PSTE, BRUSH NORMALLY AND EXPECTORATE BUT DO NOT RINSE AFTER  DO NOT EAT     (REFER TO PRESCRIPTION NOTES)  , Disp: , Rfl:   •  tobramycin (TOBREX) 0 3 % SOLN, Administer 1 drop to both eyes every 4 (four) hours while awake, Disp: 5 mL, Rfl: 0    Current Allergies     Allergies as of 05/30/2023 - Reviewed 05/30/2023   Allergen Reaction Noted   • Sulfa antibiotics Dizziness 06/19/2012            The following portions of the patient's history were reviewed and updated as appropriate: allergies, current medications, past family history, past medical history, past social history, past surgical history and problem list      Past Medical History:   Diagnosis Date   • Acute pain of left shoulder     History   • Acute recurrent maxillary sinusitis     History   • Acute upper respiratory infection     History   • BRCA negative 11/12/2018    [facility]ae   • BRCA1 negative    • BRCA2 negative    • Breast cancer ([facility] [address] ) 11/01/2018    Right   • Callus    • Cancer ([facility] [address] )    • Carotid artery narrowing     stenosis   • Disease of thyroid gland    • High arches    • History of Graves' disease     status post 131 treatment   • History of lateral epicondylitis of left elbow    • History of onychomycosis     of toenail   • History of perforation of tympanic membrane    • Hyperlipidemia    • Hypertension    • Ingrown toenail    • Left otitis media     History   • Medicare annual wellness visit, subsequent        Past Surgical History:   Procedure Laterality Date   • APPENDECTOMY  1955   • BREAST BIOPSY Right 11/01/2018    U/S BX   • BREAST LUMPECTOMY Right 12/18/2018    Procedure: BREAST LUMPECTOMY; BREAST NEEDLE LOCALIZATION (NEEDLE LOC AT 0700); Surgeon: Rafa Alvarado MD;  [facility]: [facility];  Service: Surgical Oncology   • BREAST LUMPECTOMY     • ELBOW FRACTURE SURGERY  09/1960    Open Treatment of Fracture of proximal Radius   • LYMPH NODE BIOPSY Right 12/18/2018    Procedure: SENTINEL LYMPH NODE BIOPSY; LYMPHATIC MAPPING WITH BLUE DYE AND RADIOACTIVE DYE (INJECT AT 0800 BY DR PRADO IN THE OR); Surgeon: Rafa Alvarado MD;  Location: [facility];  Service: Surgical Oncology   • SINUS SURGERY Left 1996    nasal polyps(?)-large papilloma left maxillary sinus with extension into posterior nasopharynx completed by Dr Blanca Rose  • THYROID CYST EXCISION      ablation   • TONSILLECTOMY AND ADENOIDECTOMY     • US BREAST NEEDLE LOC RIGHT Right 12/18/2018   • US GUIDED BREAST BIOPSY RIGHT COMPLETE Right 11/01/2018       Family History   Problem Relation Age of Onset   • Breast cancer Mother    • Pancreatic cancer Mother    • Cancer Mother    • Aortic aneurysm Father    • Aortic aneurysm Family         Abdominal   • Breast cancer Family    • Breast cancer Maternal Grandmother          Medications have been verified  Objective   /71   Pulse 64   Temp (!) 97 4 °F (36 3 °C)   Resp 16   SpO2 98%   No LMP recorded  Patient is postmenopausal        Physical Exam     Physical Exam  Vitals and nursing note reviewed  Constitutional:       General: She is not in acute distress  Appearance: She is not toxic-appearing  HENT:      Head: Normocephalic and atraumatic        Comments: Maxillary sinus pain on palpation     Right Ear: Tympanic membrane, ear canal and external ear normal       Left Ear: Tympanic membrane, ear canal and external ear normal       Nose: Nose normal  Mouth/Throat:      Mouth: Mucous membranes are moist       Pharynx: No oropharyngeal exudate or posterior oropharyngeal erythema  Eyes:      General:         Right eye: No discharge  Left eye: No discharge  Pupils: Pupils are equal, round, and reactive to light  Comments: Erythematous eyes B/L   No discharge or crusting   EOM intact without pain   Pulmonary:      Effort: Pulmonary effort is normal  No respiratory distress  Breath sounds: Normal breath sounds  No wheezing or rales  Lymphadenopathy:      Cervical: No cervical adenopathy  Neurological:      Mental Status: She is alert     Psychiatric:         Mood and Affect: Mood normal          Behavior: Behavior normal

## 2023-05-31 NOTE — ASSESSMENT & PLAN NOTE
RN is monitoring the charts of patient's with open OSN episode encounters. This episode will be closed due to inactivity. Last OSN visit was on 10/6/22.     The patient is scheduled for an upcoming DEXA scan in September  She previously had been on Prolia for 10 years

## 2023-06-18 DIAGNOSIS — I77.1 SUBCLAVIAN ARTERY STENOSIS (HCC): ICD-10-CM

## 2023-06-19 RX ORDER — ROSUVASTATIN CALCIUM 20 MG/1
TABLET, COATED ORAL
Qty: 90 TABLET | Refills: 3 | Status: SHIPPED | OUTPATIENT
Start: 2023-06-19

## 2023-06-20 ENCOUNTER — OFFICE VISIT (OUTPATIENT)
Dept: PODIATRY | Facility: CLINIC | Age: 73
End: 2023-06-20
Payer: COMMERCIAL

## 2023-06-20 VITALS
HEART RATE: 67 BPM | SYSTOLIC BLOOD PRESSURE: 114 MMHG | BODY MASS INDEX: 29.16 KG/M2 | RESPIRATION RATE: 18 BRPM | HEIGHT: 65 IN | DIASTOLIC BLOOD PRESSURE: 68 MMHG | WEIGHT: 175 LBS

## 2023-06-20 DIAGNOSIS — L60.3 NAIL DYSTROPHY: Primary | ICD-10-CM

## 2023-06-20 PROCEDURE — 99212 OFFICE O/P EST SF 10 MIN: CPT | Performed by: PODIATRIST

## 2023-06-20 NOTE — PROGRESS NOTES
Patient presents with concern regarding her left great toenail  This toenail was injured approximately 10 years ago and grew back in a dystrophic manner  A few months back it was infected along the lateral eponychium  This inspection has resolved and patient is currently asymptomatic but she is concerned about the future  On exam, pedal pulses are within normal limits  Left great toenail exhibits onycholysis and is brittle  The matrix is thickened at the lateral eponychium  Discussed treatment options  Ideal solution so that infection does not recur would be nail avulsion with matrixectomy  Patient is interested in this procedure in September  Will be scheduled at that time  She will contact me if she has difficulty in the future

## 2023-07-05 DIAGNOSIS — I10 PRIMARY HYPERTENSION: ICD-10-CM

## 2023-07-05 RX ORDER — HYDROCHLOROTHIAZIDE 25 MG/1
TABLET ORAL
Qty: 30 TABLET | Refills: 11 | Status: SHIPPED | OUTPATIENT
Start: 2023-07-05

## 2023-07-13 DIAGNOSIS — I10 PRIMARY HYPERTENSION: ICD-10-CM

## 2023-07-13 RX ORDER — AMLODIPINE BESYLATE 5 MG/1
TABLET ORAL
Qty: 90 TABLET | Refills: 3 | Status: SHIPPED | OUTPATIENT
Start: 2023-07-13

## 2023-07-24 DIAGNOSIS — J43.9 PULMONARY EMPHYSEMA, UNSPECIFIED EMPHYSEMA TYPE (HCC): ICD-10-CM

## 2023-07-24 DIAGNOSIS — R91.1 PULMONARY NODULE: Primary | ICD-10-CM

## 2023-08-02 ENCOUNTER — OFFICE VISIT (OUTPATIENT)
Dept: HEMATOLOGY ONCOLOGY | Facility: CLINIC | Age: 73
End: 2023-08-02
Payer: COMMERCIAL

## 2023-08-02 VITALS
HEIGHT: 65 IN | HEART RATE: 69 BPM | BODY MASS INDEX: 28.66 KG/M2 | DIASTOLIC BLOOD PRESSURE: 54 MMHG | OXYGEN SATURATION: 97 % | RESPIRATION RATE: 16 BRPM | TEMPERATURE: 97.3 F | SYSTOLIC BLOOD PRESSURE: 108 MMHG | WEIGHT: 172 LBS

## 2023-08-02 DIAGNOSIS — C50.311 MALIGNANT NEOPLASM OF LOWER-INNER QUADRANT OF RIGHT BREAST OF FEMALE, ESTROGEN RECEPTOR POSITIVE (HCC): Primary | ICD-10-CM

## 2023-08-02 DIAGNOSIS — Z17.0 MALIGNANT NEOPLASM OF LOWER-INNER QUADRANT OF RIGHT BREAST OF FEMALE, ESTROGEN RECEPTOR POSITIVE (HCC): Primary | ICD-10-CM

## 2023-08-02 PROCEDURE — 99214 OFFICE O/P EST MOD 30 MIN: CPT | Performed by: INTERNAL MEDICINE

## 2023-08-02 NOTE — PROGRESS NOTES
Hematology / Oncology Outpatient Follow Up Note    Kofi Betts 74 y.o. female EEY:[birth year] B:[id number]         Date:  8/2/2023    Assessment / Plan:  A 17-year-old postmenopausal woman with stage IA right breast cancer, grade 1, % positive, OR 90% positive, HER2 negative disease.  She underwent lumpectomy with sentinel lymph node biopsy, resulting in AMNUEL.   Since January 2019, she has been on adjuvant hormonal therapy with anastrozole with no toxicity. Clinically, she has no evidence of recurrent disease. I recommend her to discontinue anastrozole in late September 2023 to complete 5 years of adjuvant hormonal therapy. She is in agreement with my recommendation. She may see me as needed basis from now on. She is going to be followed by breast cancer survivorship clinic annually. Subjective:      HPI:  A 78-year-old postmenopausal woman who was found to have abnormality in her right breast, based on a screening mammography.  She underwent right breast biopsy in November 1, 2018 which showed invasive ductal carcinoma, grade 1.  Subsequently, she underwent lumpectomy with sentinel lymph node biopsy by Dr. Cristina Coronado in December 18, 2018 which showed 11 x 5 mm of invasive ductal carcinoma, grade 1.  There was no evidence of lymphovascular invasion.  One sentinel lymph node was negative for metastatic disease.  This was % positive, OR 90% positive, HER2 negative disease.  She presents today to discuss adjuvant treatment options.  She has no complaint of pain.  She feels well.  She has history of osteoporosis for which she is on denosumab injection every 6 months for at least 3-4 years. Carmelo Alvarez has improvement of bone density on denosumab.   She has well-controlled hypertension. Carmelo Alvarez was a smoker until 15 years ago. Carmelo Alvarez is on lung cancer screening by CT scan.  She has no respiratory symptoms such as cough, sputum production or shortness of breath.  Her weight is stable.  She has normal performance status.           Interval History:  A 64-year-old postmenopausal woman with stage IA right breast cancer, grade 1, % positive, IA 90% positive, HER2 negative disease.  She underwent lumpectomy with sentinel lymph node biopsy, resulting in MANUEL.   Since January 2019, she has been on adjuvant hormonal therapy with anastrozole.  She presents today for routine follow-up. She feels well with no new complaint. She has no complaint of hot flashes or musculoskeletal symptoms. She denied bone pain. Her weight is stable. She has no respiratory symptoms. Her performance status is normal.          Objective:      Primary Diagnosis:     1.   Right breast cancer, stage IA (pT1c, pN0, M0) grade 1, % positive, IA 90% positive, HER2 negative disease.   Diagnosed in December 2018. 2.   History of osteoporosis.     Cancer Staging:  Cancer Staging  No matching staging information was found for the patient.        Previous Hematologic/ Oncologic Treatment:            Current Hematologic/ Oncologic Treatment:       Adjuvant hormonal therapy with anastrozole since January 2019. To be completed in the end of September 2023.     Disease Status:      MANUEL status post lumpectomy and sentinel lymph node biopsy.     Test Results:     Pathology:     11 x 5 mm of invasive ductal carcinoma, grade 1.  No evidence of lymphovascular invasion.  One sentinel lymph node was negative for metastatic disease.  % positive, IA 90% positive, HER2 negative disease.  Stage IA (pT1c, pN0, M0)     Radiology:     Mammography in October 2022 was benign.  BI-RADS 2. DEXA scan in November 2022 showed T-score-2.4, consistent with osteopenia.     Laboratory:     See below.     Physical Exam:        General Appearance:    Alert, oriented          Eyes:    PERRL   Ears:    Normal external ear canals, both ears   Nose:   Nares normal, septum midline   Throat:   Mucosa moist. Pharynx without injection.     Neck:   Supple         Lungs:     Clear to auscultation bilaterally   Chest Wall:    No tenderness or deformity    Heart:    Regular rate and rhythm         Abdomen:     Soft, non-tender, bowel sounds +, no organomegaly               Extremities:   Extremities no cyanosis or edema         Skin:   no rash or icterus. Lymph nodes:   Cervical, supraclavicular, and axillary nodes normal   Neurologic:   CNII-XII intact, normal strength, sensation and reflexes     Throughout             Breast exam:   Lumpectomy scar at 3:00 position in her right breast with no palpable abnormalities. Left breast exam is negative. ROS: Review of Systems   All other systems reviewed and are negative. Imaging: No results found. Labs:   Lab Results   Component Value Date    WBC 5.01 02/24/2023    HGB 12.6 02/24/2023    HCT 37.8 02/24/2023     (H) 02/24/2023     02/24/2023     Lab Results   Component Value Date     12/09/2015    K 3.7 02/24/2023     02/24/2023    CO2 29 02/24/2023    ANIONGAP 6 12/09/2015    BUN 22 02/24/2023    CREATININE 0.95 02/24/2023    GLUCOSE 81 12/09/2015    GLUF 91 02/24/2023    CALCIUM 9.2 02/24/2023    CORRECTEDCA 9.7 02/24/2023    AST 20 02/24/2023    ALT 18 02/24/2023    ALKPHOS 66 02/24/2023    PROT 7.0 12/09/2015    BILITOT 0.57 12/09/2015    EGFR 59 02/24/2023         Lab Results   Component Value Date    WAJZDTSH29 296 02/16/2022         Current Medications: Reviewed  Allergies: Reviewed  PMH/FH/SH:  Reviewed      Vital Sign:    Body surface area is 1.86 meters squared.     Wt Readings from Last 3 Encounters:   08/02/23 78 kg (172 lb)   06/20/23 79.4 kg (175 lb)   05/19/23 78.5 kg (173 lb)        Temp Readings from Last 3 Encounters:   08/02/23 (!) 97.3 °F (36.3 °C) (Temporal)   05/30/23 (!) 97.4 °F (36.3 °C)   05/19/23 98.2 °F (36.8 °C) (Temporal)        BP Readings from Last 3 Encounters:   08/02/23 108/54   06/20/23 114/68   05/30/23 164/71         Pulse Readings from Last 3 Encounters: 08/02/23 69   06/20/23 67   05/30/23 64     @LASTSAO2(3)@

## 2023-08-10 ENCOUNTER — OFFICE VISIT (OUTPATIENT)
Dept: URGENT CARE | Facility: CLINIC | Age: 73
End: 2023-08-10
Payer: COMMERCIAL

## 2023-08-10 ENCOUNTER — TELEPHONE (OUTPATIENT)
Dept: FAMILY MEDICINE CLINIC | Facility: CLINIC | Age: 73
End: 2023-08-10

## 2023-08-10 VITALS
RESPIRATION RATE: 16 BRPM | SYSTOLIC BLOOD PRESSURE: 104 MMHG | HEART RATE: 75 BPM | OXYGEN SATURATION: 96 % | DIASTOLIC BLOOD PRESSURE: 54 MMHG | TEMPERATURE: 98.3 F

## 2023-08-10 DIAGNOSIS — R05.1 ACUTE COUGH: ICD-10-CM

## 2023-08-10 DIAGNOSIS — U07.1 COVID: Primary | ICD-10-CM

## 2023-08-10 PROCEDURE — 99213 OFFICE O/P EST LOW 20 MIN: CPT | Performed by: PHYSICIAN ASSISTANT

## 2023-08-10 RX ORDER — ALBUTEROL SULFATE 90 UG/1
2 AEROSOL, METERED RESPIRATORY (INHALATION) EVERY 6 HOURS PRN
Qty: 8.5 G | Refills: 0 | Status: SHIPPED | OUTPATIENT
Start: 2023-08-10

## 2023-08-10 RX ORDER — METHYLPREDNISOLONE 4 MG/1
TABLET ORAL
Qty: 1 EACH | Refills: 0 | Status: SHIPPED | OUTPATIENT
Start: 2023-08-10 | End: 2023-08-20

## 2023-08-10 NOTE — PATIENT INSTRUCTIONS
Patient was educated on [place] 23. Patient was educated to quarantine for 5 days from testing date. ON day 6 if fever free may return back to activity double mask for another 5 days. Patient was educated on hydration. Any chest pain or shortness of breath go to ED. IF dizziness and weakness persist go to ED. Patient was educated on inhaler and steroids. Patient was told to no take Anti-inflammatory while on steroids. Follow up with PCP  COVID-19 (Coronavirus Disease 2019)   WHAT YOU NEED TO KNOW:   COVID-19 is the disease caused by a coronavirus first discovered in December 2019. Coronaviruses generally cause upper respiratory (nose, throat, and lung) infections, such as a cold. The 2019 virus spreads quickly and easily. It can be spread starting 2 to 3 days before symptoms even begin. DISCHARGE INSTRUCTIONS:   Call your local emergency number (911 in the [address]) if:   You have trouble breathing or shortness of breath at rest.    You have chest pain or pressure that lasts longer than 5 minutes. You become confused or hard to wake. Your lips or face are blue. Return to the emergency department if:   You have a fever of 104°F (40°C) or higher. Call your doctor if:   You have symptoms of COVID-19. You have questions or concerns about your condition or care. Medicines: You may need any of the following:  Decongestants  help reduce nasal congestion and help you breathe more easily. If you take decongestant pills, they may make you feel restless or cause problems with your sleep. Do not use decongestant sprays for more than a few days. Cough suppressants  help reduce coughing. Ask your healthcare provider which type of cough medicine is best for you. Acetaminophen  decreases pain and fever. It is available without a doctor's order. Ask how much to take and how often to take it. Follow directions.  Read the labels of all other medicines you are using to see if they also contain acetaminophen, or ask your doctor or pharmacist. Acetaminophen can cause liver damage if not taken correctly. NSAIDs , such as ibuprofen, help decrease swelling, pain, and fever. This medicine is available with or without a doctor's order. NSAIDs can cause stomach bleeding or kidney problems in certain people. If you take blood thinner medicine, always ask your healthcare provider if NSAIDs are safe for you. Always read the medicine label and follow directions. Blood thinners  help prevent blood clots. Clots can cause strokes, heart attacks, and death. The following are general safety guidelines to follow while you are taking a blood thinner:    Watch for bleeding and bruising while you take blood thinners. Watch for bleeding from your gums or nose. Watch for blood in your urine and bowel movements. Use a soft washcloth on your skin, and a soft toothbrush to brush your teeth. This can keep your skin and gums from bleeding. If you shave, use an electric shaver. Do not play contact sports. Tell your dentist and other healthcare providers that you take a blood thinner. Wear a bracelet or necklace that says you take this medicine. Do not start or stop any other medicines unless your healthcare provider tells you to. Many medicines cannot be used with blood thinners. Take your blood thinner exactly as prescribed by your healthcare provider. Do not skip does or take less than prescribed. Tell your provider right away if you forget to take your blood thinner, or if you take too much. Warfarin  is a blood thinner that you may need to take. The following are things you should be aware of if you take warfarin:     Foods and medicines can affect the amount of warfarin in your blood. Do not make major changes to your diet while you take warfarin. Warfarin works best when you eat about the same amount of vitamin K every day. Vitamin K is found in green leafy vegetables and certain other foods.  Ask for more information about what to eat when you are taking warfarin. You will need to see your healthcare provider for follow-up visits when you are on warfarin. You will need regular blood tests. These tests are used to decide how much medicine you need. Take your medicine as directed. Contact your healthcare provider if you think your medicine is not helping or if you have side effects. Tell your provider if you are allergic to any medicine. Keep a list of the medicines, vitamins, and herbs you take. Include the amounts, and when and why you take them. Bring the list or the pill bottles to follow-up visits. Carry your medicine list with you in case of an emergency. What you need to know about variants: The virus has changed into several new forms (called variants) since it was discovered. The variants may be more contagious (easily spread) than the original form. Some may also cause more severe illness than others. What you need to know about COVID-19 vaccines:  Healthcare providers recommend a COVID-19 vaccine, even if you have already had COVID-19. You are considered fully vaccinated against COVID-19 two weeks after the final dose of any COVID-19 vaccine. Let your healthcare provider know when you have received the final dose of the vaccine. Make a copy of your vaccination card. Keep the original with you in case you need to show it. Keep the copy in a safe place. Get a COVID-19 vaccine as directed. Vaccination is recommended for everyone 6 months or older. COVID-19 vaccines are given as a shot in 1 to 3 doses as a primary series. This depends on the vaccine brand and the age of the person who receives it. A booster dose is recommended for everyone 5 years or older after the primary series is complete. A second booster  is recommended for all adults 48 or older and for immunocompromised adolescents.  The second booster is also recommended for anyone who got the 1-dose brand of vaccine for the first dose and a booster. Your provider can give you more information on boosters and help you schedule all needed doses. Continue social distancing and other measures. You can become infected even after you get the vaccine. You may also be able to pass the virus to others without knowing you are infected. After you get the vaccine, check local, national, and international travel rules. You may need to be tested before you travel. Some countries require proof of a negative test before you travel. You may also need to quarantine after you return. Medicine may be given to prevent infection. The medicine can be given if you are at high risk for infection and cannot get the vaccine. It can also be given if your immune system does not respond well to the vaccine. How the 2019 coronavirus spreads:   Droplets are the main way all coronaviruses spread. The virus travels in droplets that form when a person talks, sings, coughs, or sneezes. The droplets can also float in the air for minutes or hours. Infection happens when you breathe in the droplets or get them in your eyes or nose. Close personal contact with an infected person increases your risk for infection. This means being within 6 feet (2 meters) of the person for at least 15 minutes over 24 hours. Person-to-person contact can spread the virus. For example, a person with the virus on his or her hands can spread it by shaking hands with someone. The virus can stay on objects and surfaces for up to 3 days. You may become infected by touching the object or surface and then touching your eyes or mouth. Help lower the risk for COVID-19:   Wash your hands often throughout the day. Use soap and water. Rub your soapy hands together, lacing your fingers, for at least 20 seconds. Rinse with warm, running water. Dry your hands with a clean towel or paper towel. Use hand  that contains alcohol if soap and water are not available.  Teach children how to wash their hands and use hand . Cover sneezes and coughs. Turn your face away and cover your mouth and nose with a tissue. Throw the tissue away. Use the bend of your arm if a tissue is not available. Then wash your hands well with soap and water or use hand . Teach children how to cover a cough or sneeze. Wear a face covering (mask) when needed. Use a cloth covering with at least 2 layers. You can also create layers by putting a cloth covering over a disposable non-medical mask. Cover your mouth and your nose. Follow worldwide, national, and local social distancing guidelines. Keep at least 6 feet (2 meters) between you and others. Try not to touch your face. If you get the virus on your hands, you can transfer it to your eyes, nose, or mouth and become infected. You can also transfer it to objects, surfaces, or people. Clean and disinfect high-touch surfaces and objects often. Use disinfecting wipes, or make a solution of 4 teaspoons of bleach in 1 quart (4 cups) of water. Ask about other vaccines you may need. Get the influenza (flu) vaccine as soon as recommended each year, usually starting in September or October. Get the pneumonia vaccine if recommended. Your healthcare provider can tell you if you should also get other vaccines, and when to get them. Follow social distancing guidelines:  National and local social distancing rules vary. Rules and restrictions may change over time as restrictions are lifted. The following are general guidelines:  Stay home if you are sick or think you may have COVID-19. It is important to stay home if you are waiting for a testing appointment or for test results. Avoid close physical contact with anyone who does not live in your home. Do not shake hands with, hug, or kiss a person as a greeting. If you must use public transportation (such as a bus or subway), try to sit or stand away from others.  Wear your face covering. Avoid in-person gatherings and crowds. Attend virtually if possible. Follow up with your doctor as directed:  Write down your questions so you remember to ask them during your visits. For more information:   [facility] [employer] Disease Control and Prevention  [address]  Jamila Mckeon  Phone: 7- 116 - 080-6451  Web Address: [employer]    © Copyright Judy Castillo 2022 Information is for End User's use only and may not be sold, redistributed or otherwise used for commercial purposes. The above information is an  only. It is not intended as medical advice for individual conditions or treatments. Talk to your doctor, nurse or pharmacist before following any medical regimen to see if it is safe and effective for you.

## 2023-08-10 NOTE — PROGRESS NOTES
[facility]        NAME: Sunita Thomas is a 74 y.o. female  : 1950    MRN: [id number]  DATE: August 10, 2023  TIME: 11:51 AM    Assessment and Plan   COVID [U07.1]  1. COVID        2. Acute cough  methylPREDNISolone 4 MG tablet therapy pack    albuterol (ProAir HFA) 90 mcg/act inhaler        Patient was educated on Paxlovid and declined. Patient Instructions     Patient was educated on [facility] . Patient was educated to quarantine for 5 days from testing date. ON day 6 if fever free may return back to activity double mask for another 5 days. Patient was educated on hydration. Any chest pain or shortness of breath go to ED. IF dizziness and weakness persist go to ED. Patient was educated on inhaler and steroids. Patient was told to no take Anti-inflammatory while on steroids. Follow up with PCP    Chief Complaint     Chief Complaint   Patient presents with   • Cough     Pt reports cough, SOB on exertion since she was dx w/ COVID earlier in the week. History of Present Illness       Patient is here today with her  reporting chills, fatigue, congestion , cough and dizziness for four days. [place] [id number] [address] Pinocular 19 test on 2023 and this was Positive. Admits allergy to Sulfa. Patient reports the cough keeps her up at night. Denies any chest pain or shortness of breath. Denies taking any blood thinners. Review of Systems   Review of Systems   Constitutional: Positive for chills, fatigue and fever. HENT: Positive for congestion. Respiratory: Positive for cough. Cardiovascular: Negative. Neurological: Positive for dizziness. Psychiatric/Behavioral: Negative.           Current Medications       Current Outpatient Medications:   •  albuterol (ProAir HFA) 90 mcg/act inhaler, Inhale 2 puffs every 6 (six) hours as needed for wheezing, Disp: 8.5 g, Rfl: 0  •  methylPREDNISolone 4 MG tablet therapy pack, Use as directed on package, Disp: 1 each, Rfl: 0  •  amLODIPine (NORVASC) 5 mg tablet, take 1 tablet by mouth once daily, Disp: 90 tablet, Rfl: 3  •  Cholecalciferol (VITAMIN D3) 2000 units TABS, Take 1 tablet by mouth daily, Disp: , Rfl:   •  hydrochlorothiazide (HYDRODIURIL) 25 mg tablet, take 1 tablet by mouth once daily, Disp: 30 tablet, Rfl: 11  •  ketotifen (ZADITOR) 0.025 % ophthalmic solution, Administer 1 drop to both eyes 2 (two) times a day, Disp: 5 mL, Rfl: 0  •  levothyroxine 112 mcg tablet, Take 1 tablet (112 mcg total) by mouth daily, Disp: 90 tablet, Rfl: 3  •  losartan (COZAAR) 100 MG tablet, Take 1 tablet (100 mg total) by mouth daily, Disp: 90 tablet, Rfl: 3  •  mupirocin (BACTROBAN) 2 % ointment, Apply topically 3 (three) times a day, Disp: 22 g, Rfl: 0  •  rosuvastatin (CRESTOR) 20 MG tablet, take 1 tablet by mouth once daily, Disp: 90 tablet, Rfl: 3  •  Sodium Fluoride 5000 PPM 1.1 % PSTE, BRUSH NORMALLY AND EXPECTORATE BUT DO NOT RINSE AFTER. DO NOT EAT. ..  (REFER TO PRESCRIPTION NOTES). , Disp: , Rfl:   •  tobramycin (TOBREX) 0.3 % SOLN, Administer 1 drop to both eyes every 4 (four) hours while awake, Disp: 5 mL, Rfl: 0    Current Allergies     Allergies as of 08/10/2023 - Reviewed 08/10/2023   Allergen Reaction Noted   • Sulfa antibiotics Dizziness 06/19/2012            The following portions of the patient's history were reviewed and updated as appropriate: allergies, current medications, past family history, past medical history, past social history, past surgical history and problem list.     Past Medical History:   Diagnosis Date   • Acute pain of left shoulder     History   • Acute recurrent maxillary sinusitis     History   • Acute upper respiratory infection     History   • BRCA negative 11/12/2018    [facility]   • BRCA1 negative    • BRCA2 negative    • Breast cancer ([address]) 11/01/2018    Right   • Callus    • Cancer ([address])    • Carotid artery narrowing     stenosis   • Disease of thyroid gland    • High arches    • History of Graves' disease     status post 131 treatment   • History of lateral epicondylitis of left elbow    • History of onychomycosis     of toenail   • History of perforation of tympanic membrane    • Hyperlipidemia    • Hypertension    • Ingrown toenail    • Left otitis media     History   • Medicare annual wellness visit, subsequent        Past Surgical History:   Procedure Laterality Date   • APPENDECTOMY  1955   • BREAST BIOPSY Right 11/01/2018    U/S BX   • BREAST LUMPECTOMY Right 12/18/2018    Procedure: BREAST LUMPECTOMY; BREAST NEEDLE LOCALIZATION (NEEDLE LOC AT 0700); Surgeon: Jun Melendez MD;  [facility]: [facility];  Service: Surgical Oncology   • BREAST LUMPECTOMY     • ELBOW FRACTURE SURGERY  09/1960    Open Treatment of Fracture of proximal Radius   • LYMPH NODE BIOPSY Right 12/18/2018    Procedure: SENTINEL LYMPH NODE BIOPSY; LYMPHATIC MAPPING WITH BLUE DYE AND RADIOACTIVE DYE (INJECT AT 0800 BY DR PRADO IN THE OR); Surgeon: Jun Melendez MD;  Location[facility];  Service: Surgical Oncology   • SINUS SURGERY Left 1996    nasal polyps(?)-large papilloma left maxillary sinus with extension into posterior nasopharynx completed by Dr Jazmin Dominguez. • THYROID CYST EXCISION      ablation   • TONSILLECTOMY AND ADENOIDECTOMY     • US BREAST NEEDLE LOC RIGHT Right 12/18/2018   • US GUIDED BREAST BIOPSY RIGHT COMPLETE Right 11/01/2018       Family History   Problem Relation Age of Onset   • Breast cancer Mother    • Pancreatic cancer Mother    • Cancer Mother    • Aortic aneurysm Father    • Aortic aneurysm Family         Abdominal   • Breast cancer Family    • Breast cancer Maternal Grandmother          Medications have been verified. Objective   /54   Pulse 75   Temp 98.3 °F (36.8 °C)   Resp 16   SpO2 96%   No LMP recorded. Patient is postmenopausal.       Physical Exam     Physical Exam  Vitals and nursing note reviewed. Constitutional:       Appearance: Normal appearance.    HENT:      Head: Normocephalic. Comments: No pain over frontal or maxillary sinus     Right Ear: Tympanic membrane, ear canal and external ear normal.      Left Ear: Tympanic membrane, ear canal and external ear normal.      Nose: Nose normal.      Mouth/Throat:      Pharynx: Posterior oropharyngeal erythema present. Eyes:      Extraocular Movements: Extraocular movements intact. Pupils: Pupils are equal, round, and reactive to light. Cardiovascular:      Rate and Rhythm: Normal rate and regular rhythm. Heart sounds: Normal heart sounds. Pulmonary:      Breath sounds: Normal breath sounds. No wheezing. Musculoskeletal:      Comments: Mild dizziness with head movement. Upper and lower body motor intact   Neurological:      General: No focal deficit present. Mental Status: She is alert and oriented to person, place, and time.    Psychiatric:         Mood and Affect: Mood normal.         Behavior: Behavior normal.

## 2023-08-10 NOTE — TELEPHONE ENCOUNTER
Patient ([phone]) called to report testing positive for Covid. Spouse tested positive as well. Patient was unable to schedule virtual visit as they do not have a device with video access. Patient agreed to be seen in person at urgent care. Will call if symptoms worsen or do not improve with treatment.

## 2023-08-19 ENCOUNTER — APPOINTMENT (OUTPATIENT)
Dept: RADIOLOGY | Facility: HOSPITAL | Age: 73
End: 2023-08-19
Payer: COMMERCIAL

## 2023-08-19 ENCOUNTER — APPOINTMENT (EMERGENCY)
Dept: RADIOLOGY | Facility: HOSPITAL | Age: 73
End: 2023-08-19
Payer: COMMERCIAL

## 2023-08-19 ENCOUNTER — HOSPITAL ENCOUNTER (OUTPATIENT)
Facility: HOSPITAL | Age: 73
Setting detail: OBSERVATION
Discharge: HOME | End: 2023-08-20
Attending: EMERGENCY MEDICINE | Admitting: INTERNAL MEDICINE
Payer: COMMERCIAL

## 2023-08-19 DIAGNOSIS — B34.9 VIRAL ILLNESS: Primary | ICD-10-CM

## 2023-08-19 DIAGNOSIS — N17.9 AKI (ACUTE KIDNEY INJURY) (HCC): ICD-10-CM

## 2023-08-19 DIAGNOSIS — R94.31 EKG ABNORMALITY: ICD-10-CM

## 2023-08-19 PROBLEM — E86.0 DEHYDRATION: Status: ACTIVE | Noted: 2023-08-19

## 2023-08-19 PROBLEM — E86.0 DEHYDRATION: Status: RESOLVED | Noted: 2023-08-19 | Resolved: 2023-08-19

## 2023-08-19 PROBLEM — Z86.16 HISTORY OF COVID-19: Status: ACTIVE | Noted: 2023-08-19

## 2023-08-19 PROBLEM — N18.30 ACUTE RENAL FAILURE SUPERIMPOSED ON STAGE 3 CHRONIC KIDNEY DISEASE (HCC): Status: ACTIVE | Noted: 2023-08-19

## 2023-08-19 LAB
2HR DELTA HS TROPONIN: -5 NG/L
4HR DELTA HS TROPONIN: -11 NG/L
ALBUMIN SERPL BCP-MCNC: 3.5 G/DL (ref 3.5–5)
ALP SERPL-CCNC: 70 U/L (ref 46–116)
ALT SERPL W P-5'-P-CCNC: 22 U/L (ref 12–78)
ANION GAP SERPL CALCULATED.3IONS-SCNC: 6 MMOL/L
AST SERPL W P-5'-P-CCNC: 17 U/L (ref 5–45)
ATRIAL RATE: 65 BPM
BASOPHILS # BLD AUTO: 0.03 THOUSANDS/ÂΜL (ref 0–0.1)
BASOPHILS NFR BLD AUTO: 0 % (ref 0–1)
BILIRUB SERPL-MCNC: 1.38 MG/DL (ref 0.2–1)
BUN SERPL-MCNC: 28 MG/DL (ref 5–25)
CALCIUM SERPL-MCNC: 9.4 MG/DL (ref 8.3–10.1)
CARDIAC TROPONIN I PNL SERPL HS: 10 NG/L
CARDIAC TROPONIN I PNL SERPL HS: 16 NG/L
CARDIAC TROPONIN I PNL SERPL HS: 21 NG/L
CHLORIDE SERPL-SCNC: 106 MMOL/L (ref 96–108)
CO2 SERPL-SCNC: 26 MMOL/L (ref 21–32)
CREAT SERPL-MCNC: 1.36 MG/DL (ref 0.6–1.3)
D DIMER PPP FEU-MCNC: 0.95 UG/ML FEU
EOSINOPHIL # BLD AUTO: 0.2 THOUSAND/ÂΜL (ref 0–0.61)
EOSINOPHIL NFR BLD AUTO: 2 % (ref 0–6)
ERYTHROCYTE [DISTWIDTH] IN BLOOD BY AUTOMATED COUNT: 12.7 % (ref 11.6–15.1)
GFR SERPL CREATININE-BSD FRML MDRD: 38 ML/MIN/1.73SQ M
GLUCOSE SERPL-MCNC: 97 MG/DL (ref 65–140)
HCT VFR BLD AUTO: 38 % (ref 34.8–46.1)
HGB BLD-MCNC: 13.3 G/DL (ref 11.5–15.4)
IMM GRANULOCYTES # BLD AUTO: 0.08 THOUSAND/UL (ref 0–0.2)
IMM GRANULOCYTES NFR BLD AUTO: 1 % (ref 0–2)
LYMPHOCYTES # BLD AUTO: 1.51 THOUSANDS/ÂΜL (ref 0.6–4.47)
LYMPHOCYTES NFR BLD AUTO: 14 % (ref 14–44)
MCH RBC QN AUTO: 34.5 PG (ref 26.8–34.3)
MCHC RBC AUTO-ENTMCNC: 35 G/DL (ref 31.4–37.4)
MCV RBC AUTO: 98 FL (ref 82–98)
MONOCYTES # BLD AUTO: 0.94 THOUSAND/ÂΜL (ref 0.17–1.22)
MONOCYTES NFR BLD AUTO: 9 % (ref 4–12)
NEUTROPHILS # BLD AUTO: 7.79 THOUSANDS/ÂΜL (ref 1.85–7.62)
NEUTS SEG NFR BLD AUTO: 74 % (ref 43–75)
NRBC BLD AUTO-RTO: 0 /100 WBCS
P AXIS: 83 DEGREES
PLATELET # BLD AUTO: 429 THOUSANDS/UL (ref 149–390)
PMV BLD AUTO: 8.8 FL (ref 8.9–12.7)
POTASSIUM SERPL-SCNC: 3.2 MMOL/L (ref 3.5–5.3)
PR INTERVAL: 160 MS
PROT SERPL-MCNC: 6.8 G/DL (ref 6.4–8.4)
QRS AXIS: 84 DEGREES
QRSD INTERVAL: 80 MS
QT INTERVAL: 404 MS
QTC INTERVAL: 420 MS
RBC # BLD AUTO: 3.86 MILLION/UL (ref 3.81–5.12)
SODIUM SERPL-SCNC: 138 MMOL/L (ref 135–147)
T WAVE AXIS: 82 DEGREES
TSH SERPL DL<=0.05 MIU/L-ACNC: 2.04 UIU/ML (ref 0.45–4.5)
VENTRICULAR RATE: 65 BPM
WBC # BLD AUTO: 10.55 THOUSAND/UL (ref 4.31–10.16)

## 2023-08-19 PROCEDURE — 71275 CT ANGIOGRAPHY CHEST: CPT

## 2023-08-19 PROCEDURE — 84484 ASSAY OF TROPONIN QUANT: CPT

## 2023-08-19 PROCEDURE — 93010 ELECTROCARDIOGRAM REPORT: CPT | Performed by: INTERNAL MEDICINE

## 2023-08-19 PROCEDURE — 99223 1ST HOSP IP/OBS HIGH 75: CPT | Performed by: INTERNAL MEDICINE

## 2023-08-19 PROCEDURE — 85025 COMPLETE CBC W/AUTO DIFF WBC: CPT

## 2023-08-19 PROCEDURE — 99285 EMERGENCY DEPT VISIT HI MDM: CPT | Performed by: EMERGENCY MEDICINE

## 2023-08-19 PROCEDURE — 84443 ASSAY THYROID STIM HORMONE: CPT | Performed by: EMERGENCY MEDICINE

## 2023-08-19 PROCEDURE — 36415 COLL VENOUS BLD VENIPUNCTURE: CPT

## 2023-08-19 PROCEDURE — 93005 ELECTROCARDIOGRAM TRACING: CPT

## 2023-08-19 PROCEDURE — 99285 EMERGENCY DEPT VISIT HI MDM: CPT

## 2023-08-19 PROCEDURE — 80053 COMPREHEN METABOLIC PANEL: CPT

## 2023-08-19 PROCEDURE — 71046 X-RAY EXAM CHEST 2 VIEWS: CPT

## 2023-08-19 PROCEDURE — G1004 CDSM NDSC: HCPCS

## 2023-08-19 PROCEDURE — 85379 FIBRIN DEGRADATION QUANT: CPT | Performed by: INTERNAL MEDICINE

## 2023-08-19 RX ORDER — DOCUSATE SODIUM 100 MG/1
100 CAPSULE, LIQUID FILLED ORAL 2 TIMES DAILY
Status: DISCONTINUED | OUTPATIENT
Start: 2023-08-19 | End: 2023-08-20 | Stop reason: HOSPADM

## 2023-08-19 RX ORDER — NITROGLYCERIN 0.4 MG/1
0.4 TABLET SUBLINGUAL
Status: DISCONTINUED | OUTPATIENT
Start: 2023-08-19 | End: 2023-08-20 | Stop reason: HOSPADM

## 2023-08-19 RX ORDER — MELATONIN
2000 DAILY
Status: DISCONTINUED | OUTPATIENT
Start: 2023-08-19 | End: 2023-08-20 | Stop reason: HOSPADM

## 2023-08-19 RX ORDER — ATORVASTATIN CALCIUM 40 MG/1
40 TABLET, FILM COATED ORAL
Status: DISCONTINUED | OUTPATIENT
Start: 2023-08-19 | End: 2023-08-20 | Stop reason: HOSPADM

## 2023-08-19 RX ORDER — SENNOSIDES 8.6 MG
2 TABLET ORAL DAILY PRN
Status: DISCONTINUED | OUTPATIENT
Start: 2023-08-19 | End: 2023-08-20 | Stop reason: HOSPADM

## 2023-08-19 RX ORDER — CALCIUM CARBONATE 500 MG/1
1000 TABLET, CHEWABLE ORAL DAILY PRN
Status: DISCONTINUED | OUTPATIENT
Start: 2023-08-19 | End: 2023-08-20 | Stop reason: HOSPADM

## 2023-08-19 RX ORDER — ENOXAPARIN SODIUM 100 MG/ML
40 INJECTION SUBCUTANEOUS DAILY
Status: DISCONTINUED | OUTPATIENT
Start: 2023-08-19 | End: 2023-08-20 | Stop reason: HOSPADM

## 2023-08-19 RX ORDER — ALBUTEROL SULFATE 90 UG/1
2 AEROSOL, METERED RESPIRATORY (INHALATION) EVERY 6 HOURS PRN
Status: DISCONTINUED | OUTPATIENT
Start: 2023-08-19 | End: 2023-08-20 | Stop reason: HOSPADM

## 2023-08-19 RX ORDER — AMLODIPINE BESYLATE 5 MG/1
5 TABLET ORAL DAILY
Status: DISCONTINUED | OUTPATIENT
Start: 2023-08-19 | End: 2023-08-20 | Stop reason: HOSPADM

## 2023-08-19 RX ORDER — POTASSIUM CHLORIDE 20 MEQ/1
40 TABLET, EXTENDED RELEASE ORAL ONCE
Status: COMPLETED | OUTPATIENT
Start: 2023-08-19 | End: 2023-08-19

## 2023-08-19 RX ORDER — ONDANSETRON 2 MG/ML
4 INJECTION INTRAMUSCULAR; INTRAVENOUS EVERY 6 HOURS PRN
Status: DISCONTINUED | OUTPATIENT
Start: 2023-08-19 | End: 2023-08-20 | Stop reason: HOSPADM

## 2023-08-19 RX ORDER — ACETAMINOPHEN 325 MG/1
650 TABLET ORAL EVERY 4 HOURS PRN
Status: DISCONTINUED | OUTPATIENT
Start: 2023-08-19 | End: 2023-08-20 | Stop reason: HOSPADM

## 2023-08-19 RX ORDER — ASPIRIN 325 MG
325 TABLET ORAL ONCE
Status: COMPLETED | OUTPATIENT
Start: 2023-08-19 | End: 2023-08-19

## 2023-08-19 RX ORDER — LEVOTHYROXINE SODIUM 112 UG/1
112 TABLET ORAL
Status: DISCONTINUED | OUTPATIENT
Start: 2023-08-19 | End: 2023-08-20 | Stop reason: HOSPADM

## 2023-08-19 RX ORDER — SODIUM CHLORIDE, SODIUM GLUCONATE, SODIUM ACETATE, POTASSIUM CHLORIDE, MAGNESIUM CHLORIDE, SODIUM PHOSPHATE, DIBASIC, AND POTASSIUM PHOSPHATE .53; .5; .37; .037; .03; .012; .00082 G/100ML; G/100ML; G/100ML; G/100ML; G/100ML; G/100ML; G/100ML
100 INJECTION, SOLUTION INTRAVENOUS CONTINUOUS
Status: DISCONTINUED | OUTPATIENT
Start: 2023-08-19 | End: 2023-08-20 | Stop reason: HOSPADM

## 2023-08-19 RX ORDER — ASPIRIN 81 MG/1
81 TABLET, CHEWABLE ORAL DAILY
Status: DISCONTINUED | OUTPATIENT
Start: 2023-08-20 | End: 2023-08-20 | Stop reason: HOSPADM

## 2023-08-19 RX ADMIN — ENOXAPARIN SODIUM 40 MG: 40 INJECTION SUBCUTANEOUS at 13:04

## 2023-08-19 RX ADMIN — ATORVASTATIN CALCIUM 40 MG: 40 TABLET, FILM COATED ORAL at 17:32

## 2023-08-19 RX ADMIN — Medication 2000 UNITS: at 13:04

## 2023-08-19 RX ADMIN — SODIUM CHLORIDE, SODIUM GLUCONATE, SODIUM ACETATE, POTASSIUM CHLORIDE AND MAGNESIUM CHLORIDE 100 ML/HR: 526; 502; 368; 37; 30 INJECTION, SOLUTION INTRAVENOUS at 12:52

## 2023-08-19 RX ADMIN — AMLODIPINE BESYLATE 5 MG: 5 TABLET ORAL at 13:04

## 2023-08-19 RX ADMIN — ASPIRIN 325 MG ORAL TABLET 325 MG: 325 PILL ORAL at 13:04

## 2023-08-19 RX ADMIN — LEVOTHYROXINE SODIUM 112 MCG: 112 TABLET ORAL at 14:20

## 2023-08-19 RX ADMIN — POTASSIUM CHLORIDE 40 MEQ: 1500 TABLET, EXTENDED RELEASE ORAL at 09:18

## 2023-08-19 RX ADMIN — IOHEXOL 85 ML: 350 INJECTION, SOLUTION INTRAVENOUS at 17:17

## 2023-08-19 NOTE — ED ATTENDING ATTESTATION
8/19/2023  IRaudel MD, saw and evaluated the patient. I have discussed the patient with the resident/non-physician practitioner and agree with the resident's/non-physician practitioner's findings, Plan of Care, and MDM as documented in the resident's/non-physician practitioner's note, except where noted. All available labs and Radiology studies were reviewed. I was present for key portions of any procedure(s) performed by the resident/non-physician practitioner and I was immediately available to provide assistance. At this point I agree with the current assessment done in the Emergency Department. I have conducted an independent evaluation of this patient a history and physical is as follows:    ED Course      20-year-old female, history of hypertension, hyperlipidemia, Graves' disease status post treatment, presenting to the emergency department for evaluation of 2-week history of progressive generalized weakness, malaise, and exertional shortness of breath. Patient states that on August 7, she developed a viral syndrome, to go home COVID test which was positive, saw an urgent care center the following day and was prescribed albuterol MDI and steroids. Patient states that she has not gotten better, continues to have cough, denies fever, reports general malaise and generalized weakness, and states that she felt so bad today that she was unable to leave on her planned vacation prompting her emergency department visit.   Past Medical History:   Diagnosis Date   • Acute pain of left shoulder     History   • Acute recurrent maxillary sinusitis     History   • Acute upper respiratory infection     History   • BRCA negative 11/12/2018    [facility]   • BRCA1 negative    • BRCA2 negative    • Breast cancer ([address]) 11/01/2018    Right   • Callus    • Cancer [facility])    • Carotid artery narrowing     stenosis   • Disease of thyroid gland    • High arches    • History of Graves' disease     status post 131 treatment   • History of lateral epicondylitis of left elbow    • History of onychomycosis     of toenail   • History of perforation of tympanic membrane    • Hyperlipidemia    • Hypertension    • Ingrown toenail    • Left otitis media     History   • Medicare annual wellness visit, subsequent      The patient is resting comfortably on a stretcher in no acute respiratory distress. The patient appears nontoxic. HEENT reveals moist mucous membranes. Head is normocephalic and atraumatic. Conjunctiva and sclera are normal. Neck is nontender and supple with full range of motion to flexion, extension, lateral rotation. No meningismus appreciated. No masses are appreciated. Lungs are clear to auscultation bilaterally without any wheezes, rales or rhonchi. Heart is regular rate and rhythm with systolic ejection murmur at the upper sternal border consistent with aortic stenosis. Abdomen is soft and nontender without any rebound or guarding. Extremities appear grossly normal without any significant arthropathy. Patient is awake, alert, and oriented x3. The patient has normal interaction. Cranial nerves grossly intact. Motor is 5 out of 5 bilateral upper and lower extremities. MEDICAL DECISION MAKING    Number and Complexity of Problems  • Differential diagnosis: Anemia, hypothyroidism, hyperkalemia, hypokalemia, acute kidney injury and dehydration, post-COVID continued symptoms. Medical Decision Making Data  • External documents reviewed: Reviewed urgent care visit from 8/10/2023. Reviewed EKG from 12/3/2018. • My EKG interpretation: Normal sinus rhythm at 65 bpm.  Patient has ST segment abnormality in leads II, III, aVF, and V4 through V6 where she has a downsloping ST segments with biphasic T wave. This is new since an EKG performed on 12/3/2018. • My X-ray interpretation: No acute cardiopulmonary disease. XR chest 2 views   ED Interpretation   No acute cardiopulmonary disease.           Labs Reviewed   CBC AND DIFFERENTIAL - Abnormal       Result Value Ref Range Status    WBC 10.55 (*) 4.31 - 10.16 Thousand/uL Final    RBC 3.86  3.81 - 5.12 Million/uL Final    Hemoglobin 13.3  11.5 - 15.4 g/dL Final    Hematocrit 38.0  34.8 - 46.1 % Final    MCV 98  82 - 98 fL Final    MCH 34.5 (*) 26.8 - 34.3 pg Final    MCHC 35.0  31.4 - 37.4 g/dL Final    RDW 12.7  11.6 - 15.1 % Final    MPV 8.8 (*) 8.9 - 12.7 fL Final    Platelets 650 (*) 689 - 390 Thousands/uL Final    nRBC 0  /100 WBCs Final    Neutrophils Relative 74  43 - 75 % Final    Immat GRANS % 1  0 - 2 % Final    Lymphocytes Relative 14  14 - 44 % Final    Monocytes Relative 9  4 - 12 % Final    Eosinophils Relative 2  0 - 6 % Final    Basophils Relative 0  0 - 1 % Final    Neutrophils Absolute 7.79 (*) 1.85 - 7.62 Thousands/µL Final    Immature Grans Absolute 0.08  0.00 - 0.20 Thousand/uL Final    Lymphocytes Absolute 1.51  0.60 - 4.47 Thousands/µL Final    Monocytes Absolute 0.94  0.17 - 1.22 Thousand/µL Final    Eosinophils Absolute 0.20  0.00 - 0.61 Thousand/µL Final    Basophils Absolute 0.03  0.00 - 0.10 Thousands/µL Final   COMPREHENSIVE METABOLIC PANEL - Abnormal    Sodium 138  135 - 147 mmol/L Final    Potassium 3.2 (*) 3.5 - 5.3 mmol/L Final    Chloride 106  96 - 108 mmol/L Final    CO2 26  21 - 32 mmol/L Final    ANION GAP 6  mmol/L Final    BUN 28 (*) 5 - 25 mg/dL Final    Creatinine 1.36 (*) 0.60 - 1.30 mg/dL Final    Comment: Standardized to IDMS reference method    Glucose 97  65 - 140 mg/dL Final    Comment: If the patient is fasting, the ADA then defines impaired fasting glucose as > 100 mg/dL and diabetes as > or equal to 123 mg/dL. Specimen collection should occur prior to Sulfasalazine administration due to the potential for falsely depressed results. Specimen collection should occur prior to Sulfapyridine administration due to the potential for falsely elevated results.     Calcium 9.4  8.3 - 10.1 mg/dL Final    AST 17  5 - 45 U/L Final    Comment: Specimen collection should occur prior to Sulfasalazine administration due to the potential for falsely depressed results. ALT 22  12 - 78 U/L Final    Comment: Specimen collection should occur prior to Sulfasalazine and/or Sulfapyridine administration due to the potential for falsely depressed results. Alkaline Phosphatase 70  46 - 116 U/L Final    Total Protein 6.8  6.4 - 8.4 g/dL Final    Albumin 3.5  3.5 - 5.0 g/dL Final    Total Bilirubin 1.38 (*) 0.20 - 1.00 mg/dL Final    Comment: Use of this assay is not recommended for patients undergoing treatment with eltrombopag due to the potential for falsely elevated results. eGFR 38  ml/min/1.73sq m Final    Narrative:     [place] guidelines for Chronic Kidney Disease (CKD):   •  Stage 1 with normal or high GFR (GFR > 90 mL/min/1.73 square meters)  •  Stage 2 Mild CKD (GFR = 60-89 mL/min/1.73 square meters)  •  Stage 3A Moderate CKD (GFR = 45-59 mL/min/1.73 square meters)  •  Stage 3B Moderate CKD (GFR = 30-44 mL/min/1.73 square meters)  •  Stage 4 Severe CKD (GFR = 15-29 mL/min/1.73 square meters)  •  Stage 5 End Stage CKD (GFR <15 mL/min/1.73 square meters)  Note: GFR calculation is accurate only with a steady state creatinine   HS TROPONIN I 0HR - Normal    hs TnI 0hr 21  "Refer to ACS Flowchart"- see link ng/L Final    Comment:                                              Initial (time 0) result  If >=50 ng/L, Myocardial injury suggested ;  Type of myocardial injury and treatment strategy  to be determined. If 5-49 ng/L, a delta result at 2 hours and or 4 hours will be needed to further evaluate. If <4 ng/L, and chest pain has been >3 hours since onset, patient may qualify for discharge based on the HEART score in the ED. If <5 ng/L and <3hours since onset of chest pain, a delta result at 2 hours will be needed to further evaluate.     HS Troponin 99th Percentile URL of a Health Population=12 ng/L with a 95% Confidence Interval of 8-18 ng/L. Second Troponin (time 2 hours)  If calculated delta >= 20 ng/L,  Myocardial injury suggested ; Type of myocardial injury and treatment strategy to be determined. If 5-49 ng/L and the calculated delta is 5-19 ng/L, consult medical service for evaluation. Continue evaluation for ischemia on ecg and other possible etiology and repeat hs troponin at 4 hours. If delta is <5 ng/L at 2 hours, consider discharge based on risk stratification via the HEART score (if in ED), or JAVIER risk score in IP/Observation. HS Troponin 99th Percentile URL of a Health Population=12 ng/L with a 95% Confidence Interval of 8-18 ng/L.   TSH, 3RD GENERATION WITH FREE T4 REFLEX - Normal    TSH 3RD GENERATON 2.043  0.450 - 4.500 uIU/mL Final    Comment: The recommended reference ranges for TSH during pregnancy are as follows:   First trimester 0.1 to 2.5 uIU/mL   Second trimester  0.2 to 3.0 uIU/mL   Third trimester 0.3 to 3.0 uIU/m    Note: Normal ranges may not apply to patients who are transgender, non-binary, or whose legal sex, sex at birth, and gender identity differ. Adult TSH (3rd generation) reference range follows the recommended guidelines of the American Thy[employer] Association, January, 2020. HS TROPONIN I 2HR - Normal    hs TnI 2hr 16  "Refer to ACS Flowchart"- see link ng/L Final    Comment:                                              Initial (time 0) result  If >=50 ng/L, Myocardial injury suggested ;  Type of myocardial injury and treatment strategy  to be determined. If 5-49 ng/L, a delta result at 2 hours and or 4 hours will be needed to further evaluate. If <4 ng/L, and chest pain has been >3 hours since onset, patient may qualify for discharge based on the HEART score in the ED. If <5 ng/L and <3hours since onset of chest pain, a delta result at 2 hours will be needed to further evaluate.     HS Troponin 99th Percentile URL of a Health Population=12 ng/L with a 95% Confidence Interval of 8-18 ng/L.    Second Troponin (time 2 hours)  If calculated delta >= 20 ng/L,  Myocardial injury suggested ; Type of myocardial injury and treatment strategy to be determined. If 5-49 ng/L and the calculated delta is 5-19 ng/L, consult medical service for evaluation. Continue evaluation for ischemia on ecg and other possible etiology and repeat hs troponin at 4 hours. If delta is <5 ng/L at 2 hours, consider discharge based on risk stratification via the HEART score (if in ED), or JAVIER risk score in IP/Observation. HS Troponin 99th Percentile URL of a Health Population=12 ng/L with a 95% Confidence Interval of 8-18 ng/L. Delta 2hr hsTnI -5  <20 ng/L Final   HS TROPONIN I 4HR   D-DIMER, QUANTITATIVE   PLATELET COUNT       • Labs reviewed by me are significant for: Potassium of 3.2. Mild JJ with creatinine 1.36. Elevated troponin of 21 with a negative delta troponin. • Discussed case with: Hospitalist.  • Considered admission for: Acute kidney injury, hypokalemia, new EKG changes. Treatment and Disposition  ED course: Patient underwent evaluation in the emergency department. She was medicated for hypokalemia. Patient noted to have new EKG abnormalities, however last EKG was approximately 5 years ago. Delta troponin was negative. Discussed possible treatment modalities with patient including outpatient follow-up with oral potassium as an outpatient, repeat lab work, and supportive care and primary care physician follow-up for the EKG versus admission. Patient opted for admission. Shared decision making: Patient is agreeable with plan. Code status: Full code.                 Critical Care Time  Procedures

## 2023-08-19 NOTE — ASSESSMENT & PLAN NOTE
Lab Results   Component Value Date    EGFR 38 08/19/2023    EGFR 59 02/24/2023    EGFR 54 08/19/2022    CREATININE 1.36 (H) 08/19/2023    CREATININE 0.95 02/24/2023    CREATININE 1.01 08/19/2022   · baseline creatinine is 0.9-1.0  · likey due to poor po intake and HCTZ use  · IV hydration with isolyte  · Check BMP in AM  · Renally dose medications  · Avoid NSAIDS

## 2023-08-19 NOTE — ED PROVIDER NOTES
History  Chief Complaint   Patient presents with   • Flu Symptoms     PT had covid two weeks ago and now feels light headed, fatigued, short of breath, has a cough. States it has been progressively getting worse. Denies pain. -CP     [place]  Perry Quiñonez is a 74 y.o. female who presents to the emergency department with viral symptoms. She reports she began feeling sick on 8/7 and had a positive COVID test.  She was seen at urgent care and was given an albuterol Hailer and a Medrol Dosepak, she has not felt much improvement from these. She has continued to have fatigue, body aches, and intermittent lightheadedness without syncope. She denies chest pain but has had some intermittent shortness of breath with exertion. She denies prior cardiac or lung disease. She previously smoked cigarettes but quit 20 years ago. She denies history of blood clots, leg swelling, recent travel or surgery, hemoptysis, or active malignancy. Prior to Admission Medications   Prescriptions Last Dose Informant Patient Reported? Taking? Cholecalciferol (VITAMIN D3) 2000 units TABS  Self Yes No   Sig: Take 1 tablet by mouth daily   Sodium Fluoride 5000 PPM 1.1 % PSTE  Self Yes No   Sig: BRUSH NORMALLY AND EXPECTORATE BUT DO NOT RINSE AFTER. DO NOT EAT. ..  (REFER TO PRESCRIPTION NOTES).    albuterol (ProAir HFA) 90 mcg/act inhaler   No No   Sig: Inhale 2 puffs every 6 (six) hours as needed for wheezing   amLODIPine (NORVASC) 5 mg tablet   No No   Sig: take 1 tablet by mouth once daily   hydrochlorothiazide (HYDRODIURIL) 25 mg tablet   No No   Sig: take 1 tablet by mouth once daily   ketotifen (ZADITOR) 0.025 % ophthalmic solution   No No   Sig: Administer 1 drop to both eyes 2 (two) times a day   levothyroxine 112 mcg tablet   No No   Sig: Take 1 tablet (112 mcg total) by mouth daily   losartan (COZAAR) 100 MG tablet   No No   Sig: Take 1 tablet (100 mg total) by mouth daily   methylPREDNISolone 4 MG tablet therapy pack   No No   Sig: Use as directed on package   mupirocin (BACTROBAN) 2 % ointment   No No   Sig: Apply topically 3 (three) times a day   rosuvastatin (CRESTOR) 20 MG tablet   No No   Sig: take 1 tablet by mouth once daily   tobramycin (TOBREX) 0.3 % SOLN   No No   Sig: Administer 1 drop to both eyes every 4 (four) hours while awake      Facility-Administered Medications: None       Past Medical History:   Diagnosis Date   • Acute pain of left shoulder     History   • Acute recurrent maxillary sinusitis     History   • Acute upper respiratory infection     History   • BRCA negative 11/12/2018    [facility]   • BRCA1 negative    • BRCA2 negative    • Breast cancer ([address]) 11/01/2018    Right   • Callus    • Cancer ([address])    • Carotid artery narrowing     stenosis   • Disease of thyroid gland    • High arches    • History of Graves' disease     status post 131 treatment   • History of lateral epicondylitis of left elbow    • History of onychomycosis     of toenail   • History of perforation of tympanic membrane    • Hyperlipidemia    • Hypertension    • Ingrown toenail    • Left otitis media     History   • Medicare annual wellness visit, subsequent        Past Surgical History:   Procedure Laterality Date   • APPENDECTOMY  1955   • BREAST BIOPSY Right 11/01/2018    U/S BX   • BREAST LUMPECTOMY Right 12/18/2018    Procedure: BREAST LUMPECTOMY; BREAST NEEDLE LOCALIZATION (NEEDLE LOC AT 0700); Surgeon: Marcha Cowden, MD;  Location: [facility];  Service: Surgical Oncology   • BREAST LUMPECTOMY     • ELBOW FRACTURE SURGERY  09/1960    Open Treatment of Fracture of proximal Radius   • LYMPH NODE BIOPSY Right 12/18/2018    Procedure: SENTINEL LYMPH NODE BIOPSY; LYMPHATIC MAPPING WITH BLUE DYE AND RADIOACTIVE DYE (INJECT AT 0800 BY DR PRADO IN THE OR);   Surgeon: Marcha Cowden, MD;  Location: [facility];  Service: Surgical Oncology   • SINUS SURGERY Left 1996    nasal polyps(?)-large papilloma left maxillary sinus with extension into posterior nasopharynx completed by Dr Yessy Hanley. • THYROID CYST EXCISION      ablation   • TONSILLECTOMY AND ADENOIDECTOMY     • US BREAST NEEDLE LOC RIGHT Right 2018   • US GUIDED BREAST BIOPSY RIGHT COMPLETE Right 2018       Family History   Problem Relation Age of Onset   • Breast cancer Mother    • Pancreatic cancer Mother    • Cancer Mother    • Aortic aneurysm Father    • Aortic aneurysm Family         Abdominal   • Breast cancer Family    • Breast cancer Maternal Grandmother      I have reviewed and agree with the history as documented. E-Cigarette/Vaping   • E-Cigarette Use Never User      E-Cigarette/Vaping Substances   • Nicotine No    • THC No    • CBD No    • Flavoring No    • Other No    • Unknown No      Social History     Tobacco Use   • Smoking status: Former     Packs/day: 1.00     Years: 35.00     Total pack years: 35.00     Types: Cigarettes     Start date: 1963     Quit date: 10/16/2002     Years since quittin.8   • Smokeless tobacco: Never   Vaping Use   • Vaping Use: Never used   Substance Use Topics   • Alcohol use: Not Currently     Comment: rarely   • Drug use: No       Home medications:  Prior to Admission Medications   Prescriptions Last Dose Informant Patient Reported? Taking? Cholecalciferol (VITAMIN D3) 2000 units TABS  Self Yes No   Sig: Take 1 tablet by mouth daily   Sodium Fluoride 5000 PPM 1.1 % PSTE  Self Yes No   Sig: BRUSH NORMALLY AND EXPECTORATE BUT DO NOT RINSE AFTER. DO NOT EAT. ..  (REFER TO PRESCRIPTION NOTES).    albuterol (ProAir HFA) 90 mcg/act inhaler   No No   Sig: Inhale 2 puffs every 6 (six) hours as needed for wheezing   amLODIPine (NORVASC) 5 mg tablet   No No   Sig: take 1 tablet by mouth once daily   hydrochlorothiazide (HYDRODIURIL) 25 mg tablet   No No   Sig: take 1 tablet by mouth once daily   ketotifen (ZADITOR) 0.025 % ophthalmic solution   No No   Sig: Administer 1 drop to both eyes 2 (two) times a day   levothyroxine 112 mcg tablet   No No   Sig: Take 1 tablet (112 mcg total) by mouth daily   losartan (COZAAR) 100 MG tablet   No No   Sig: Take 1 tablet (100 mg total) by mouth daily   methylPREDNISolone 4 MG tablet therapy pack   No No   Sig: Use as directed on package   mupirocin (BACTROBAN) 2 % ointment   No No   Sig: Apply topically 3 (three) times a day   rosuvastatin (CRESTOR) 20 MG tablet   No No   Sig: take 1 tablet by mouth once daily   tobramycin (TOBREX) 0.3 % SOLN   No No   Sig: Administer 1 drop to both eyes every 4 (four) hours while awake      Facility-Administered Medications: None     Allergies: Allergies   Allergen Reactions   • Sulfa Antibiotics Dizziness     Other reaction(s): feeling "off-balance"  Category: Adverse Reaction; Review of Systems   Constitutional: Positive for fatigue. Negative for fever. Respiratory: Positive for cough and shortness of breath. Cardiovascular: Negative for chest pain and leg swelling. Gastrointestinal: Positive for diarrhea. Negative for abdominal pain and vomiting. Genitourinary: Negative for dysuria. Neurological: Negative for syncope. All other systems reviewed and are negative. Physical Exam  ED Triage Vitals [08/19/23 0624]   Temperature Pulse Respirations Blood Pressure SpO2   97.6 °F (36.4 °C) 65 18 143/69 95 %      Temp Source Heart Rate Source Patient Position - Orthostatic VS BP Location FiO2 (%)   Oral Monitor Lying Left arm --      Pain Score       No Pain             Orthostatic Vital Signs  Vitals:    08/19/23 0624 08/19/23 1048 08/19/23 1408   BP: 143/69 138/63 111/61   Pulse: 65 61 75   Patient Position - Orthostatic VS: Lying Sitting        Physical Exam  Vitals and nursing note reviewed. Constitutional:       General: She is not in acute distress. Appearance: She is not toxic-appearing or diaphoretic. HENT:      Head: Normocephalic. Eyes:      Pupils: Pupils are equal, round, and reactive to light.    Cardiovascular:      Rate and Rhythm: Normal rate and regular rhythm. Heart sounds: No murmur heard. Pulmonary:      Effort: Pulmonary effort is normal. No respiratory distress. Breath sounds: Normal breath sounds. No wheezing, rhonchi or rales. Abdominal:      General: Abdomen is flat. There is no distension. Palpations: Abdomen is soft. Tenderness: There is no abdominal tenderness. There is no guarding or rebound. Musculoskeletal:      Cervical back: Normal range of motion. Right lower leg: No edema. Left lower leg: No edema. Skin:     General: Skin is warm and dry. Neurological:      Mental Status: She is alert.          ED Medications  Medications   multi-electrolyte (PLASMALYTE-A/ISOLYTE-S PH 7.4) IV solution (100 mL/hr Intravenous New Bag 8/19/23 1252)   albuterol (PROVENTIL HFA,VENTOLIN HFA) inhaler 2 puff (has no administration in time range)   amLODIPine (NORVASC) tablet 5 mg (5 mg Oral Given 8/19/23 1304)   cholecalciferol (VITAMIN D3) tablet 2,000 Units (2,000 Units Oral Given 8/19/23 1304)   levothyroxine tablet 112 mcg (112 mcg Oral Given 8/19/23 1420)   atorvastatin (LIPITOR) tablet 40 mg (has no administration in time range)   docusate sodium (COLACE) capsule 100 mg (100 mg Oral Not Given 8/19/23 1304)   senna (SENOKOT) tablet 17.2 mg (has no administration in time range)   ondansetron (ZOFRAN) injection 4 mg (has no administration in time range)   calcium carbonate (TUMS) chewable tablet 1,000 mg (has no administration in time range)   nitroglycerin (NITROSTAT) SL tablet 0.4 mg (has no administration in time range)   aspirin chewable tablet 81 mg (has no administration in time range)   enoxaparin (LOVENOX) subcutaneous injection 40 mg (40 mg Subcutaneous Given 8/19/23 1304)   acetaminophen (TYLENOL) tablet 650 mg (has no administration in time range)   potassium chloride (K-DUR,KLOR-CON) CR tablet 40 mEq (40 mEq Oral Given 8/19/23 0918)   aspirin tablet 325 mg (325 mg Oral Given 8/19/23 1304)       Diagnostic Studies  Results Reviewed     Procedure Component Value Units Date/Time    D-dimer, quantitative [[id number]]  (Abnormal) Collected: 08/19/23 1251    Lab Status: Final result Specimen: Blood from Arm, Right Updated: 08/19/23 1327     D-Dimer, Quant 0.95 ug/ml FEU     Narrative: In the evaluation for possible pulmonary embolism, in the appropriate (Well's Score of 4 or less) patient, the age adjusted d-dimer cutoff for this patient can be calculated as:    Age x 0.01 (in ug/mL) for Age-adjusted D-dimer exclusion threshold for a patient over 50 years.     HS Troponin I 4hr [[id number]]  (Normal) Collected: 08/19/23 1221    Lab Status: Final result Specimen: Blood from Arm, Right Updated: 08/19/23 1319     hs TnI 4hr 10 ng/L      Delta 4hr hsTnI -11 ng/L     Platelet count [[id number]]     Lab Status: No result Specimen: Blood     TSH, 3rd generation with Free T4 reflex [[id number]]  (Normal) Collected: 08/19/23 0734    Lab Status: Final result Specimen: Blood from Arm, Right Updated: 08/19/23 1044     TSH 3RD GENERATON 2.043 uIU/mL     HS Troponin I 2hr [[id number]]  (Normal) Collected: 08/19/23 0924    Lab Status: Final result Specimen: Blood from Arm, Right Updated: 08/19/23 1013     hs TnI 2hr 16 ng/L      Delta 2hr hsTnI -5 ng/L     HS Troponin 0hr (reflex protocol) [[id number]]  (Normal) Collected: 08/19/23 0734    Lab Status: Final result Specimen: Blood from Arm, Right Updated: 08/19/23 0831     hs TnI 0hr 21 ng/L     Comprehensive metabolic panel [[id number]]  (Abnormal) Collected: 08/19/23 0734    Lab Status: Final result Specimen: Blood from Arm, Right Updated: 08/19/23 0826     Sodium 138 mmol/L      Potassium 3.2 mmol/L      Chloride 106 mmol/L      CO2 26 mmol/L      ANION GAP 6 mmol/L      BUN 28 mg/dL      Creatinine 1.36 mg/dL      Glucose 97 mg/dL      Calcium 9.4 mg/dL      AST 17 U/L      ALT 22 U/L      Alkaline Phosphatase 70 U/L      Total Protein 6.8 g/dL      Albumin 3.5 g/dL      Total Bilirubin 1.38 mg/dL      eGFR 38 ml/min/1.73sq m     Narrative:      [facility] (CKD):   •  Stage 1 with normal or high GFR (GFR > 90 mL/min/1.73 square meters)  •  Stage 2 Mild CKD (GFR = 60-89 mL/min/1.73 square meters)  •  Stage 3A Moderate CKD (GFR = 45-59 mL/min/1.73 square meters)  •  Stage 3B Moderate CKD (GFR = 30-44 mL/min/1.73 square meters)  •  Stage 4 Severe CKD (GFR = 15-29 mL/min/1.73 square meters)  •  Stage 5 End Stage CKD (GFR <15 mL/min/1.73 square meters)  Note: GFR calculation is accurate only with a steady state creatinine    CBC and differential [[id number]]  (Abnormal) Collected: 08/19/23 0734    Lab Status: Final result Specimen: Blood from Arm, Right Updated: 08/19/23 0801     WBC 10.55 Thousand/uL      RBC 3.86 Million/uL      Hemoglobin 13.3 g/dL      Hematocrit 38.0 %      MCV 98 fL      MCH 34.5 pg      MCHC 35.0 g/dL      RDW 12.7 %      MPV 8.8 fL      Platelets 913 Thousands/uL      nRBC 0 /100 WBCs      Neutrophils Relative 74 %      Immat GRANS % 1 %      Lymphocytes Relative 14 %      Monocytes Relative 9 %      Eosinophils Relative 2 %      Basophils Relative 0 %      Neutrophils Absolute 7.79 Thousands/µL      Immature Grans Absolute 0.08 Thousand/uL      Lymphocytes Absolute 1.51 Thousands/µL      Monocytes Absolute 0.94 Thousand/µL      Eosinophils Absolute 0.20 Thousand/µL      Basophils Absolute 0.03 Thousands/µL                  XR chest 2 views   ED Interpretation by Shemar Schneider MD (08/19 8073)   No acute cardiopulmonary disease.       CTA chest pe study    (Results Pending)         Procedures  ECG 12 Lead Documentation Only    Date/Time: 8/19/2023 7:30 AM    Performed by: Coral Jane MD  Authorized by: Coral Jane MD    ECG reviewed by me, the ED Provider: yes    Patient location:  ED  Previous ECG:     Previous ECG:  Compared to current    Similarity:  Changes noted  Interpretation: Interpretation: abnormal    Rate:     ECG rate:  65    ECG rate assessment: normal    Rhythm:     Rhythm: sinus rhythm    Ectopy:     Ectopy: none    QRS:     QRS axis:  Normal    QRS intervals:  Normal  Conduction:     Conduction: normal    ST segments:     ST segments:  Abnormal    Depression:  II, III, aVF, V4, V5 and V6          ED Course                                        [facility]  Natalio Victoria is a 74 y.o. female who presents to the emergency department with viral symptoms, exertional shortness of breath, after testing COVID-positive on 8/7. Workup including vital signs, physical exam, EKG, CXR and labs. CXR without acute cardiopulmonary abnormalities, no signs of superimposed bacterial pneumonia. EKG with new ST depressions compared to prior EKG from 5 years ago, no chest pain, delta troponin negative. Labs with JJ. Given EKG changes, JJ, plan for admission to medicine. Disposition  Final diagnoses:   Viral illness   EKG abnormality   JJ (acute kidney injury) ([address])     Time reflects when diagnosis was documented in both MDM as applicable and the Disposition within this note     Time User Action Codes Description Comment    8/19/2023 12:15 PM Nelda Constantino Add [B34.9] Viral illness     8/19/2023 12:15 PM Luc Cedillo Add [R94.31] EKG abnormality     8/19/2023 12:15 PM Lida Cedillo Add [N17.9] JJ (acute kidney injury) [facility])       ED Disposition     ED Disposition   Admit    Condition   Stable    Date/Time   Sat Aug 19, 2023 12:15 PM    Comment   Case was discussed with internal medicine and the patient's admission status was agreed to be Admission Status: observation status to the service of Dr. Vidya Fountain .            Follow-up Information    None         Current Discharge Medication List      CONTINUE these medications which have NOT CHANGED    Details   albuterol (ProAir HFA) 90 mcg/act inhaler Inhale 2 puffs every 6 (six) hours as needed for wheezing  Qty: 8.5 g, Refills: 0    Comments: Substitution to a formulary equivalent within the same pharmaceutical class is authorized. Associated Diagnoses: Acute cough      amLODIPine (NORVASC) 5 mg tablet take 1 tablet by mouth once daily  Qty: 90 tablet, Refills: 3    Associated Diagnoses: Primary hypertension      Cholecalciferol (VITAMIN D3) 2000 units TABS Take 1 tablet by mouth daily      hydrochlorothiazide (HYDRODIURIL) 25 mg tablet take 1 tablet by mouth once daily  Qty: 30 tablet, Refills: 11    Associated Diagnoses: Primary hypertension      ketotifen (ZADITOR) 0.025 % ophthalmic solution Administer 1 drop to both eyes 2 (two) times a day  Qty: 5 mL, Refills: 0    Associated Diagnoses: Allergic conjunctivitis of both eyes      levothyroxine 112 mcg tablet Take 1 tablet (112 mcg total) by mouth daily  Qty: 90 tablet, Refills: 3    Associated Diagnoses: Acquired hypothyroidism      losartan (COZAAR) 100 MG tablet Take 1 tablet (100 mg total) by mouth daily  Qty: 90 tablet, Refills: 3    Associated Diagnoses: Essential hypertension      methylPREDNISolone 4 MG tablet therapy pack Use as directed on package  Qty: 1 each, Refills: 0    Associated Diagnoses: Acute cough      mupirocin (BACTROBAN) 2 % ointment Apply topically 3 (three) times a day  Qty: 22 g, Refills: 0    Associated Diagnoses: Lesion of nose      rosuvastatin (CRESTOR) 20 MG tablet take 1 tablet by mouth once daily  Qty: 90 tablet, Refills: 3    Associated Diagnoses: Subclavian artery stenosis (HCC)      Sodium Fluoride 5000 PPM 1.1 % PSTE BRUSH NORMALLY AND EXPECTORATE BUT DO NOT RINSE AFTER. DO NOT EAT. ..  (REFER TO PRESCRIPTION NOTES). tobramycin (TOBREX) 0.3 % SOLN Administer 1 drop to both eyes every 4 (four) hours while awake  Qty: 5 mL, Refills: 0    Associated Diagnoses: Acute bacterial conjunctivitis of both eyes             No discharge procedures on file.     PDMP Review     None           ED Provider  Attending physically available and evaluated Jasmyne Merchnat. I managed the patient along with the ED Attending. Electronically Signed by    Portions of the record may have been created with voice recognition software. Occasional wrong word or "sound a like" substitutions may have occurred due to the inherent limitations of voice recognition software.   Read the chart carefully and recognize, using context, where substitutions have occurred     Eber Davila MD  08/19/23 9968

## 2023-08-19 NOTE — ASSESSMENT & PLAN NOTE
· Presented to the ED with symptoms of fatigue, dizziness, nausea. No vomiting, no chest pain or shortness of breath. · Evaluated in the ED and found to be dehydrated with new EKG changes  · Given her recent covid infection the differential includes CAD or PE.  Pt is moderate risk on wells score  · Check ddimer, is positive will check CTA  · Monitor on telemetry  · Trend troponin  · Aspirin 325mg x1, followed by 81mg daily for now  · Continue statin  · Check echocardiogram  · Consult with cardiology

## 2023-08-19 NOTE — H&P
[address] [facility]  H&P  Name: Edmar Odom 74 y.o. female I MRN: [id number]  Unit/Bed#: [facility] I Date of Admission: 8/19/2023   Date of Service: 8/19/2023 I Hospital Day: 0      Assessment/Plan   * Abnormal EKG  Assessment & Plan  · Presented to the ED with symptoms of fatigue, dizziness, nausea. No vomiting, no chest pain or shortness of breath. · Evaluated in the ED and found to be dehydrated with new EKG changes  · Given her recent covid infection the differential includes CAD or PE. Pt is moderate risk on wells score  · Check ddimer, is positive will check CTA  · Monitor on telemetry  · Trend troponin  · Aspirin 325mg x1, followed by 81mg daily for now  · Continue statin  · Check echocardiogram  · Consult with cardiology    Acute renal failure superimposed on stage 3 chronic kidney disease [facility])  Assessment & Plan  Lab Results   Component Value Date    EGFR 38 08/19/2023    EGFR 59 02/24/2023    EGFR 54 08/19/2022    CREATININE 1.36 (H) 08/19/2023    CREATININE 0.95 02/24/2023    CREATININE 1.01 08/19/2022   · baseline creatinine is 0.9-1.0  · likey due to poor po intake and HCTZ use  · IV hydration with isolyte  · Check BMP in AM  · Renally dose medications  · Avoid NSAIDS    History of COVID-19  Assessment & Plan  · Tested positive on 8/7/23  · Completed a medrol dose pack  · Cough has resolved    Hypertension  Assessment & Plan  · BP is acceptable  · Holding losartan and HCTZ due to JJ         VTE Pharmacologic Prophylaxis:   Moderate Risk (Score 3-4) - Pharmacological DVT Prophylaxis Ordered: enoxaparin (Lovenox). Code Status: Level 1 - Full Code     Anticipated Length of Stay: Patient will be admitted on an observation basis with an anticipated length of stay of less than 2 midnights secondary to abnormal EKG.     Total Time Spent on Date of Encounter in care of patient: 55 minutes This time was spent on one or more of the following: performing physical exam; counseling and coordination of care; obtaining or reviewing history; documenting in the medical record; reviewing/ordering tests, medications or procedures; communicating with other healthcare professionals and discussing with patient's family/caregivers. Chief Complaint: fatigue dizziness, nausea    History of Present Illness:  Amy Perkins is a 74 y.o. female with a PMH of HTN, HLD who presents with fatigue, dizziness, nausea. She tested positive on 8/7 for COVID-19 and was treated with corticosteroids. Her cough has resolved and she denies any shortness of breath at this time. She presented today due to poor appetite, fatigue, dizziness and nausea. No vomiting or diarrhea. She was evaluated in the ED and found to have EKG changes and dehydration. She was referred to [facility] for further management. Review of Systems:  Review of Systems   All other systems reviewed and are negative.       Past Medical and Surgical History:   Past Medical History:   Diagnosis Date   • Acute pain of left shoulder     History   • Acute recurrent maxillary sinusitis     History   • Acute upper respiratory infection     History   • BRCA negative 11/12/2018    [facility]   • BRCA1 negative    • BRCA2 negative    • Breast cancer ([address]) 11/01/2018    Right   • Callus    • Cancer ([address])    • Carotid artery narrowing     stenosis   • Disease of thyroid gland    • High arches    • History of Graves' disease     status post 131 treatment   • History of lateral epicondylitis of left elbow    • History of onychomycosis     of toenail   • History of perforation of tympanic membrane    • Hyperlipidemia    • Hypertension    • Ingrown toenail    • Left otitis media     History   • Medicare annual wellness visit, subsequent        Past Surgical History:   Procedure Laterality Date   • APPENDECTOMY  1955   • BREAST BIOPSY Right 11/01/2018    U/S BX   • BREAST LUMPECTOMY Right 12/18/2018    Procedure: BREAST LUMPECTOMY; BREAST NEEDLE LOCALIZATION (NEEDLE LOC AT 0700);  Surgeon: Montana Bernardo MD;  [facility]: [facility];  Service: Surgical Oncology   • BREAST LUMPECTOMY     • ELBOW FRACTURE SURGERY  09/1960    Open Treatment of Fracture of proximal Radius   • LYMPH NODE BIOPSY Right 12/18/2018    Procedure: SENTINEL LYMPH NODE BIOPSY; LYMPHATIC MAPPING WITH BLUE DYE AND RADIOACTIVE DYE (INJECT AT 0800 BY DR PRADO IN THE OR); Surgeon: Montana Bernardo MD;  Location[facility];  Service: Surgical Oncology   • SINUS SURGERY Left 1996    nasal polyps(?)-large papilloma left maxillary sinus with extension into posterior nasopharynx completed by Dr Rachel Teresa. • THYROID CYST EXCISION      ablation   • TONSILLECTOMY AND ADENOIDECTOMY     • US BREAST NEEDLE LOC RIGHT Right 12/18/2018   • US GUIDED BREAST BIOPSY RIGHT COMPLETE Right 11/01/2018       Meds/Allergies:  Prior to Admission medications    Medication Sig Start Date End Date Taking?  Authorizing Provider   albuterol (ProAir HFA) 90 mcg/act inhaler Inhale 2 puffs every 6 (six) hours as needed for wheezing 8/10/23   Marga Koenig PA-C   amLODIPine (NORVASC) 5 mg tablet take 1 tablet by mouth once daily 7/13/23   Maurisio Garcia MD   Cholecalciferol (VITAMIN D3) 2000 units TABS Take 1 tablet by mouth daily 6/17/15   Historical Provider, MD   hydrochlorothiazide (HYDRODIURIL) 25 mg tablet take 1 tablet by mouth once daily 7/5/23   Maurisio Garcia MD   ketotifen (ZADITOR) 0.025 % ophthalmic solution Administer 1 drop to both eyes 2 (two) times a day 5/30/23   Gabi Collins PA-C   levothyroxine 112 mcg tablet Take 1 tablet (112 mcg total) by mouth daily 9/12/22 5/19/23  Nereida Aparicio MD   losartan (COZAAR) 100 MG tablet Take 1 tablet (100 mg total) by mouth daily 2/28/23   Nereida Aparicio MD   methylPREDNISolone 4 MG tablet therapy pack Use as directed on package 8/10/23   Marga Koenig PA-C   mupirocin [facility]) 2 % ointment Apply topically 3 (three) times a day 5/19/23   Johnny Jansen Shefali Gayle MD   rosuvastatin (CRESTOR) 20 MG tablet take 1 tablet by mouth once daily 23   Judy Garcia MD   Sodium Fluoride 5000 PPM 1.1 % PSTE BRUSH NORMALLY AND EXPECTORATE BUT DO NOT RINSE AFTER. DO NOT EAT. ..  (REFER TO PRESCRIPTION NOTES). 22   Historical Provider, MD   tobramycin (TOBREX) 0.3 % SOLN Administer 1 drop to both eyes every 4 (four) hours while awake 23   Elena Camarillo MD     I have reviewed home medications with patient personally. Allergies: Allergies   Allergen Reactions   • Sulfa Antibiotics Dizziness     Other reaction(s): feeling "off-balance"  Category: Adverse Reaction;        Social History:  Marital Status: /Civil Union   Occupation: None listed  Patient Pre-hospital Living Situation: Home  Patient Pre-hospital Level of Mobility: walks  Patient Pre-hospital Diet Restrictions: None  Substance Use History:   Social History     Substance and Sexual Activity   Alcohol Use Not Currently    Comment: rarely     Social History     Tobacco Use   Smoking Status Former   • Packs/day: 1.00   • Years: 35.00   • Total pack years: 35.00   • Types: Cigarettes   • Start date: 1963   • Quit date: 10/16/2002   • Years since quittin.8   Smokeless Tobacco Never     Social History     Substance and Sexual Activity   Drug Use No       Family History:  Family History   Problem Relation Age of Onset   • Breast cancer Mother    • Pancreatic cancer Mother    • Cancer Mother    • Aortic aneurysm Father    • Aortic aneurysm Family         Abdominal   • Breast cancer Family    • Breast cancer Maternal Grandmother        Physical Exam:     Vitals:   Blood Pressure: 138/63 (23 1048)  Pulse: 61 (23 1048)  Temperature: 97.6 °F (36.4 °C) (23 0624)  Temp Source: Oral (23 0624)  Respirations: 20 (23 1048)  SpO2: 95 % (23 1048)    Physical Exam  Constitutional:       Appearance: Normal appearance. HENT:      Head: Normocephalic and atraumatic. Nose: Nose normal. No congestion. Mouth/Throat:      Mouth: Mucous membranes are moist.      Pharynx: Oropharynx is clear. No oropharyngeal exudate or posterior oropharyngeal erythema. Eyes:      Extraocular Movements: Extraocular movements intact. Cardiovascular:      Rate and Rhythm: Normal rate and regular rhythm. Heart sounds: Murmur heard. Pulmonary:      Effort: Pulmonary effort is normal.      Breath sounds: No wheezing or rales. Abdominal:      General: There is no distension. Palpations: Abdomen is soft. Tenderness: There is no abdominal tenderness. Musculoskeletal:      Right lower leg: No edema. Left lower leg: No edema. Lymphadenopathy:      Cervical: No cervical adenopathy. Skin:     General: Skin is warm and dry. Neurological:      Mental Status: She is alert and oriented to person, place, and time. Psychiatric:         Mood and Affect: Mood normal.         Behavior: Behavior normal.          Additional Data:     Lab Results:  Results from last 7 days   Lab Units 08/19/23  0734   WBC Thousand/uL 10.55*   HEMOGLOBIN g/dL 13.3   HEMATOCRIT % 38.0   PLATELETS Thousands/uL 429*   NEUTROS PCT % 74   LYMPHS PCT % 14   MONOS PCT % 9   EOS PCT % 2     Results from last 7 days   Lab Units 08/19/23  0734   SODIUM mmol/L 138   POTASSIUM mmol/L 3.2*   CHLORIDE mmol/L 106   CO2 mmol/L 26   BUN mg/dL 28*   CREATININE mg/dL 1.36*   ANION GAP mmol/L 6   CALCIUM mg/dL 9.4   ALBUMIN g/dL 3.5   TOTAL BILIRUBIN mg/dL 1.38*   ALK PHOS U/L 70   ALT U/L 22   AST U/L 17   GLUCOSE RANDOM mg/dL 97                       Lines/Drains:  Invasive Devices     Peripheral Intravenous Line  Duration           Peripheral IV 08/19/23 Right;Ventral (anterior) Forearm <1 day                    Imaging: Personally reviewed the following imaging: chest xray  XR chest 2 views   ED Interpretation by Yoni Graham MD (08/19 0889)   No acute cardiopulmonary disease.           EKG and Other Studies Reviewed on Admission:   · EKG: SR rate 65, new ST depressions. ** Please Note: This note has been constructed using a voice recognition system.  **

## 2023-08-20 ENCOUNTER — RA CDI HCC (OUTPATIENT)
Dept: OTHER | Facility: HOSPITAL | Age: 73
End: 2023-08-20

## 2023-08-20 ENCOUNTER — APPOINTMENT (OUTPATIENT)
Dept: NON INVASIVE DIAGNOSTICS | Facility: HOSPITAL | Age: 73
End: 2023-08-20
Payer: COMMERCIAL

## 2023-08-20 VITALS
HEIGHT: 65 IN | SYSTOLIC BLOOD PRESSURE: 134 MMHG | BODY MASS INDEX: 28.66 KG/M2 | OXYGEN SATURATION: 99 % | RESPIRATION RATE: 20 BRPM | DIASTOLIC BLOOD PRESSURE: 55 MMHG | TEMPERATURE: 97.7 F | WEIGHT: 172 LBS | HEART RATE: 62 BPM

## 2023-08-20 LAB
ANION GAP SERPL CALCULATED.3IONS-SCNC: 4 MMOL/L
AORTIC VALVE MEAN VELOCITY: 10.5 M/S
APICAL FOUR CHAMBER EJECTION FRACTION: 57 %
ATRIAL RATE: 69 BPM
AV AREA BY CONTINUOUS VTI: 1.6 CM2
AV AREA PEAK VELOCITY: 1.8 CM2
AV LVOT MEAN GRADIENT: 5 MMHG
AV LVOT PEAK GRADIENT: 8 MMHG
AV MEAN GRADIENT: 5 MMHG
AV PEAK GRADIENT: 10 MMHG
AV VALVE AREA: 1.6 CM2
AV VELOCITY RATIO: 0.89
BUN SERPL-MCNC: 20 MG/DL (ref 5–25)
CALCIUM SERPL-MCNC: 8.4 MG/DL (ref 8.3–10.1)
CHLORIDE SERPL-SCNC: 109 MMOL/L (ref 96–108)
CO2 SERPL-SCNC: 28 MMOL/L (ref 21–32)
CREAT SERPL-MCNC: 1 MG/DL (ref 0.6–1.3)
DOP CALC AO PEAK VEL: 1.57 M/S
DOP CALC AO VTI: 37.61 CM
DOP CALC LVOT AREA: 2.01 CM2
DOP CALC LVOT CARDIAC INDEX: 2.14 L/MIN/M2
DOP CALC LVOT CARDIAC OUTPUT: 3.98 L/MIN
DOP CALC LVOT DIAMETER: 1.6 CM
DOP CALC LVOT PEAK VEL VTI: 30 CM
DOP CALC LVOT PEAK VEL: 1.4 M/S
DOP CALC LVOT STROKE INDEX: 30.6 ML/M2
DOP CALC LVOT STROKE VOLUME: 60.29
E WAVE DECELERATION TIME: 218 MS
FRACTIONAL SHORTENING: 28 (ref 28–44)
GFR SERPL CREATININE-BSD FRML MDRD: 55 ML/MIN/1.73SQ M
GLUCOSE P FAST SERPL-MCNC: 82 MG/DL (ref 65–99)
GLUCOSE SERPL-MCNC: 82 MG/DL (ref 65–140)
INTERVENTRICULAR SEPTUM IN DIASTOLE (PARASTERNAL SHORT AXIS VIEW): 1 CM
INTERVENTRICULAR SEPTUM: 1 CM (ref 0.6–1.1)
LAAS-AP4: 16.5 CM2
LEFT ATRIUM AREA SYSTOLE SINGLE PLANE A4C: 16.7 CM2
LEFT INTERNAL DIMENSION IN SYSTOLE: 2.8 CM (ref 2.1–4)
LEFT VENTRICULAR INTERNAL DIMENSION IN DIASTOLE: 3.9 CM (ref 3.5–6)
LEFT VENTRICULAR POSTERIOR WALL IN END DIASTOLE: 1 CM
LEFT VENTRICULAR STROKE VOLUME: 36 ML
LVSV (TEICH): 36 ML
MV E'TISSUE VEL-LAT: 11 CM/S
MV E'TISSUE VEL-SEP: 11 CM/S
MV PEAK A VEL: 0.77 M/S
MV PEAK E VEL: 114 CM/S
MV STENOSIS PRESSURE HALF TIME: 63 MS
MV VALVE AREA P 1/2 METHOD: 3.49
P AXIS: 82 DEGREES
POTASSIUM SERPL-SCNC: 3.5 MMOL/L (ref 3.5–5.3)
PR INTERVAL: 170 MS
QRS AXIS: 54 DEGREES
QRSD INTERVAL: 82 MS
QT INTERVAL: 402 MS
QTC INTERVAL: 430 MS
RIGHT ATRIUM AREA SYSTOLE A4C: 13.8 CM2
RIGHT VENTRICLE ID DIMENSION: 4 CM
SL CV LV EF: 60
SL CV PED ECHO LEFT VENTRICLE DIASTOLIC VOLUME (MOD BIPLANE) 2D: 65 ML
SL CV PED ECHO LEFT VENTRICLE SYSTOLIC VOLUME (MOD BIPLANE) 2D: 30 ML
SODIUM SERPL-SCNC: 141 MMOL/L (ref 135–147)
T WAVE AXIS: 72 DEGREES
TR MAX PG: 33 MMHG
TR PEAK VELOCITY: 2.9 M/S
TRICUSPID ANNULAR PLANE SYSTOLIC EXCURSION: 1.8 CM
TRICUSPID VALVE PEAK REGURGITATION VELOCITY: 2.89 M/S
VENTRICULAR RATE: 69 BPM

## 2023-08-20 PROCEDURE — 99239 HOSP IP/OBS DSCHRG MGMT >30: CPT | Performed by: NURSE PRACTITIONER

## 2023-08-20 PROCEDURE — 93005 ELECTROCARDIOGRAM TRACING: CPT

## 2023-08-20 PROCEDURE — 80048 BASIC METABOLIC PNL TOTAL CA: CPT | Performed by: INTERNAL MEDICINE

## 2023-08-20 PROCEDURE — 93306 TTE W/DOPPLER COMPLETE: CPT

## 2023-08-20 PROCEDURE — 93306 TTE W/DOPPLER COMPLETE: CPT | Performed by: INTERNAL MEDICINE

## 2023-08-20 RX ADMIN — ASPIRIN 81 MG CHEWABLE TABLET 81 MG: 81 TABLET CHEWABLE at 08:38

## 2023-08-20 RX ADMIN — SODIUM CHLORIDE, SODIUM GLUCONATE, SODIUM ACETATE, POTASSIUM CHLORIDE AND MAGNESIUM CHLORIDE 100 ML/HR: 526; 502; 368; 37; 30 INJECTION, SOLUTION INTRAVENOUS at 00:23

## 2023-08-20 RX ADMIN — ENOXAPARIN SODIUM 40 MG: 40 INJECTION SUBCUTANEOUS at 08:39

## 2023-08-20 RX ADMIN — LEVOTHYROXINE SODIUM 112 MCG: 112 TABLET ORAL at 05:59

## 2023-08-20 RX ADMIN — Medication 2000 UNITS: at 08:38

## 2023-08-20 RX ADMIN — AMLODIPINE BESYLATE 5 MG: 5 TABLET ORAL at 08:38

## 2023-08-20 RX ADMIN — SODIUM CHLORIDE, SODIUM GLUCONATE, SODIUM ACETATE, POTASSIUM CHLORIDE AND MAGNESIUM CHLORIDE 100 ML/HR: 526; 502; 368; 37; 30 INJECTION, SOLUTION INTRAVENOUS at 09:31

## 2023-08-20 NOTE — ASSESSMENT & PLAN NOTE
· Presented to the ED with symptoms of fatigue, dizziness, nausea. No vomiting, no chest pain or shortness of breath. · Evaluated in the ED and found to be dehydrated with new EKG changes  · Given her recent covid infection the differential includes CAD or PE  · ddimer 0.95 is positive.  CTA PE study completed negative for PE  · Telemetry monitoring  · Troponin normal with negative delta  · Aspirin 325mg x1, followed by 81mg daily for now  · Continue statin  · Echo completed normal findings   · EKG changes per personally discussion with on call cardiology appear chronic

## 2023-08-20 NOTE — NURSING NOTE
Patient discharged home in stable condition, IV access discontinued without complication, all personal belongings take during discharge.

## 2023-08-20 NOTE — ASSESSMENT & PLAN NOTE
Lab Results   Component Value Date    EGFR 55 08/20/2023    EGFR 38 08/19/2023    EGFR 59 02/24/2023    CREATININE 1.00 08/20/2023    CREATININE 1.36 (H) 08/19/2023    CREATININE 0.95 02/24/2023   · baseline creatinine is 0.9-1.0  · likey due to poor po intake and HCTZ use  · IV hydration with isolyte given  · Cr.  Now at baseline  · JJ resolved

## 2023-08-20 NOTE — PLAN OF CARE
Problem: PAIN - ADULT  Goal: Verbalizes/displays adequate comfort level or baseline comfort level  Description: Interventions:  - Encourage patient to monitor pain and request assistance  - Assess pain using appropriate pain scale  - Administer analgesics based on type and severity of pain and evaluate response  - Implement non-pharmacological measures as appropriate and evaluate response  - Consider cultural and social influences on pain and pain management  - Notify physician/advanced practitioner if interventions unsuccessful or patient reports new pain  Outcome: Progressing     Problem: INFECTION - ADULT  Goal: Absence or prevention of progression during hospitalization  Description: INTERVENTIONS:  - Assess and monitor for signs and symptoms of infection  - Monitor lab/diagnostic results  - Monitor all insertion sites, i.e. indwelling lines, tubes, and drains  - Monitor endotracheal if appropriate and nasal secretions for changes in amount and color  - New Site appropriate cooling/warming therapies per order  - Administer medications as ordered  - Instruct and encourage patient and family to use good hand hygiene technique  - Identify and instruct in appropriate isolation precautions for identified infection/condition  Outcome: Progressing  Goal: Absence of fever/infection during neutropenic period  Description: INTERVENTIONS:  - Monitor WBC    Outcome: Progressing     Problem: SAFETY ADULT  Goal: Patient will remain free of falls  Description: INTERVENTIONS:  - Educate patient/family on patient safety including physical limitations  - Instruct patient to call for assistance with activity   - Consult OT/PT to assist with strengthening/mobility   - Keep Call bell within reach  - Keep bed low and locked with side rails adjusted as appropriate  - Keep care items and personal belongings within reach  - Initiate and maintain comfort rounds  - Make Fall Risk Sign visible to staff  - Offer Toileting every   Hours, in advance of need  - Initiate/Maintain  alarm  - Obtain necessary fall risk management equipment:    - Apply yellow socks and bracelet for high fall risk patients  - Consider moving patient to room near nurses station  Outcome: Progressing  Goal: Maintain or return to baseline ADL function  Description: INTERVENTIONS:  -  Assess patient's ability to carry out ADLs; assess patient's baseline for ADL function and identify physical deficits which impact ability to perform ADLs (bathing, care of mouth/teeth, toileting, grooming, dressing, etc.)  - Assess/evaluate cause of self-care deficits   - Assess range of motion  - Assess patient's mobility; develop plan if impaired  - Assess patient's need for assistive devices and provide as appropriate  - Encourage maximum independence but intervene and supervise when necessary  - Involve family in performance of ADLs  - Assess for home care needs following discharge   - Consider OT consult to assist with ADL evaluation and planning for discharge  - Provide patient education as appropriate  Outcome: Progressing  Goal: Maintains/Returns to pre admission functional level  Description: INTERVENTIONS:  - Perform BMAT or MOVE assessment daily.   - Set and communicate daily mobility goal to care team and patient/family/caregiver. - Collaborate with rehabilitation services on mobility goals if consulted  - Perform Range of Motion   times a day. - Reposition patient every   hours.   - Dangle patient   times a day  - Stand patient   times a day  - Ambulate patient   times a day  - Out of bed to chair   times a day   - Out of bed for meals     times a day  - Out of bed for toileting  - Record patient progress and toleration of activity level   Outcome: Progressing     Problem: DISCHARGE PLANNING  Goal: Discharge to home or other facility with appropriate resources  Description: INTERVENTIONS:  - Identify barriers to discharge w/patient and caregiver  - Arrange for needed discharge resources and transportation as appropriate  - Identify discharge learning needs (meds, wound care, etc.)  - Arrange for interpretive services to assist at discharge as needed  - Refer to Case Management Department for coordinating discharge planning if the patient needs post-hospital services based on physician/advanced practitioner order or complex needs related to functional status, cognitive ability, or social support system  Outcome: Progressing     Problem: Knowledge Deficit  Goal: Patient/family/caregiver demonstrates understanding of disease process, treatment plan, medications, and discharge instructions  Description: Complete learning assessment and assess knowledge base.   Interventions:  - Provide teaching at level of understanding  - Provide teaching via preferred learning methods  Outcome: Progressing

## 2023-08-20 NOTE — PROGRESS NOTES
J43.9  [address] [facility] coding opportunities          Chart Reviewed number of suggestions sent to Provider: 1     Patients Insurance     Medicare Insurance: [address] [place] [address]

## 2023-08-21 ENCOUNTER — TELEPHONE (OUTPATIENT)
Dept: FAMILY MEDICINE CLINIC | Facility: CLINIC | Age: 73
End: 2023-08-21

## 2023-08-21 ENCOUNTER — TRANSITIONAL CARE MANAGEMENT (OUTPATIENT)
Dept: FAMILY MEDICINE CLINIC | Facility: CLINIC | Age: 73
End: 2023-08-21

## 2023-08-21 LAB
ATRIAL RATE: 57 BPM
ATRIAL RATE: 59 BPM
P AXIS: 64 DEGREES
P AXIS: 69 DEGREES
PR INTERVAL: 152 MS
PR INTERVAL: 164 MS
QRS AXIS: 67 DEGREES
QRS AXIS: 70 DEGREES
QRSD INTERVAL: 74 MS
QRSD INTERVAL: 80 MS
QT INTERVAL: 420 MS
QT INTERVAL: 422 MS
QTC INTERVAL: 410 MS
QTC INTERVAL: 415 MS
T WAVE AXIS: 60 DEGREES
T WAVE AXIS: 75 DEGREES
VENTRICULAR RATE: 57 BPM
VENTRICULAR RATE: 59 BPM

## 2023-08-21 PROCEDURE — 93010 ELECTROCARDIOGRAM REPORT: CPT | Performed by: INTERNAL MEDICINE

## 2023-08-21 NOTE — TELEPHONE ENCOUNTER
Pt aware----- Message from Jose Alberto West MD sent at 8/21/2023 10:07 AM EDT -----  Regarding: FW: Recent hospitalization August 19 and 20th. Contact: [phone]  Please call patient. I reviewed hospital records. She can go on a trip as planned. Recommend to stay well hydrated, continue current medications.  ----- Message -----  From: Mckenzie Amezquita  Sent: 8/21/2023   7:54 AM EDT  To: Jose Alberto West MD  Subject: FW: Recent hospitalization August 19 and 20t#    FYI  ----- Message -----  From: Ashley Benton  Sent: 8/20/2023   3:15 PM EDT  [facility]  Subject: Recent hospitalization August 19 and 20th. Went in to the emergency room with severe dehydration. Had extensive cardio work up and blood tests. was discharged on the 20th. Spoke with CHOLO Raygoza. Asked her about going vacation tomorrow and she didn't have a problem with. And to let my Primary Care Physician know. Just a relaxing time. not out of the country, only 4 [place]/2 hrs away.     [facility] [employer]

## 2023-08-29 ENCOUNTER — OFFICE VISIT (OUTPATIENT)
Dept: FAMILY MEDICINE CLINIC | Facility: CLINIC | Age: 73
End: 2023-08-29
Payer: COMMERCIAL

## 2023-08-29 VITALS
HEIGHT: 65 IN | DIASTOLIC BLOOD PRESSURE: 62 MMHG | TEMPERATURE: 97 F | HEART RATE: 72 BPM | SYSTOLIC BLOOD PRESSURE: 124 MMHG | OXYGEN SATURATION: 98 % | WEIGHT: 171 LBS | BODY MASS INDEX: 28.49 KG/M2 | RESPIRATION RATE: 16 BRPM

## 2023-08-29 DIAGNOSIS — Z86.16 HISTORY OF COVID-19: ICD-10-CM

## 2023-08-29 DIAGNOSIS — N17.9 ACUTE RENAL FAILURE SUPERIMPOSED ON STAGE 3A CHRONIC KIDNEY DISEASE, UNSPECIFIED ACUTE RENAL FAILURE TYPE (HCC): ICD-10-CM

## 2023-08-29 DIAGNOSIS — E03.9 ACQUIRED HYPOTHYROIDISM: ICD-10-CM

## 2023-08-29 DIAGNOSIS — E78.2 MIXED HYPERLIPIDEMIA: ICD-10-CM

## 2023-08-29 DIAGNOSIS — N18.31 ACUTE RENAL FAILURE SUPERIMPOSED ON STAGE 3A CHRONIC KIDNEY DISEASE, UNSPECIFIED ACUTE RENAL FAILURE TYPE (HCC): ICD-10-CM

## 2023-08-29 DIAGNOSIS — I10 PRIMARY HYPERTENSION: ICD-10-CM

## 2023-08-29 DIAGNOSIS — R94.31 ABNORMAL EKG: Primary | ICD-10-CM

## 2023-08-29 PROBLEM — H25.9 AGE-RELATED CATARACT OF LEFT EYE: Status: RESOLVED | Noted: 2022-01-15 | Resolved: 2023-08-29

## 2023-08-29 PROBLEM — L03.032 CELLULITIS OF TOE OF LEFT FOOT: Status: RESOLVED | Noted: 2023-05-08 | Resolved: 2023-08-29

## 2023-08-29 PROBLEM — H25.9 AGE-RELATED CATARACT OF RIGHT EYE: Status: RESOLVED | Noted: 2022-01-18 | Resolved: 2023-08-29

## 2023-08-29 PROCEDURE — 99495 TRANSJ CARE MGMT MOD F2F 14D: CPT | Performed by: FAMILY MEDICINE

## 2023-08-29 NOTE — PROGRESS NOTES
Chief Complaint   Patient presents with   • Transition of Care Management     Viral illness      Health Maintenance   Topic Date Due   • COVID-19 Vaccine (4 - Pfizer series) 01/15/2022   • Influenza Vaccine (1) 09/01/2023   • Breast Cancer Screening: Mammogram  10/21/2023   • ONC Colorectal Surgery Screening  11/06/2023   • Breast Cancer Survivorship Visit  11/06/2023   • ONC Cervical Cancer Screening  11/06/2023   • ONC DXA Scan  11/06/2023   • ONC Physical Therapy Referral  11/06/2023   • BMI: Followup Plan  02/28/2024   • Fall Risk  02/28/2024   • Urinary Incontinence Screening  02/28/2024   • Medicare Annual Wellness Visit (AWV)  02/28/2024   • Depression Screening  05/30/2024   • BMI: Adult  08/29/2024   • Hepatitis C Screening  Completed   • Osteoporosis Screening  Completed   • Pneumococcal Vaccine: 65+ Years  Completed   • HIB Vaccine  Aged Out   • IPV Vaccine  Aged Out   • Hepatitis A Vaccine  Aged Out   • Meningococcal ACWY Vaccine  Aged Out   • HPV Vaccine  Aged Out   • Colorectal Cancer Screening  Discontinued       Assessment & Plan     Patient presents for TCM visit. She was admitted to [facility] 8/19/23 - 8/20/23. 1. Abnormal EKG  Assessment & Plan:  Patient presented to the hospital with symptoms of fatigue, dizziness, nausea in the setting of recent COVID virus infection. She was found to be dehydrated with some EKG changes. CTA PE study was negative for PE. Echocardiogram showed EF 60%, no wall motion abnormality. Diastolic function was normal.  EKG changes appeared to be chronic. Patient reports no chest pain, shortness of breath, dizziness. Recommended to stay well-hydrated. Continue current medical therapy for HTN. Follow-up with cardiology. Orders:  -     Ambulatory Referral to Cardiology; Future    2. History of COVID-19  Assessment & Plan:  Patient tested positive for COVID-19 on August 7, 2023. Reports no respiratory symptoms.       3. Acute renal failure superimposed on stage 3a chronic kidney disease, unspecified acute renal failure type [facility])  Assessment & Plan:  Lab Results   Component Value Date    EGFR 55 08/20/2023    EGFR 38 08/19/2023    EGFR 59 02/24/2023    CREATININE 1.00 08/20/2023    CREATININE 1.36 (H) 08/19/2023    CREATININE 0.95 02/24/2023     Improved with IV fluid hydration. Recommended to stay well-hydrated. Avoid NSAIDs. 4. Primary hypertension  Assessment & Plan:  BP remains stable. Continue Losartan, HCTZ, Amlodipine. Monitor blood pressure at home. Recommended to follow a low-sodium diet. Patient has right ankle edema. R/o side effect from Amlodipine. Recommended cardiology follow-up, review medical therapy. Orders:  -     Ambulatory Referral to Cardiology; Future    5. Acquired hypothyroidism  Assessment & Plan:  TSH 2.043. Continue Levothyroxine 112 mcg daily. 6. Mixed hyperlipidemia  Assessment & Plan:  Continue Rosuvastatin 20 mg daily. Schedule follow-up visit, Medicare AWV in 6 months. Subjective     Transitional Care Management Review:   Stephy Miller is a 74 y.o. female here for TCM follow up. During the TCM phone call patient stated:  TCM Call     Date and time call was made  8/21/2023 11:24 AM    Patient was hospitialized at  [address] [place] [address]    Date of Admission  08/19/23    Date of discharge  08/20/23    Diagnosis  Abnormal EKG    Disposition  Home    Were the patients medications reviewed and updated  No    Current Symptoms  None      TCM Call     Post hospital issues  None    Should patient be enrolled in anticoag monitoring? No    Scheduled for follow up?   Yes    Did you obtain your prescribed medications  Yes    Do you need help managing your prescriptions or medications  No    Is transportation to your appointment needed  No    I have advised the patient to call PCP with any new or worsening symptoms  Romaine5 Ulisses Loco or Significiant other    Support System  Spouse    The type of support provided  None    Do you have social support  Yes, as much as I need    Are you recieving any outpatient services  No    Are you recieving home care services  No    Are you using any community resources  No    Current waiver services  No    Have you fallen in the last 12 months  No    Interperter language line needed  No    Counseling  Patient        HPI     Patient presents for TCM visit. She was admitted to [facility] 8/19/23 - 8/20/23. Patient originally presented to the hospital due to recent COVID diagnosis, not eating and drinking at home, started with nausea and dizziness. EKG showed some abnormality. Patient was monitored overnight on telemetry with no events. Case was discussed with cardiology. Abnormal EKG changes appeared chronic. Echocardiogram done on August 20, 2023 showed EF 60%. Wall motion is normal.  Diastolic function is normal.  Mild TR. No significant changes noted compared to prior study in May 2022. Patient was found to be in JJ, resolved with IV fluids. Patient reports no fever,chills, headache, dizziness. No chest pain, shortness of breath. Reviewed results from August 20, 2023. Fasting blood sugar 82, creatinine 1.00, GFR 55, potassium 3.5. TSH 2.043. Patient reports a fall 1 week ago while on vacation. She tripped and fell on her face. She was seen in the emergency room, CT scan of the head and  x-rays were unremarkable. Denies headache. No dizziness. No visual disturbance. HTN - blood pressure remains stable. Patient takes amlodipine 5 mg daily, HCTZ 25 mg daily, losartan 100 mg daily. Hypothyroidism - on Levothyroxine 112 mcg daily. Former smoker. Quit smoking 20 years ago. Review of Systems   Constitutional: Positive for fatigue (mild). Negative for activity change, appetite change, chills and fever.    HENT: Negative for congestion, hearing loss, sore throat and trouble swallowing. Eyes: Negative for pain, discharge, redness, itching and visual disturbance. Respiratory: Negative for cough, chest tightness, shortness of breath and wheezing. Cardiovascular: Positive for leg swelling (right ankle swelling). Negative for chest pain and palpitations. Gastrointestinal: Negative for abdominal pain, constipation, diarrhea, nausea and vomiting. Genitourinary: Negative for difficulty urinating, dysuria and hematuria. Musculoskeletal: Positive for arthralgias. Negative for back pain, gait problem and joint swelling. Skin: Negative for rash. Neurological: Negative for dizziness, syncope and headaches. Hematological: Negative. Psychiatric/Behavioral: Negative for dysphoric mood and sleep disturbance. The patient is not nervous/anxious. Objective     /62   Pulse 72   Temp (!) 97 °F (36.1 °C) (Tympanic)   Resp 16   Ht 5' 5" (1.651 m)   Wt 77.6 kg (171 lb)   SpO2 98%   BMI 28.46 kg/m²      Physical Exam  Vitals and nursing note reviewed. Constitutional:       Appearance: Normal appearance. HENT:      Head: Normocephalic and atraumatic. Eyes:      Conjunctiva/sclera: Conjunctivae normal.      Pupils: Pupils are equal, round, and reactive to light. Neck:      Vascular: Carotid bruit present. Cardiovascular:      Rate and Rhythm: Normal rate and regular rhythm. Heart sounds: No murmur heard. Comments: Right ankle edema 1+  Pulmonary:      Effort: Pulmonary effort is normal.      Breath sounds: Normal breath sounds. Abdominal:      General: Bowel sounds are normal. There is no distension. Palpations: Abdomen is soft. Tenderness: There is no abdominal tenderness. Musculoskeletal:         General: No swelling. Normal range of motion. Cervical back: Normal range of motion and neck supple. Tenderness: BL carotid bruits. Right lower leg: No edema. Left lower leg: No edema.    Skin:     General: Skin is warm and dry. Findings: No rash. Comments: Small bruise on left forehead   Neurological:      General: No focal deficit present. Mental Status: She is alert and oriented to person, place, and time. Motor: No weakness.       Gait: Gait normal.   Psychiatric:         Mood and Affect: Mood normal.       Medications have been reviewed by provider in current encounter    Radha Mcknight MD

## 2023-08-30 NOTE — ASSESSMENT & PLAN NOTE
Patient presented to the hospital with symptoms of fatigue, dizziness, nausea in the setting of recent COVID virus infection. She was found to be dehydrated with some EKG changes. CTA PE study was negative for PE. Echocardiogram showed EF 60%, no wall motion abnormality. Diastolic function was normal.  EKG changes appeared to be chronic. Patient reports no chest pain, shortness of breath, dizziness. Recommended to stay well-hydrated. Continue current medical therapy for HTN. Follow-up with cardiology.

## 2023-08-30 NOTE — ASSESSMENT & PLAN NOTE
BP remains stable. Continue Losartan, HCTZ, Amlodipine. Monitor blood pressure at home. Recommended to follow a low-sodium diet. Patient has right ankle edema. R/o side effect from Amlodipine. Recommended cardiology follow-up, review medical therapy.

## 2023-08-30 NOTE — ASSESSMENT & PLAN NOTE
Lab Results   Component Value Date    EGFR 55 08/20/2023    EGFR 38 08/19/2023    EGFR 59 02/24/2023    CREATININE 1.00 08/20/2023    CREATININE 1.36 (H) 08/19/2023    CREATININE 0.95 02/24/2023     Improved with IV fluid hydration. Recommended to stay well-hydrated. Avoid NSAIDs.

## 2023-09-01 DIAGNOSIS — E03.9 ACQUIRED HYPOTHYROIDISM: ICD-10-CM

## 2023-09-01 RX ORDER — LEVOTHYROXINE SODIUM 112 UG/1
112 TABLET ORAL DAILY
Qty: 90 TABLET | Refills: 3 | Status: SHIPPED | OUTPATIENT
Start: 2023-09-01 | End: 2023-09-05 | Stop reason: SDUPTHER

## 2023-09-05 ENCOUNTER — APPOINTMENT (OUTPATIENT)
Dept: LAB | Facility: CLINIC | Age: 73
End: 2023-09-05
Payer: COMMERCIAL

## 2023-09-05 DIAGNOSIS — E78.2 MIXED HYPERLIPIDEMIA: ICD-10-CM

## 2023-09-05 DIAGNOSIS — I77.1 SUBCLAVIAN ARTERY STENOSIS (HCC): ICD-10-CM

## 2023-09-05 DIAGNOSIS — E03.9 ACQUIRED HYPOTHYROIDISM: Primary | ICD-10-CM

## 2023-09-05 DIAGNOSIS — E03.9 ACQUIRED HYPOTHYROIDISM: ICD-10-CM

## 2023-09-05 DIAGNOSIS — I10 PRIMARY HYPERTENSION: ICD-10-CM

## 2023-09-05 DIAGNOSIS — N18.31 STAGE 3A CHRONIC KIDNEY DISEASE (HCC): ICD-10-CM

## 2023-09-05 LAB
ALBUMIN SERPL BCP-MCNC: 4 G/DL (ref 3.5–5)
ALP SERPL-CCNC: 60 U/L (ref 34–104)
ALT SERPL W P-5'-P-CCNC: 12 U/L (ref 7–52)
ANION GAP SERPL CALCULATED.3IONS-SCNC: 5 MMOL/L
AST SERPL W P-5'-P-CCNC: 17 U/L (ref 13–39)
BASOPHILS # BLD AUTO: 0.05 THOUSANDS/ÂΜL (ref 0–0.1)
BASOPHILS NFR BLD AUTO: 1 % (ref 0–1)
BILIRUB SERPL-MCNC: 0.7 MG/DL (ref 0.2–1)
BUN SERPL-MCNC: 21 MG/DL (ref 5–25)
CALCIUM SERPL-MCNC: 9.6 MG/DL (ref 8.4–10.2)
CHLORIDE SERPL-SCNC: 100 MMOL/L (ref 96–108)
CHOLEST SERPL-MCNC: 143 MG/DL
CO2 SERPL-SCNC: 30 MMOL/L (ref 21–32)
CREAT SERPL-MCNC: 1.12 MG/DL (ref 0.6–1.3)
EOSINOPHIL # BLD AUTO: 0.18 THOUSAND/ÂΜL (ref 0–0.61)
EOSINOPHIL NFR BLD AUTO: 3 % (ref 0–6)
ERYTHROCYTE [DISTWIDTH] IN BLOOD BY AUTOMATED COUNT: 13.9 % (ref 11.6–15.1)
GFR SERPL CREATININE-BSD FRML MDRD: 48 ML/MIN/1.73SQ M
GLUCOSE P FAST SERPL-MCNC: 89 MG/DL (ref 65–99)
HCT VFR BLD AUTO: 38.5 % (ref 34.8–46.1)
HDLC SERPL-MCNC: 62 MG/DL
HGB BLD-MCNC: 12.5 G/DL (ref 11.5–15.4)
IMM GRANULOCYTES # BLD AUTO: 0.03 THOUSAND/UL (ref 0–0.2)
IMM GRANULOCYTES NFR BLD AUTO: 1 % (ref 0–2)
LDLC SERPL CALC-MCNC: 66 MG/DL (ref 0–100)
LYMPHOCYTES # BLD AUTO: 1.78 THOUSANDS/ÂΜL (ref 0.6–4.47)
LYMPHOCYTES NFR BLD AUTO: 29 % (ref 14–44)
MCH RBC QN AUTO: 33.5 PG (ref 26.8–34.3)
MCHC RBC AUTO-ENTMCNC: 32.5 G/DL (ref 31.4–37.4)
MCV RBC AUTO: 103 FL (ref 82–98)
MONOCYTES # BLD AUTO: 0.63 THOUSAND/ÂΜL (ref 0.17–1.22)
MONOCYTES NFR BLD AUTO: 10 % (ref 4–12)
NEUTROPHILS # BLD AUTO: 3.53 THOUSANDS/ÂΜL (ref 1.85–7.62)
NEUTS SEG NFR BLD AUTO: 56 % (ref 43–75)
NONHDLC SERPL-MCNC: 81 MG/DL
NRBC BLD AUTO-RTO: 0 /100 WBCS
PLATELET # BLD AUTO: 382 THOUSANDS/UL (ref 149–390)
PMV BLD AUTO: 9 FL (ref 8.9–12.7)
POTASSIUM SERPL-SCNC: 3.9 MMOL/L (ref 3.5–5.3)
PROT SERPL-MCNC: 6.6 G/DL (ref 6.4–8.4)
RBC # BLD AUTO: 3.73 MILLION/UL (ref 3.81–5.12)
SODIUM SERPL-SCNC: 135 MMOL/L (ref 135–147)
TRIGL SERPL-MCNC: 74 MG/DL
TSH SERPL DL<=0.05 MIU/L-ACNC: 2.02 UIU/ML (ref 0.45–4.5)
WBC # BLD AUTO: 6.2 THOUSAND/UL (ref 4.31–10.16)

## 2023-09-05 PROCEDURE — 80061 LIPID PANEL: CPT

## 2023-09-05 PROCEDURE — 36415 COLL VENOUS BLD VENIPUNCTURE: CPT

## 2023-09-05 PROCEDURE — 84443 ASSAY THYROID STIM HORMONE: CPT

## 2023-09-05 PROCEDURE — 85025 COMPLETE CBC W/AUTO DIFF WBC: CPT

## 2023-09-05 PROCEDURE — 80053 COMPREHEN METABOLIC PANEL: CPT

## 2023-09-05 RX ORDER — LEVOTHYROXINE SODIUM 112 UG/1
112 TABLET ORAL DAILY
Qty: 90 TABLET | Refills: 3 | Status: SHIPPED | OUTPATIENT
Start: 2023-09-05

## 2023-09-08 ENCOUNTER — TELEPHONE (OUTPATIENT)
Age: 73
End: 2023-09-08

## 2023-09-08 NOTE — TELEPHONE ENCOUNTER
Caller:Marianne    Doctor: Chioma Peterson    Reason for call: Would like to know if she needs to wear any kind of special shoe she is having her toenail removed on Monday please advise.   Thank you    Call back#: [phone]

## 2023-09-11 ENCOUNTER — OFFICE VISIT (OUTPATIENT)
Dept: PODIATRY | Facility: CLINIC | Age: 73
End: 2023-09-11
Payer: COMMERCIAL

## 2023-09-11 VITALS
HEIGHT: 65 IN | RESPIRATION RATE: 18 BRPM | HEART RATE: 66 BPM | WEIGHT: 170 LBS | SYSTOLIC BLOOD PRESSURE: 117 MMHG | BODY MASS INDEX: 28.32 KG/M2 | DIASTOLIC BLOOD PRESSURE: 60 MMHG

## 2023-09-11 DIAGNOSIS — M79.675 PAIN IN TOE OF LEFT FOOT: ICD-10-CM

## 2023-09-11 DIAGNOSIS — L60.3 NAIL DYSTROPHY: Primary | ICD-10-CM

## 2023-09-11 PROCEDURE — 99212 OFFICE O/P EST SF 10 MIN: CPT | Performed by: PODIATRIST

## 2023-09-11 PROCEDURE — 11750 EXCISION NAIL&NAIL MATRIX: CPT | Performed by: PODIATRIST

## 2023-09-11 RX ORDER — LIDOCAINE HYDROCHLORIDE AND EPINEPHRINE 10; 10 MG/ML; UG/ML
1 INJECTION, SOLUTION INFILTRATION; PERINEURAL ONCE
Status: COMPLETED | OUTPATIENT
Start: 2023-09-11 | End: 2023-09-11

## 2023-09-11 RX ORDER — LIDOCAINE HYDROCHLORIDE 10 MG/ML
1 INJECTION, SOLUTION EPIDURAL; INFILTRATION; INTRACAUDAL; PERINEURAL ONCE
Status: COMPLETED | OUTPATIENT
Start: 2023-09-11 | End: 2023-09-11

## 2023-09-11 RX ADMIN — LIDOCAINE HYDROCHLORIDE AND EPINEPHRINE 1 ML: 10; 10 INJECTION, SOLUTION INFILTRATION; PERINEURAL at 08:44

## 2023-09-11 RX ADMIN — LIDOCAINE HYDROCHLORIDE 1 ML: 10 INJECTION, SOLUTION EPIDURAL; INFILTRATION; INTRACAUDAL; PERINEURAL at 08:45

## 2023-09-11 NOTE — PROGRESS NOTES
Patient presents with a painful and dystrophic left great toenail. Patient [place] the [place] and it never grew back properly. On exam, left great toenail is dystrophic exhibiting mild onycholysis. It appears as if it is a shell with a brittle appearance. Discussed treatment options. Patient desires nails permanent removal.    Recommended total nail avulsion with total matrixectomy. Anesthesia via 2 cc of a 1: 1 mixture of 1% Xylocaine with epinephrine and 1% Xylocaine plain. Betadine prep performed. Namon Sine was utilized to reduce blood flow to the digit. Total nail avulsion performed followed by matrixectomy utilizing phenol in standard manner. Bacitracin dressing applied. Patient to soak twice daily in warm water followed by Neosporin dressing. She is rescheduled in 1 week. Nail removal    Date/Time: 9/11/2023 8:30 AM    Performed by: Fede Goetz DPM  Authorized by: Fede Goetz DPM    Patient location:  ClinicUniversal Protocol:  Patient understanding: patient does not state understanding of the procedure being performed  Patient identity confirmed: verbally with patient      Location:     Foot:  L big toe  Pre-procedure details:     Skin preparation:  Betadine  Anesthesia (see MAR for exact dosages): Anesthesia method:  Nerve block    Block needle gauge:  25 G    Block anesthetic:  Lidocaine 1% WITH epi and lidocaine 1% w/o epi    Block injection procedure:  Anatomic landmarks identified    Block outcome:  Anesthesia achieved  Nail Removal:     Nail removed:  Complete  Ingrown nail:     Wedge excision of skin: no      Nail matrix removed or ablated:  Complete  Post-procedure details:     Dressing:  4x4 sterile gauze, antibiotic ointment and gauze roll    Patient tolerance of procedure:   Tolerated well, no immediate complications

## 2023-09-18 ENCOUNTER — OFFICE VISIT (OUTPATIENT)
Dept: PODIATRY | Facility: CLINIC | Age: 73
End: 2023-09-18

## 2023-09-18 VITALS
HEART RATE: 70 BPM | BODY MASS INDEX: 28.49 KG/M2 | WEIGHT: 171 LBS | DIASTOLIC BLOOD PRESSURE: 66 MMHG | RESPIRATION RATE: 18 BRPM | SYSTOLIC BLOOD PRESSURE: 124 MMHG | HEIGHT: 65 IN

## 2023-09-18 DIAGNOSIS — L60.3 NAIL DYSTROPHY: Primary | ICD-10-CM

## 2023-09-18 PROCEDURE — 99024 POSTOP FOLLOW-UP VISIT: CPT | Performed by: PODIATRIST

## 2023-09-18 NOTE — PROGRESS NOTES
Patient presents 1 week post total matrixectomy left great toe. Significant erythema and drainage noted but minimal pain. Reassured patient that she is not infected that the drainage and erythema is typical utilizing phenol for the matrixectomy. She may discontinue soaks and Neosporin. She will contact me should pain increase. She is rescheduled for 6 weeks.

## 2023-10-23 ENCOUNTER — HOSPITAL ENCOUNTER (OUTPATIENT)
Dept: MAMMOGRAPHY | Facility: CLINIC | Age: 73
Discharge: HOME | End: 2023-10-23
Payer: COMMERCIAL

## 2023-10-23 DIAGNOSIS — C50.311 MALIGNANT NEOPLASM OF LOWER-INNER QUADRANT OF RIGHT BREAST OF FEMALE, ESTROGEN RECEPTOR POSITIVE: ICD-10-CM

## 2023-10-23 DIAGNOSIS — Z17.0 MALIGNANT NEOPLASM OF LOWER-INNER QUADRANT OF RIGHT BREAST OF FEMALE, ESTROGEN RECEPTOR POSITIVE: ICD-10-CM

## 2023-10-23 PROCEDURE — 77066 DX MAMMO INCL CAD BI: CPT

## 2023-10-23 PROCEDURE — G0279 TOMOSYNTHESIS, MAMMO: HCPCS

## 2023-10-30 ENCOUNTER — OFFICE VISIT (OUTPATIENT)
Dept: PODIATRY | Facility: CLINIC | Age: 73
End: 2023-10-30
Payer: COMMERCIAL

## 2023-10-30 VITALS
HEIGHT: 65 IN | BODY MASS INDEX: 28.06 KG/M2 | WEIGHT: 168.4 LBS | SYSTOLIC BLOOD PRESSURE: 108 MMHG | HEART RATE: 57 BPM | DIASTOLIC BLOOD PRESSURE: 62 MMHG

## 2023-10-30 DIAGNOSIS — L60.3 NAIL DYSTROPHY: Primary | ICD-10-CM

## 2023-10-30 DIAGNOSIS — L84 CORNS: ICD-10-CM

## 2023-10-30 DIAGNOSIS — M21.961 DEFORMITY OF METATARSAL BONE OF RIGHT FOOT: ICD-10-CM

## 2023-10-30 PROCEDURE — 1160F RVW MEDS BY RX/DR IN RCRD: CPT | Performed by: PODIATRIST

## 2023-10-30 PROCEDURE — 1159F MED LIST DOCD IN RCRD: CPT | Performed by: PODIATRIST

## 2023-10-30 PROCEDURE — 99212 OFFICE O/P EST SF 10 MIN: CPT | Performed by: PODIATRIST

## 2023-10-30 NOTE — PROGRESS NOTES
Patient presents approximately 6 weeks post total matrixectomy left hallux. No pain now related. Eschar overlies the nailbed. This should fall off over time. No additional treatment needed. Patient has a painful callus beneath the right first metatarsal head due to a declinated first ray. Treatment consisted of lesion trimming.

## 2023-11-02 ENCOUNTER — OFFICE VISIT (OUTPATIENT)
Dept: URGENT CARE | Facility: CLINIC | Age: 73
End: 2023-11-02
Payer: COMMERCIAL

## 2023-11-02 VITALS
HEART RATE: 85 BPM | OXYGEN SATURATION: 98 % | TEMPERATURE: 99 F | DIASTOLIC BLOOD PRESSURE: 58 MMHG | RESPIRATION RATE: 16 BRPM | SYSTOLIC BLOOD PRESSURE: 118 MMHG

## 2023-11-02 DIAGNOSIS — J02.9 SORE THROAT: ICD-10-CM

## 2023-11-02 DIAGNOSIS — R05.1 ACUTE COUGH: ICD-10-CM

## 2023-11-02 DIAGNOSIS — J01.90 ACUTE NON-RECURRENT SINUSITIS, UNSPECIFIED LOCATION: Primary | ICD-10-CM

## 2023-11-02 LAB
S PYO AG THROAT QL: NEGATIVE
SARS-COV-2 AG UPPER RESP QL IA: NEGATIVE
VALID CONTROL: NORMAL

## 2023-11-02 PROCEDURE — 87811 SARS-COV-2 COVID19 W/OPTIC: CPT | Performed by: PHYSICIAN ASSISTANT

## 2023-11-02 PROCEDURE — 99213 OFFICE O/P EST LOW 20 MIN: CPT | Performed by: PHYSICIAN ASSISTANT

## 2023-11-02 PROCEDURE — 87880 STREP A ASSAY W/OPTIC: CPT | Performed by: PHYSICIAN ASSISTANT

## 2023-11-02 RX ORDER — BENZONATATE 100 MG/1
100 CAPSULE ORAL 3 TIMES DAILY PRN
Qty: 20 CAPSULE | Refills: 0 | Status: SHIPPED | OUTPATIENT
Start: 2023-11-02

## 2023-11-02 RX ORDER — AMOXICILLIN AND CLAVULANATE POTASSIUM 875; 125 MG/1; MG/1
1 TABLET, FILM COATED ORAL EVERY 12 HOURS SCHEDULED
Qty: 20 TABLET | Refills: 0 | Status: SHIPPED | OUTPATIENT
Start: 2023-11-02 | End: 2023-11-12

## 2023-11-02 NOTE — PROGRESS NOTES
[facility]        NAME: Mirian Pedraza is a 74 y.o. female  : 1950    MRN: [id number]  DATE: 2023  TIME: 11:06 AM    Assessment and Plan   Acute non-recurrent sinusitis, unspecified location [J01.90]  1. Acute non-recurrent sinusitis, unspecified location  amoxicillin-clavulanate (AUGMENTIN) 875-125 mg per tablet    Ambulatory Referral to Otolaryngology      2. Acute cough  Poct Covid 19 Rapid Antigen Test    benzonatate (TESSALON PERLES) 100 mg capsule      3. Sore throat  POCT rapid strepA        Rapid Strep- negative  Rapid COVID 19- Negative. Patient was offered throat culture but declined at this time. Patient Instructions     Patient was educated on sinus infection and cough. Patient was prescribed antibiotics. Patient was told to eat on antibiotics. Patient was educated on [employer]. Patient Was told any chest pain or shortness of breath go to ED. Patient was given an ENT referral.     Chief Complaint     Chief Complaint   Patient presents with    Sinusitis     Pt reports sinus pain, cough, facial pain, BL ear pain, headache, lots of PND causing sore throat, etc x5 days. No home COVID tests. History of Present Illness       Patient is here today reporting sinus pressure, ear pain, facial pain and cough for 4 days. Patient reports cough is wet. Patient reports allergy to Sulfa. Denies any chest pain or shortness of breath. Patient reports no history of asthma or diabetes. Patient reports she can't take steroids it makes her feel worst.     Sinusitis  Associated symptoms include coughing, ear pain and sinus pressure. Review of Systems   Review of Systems   Constitutional: Negative. HENT:  Positive for ear pain, sinus pressure and sinus pain. Respiratory:  Positive for cough. Cardiovascular: Negative. Psychiatric/Behavioral: Negative.            Current Medications       Current Outpatient Medications:     amoxicillin-clavulanate (AUGMENTIN) 875-125 mg per tablet, Take 1 tablet by mouth every 12 (twelve) hours for 10 days, Disp: 20 tablet, Rfl: 0    benzonatate (TESSALON PERLES) 100 mg capsule, Take 1 capsule (100 mg total) by mouth 3 (three) times a day as needed for cough, Disp: 20 capsule, Rfl: 0    albuterol (ProAir HFA) 90 mcg/act inhaler, Inhale 2 puffs every 6 (six) hours as needed for wheezing, Disp: 8.5 g, Rfl: 0    amLODIPine (NORVASC) 5 mg tablet, take 1 tablet by mouth once daily, Disp: 90 tablet, Rfl: 3    Cholecalciferol (VITAMIN D3) 2000 units TABS, Take 1 tablet by mouth daily, Disp: , Rfl:     hydrochlorothiazide (HYDRODIURIL) 25 mg tablet, take 1 tablet by mouth once daily, Disp: 30 tablet, Rfl: 11    levothyroxine 112 mcg tablet, Take 1 tablet (112 mcg total) by mouth daily, Disp: 90 tablet, Rfl: 3    losartan (COZAAR) 100 MG tablet, Take 1 tablet (100 mg total) by mouth daily, Disp: 90 tablet, Rfl: 3    mupirocin (BACTROBAN) 2 % ointment, Apply topically 3 (three) times a day, Disp: 22 g, Rfl: 0    rosuvastatin (CRESTOR) 20 MG tablet, take 1 tablet by mouth once daily, Disp: 90 tablet, Rfl: 3    Sodium Fluoride 5000 PPM 1.1 % PSTE, BRUSH NORMALLY AND EXPECTORATE BUT DO NOT RINSE AFTER. DO NOT EAT. ..  (REFER TO PRESCRIPTION NOTES). , Disp: , Rfl:     Current Allergies     Allergies as of 11/02/2023 - Reviewed 11/02/2023   Allergen Reaction Noted    Sulfa antibiotics Dizziness 06/19/2012            The following portions of the patient's history were reviewed and updated as appropriate: allergies, current medications, past family history, past medical history, past social history, past surgical history and problem list.     Past Medical History:   Diagnosis Date    Acute pain of left shoulder     History    Acute recurrent maxillary sinusitis     History    Acute upper respiratory infection     History    BRCA negative 11/12/2018    [facility]    BRCA1 negative     BRCA2 negative     Breast cancer ([address]) 11/01/2018    Right Callus     Cancer ([address])     Carotid artery narrowing     stenosis    Disease of thyroid gland     [place]     History of Graves' disease     status post 131 treatment    History of lateral epicondylitis of left elbow     History of onychomycosis     of toenail    History of perforation of tympanic membrane     Hyperlipidemia     Hypertension     Ingrown toenail     Left otitis media     History    Medicare annual wellness visit, subsequent        Past Surgical History:   Procedure Laterality Date    APPENDECTOMY  1955    BREAST BIOPSY Right 11/01/2018    U/S BX    BREAST LUMPECTOMY Right 12/18/2018    Procedure: BREAST LUMPECTOMY; BREAST NEEDLE LOCALIZATION (NEEDLE LOC AT 0700); Surgeon: Kayleen Griffith MD;  Location: [facility];  Service: Surgical Oncology    BREAST LUMPECTOMY      ELBOW FRACTURE SURGERY  09/1960    Open Treatment of Fracture of proximal Radius    LYMPH NODE BIOPSY Right 12/18/2018    Procedure: SENTINEL LYMPH NODE BIOPSY; LYMPHATIC MAPPING WITH BLUE DYE AND RADIOACTIVE DYE (INJECT AT 0800 BY DR PRADO IN THE OR); Surgeon: Kayleen Griffith MD;  Location: [facility];  Service: Surgical Oncology    SINUS SURGERY Left 1996    nasal polyps(?)-large papilloma left maxillary sinus with extension into posterior nasopharynx completed by Dr Gabi Redman. THYROID CYST EXCISION      ablation    TONSILLECTOMY AND ADENOIDECTOMY      US BREAST NEEDLE LOC RIGHT Right 12/18/2018    US GUIDED BREAST BIOPSY RIGHT COMPLETE Right 11/01/2018       Family History   Problem Relation Age of Onset    Breast cancer Mother     Pancreatic cancer Mother     Cancer Mother     Aortic aneurysm Father     Aortic aneurysm Family         Abdominal    Breast cancer Family     Breast cancer Maternal Grandmother          Medications have been verified. Objective   /58   Pulse 85   Temp 99 °F (37.2 °C)   Resp 16   SpO2 98%   No LMP recorded.  Patient is postmenopausal.       Physical Exam     Physical Exam  Vitals and nursing note reviewed. HENT:      Head: Normocephalic. Comments: NO pressure over frontal sinus. Pain over maxillary sinus     Right Ear: Tympanic membrane, ear canal and external ear normal.      Left Ear: Tympanic membrane, ear canal and external ear normal.      Mouth/Throat:      Mouth: Mucous membranes are moist.      Pharynx: Posterior oropharyngeal erythema present. Eyes:      Extraocular Movements: Extraocular movements intact. Pupils: Pupils are equal, round, and reactive to light. Cardiovascular:      Rate and Rhythm: Normal rate and regular rhythm. Heart sounds: Normal heart sounds. Pulmonary:      Breath sounds: Normal breath sounds. No wheezing. Neurological:      Mental Status: She is alert and oriented to person, place, and time.    Psychiatric:         Mood and Affect: Mood normal.         Behavior: Behavior normal.

## 2023-11-02 NOTE — PATIENT INSTRUCTIONS
Patient was educated on sinus infection and cough. Patient was prescribed antibiotics. Patient was told to eat on antibiotics. Patient was educated on [employer]. Patient Was told any chest pain or shortness of breath go to ED. Patient was given an ENT referral.     Sinusitis   WHAT YOU NEED TO KNOW:   Sinusitis is inflammation or infection of your sinuses. Sinusitis is most often caused by a virus. Acute sinusitis may last up to 12 weeks. Chronic sinusitis lasts longer than 12 weeks. Recurrent sinusitis means you have 4 or more infections in 1 year. DISCHARGE INSTRUCTIONS:   Return to the emergency department if:   You have trouble breathing or wheezing that is getting worse. You have a stiff neck, a fever, or a bad headache. You cannot open your eye. Your eyeball bulges out or you cannot move your eye. You are more sleepy than normal, or you notice changes in your ability to think, move, or talk. You have swelling of your forehead or scalp. Call your doctor if:   You have vision changes, such as double vision. Your eye and eyelid are red, swollen, and painful. Your symptoms do not improve or go away after 10 days. You have nausea and are vomiting. Your nose is bleeding. You have questions or concerns about your condition or care. Medicines: Your symptoms may go away on their own. Your healthcare provider may recommend watchful waiting for up to 10 days before starting antibiotics. You may need any of the following:  Acetaminophen  decreases pain and fever. It is available without a doctor's order. Ask how much to take and how often to take it. Follow directions. Read the labels of all other medicines you are using to see if they also contain acetaminophen, or ask your doctor or pharmacist. Acetaminophen can cause liver damage if not taken correctly. NSAIDs , such as ibuprofen, help decrease swelling, pain, and fever.  This medicine is available with or without a doctor's order. NSAIDs can cause stomach bleeding or kidney problems in certain people. If you take blood thinner medicine, always ask your healthcare provider if NSAIDs are safe for you. Always read the medicine label and follow directions. Nasal steroid sprays  may help decrease inflammation in your nose and sinuses. Decongestants  help reduce swelling and drain mucus in the nose and sinuses. They may help you breathe easier. Antihistamines  help dry mucus in the nose and relieve sneezing. Antibiotics  help treat or prevent a bacterial infection. Take your medicine as directed. Contact your healthcare provider if you think your medicine is not helping or if you have side effects. Tell your provider if you are allergic to any medicine. Keep a list of the medicines, vitamins, and herbs you take. Include the amounts, and when and why you take them. Bring the list or the pill bottles to follow-up visits. Carry your medicine list with you in case of an emergency. Self-care:   Rinse your sinuses as directed. Use a sinus rinse device to rinse your nasal passages with a saline (salt water) solution or distilled water. Do not use tap water. This will help thin the mucus in your nose and rinse away pollen and dirt. It will also help reduce swelling so you can breathe normally. Use a humidifier  to increase air moisture in your home. This may make it easier for you to breathe and help decrease your cough. Sleep with your head elevated. Place an extra pillow under your head before you go to sleep to help your sinuses drain. Drink liquids as directed. Ask your healthcare provider how much liquid to drink each day and which liquids are best for you. Liquids will thin the mucus in your nose and help it drain. Avoid drinks that contain alcohol or caffeine. Do not smoke, and avoid secondhand smoke. Nicotine and other chemicals in cigarettes and cigars can make your symptoms worse. Ask your healthcare provider for information if you currently smoke and need help to quit. E-cigarettes or smokeless tobacco still contain nicotine. Talk to your healthcare provider before you use these products. Prevent the spread of germs:   Wash your hands often with soap and water. Wash your hands after you use the bathroom, change a child's diaper, or sneeze. Wash your hands before you prepare or eat food. Stay away from people who are sick. Some germs spread easily and quickly through contact. Follow up with your doctor as directed: You may be referred to an ear, nose, and throat specialist. Write down your questions so you remember to ask them during your visits. © Copyright [facility] 2023 Information is for End User's use only and may not be sold, redistributed or otherwise used for commercial purposes. The above information is an  only. It is not intended as medical advice for individual conditions or treatments. Talk to your doctor, nurse or pharmacist before following any medical regimen to see if it is safe and effective for you.

## 2023-11-08 ENCOUNTER — OFFICE VISIT (OUTPATIENT)
Dept: SURGICAL ONCOLOGY | Facility: CLINIC | Age: 73
End: 2023-11-08
Payer: COMMERCIAL

## 2023-11-08 VITALS
HEIGHT: 65 IN | OXYGEN SATURATION: 98 % | BODY MASS INDEX: 27.82 KG/M2 | HEART RATE: 79 BPM | SYSTOLIC BLOOD PRESSURE: 136 MMHG | DIASTOLIC BLOOD PRESSURE: 52 MMHG | WEIGHT: 167 LBS | TEMPERATURE: 97.3 F

## 2023-11-08 DIAGNOSIS — Z17.0 MALIGNANT NEOPLASM OF LOWER-INNER QUADRANT OF RIGHT BREAST OF FEMALE, ESTROGEN RECEPTOR POSITIVE: Primary | ICD-10-CM

## 2023-11-08 DIAGNOSIS — C50.311 MALIGNANT NEOPLASM OF LOWER-INNER QUADRANT OF RIGHT BREAST OF FEMALE, ESTROGEN RECEPTOR POSITIVE: Primary | ICD-10-CM

## 2023-11-08 DIAGNOSIS — Z12.31 VISIT FOR SCREENING MAMMOGRAM: ICD-10-CM

## 2023-11-08 PROBLEM — Z79.811 USE OF ANASTROZOLE (ARIMIDEX): Status: RESOLVED | Noted: 2019-04-02 | Resolved: 2023-11-08

## 2023-11-08 PROCEDURE — 99213 OFFICE O/P EST LOW 20 MIN: CPT

## 2023-11-08 NOTE — PROGRESS NOTES
Surgical Oncology Follow Up       [address] [facility] SURGICAL ONCOLOGY [place]  [address]  [facility] [address]    Mckenna Betts  1950  [id number]  [address] [facility] SURGICAL ONCOLOGY [place]  [address] [address]    Chief Complaint   Patient presents with    Follow-up       Assessment/Plan:  1. Malignant neoplasm of lower-inner quadrant of right breast of female, estrogen receptor positive   - 1 year follow up    2. Visit for screening mammogram  - Mammo screening bilateral w 3d & cad; Future       Discussion/Summary:  Patient is a 64-year-old female presenting today for right breast cancer diagnosed in November of 2018. Pathology revealed invasive ductal carcinoma ER/NY +, HER2 negative. She underwent genetic testing which was BRCA negative. She underwent a right breast lumpectomy with sentinel node biopsy with Dr. Re Rogel. She completed a 5-year course of anastrozole in September of this year. She had a bilateral diagnostic mammogram on 10/23/2023 which was BI-RADS 2 category 2 density. There were no concerns on her clinical breast exam. I will see the patient back in 6 months or sooner should the need arise. She was instructed to call with any questions or concerns prior to this time. All questions were answered today.        History of Present Illness:     Oncology History   Malignant neoplasm of lower-inner quadrant of right breast of female, estrogen receptor positive    11/1/2018 Biopsy    Right breast biopsy  3-4 o'clock, 5 cmfn  Invasive ductal carcinoma  Grade 1  8 mm on ultrasound    NY 90  Her 2 1 +     11/12/2018 Genetic Testing    BRCA testing-invitae  Negative     12/18/2018 Surgery    Right breast lumpectomy with sentinel lymph node biopsy  Invasive ductal carcinoma  Grade 1  1.1 cm  Margins negative   0/1 lymph node    [place] 90  Her 2 1+  Stage IA     1/23/2019 -  Hormone Therapy    Anastrozole 1 mg daily for 5 years  With Dr. Foreign Dent          -Interval History: Patient is a 68-year-old female presenting today for right breast cancer diagnosed in November of 2018. She had a bilateral diagnostic mammogram on 10/23/2023 which was BI-RADS 2 category 2 density. Patient denies changes on her breast exam. She denies persistent headaches, bone pain, back pain, shortness of breath, or abdominal pain. Review of Systems:  Review of Systems   Constitutional:  Negative for activity change, appetite change, fatigue and unexpected weight change. Respiratory:  Negative for cough and shortness of breath. Cardiovascular:  Negative for chest pain. Gastrointestinal:  Negative for abdominal pain, diarrhea, nausea and vomiting. Endocrine: Negative for heat intolerance. Musculoskeletal:  Negative for arthralgias, back pain and myalgias. Skin:  Negative for rash. Neurological:  Negative for weakness and headaches. Hematological:  Negative for adenopathy.        Patient Active Problem List   Diagnosis    Allergic rhinitis    Bradycardia    Heart murmur    Hyperlipidemia    Hypertension    Hypothyroidism    Osteoporosis    Pulmonary nodules    Subclavian artery stenosis (HCC)    Vitamin D deficiency    Malignant neoplasm of lower-inner quadrant of right breast of female, estrogen receptor positive     Use of anastrozole (Arimidex)    Osteopenia of multiple sites    Decreased pedal pulses    History of tobacco use    Paresthesia of foot, bilateral    CKD (chronic kidney disease)    History of Graves' disease    Acute renal failure superimposed on stage 3 chronic kidney disease (HCC)    History of COVID-19    Abnormal EKG     Past Medical History:   Diagnosis Date    Acute pain of left shoulder     History    Acute recurrent maxillary sinusitis     History    Acute upper respiratory infection     History    BRCA negative 11/12/2018    Invitae    BRCA1 negative     BRCA2 negative     Breast cancer ([address]) 11/01/2018    Right    Callus     Cancer ([address])     Carotid artery narrowing     stenosis    Disease of thyroid gland     High arches     History of Graves' disease     status post 131 treatment    History of lateral epicondylitis of left elbow     History of onychomycosis     of toenail    History of perforation of tympanic membrane     Hyperlipidemia     Hypertension     Ingrown toenail     Left otitis media     History    Medicare annual wellness visit, subsequent      Past Surgical History:   Procedure Laterality Date    APPENDECTOMY  1955    BREAST BIOPSY Right 11/01/2018    U/S BX    BREAST LUMPECTOMY Right 12/18/2018    Procedure: BREAST LUMPECTOMY; BREAST NEEDLE LOCALIZATION (NEEDLE LOC AT 0700); Surgeon: All Hernandez MD;  Location: [facility];  Service: Surgical Oncology    BREAST LUMPECTOMY      ELBOW FRACTURE SURGERY  09/1960    Open Treatment of Fracture of proximal Radius    LYMPH NODE BIOPSY Right 12/18/2018    Procedure: SENTINEL LYMPH NODE BIOPSY; LYMPHATIC MAPPING WITH BLUE DYE AND RADIOACTIVE DYE (INJECT AT 0800 BY DR PRADO IN THE OR); Surgeon: All Hernandez MD;  Location: [facility];  Service: Surgical Oncology    SINUS SURGERY Left 1996    nasal polyps(?)-large papilloma left maxillary sinus with extension into posterior nasopharynx completed by Dr Mitch Paul.     THYROID CYST EXCISION      ablation    TONSILLECTOMY AND ADENOIDECTOMY      US BREAST NEEDLE LOC RIGHT Right 12/18/2018    US GUIDED BREAST BIOPSY RIGHT COMPLETE Right 11/01/2018     Family History   Problem Relation Age of Onset    Breast cancer Mother     Pancreatic cancer Mother     Cancer Mother     Aortic aneurysm Father     Aortic aneurysm Family         Abdominal    Breast cancer Family     Breast cancer Maternal Grandmother      Social History     Socioeconomic History    Marital status: /Civil Union     Spouse name: Not on file    Number of children: Not on file    Years of education: Not on file    Highest education level: Not on file   Occupational History    Not on file   Tobacco Use    Smoking status: Former     Packs/day: 1.00     Years: 35.00     Total pack years: 35.00     Types: Cigarettes     Start date: 1963     Quit date: 10/16/2002     Years since quittin.0     Passive exposure: Past    Smokeless tobacco: Never   Vaping Use    Vaping Use: Never used   Substance and Sexual Activity    Alcohol use: Not Currently     Comment: rarely    Drug use: No    Sexual activity: Not Currently     Partners: Male     Birth control/protection: Other     Comment: taken nothing for over 40 years   Other Topics Concern    Not on file   Social History Narrative    Not on file     Social Determinants of Health     Financial Resource Strain: Not on file   Food Insecurity: Not on file   Transportation Needs: Not on file   Physical Activity: Not on file   Stress: Not on file   Social Connections: Not on file   Intimate Partner Violence: Not on file   Housing Stability: Not on file       Current Outpatient Medications:     albuterol (ProAir HFA) 90 mcg/act inhaler, Inhale 2 puffs every 6 (six) hours as needed for wheezing, Disp: 8.5 g, Rfl: 0    amLODIPine (NORVASC) 5 mg tablet, take 1 tablet by mouth once daily, Disp: 90 tablet, Rfl: 3    amoxicillin-clavulanate (AUGMENTIN) 875-125 mg per tablet, Take 1 tablet by mouth every 12 (twelve) hours for 10 days, Disp: 20 tablet, Rfl: 0    benzonatate (TESSALON PERLES) 100 mg capsule, Take 1 capsule (100 mg total) by mouth 3 (three) times a day as needed for cough, Disp: 20 capsule, Rfl: 0    Cholecalciferol (VITAMIN D3) 2000 units TABS, Take 1 tablet by mouth daily, Disp: , Rfl:     hydrochlorothiazide (HYDRODIURIL) 25 mg tablet, take 1 tablet by mouth once daily, Disp: 30 tablet, Rfl: 11    levothyroxine 112 mcg tablet, Take 1 tablet (112 mcg total) by mouth daily, Disp: 90 tablet, Rfl: 3    losartan (COZAAR) 100 MG tablet, Take 1 tablet (100 mg total) by mouth daily, Disp: 90 tablet, Rfl: 3 mupirocin (BACTROBAN) 2 % ointment, Apply topically 3 (three) times a day, Disp: 22 g, Rfl: 0    rosuvastatin (CRESTOR) 20 MG tablet, take 1 tablet by mouth once daily, Disp: 90 tablet, Rfl: 3    Sodium Fluoride 5000 PPM 1.1 % PSTE, BRUSH NORMALLY AND EXPECTORATE BUT DO NOT RINSE AFTER. DO NOT EAT. ..  (REFER TO PRESCRIPTION NOTES). , Disp: , Rfl:   Allergies   Allergen Reactions    Sulfa Antibiotics Dizziness     Other reaction(s): feeling "off-balance"  Category: Adverse Reaction;      Vitals:    11/08/23 0850   BP: 136/52   Pulse: 79   Temp: (!) 97.3 °F (36.3 °C)   SpO2: 98%       Physical Exam  Constitutional:       General: She is not in acute distress. Appearance: Normal appearance. Cardiovascular:      Rate and Rhythm: Normal rate and regular rhythm. Pulses: Normal pulses. Heart sounds: Normal heart sounds. Pulmonary:      Effort: Pulmonary effort is normal.      Breath sounds: Normal breath sounds. Chest:      Chest wall: No mass. Breasts:     Right: No swelling, bleeding, inverted nipple, mass, nipple discharge, skin change or tenderness. Left: No swelling, bleeding, inverted nipple, mass, nipple discharge, skin change or tenderness. Abdominal:      General: Abdomen is flat. Palpations: Abdomen is soft. Lymphadenopathy:      Upper Body:      Right upper body: No supraclavicular, axillary or pectoral adenopathy. Left upper body: No supraclavicular, axillary or pectoral adenopathy. Skin:     General: Skin is warm. Neurological:      General: No focal deficit present. Mental Status: She is alert and oriented to person, place, and time.    Psychiatric:         Mood and Affect: Mood normal.         Behavior: Behavior normal.           Results:    Imaging  Mammo diagnostic bilateral w 3d & cad    Result Date: 10/23/2023  Narrative: DIAGNOSIS: Malignant neoplasm of lower-inner quadrant of right breast of female, estrogen receptor positive  TECHNIQUE: Digital diagnostic mammography was performed. Computer Aided Detection (CAD) analyzed all applicable images. COMPARISONS: Prior breast imaging dated: 10/21/2022 RELEVANT HISTORY: Family Breast Cancer History: History of breast cancer in Mother, Family, Maternal Grandmother. Family Medical History: Family medical history includes breast cancer in 3 relatives (family, maternal grandmother, mother). Personal History: Hormone history includes birth control and other. Surgical history includes breast biopsy and lumpectomy. Medical history includes breast cancer, BRCA 1 negative, and BRCA 2 negative. RISK ASSESSMENT: Tyrer-zick risk assessment reporting was suppressed due to the patient's history and/or demographic factors. TISSUE DENSITY: There are scattered areas of fibroglandular density. INDICATION: Zora Henson is a 74 y.o. female presenting for annual. FINDINGS: Bilateral There are no suspicious masses, grouped microcalcifications or areas of unexplained architectural distortion. The skin and nipple areolar complex are unremarkable. Postsurgical changes are present in the right breast.     Impression:  No findings of malignancy in either breast.  Stable postsurgical changes in the right breast. ASSESSMENT/BI-RADS CATEGORY:  Overall: 2 - Benign RECOMMENDATION:      - Return to routine screening of breast(s). Workstation ID: [id number]       I reviewed the above imaging data. Advance Care Planning/Advance Directives:  Discussed disease status, cancer treatment plans and/or cancer treatment goals with the patient.

## 2023-11-09 ENCOUNTER — OFFICE VISIT (OUTPATIENT)
Dept: CARDIOLOGY CLINIC | Facility: CLINIC | Age: 73
End: 2023-11-09
Payer: COMMERCIAL

## 2023-11-09 VITALS
HEIGHT: 65 IN | SYSTOLIC BLOOD PRESSURE: 120 MMHG | DIASTOLIC BLOOD PRESSURE: 60 MMHG | WEIGHT: 166.4 LBS | HEART RATE: 67 BPM | BODY MASS INDEX: 27.72 KG/M2

## 2023-11-09 DIAGNOSIS — I10 PRIMARY HYPERTENSION: ICD-10-CM

## 2023-11-09 DIAGNOSIS — I77.1 SUBCLAVIAN ARTERY STENOSIS (HCC): ICD-10-CM

## 2023-11-09 DIAGNOSIS — N18.31 STAGE 3A CHRONIC KIDNEY DISEASE (HCC): Primary | ICD-10-CM

## 2023-11-09 PROCEDURE — 99214 OFFICE O/P EST MOD 30 MIN: CPT | Performed by: INTERNAL MEDICINE

## 2023-11-09 RX ORDER — ROSUVASTATIN CALCIUM 20 MG/1
20 TABLET, COATED ORAL DAILY
Qty: 90 TABLET | Refills: 3 | Status: SHIPPED | OUTPATIENT
Start: 2023-11-09

## 2023-11-09 RX ORDER — LOSARTAN POTASSIUM AND HYDROCHLOROTHIAZIDE 25; 100 MG/1; MG/1
1 TABLET ORAL DAILY
Qty: 90 TABLET | Refills: 3 | Status: SHIPPED | OUTPATIENT
Start: 2023-11-09

## 2023-11-09 RX ORDER — LOSARTAN POTASSIUM AND HYDROCHLOROTHIAZIDE 12.5; 5 MG/1; MG/1
1 TABLET ORAL DAILY
Qty: 90 TABLET | Refills: 3 | Status: SHIPPED | OUTPATIENT
Start: 2023-11-09 | End: 2023-11-09

## 2023-11-09 NOTE — PROGRESS NOTES
Cardiology Outpatient Follow-Up Note - Winnie Betts 74 y.o. female MRN: [id number]      Assessment/Plan:  1. Primary hypertension  Controlled on current therapy. She had d/c'd amlodipine, only taking losartan 100 mg daily and HCTZ 25 mg daily. We will combine to Hyzaar 100-25 mg daily. - Ambulatory Referral to Cardiology  - losartan-hydrochlorothiazide (HYZAAR) 100-25 MG per tablet; Take 1 tablet by mouth daily  Dispense: 90 tablet; Refill: 3    2. Subclavian artery stenosis (HCC)  Improved on serial studies with statin therapy. Continue rosuvastatin 20 mg daily. Routine surveillance is no longer necessary.  - rosuvastatin (CRESTOR) 20 MG tablet; Take 1 tablet (20 mg total) by mouth daily  Dispense: 90 tablet; Refill: 3    3. Stage 3a chronic kidney disease (HCC)  Noted, stable. Continue current therapy with BP goal < 130/80. 4. Lower extremity edema  With varicosities on exam. Suspect 2/2 CKD and veinous insufficiency. We discussed conservative measures including elevation and compression if needed. We will see Ubalod Guerrero back in 12 months for routine follow-up. Subjective:     HPI: Ubaldo Guerrero is a 74y.o. year old female with HTN, PAD (L subclavian artery stenosis), CKD3, HLD, presenting for follow-up. No new complaints today. We did recheck UE vascular Dopplers in March 2023 which showed no significant stenosis of subclavians bilaterally, compared to >50% stenosis of proximal L subclavian in 2022. She had self dc'd amlodipine due to leg edema. The edema has not improved but BP is well controlled today nonetheless. Cardiac Testing:    VAS carotid 3/22/23   CONCLUSION:  Impression  RIGHT:  There is <50% stenosis noted in the internal carotid artery. Plaque is  heterogenous and irregular. Vertebral artery flow is antegrade. There is no significant subclavian artery disease. LEFT:  There is <50% stenosis noted in the internal carotid artery.  Plaque is  heterogenous and irregular Vertebral artery flow is antegrade. There is no significant subclavian artery disease. VAS Carotid 3/14/22   Impression  RIGHT:  There is <50% stenosis noted in the internal carotid artery. Plaque is  heterogenous/calcified and irregular. Vertebral artery flow is antegrade. There is no significant subclavian artery disease. LEFT:  There is <50% stenosis noted in the internal carotid artery. Plaque is  heterogenous and irregular. Vertebral artery flow is antegrade. There is evidence suggestive of >50% stenosis in the proximal subclavian artery. Echo 8/20/23   Interpretation Summary     Left Ventricle: Left ventricular cavity size is normal. Wall thickness is normal. The left ventricular ejection fraction is 60%. Systolic function is normal. Wall motion is normal. Diastolic function is normal.    Right Ventricle: Right ventricular cavity size is normal. Systolic function is normal.    Mitral Valve: There is trace regurgitation. Tricuspid Valve: There is mild regurgitation. Prior TTE study available for comparison. Prior study date: 5/5/2022. No significant changes noted compared to the prior study. EKGs, personally reviewed:  N/A    Relevant Labs & Results:  Lipid panel 9/5/23 -- LDL 66 mg/dL  CMP 9/5/23 -- K 3.9, Cr 1.12  CBC 9/5/23 -- WNL      ROS:  Review of Systems:  Review of Systems    Objective:     Vitals:   Vitals:    11/09/23 0900   BP: 120/60   BP Location: Left arm   Patient Position: Sitting   Cuff Size: Large   Pulse: 67   Weight: 75.5 kg (166 lb 6.4 oz)   Height: 5' 5" (1.651 m)    Body surface area is 1.83 meters squared.   Wt Readings from Last 3 Encounters:   11/09/23 75.5 kg (166 lb 6.4 oz)   11/08/23 75.8 kg (167 lb)   10/30/23 76.4 kg (168 lb 6.4 oz)       Physical Exam:  General: Malinda Piedra is a well appearing female, in no acute distress, sitting comfortably  HEENT: moist mucous membranes, EOMI  Neck:  No JVD, supple, trachea midline  Cardiovascular: unremarkable S1/S2, regular rate and rhythm, no murmurs, rubs or gallops  Pulmonary: normal respiratory effort, CTAB  Abdomen: soft and nondistended  Extremities: No lower extremity edema. Warm and well perfused extremities. Neuro: no focal motor deficits, AAOx3 (person, place, time)  Psych: Normal mood and affect, cooperative      Medications (at the START of this encounter): Outpatient Medications Prior to Visit   Medication Sig Dispense Refill    albuterol (ProAir HFA) 90 mcg/act inhaler Inhale 2 puffs every 6 (six) hours as needed for wheezing 8.5 g 0    amoxicillin-clavulanate (AUGMENTIN) 875-125 mg per tablet Take 1 tablet by mouth every 12 (twelve) hours for 10 days 20 tablet 0    benzonatate (TESSALON PERLES) 100 mg capsule Take 1 capsule (100 mg total) by mouth 3 (three) times a day as needed for cough 20 capsule 0    Cholecalciferol (VITAMIN D3) 2000 units TABS Take 1 tablet by mouth daily      hydrochlorothiazide (HYDRODIURIL) 25 mg tablet take 1 tablet by mouth once daily 30 tablet 11    levothyroxine 112 mcg tablet Take 1 tablet (112 mcg total) by mouth daily 90 tablet 3    losartan (COZAAR) 100 MG tablet Take 1 tablet (100 mg total) by mouth daily 90 tablet 3    rosuvastatin (CRESTOR) 20 MG tablet take 1 tablet by mouth once daily 90 tablet 3    Sodium Fluoride 5000 PPM 1.1 % PSTE BRUSH NORMALLY AND EXPECTORATE BUT DO NOT RINSE AFTER. DO NOT EAT. ..  (REFER TO PRESCRIPTION NOTES). amLODIPine (NORVASC) 5 mg tablet take 1 tablet by mouth once daily (Patient not taking: Reported on 11/9/2023) 90 tablet 3    mupirocin (BACTROBAN) 2 % ointment Apply topically 3 (three) times a day (Patient not taking: Reported on 11/9/2023) 22 g 0     No facility-administered medications prior to visit. Time Spent:  Total time (face-to-face and non-face-to-face) spent on today's visit was 20 minutes.  This includes preparation for the visits (i.e. reviewing test results), performance of a medically appropriate history and examination, and orders for medications, tests or other procedures. This time is exclusive of procedures performed and time spent teaching. This note was completed in part utilizing [email or web address] voice recognition software. Grammatical errors, random word insertion, spelling mistakes, occasional wrong word or "sound-alike" substitutions and incomplete sentences may be an occasional consequence of the system secondary to software limitations, ambient noise and hardware issues. At the time of dictation, efforts were made to edit, clarify and /or correct errors. Please read the chart carefully and recognize, using context, where substitutions have occurred. If you have any questions or concerns about the context, text or information contained within the body of this dictation, please contact myself, the provider, for further clarification.

## 2024-03-01 ENCOUNTER — RA CDI HCC (OUTPATIENT)
Dept: OTHER | Facility: HOSPITAL | Age: 74
End: 2024-03-01

## 2024-03-04 ENCOUNTER — APPOINTMENT (OUTPATIENT)
Dept: LAB | Facility: CLINIC | Age: 74
End: 2024-03-04
Payer: COMMERCIAL

## 2024-03-04 DIAGNOSIS — E03.9 ACQUIRED HYPOTHYROIDISM: ICD-10-CM

## 2024-03-04 DIAGNOSIS — N18.31 STAGE 3A CHRONIC KIDNEY DISEASE (HCC): ICD-10-CM

## 2024-03-04 DIAGNOSIS — E87.6 HYPOKALEMIA: Primary | ICD-10-CM

## 2024-03-04 DIAGNOSIS — I10 PRIMARY HYPERTENSION: ICD-10-CM

## 2024-03-04 DIAGNOSIS — E78.2 MIXED HYPERLIPIDEMIA: ICD-10-CM

## 2024-03-04 PROBLEM — N18.9 CKD (CHRONIC KIDNEY DISEASE): Status: RESOLVED | Noted: 2022-05-06 | Resolved: 2024-03-04

## 2024-03-04 LAB
ALBUMIN SERPL BCP-MCNC: 4 G/DL (ref 3.5–5)
ALP SERPL-CCNC: 55 U/L (ref 34–104)
ALT SERPL W P-5'-P-CCNC: 9 U/L (ref 7–52)
ANION GAP SERPL CALCULATED.3IONS-SCNC: 6 MMOL/L
AST SERPL W P-5'-P-CCNC: 16 U/L (ref 13–39)
BASOPHILS # BLD AUTO: 0.03 THOUSANDS/ÂΜL (ref 0–0.1)
BASOPHILS NFR BLD AUTO: 1 % (ref 0–1)
BILIRUB SERPL-MCNC: 0.82 MG/DL (ref 0.2–1)
BUN SERPL-MCNC: 22 MG/DL (ref 5–25)
CALCIUM SERPL-MCNC: 9.8 MG/DL (ref 8.4–10.2)
CHLORIDE SERPL-SCNC: 102 MMOL/L (ref 96–108)
CHOLEST SERPL-MCNC: 147 MG/DL
CO2 SERPL-SCNC: 30 MMOL/L (ref 21–32)
CREAT SERPL-MCNC: 1.01 MG/DL (ref 0.6–1.3)
EOSINOPHIL # BLD AUTO: 0.13 THOUSAND/ÂΜL (ref 0–0.61)
EOSINOPHIL NFR BLD AUTO: 2 % (ref 0–6)
ERYTHROCYTE [DISTWIDTH] IN BLOOD BY AUTOMATED COUNT: 12.8 % (ref 11.6–15.1)
GFR SERPL CREATININE-BSD FRML MDRD: 54 ML/MIN/1.73SQ M
GLUCOSE P FAST SERPL-MCNC: 78 MG/DL (ref 65–99)
HCT VFR BLD AUTO: 39.2 % (ref 34.8–46.1)
HDLC SERPL-MCNC: 60 MG/DL
HGB BLD-MCNC: 12.9 G/DL (ref 11.5–15.4)
IMM GRANULOCYTES # BLD AUTO: 0.02 THOUSAND/UL (ref 0–0.2)
IMM GRANULOCYTES NFR BLD AUTO: 0 % (ref 0–2)
LDLC SERPL CALC-MCNC: 73 MG/DL (ref 0–100)
LYMPHOCYTES # BLD AUTO: 1.83 THOUSANDS/ÂΜL (ref 0.6–4.47)
LYMPHOCYTES NFR BLD AUTO: 31 % (ref 14–44)
MCH RBC QN AUTO: 33 PG (ref 26.8–34.3)
MCHC RBC AUTO-ENTMCNC: 32.9 G/DL (ref 31.4–37.4)
MCV RBC AUTO: 100 FL (ref 82–98)
MONOCYTES # BLD AUTO: 0.5 THOUSAND/ÂΜL (ref 0.17–1.22)
MONOCYTES NFR BLD AUTO: 8 % (ref 4–12)
NEUTROPHILS # BLD AUTO: 3.42 THOUSANDS/ÂΜL (ref 1.85–7.62)
NEUTS SEG NFR BLD AUTO: 58 % (ref 43–75)
NONHDLC SERPL-MCNC: 87 MG/DL
NRBC BLD AUTO-RTO: 0 /100 WBCS
PLATELET # BLD AUTO: 323 THOUSANDS/UL (ref 149–390)
PMV BLD AUTO: 9.3 FL (ref 8.9–12.7)
POTASSIUM SERPL-SCNC: 3.3 MMOL/L (ref 3.5–5.3)
PROT SERPL-MCNC: 6.7 G/DL (ref 6.4–8.4)
RBC # BLD AUTO: 3.91 MILLION/UL (ref 3.81–5.12)
SODIUM SERPL-SCNC: 138 MMOL/L (ref 135–147)
T4 FREE SERPL-MCNC: 1.49 NG/DL (ref 0.61–1.12)
TRIGL SERPL-MCNC: 69 MG/DL
TSH SERPL DL<=0.05 MIU/L-ACNC: 0.44 UIU/ML (ref 0.45–4.5)
WBC # BLD AUTO: 5.93 THOUSAND/UL (ref 4.31–10.16)

## 2024-03-04 PROCEDURE — 36415 COLL VENOUS BLD VENIPUNCTURE: CPT

## 2024-03-04 PROCEDURE — 80053 COMPREHEN METABOLIC PANEL: CPT

## 2024-03-04 PROCEDURE — 84443 ASSAY THYROID STIM HORMONE: CPT

## 2024-03-04 PROCEDURE — 84439 ASSAY OF FREE THYROXINE: CPT

## 2024-03-04 PROCEDURE — 80061 LIPID PANEL: CPT

## 2024-03-04 PROCEDURE — 85025 COMPLETE CBC W/AUTO DIFF WBC: CPT

## 2024-03-04 RX ORDER — POTASSIUM CHLORIDE 750 MG/1
10 CAPSULE, EXTENDED RELEASE ORAL DAILY
Qty: 30 CAPSULE | Refills: 5 | Status: SHIPPED | OUTPATIENT
Start: 2024-03-04

## 2024-03-07 ENCOUNTER — OFFICE VISIT (OUTPATIENT)
Dept: FAMILY MEDICINE CLINIC | Facility: CLINIC | Age: 74
End: 2024-03-07
Payer: COMMERCIAL

## 2024-03-07 VITALS
HEIGHT: 65 IN | BODY MASS INDEX: 26.92 KG/M2 | RESPIRATION RATE: 18 BRPM | OXYGEN SATURATION: 99 % | SYSTOLIC BLOOD PRESSURE: 126 MMHG | WEIGHT: 161.6 LBS | DIASTOLIC BLOOD PRESSURE: 68 MMHG | TEMPERATURE: 98 F | HEART RATE: 58 BPM

## 2024-03-07 DIAGNOSIS — I77.1 SUBCLAVIAN ARTERY STENOSIS (HCC): ICD-10-CM

## 2024-03-07 DIAGNOSIS — N18.31 STAGE 3A CHRONIC KIDNEY DISEASE (HCC): ICD-10-CM

## 2024-03-07 DIAGNOSIS — E78.2 MIXED HYPERLIPIDEMIA: ICD-10-CM

## 2024-03-07 DIAGNOSIS — E87.6 HYPOKALEMIA: ICD-10-CM

## 2024-03-07 DIAGNOSIS — I10 PRIMARY HYPERTENSION: Primary | ICD-10-CM

## 2024-03-07 DIAGNOSIS — Z00.00 MEDICARE ANNUAL WELLNESS VISIT, SUBSEQUENT: ICD-10-CM

## 2024-03-07 DIAGNOSIS — M81.0 AGE-RELATED OSTEOPOROSIS WITHOUT CURRENT PATHOLOGICAL FRACTURE: ICD-10-CM

## 2024-03-07 DIAGNOSIS — E03.9 ACQUIRED HYPOTHYROIDISM: ICD-10-CM

## 2024-03-07 DIAGNOSIS — Z17.0 MALIGNANT NEOPLASM OF LOWER-INNER QUADRANT OF RIGHT BREAST OF FEMALE, ESTROGEN RECEPTOR POSITIVE: ICD-10-CM

## 2024-03-07 DIAGNOSIS — C50.311 MALIGNANT NEOPLASM OF LOWER-INNER QUADRANT OF RIGHT BREAST OF FEMALE, ESTROGEN RECEPTOR POSITIVE: ICD-10-CM

## 2024-03-07 PROCEDURE — G0439 PPPS, SUBSEQ VISIT: HCPCS | Performed by: FAMILY MEDICINE

## 2024-03-07 PROCEDURE — 99214 OFFICE O/P EST MOD 30 MIN: CPT | Performed by: FAMILY MEDICINE

## 2024-03-07 RX ORDER — LEVOTHYROXINE SODIUM 112 UG/1
TABLET ORAL
Qty: 90 TABLET | Refills: 3 | Status: SHIPPED | OUTPATIENT
Start: 2024-03-07

## 2024-03-07 NOTE — ASSESSMENT & PLAN NOTE
Patient completed 5 year course of Anastrozole last year.    Bilateral diagnostic mammogram done in October 2023 showed no mammographic evidence of malignancy.

## 2024-03-07 NOTE — ASSESSMENT & PLAN NOTE
Potassium 3.3.  Patient was prescribed Potassium Chloride 10 mEq 1 capsule daily.    Recommended to keep sufficient dietary potassium intake.    Recheck Potassium level in 7 -10 days.

## 2024-03-07 NOTE — ASSESSMENT & PLAN NOTE
TSH 0.438, T4 free 1.49.  Recommended to take Levothyroxine 112 mcg 1 tab. Mon-Sat and 1/2 tab. on Sunday.

## 2024-03-07 NOTE — PROGRESS NOTES
Assessment and Plan:     Problem List Items Addressed This Visit          Endocrine    Hypothyroidism     TSH 0.438, T4 free 1.49.  Recommended to take Levothyroxine 112 mcg 1 tab. Mon-Sat and 1/2 tab. on Sunday.            Relevant Medications    levothyroxine 112 mcg tablet    Other Relevant Orders    TSH, 3rd generation with Free T4 reflex       Cardiovascular and Mediastinum    Hypertension - Primary     BP remains stable.  Continue Losartan/HCTZ 100/25 mg daily.    Follow low-sodium diet, regular exercise.  Follow-up with cardiology Dr. Garcia annually.           Relevant Orders    Comprehensive metabolic panel    Subclavian artery stenosis (HCC)     Continue statin therapy.  Aspirin 81 mg was d/c  by cardiology.    Schedule  Carotid Doppler.         Relevant Orders    VAS carotid complete study       Musculoskeletal and Integument    Osteoporosis     DEXA scan done in November 2022 showed osteopenia.    Patient stopped Prolia injections, developed jaw problem.    Recommended weightbearing exercise, continue calcium with vitamin D supplementation.    Will recheck DEXA scan by the end of this year.            Genitourinary    Stage 3a chronic kidney disease (HCC)     Lab Results   Component Value Date    EGFR 54 03/04/2024    EGFR 48 09/05/2023    EGFR 55 08/20/2023    CREATININE 1.01 03/04/2024    CREATININE 1.12 09/05/2023    CREATININE 1.00 08/20/2023     Creatinine remains stable.  Recommended to stay well-hydrated.  Avoid NSAIDs, nephrotoxins.         Relevant Orders    Comprehensive metabolic panel       Other    Hyperlipidemia     LDL 73 - at goal.  Continue Rosuvastatin 20 mg daily.           Relevant Orders    Comprehensive metabolic panel    Lipid panel    Malignant neoplasm of lower-inner quadrant of right breast of female, estrogen receptor positive      Patient completed 5 year course of Anastrozole last year.    Bilateral diagnostic mammogram done in October 2023 showed no mammographic evidence  of malignancy.         Medicare annual wellness visit, subsequent    Hypokalemia     Potassium 3.3.  Patient was prescribed Potassium Chloride 10 mEq 1 capsule daily.    Recommended to keep sufficient dietary potassium intake.    Recheck Potassium level in 7 -10 days.           Relevant Orders    Potassium       Depression Screening and Follow-up Plan: Patient was screened for depression during today's encounter. They screened negative with a PHQ-2 score of 0.      Preventive health issues were discussed with patient, and age appropriate screening tests were ordered as noted in patient's After Visit Summary.  Personalized health advice and appropriate referrals for health education or preventive services given if needed, as noted in patient's After Visit Summary.  Discussed Shingrix vaccination with patient.  Schedule follow-up visit in 6 months.     History of Present Illness:     Patient presents for a Medicare Wellness Visit    HPI     Patient presents for 6 month follow-up visit, Medicare AWV.    PMHx: HTN, Hypothyroidism, Hyperlipidemia, carotid artery stenosis, left subclavian artery stenosis, osteopenia, pulmonary nodule, right breast CA, S/P lumpectomy in December 2018, Osteoporosis.     Reviewed current medications, blood test results from March 4, 2024.    TSH 0.438, T4 free 1.49, blood sugar 78.  Creatinine 1.01 - stable.  Potassium 3.3. LDL 73.    HTN - blood pressure remains stable.    Patient denies chest pain, shortness of breath, heart palpitations.    C/o occasional lightheadedness with getting up quickly.  Followed by cardiology Dr. Garcia, was seen in November 2023.    Cardiology recommended to schedule follow-up visit in 1 year.    Hyperlipidemia - well-controlled on Rosuvastatin 20 mg daily.    Hypothyroidism - on Levothyroxine 112 mcg daily.    CKD stage 3 - kidney function test remains stable.    Osteoporosis - DEXA scan done in November 2022 showed osteopenia.    Patient is off Prolia  injections.  She states that she develops jaw problem.    Takes calcium with Vit D.    Quit smoking over 20 years ago.    Right breast CA diagnosed in November 2018 - patient completed 5 year course of Anastrozole last year.      Bilateral diagnostic mammogram done in October 2023 showed no mammographic evidence of malignancy.      Colonoscopy done by colorectal surgeon Dr. Sharp in August 2022.    Reports no family history of colon cancer.    Received Flu shot at [facility] in September 2023.      Patient Care Team:  Lucretia Limon MD as PCP - General  MD Mateus Blunt MD as Surgeon (Surgical Oncology)  Conrad Phillips MD (Hematology)  Darwin Ceja DPM (Podiatry)  Washington Garcia MD (Cardiology)     Review of Systems:     Review of Systems   Constitutional:  Negative for activity change, appetite change, chills, fatigue and fever.   HENT:  Negative for congestion, ear pain, hearing loss, sore throat and trouble swallowing.    Eyes:  Negative for pain, discharge, redness, itching and visual disturbance.   Respiratory:  Negative for cough, chest tightness, shortness of breath and wheezing.    Cardiovascular:  Positive for leg swelling (right ankle swelling). Negative for chest pain and palpitations.   Gastrointestinal:  Negative for abdominal pain, blood in stool, constipation, diarrhea, nausea and vomiting.   Genitourinary:  Negative for difficulty urinating, dysuria and hematuria.   Musculoskeletal:  Positive for arthralgias. Negative for back pain, gait problem and joint swelling.   Skin:  Negative for rash.   Neurological:  Positive for light-headedness. Negative for syncope and headaches. Numbness: occasional.  Hematological: Negative.    Psychiatric/Behavioral:  Negative for dysphoric mood and sleep disturbance. The patient is not nervous/anxious.         Problem List:     Patient Active Problem List   Diagnosis    Allergic rhinitis    Bradycardia    Heart murmur     Hyperlipidemia    Hypertension    Hypothyroidism    Osteoporosis    Pulmonary nodules    Subclavian artery stenosis (HCC)    Vitamin D deficiency    Malignant neoplasm of lower-inner quadrant of right breast of female, estrogen receptor positive     Medicare annual wellness visit, subsequent    Osteopenia of multiple sites    Decreased pedal pulses    History of tobacco use    Paresthesia of foot, bilateral    History of Graves' disease    Stage 3a chronic kidney disease (HCC)    History of COVID-19    Abnormal EKG    Hypokalemia      Past Medical and Surgical History:     Past Medical History:   Diagnosis Date    Acute pain of left shoulder     History    Acute recurrent maxillary sinusitis     History    Acute upper respiratory infection     History    BRCA negative 11/12/2018    [facility]    BRCA1 negative     BRCA2 negative     Breast cancer (HCC) 11/01/2018    Right    Callus     Cancer (HCC)     Carotid artery narrowing     stenosis    Disease of thyroid gland     High arches     History of Graves' disease     status post 131 treatment    History of lateral epicondylitis of left elbow     History of onychomycosis     of toenail    History of perforation of tympanic membrane     Hyperlipidemia     Hypertension     Ingrown toenail     Left otitis media     History    Medicare annual wellness visit, subsequent      Past Surgical History:   Procedure Laterality Date    APPENDECTOMY  1955    BREAST BIOPSY Right 11/01/2018    U/S BX    BREAST LUMPECTOMY Right 12/18/2018    Procedure: BREAST LUMPECTOMY; BREAST NEEDLE LOCALIZATION (NEEDLE LOC AT 0700);  Surgeon: Mateus Quinteros MD;  Location: [facility];  Service: Surgical Oncology    BREAST LUMPECTOMY      ELBOW FRACTURE SURGERY  09/1960    Open Treatment of Fracture of proximal Radius    LYMPH NODE BIOPSY Right 12/18/2018    Procedure: SENTINEL LYMPH NODE BIOPSY; LYMPHATIC MAPPING WITH BLUE DYE AND RADIOACTIVE DYE (INJECT AT 0800 BY DR QUINTEROS IN THE OR);  Surgeon: Mateus  MD Aldair;  Location: [facility];  Service: Surgical Oncology    SINUS SURGERY Left 1996    nasal polyps(?)-large papilloma left maxillary sinus with extension into posterior nasopharynx completed by Dr Estrada.    THYROID CYST EXCISION      ablation    TONSILLECTOMY AND ADENOIDECTOMY      US BREAST NEEDLE LOC RIGHT Right 2018    US GUIDED BREAST BIOPSY RIGHT COMPLETE Right 2018      Family History:     Family History   Problem Relation Age of Onset    Breast cancer Mother     Pancreatic cancer Mother     Cancer Mother     Aortic aneurysm Father     Aortic aneurysm Family         Abdominal    Breast cancer Family     Breast cancer Maternal Grandmother       Social History:     Social History     Socioeconomic History    Marital status: /Civil Union     Spouse name: None    Number of children: None    Years of education: None    Highest education level: None   Occupational History    None   Tobacco Use    Smoking status: Former     Current packs/day: 0.00     Average packs/day: 1 pack/day for 39.8 years (39.8 ttl pk-yrs)     Types: Cigarettes     Start date: 1963     Quit date: 10/16/2002     Years since quittin.4     Passive exposure: Past    Smokeless tobacco: Never   Vaping Use    Vaping status: Never Used   Substance and Sexual Activity    Alcohol use: Not Currently     Comment: rarely    Drug use: No    Sexual activity: Not Currently     Partners: Male     Birth control/protection: Other     Comment: taken nothing for over 40 years   Other Topics Concern    None   Social History Narrative    None     Social Determinants of Health     Financial Resource Strain: Low Risk  (3/7/2024)    Overall Financial Resource Strain (CARDIA)     Difficulty of Paying Living Expenses: Not hard at all   Food Insecurity: Not on file   Transportation Needs: No Transportation Needs (3/7/2024)    PRAPARE - Transportation     Lack of Transportation (Medical): No     Lack of Transportation (Non-Medical): No  "  Physical Activity: Not on file   Stress: Not on file   Social Connections: Not on file   Intimate Partner Violence: Not on file   Housing Stability: Not on file      Medications and Allergies:     Current Outpatient Medications   Medication Sig Dispense Refill    albuterol (ProAir HFA) 90 mcg/act inhaler Inhale 2 puffs every 6 (six) hours as needed for wheezing 8.5 g 0    Cholecalciferol (VITAMIN D3) 2000 units TABS Take 1 tablet by mouth daily      levothyroxine 112 mcg tablet Take 1 tab. daily Mon - Sat and 1/2 tab. on Sunday 90 tablet 3    losartan-hydrochlorothiazide (HYZAAR) 100-25 MG per tablet Take 1 tablet by mouth daily 90 tablet 3    potassium chloride (MICRO-K) 10 MEQ CR capsule Take 1 capsule (10 mEq total) by mouth daily 30 capsule 5    rosuvastatin (CRESTOR) 20 MG tablet Take 1 tablet (20 mg total) by mouth daily 90 tablet 3    Sodium Fluoride 5000 PPM 1.1 % PSTE BRUSH NORMALLY AND EXPECTORATE BUT DO NOT RINSE AFTER. DO NOT EAT...  (REFER TO PRESCRIPTION NOTES).      mupirocin (BACTROBAN) 2 % ointment Apply topically 3 (three) times a day (Patient not taking: Reported on 11/9/2023) 22 g 0     No current facility-administered medications for this visit.     Allergies   Allergen Reactions    Sulfa Antibiotics Dizziness     Other reaction(s): feeling \"off-balance\"  Category: Adverse Reaction;       Immunizations:     Immunization History   Administered Date(s) Administered    COVID-19 PFIZER VACCINE 0.3 ML IM 03/05/2021, 03/30/2021, 11/20/2021    INFLUENZA 10/10/2014, 09/19/2018, 08/24/2020, 09/28/2021    Influenza Split High Dose Preservative Free IM 10/30/2013    Influenza, seasonal, injectable 09/01/2015, 10/13/2016, 10/04/2017    Pneumococcal Conjugate 13-Valent 12/15/2015    Pneumococcal Polysaccharide PPV23 06/18/2014    Tdap 10/29/2021    influenza, trivalent, adjuvanted 09/17/2019      Health Maintenance:         Topic Date Due    Breast Cancer Screening: Mammogram  10/23/2024    Hepatitis C " Screening  Completed    Colorectal Cancer Screening  Discontinued         Topic Date Due    COVID-19 Vaccine (4 - 2023-24 season) 09/01/2023      Medicare Screening Tests and Risk Assessments:     Marianne is here for her Subsequent Wellness visit.     Health Risk Assessment:   Patient rates overall health as very good. Patient feels that their physical health rating is same. Patient is very satisfied with their life. Eyesight was rated as same. Hearing was rated as same. Patient feels that their emotional and mental health rating is slightly better. Patients states they are never, rarely angry. Patient states they are sometimes unusually tired/fatigued. Pain experienced in the last 7 days has been some. Patient's pain rating has been 1/10. Patient states that she has experienced weight loss or gain in last 6 months.     Depression Screening:   PHQ-2 Score: 0      Fall Risk Screening:   In the past year, patient has experienced: no history of falling in past year      Urinary Incontinence Screening:   Patient has not leaked urine accidently in the last six months.     Home Safety:  Patient does not have trouble with stairs inside or outside of their home. Patient has working smoke alarms and has no working carbon monoxide detector. Home safety hazards include: none.     Nutrition:   Current diet is Regular.     Medications:   Patient is currently taking over-the-counter supplements. OTC medications include: see medication list. Patient is able to manage medications.     Activities of Daily Living (ADLs)/Instrumental Activities of Daily Living (IADLs):   Walk and transfer into and out of bed and chair?: Yes  Dress and groom yourself?: Yes    Bathe or shower yourself?: Yes    Feed yourself? Yes  Do your laundry/housekeeping?: Yes  Manage your money, pay your bills and track your expenses?: Yes  Make your own meals?: Yes    Do your own shopping?: Yes    Previous Hospitalizations:   Any hospitalizations or ED visits  within the last 12 months?: Yes    How many hospitalizations have you had in the last year?: 1-2    Advance Care Planning:   Living will: No    Durable POA for healthcare: No    Advanced directive: No    Advanced directive counseling given: Yes    End of Life Decisions reviewed with patient: Yes      Cognitive Screening:   Provider or family/friend/caregiver concerned regarding cognition?: No    PREVENTIVE SCREENINGS      Cardiovascular Screening:    General: Screening Not Indicated and History Lipid Disorder      Diabetes Screening:     General: Screening Current      Colorectal Cancer Screening:     General: Screening Current      Breast Cancer Screening:     General: History Breast Cancer      Cervical Cancer Screening:    General: Screening Not Indicated      Osteoporosis Screening:    General: Screening Not Indicated and History Osteoporosis      Abdominal Aortic Aneurysm (AAA) Screening:        General: Screening Not Indicated      Lung Cancer Screening:     General: Screening Not Indicated      Hepatitis C Screening:    General: Screening Current    Screening, Brief Intervention, and Referral to Treatment (SBIRT)    Screening  Typical number of drinks in a day: 0  Typical number of drinks in a week: 0  Interpretation: Low risk drinking behavior.    AUDIT-C Screenin) How often did you have a drink containing alcohol in the past year? never  2) How many drinks did you have on a typical day when you were drinking in the past year? 0  3) How often did you have 6 or more drinks on one occasion in the past year? never    AUDIT-C Score: 0  Interpretation: Score 0-2 (female): Negative screen for alcohol misuse    Single Item Drug Screening:  How often have you used an illegal drug (including marijuana) or a prescription medication for non-medical reasons in the past year? never    Single Item Drug Screen Score: 0  Interpretation: Negative screen for possible drug use disorder    Other Counseling Topics:  "  Car/seat belt/driving safety, skin self-exam and calcium and vitamin D intake and regular weightbearing exercise.     No results found.     Physical Exam:     /68   Pulse 58   Temp 98 °F (36.7 °C) (Temporal)   Resp 18   Ht 5' 5\" (1.651 m)   Wt 73.3 kg (161 lb 9.6 oz)   SpO2 99%   BMI 26.89 kg/m²     Physical Exam  Vitals and nursing note reviewed.   Constitutional:       Appearance: Normal appearance.   HENT:      Head: Normocephalic and atraumatic.   Eyes:      Conjunctiva/sclera: Conjunctivae normal.      Pupils: Pupils are equal, round, and reactive to light.   Neck:      Vascular: Carotid bruit (left carotid bruit) present.   Cardiovascular:      Rate and Rhythm: Normal rate and regular rhythm.      Heart sounds: No murmur heard.  Pulmonary:      Effort: Pulmonary effort is normal.      Breath sounds: Normal breath sounds.   Abdominal:      General: Bowel sounds are normal. There is no distension.      Palpations: Abdomen is soft.      Tenderness: There is no abdominal tenderness.   Musculoskeletal:         General: No swelling. Normal range of motion.      Cervical back: Normal range of motion and neck supple.      Right lower leg: Edema (mild right ankle swelling) present.      Left lower leg: No edema.   Skin:     General: Skin is warm and dry.      Findings: No rash.   Neurological:      General: No focal deficit present.      Mental Status: She is alert and oriented to person, place, and time.      Motor: No weakness.      Gait: Gait normal.   Psychiatric:         Mood and Affect: Mood normal.          Lucretia Limon MD  "

## 2024-03-07 NOTE — ASSESSMENT & PLAN NOTE
DEXA scan done in November 2022 showed osteopenia.    Patient stopped Prolia injections, developed jaw problem.    Recommended weightbearing exercise, continue calcium with vitamin D supplementation.    Will recheck DEXA scan by the end of this year.

## 2024-03-07 NOTE — ASSESSMENT & PLAN NOTE
BP remains stable.  Continue Losartan/HCTZ 100/25 mg daily.    Follow low-sodium diet, regular exercise.  Follow-up with cardiology Dr. Garcia annually.

## 2024-03-07 NOTE — ASSESSMENT & PLAN NOTE
Lab Results   Component Value Date    EGFR 54 03/04/2024    EGFR 48 09/05/2023    EGFR 55 08/20/2023    CREATININE 1.01 03/04/2024    CREATININE 1.12 09/05/2023    CREATININE 1.00 08/20/2023     Creatinine remains stable.  Recommended to stay well-hydrated.  Avoid NSAIDs, nephrotoxins.

## 2024-03-07 NOTE — PATIENT INSTRUCTIONS
Medicare Preventive Visit Patient Instructions  Thank you for completing your Welcome to Medicare Visit or Medicare Annual Wellness Visit today. Your next wellness visit will be due in one year (3/8/2025).  The screening/preventive services that you may require over the next 5-10 years are detailed below. Some tests may not apply to you based off risk factors and/or age. Screening tests ordered at today's visit but not completed yet may show as past due. Also, please note that scanned in results may not display below.  Preventive Screenings:  Service Recommendations Previous Testing/Comments   Colorectal Cancer Screening  * Colonoscopy    * Fecal Occult Blood Test (FOBT)/Fecal Immunochemical Test (FIT)  * Fecal DNA/Cologuard Test  * Flexible Sigmoidoscopy Age: 45-75 years old   Colonoscopy: every 10 years (may be performed more frequently if at higher risk)  OR  FOBT/FIT: every 1 year  OR  Cologuard: every 3 years  OR  Sigmoidoscopy: every 5 years  Screening may be recommended earlier than age 45 if at higher risk for colorectal cancer. Also, an individualized decision between you and your healthcare provider will decide whether screening between the ages of 76-85 would be appropriate. Colonoscopy: 08/22/2022  FOBT/FIT: Not on file  Cologuard: Not on file  Sigmoidoscopy: Not on file          Breast Cancer Screening Age: 40+ years old  Frequency: every 1-2 years  Not required if history of left and right mastectomy Mammogram: 10/23/2023        Cervical Cancer Screening Between the ages of 21-29, pap smear recommended once every 3 years.   Between the ages of 30-65, can perform pap smear with HPV co-testing every 5 years.   Recommendations may differ for women with a history of total hysterectomy, cervical cancer, or abnormal pap smears in past. Pap Smear: Not on file        Hepatitis C Screening Once for adults born between 1945 and 1965  More frequently in patients at high risk for Hepatitis C Hep C Antibody:  02/03/2020        Diabetes Screening 1-2 times per year if you're at risk for diabetes or have pre-diabetes Fasting glucose: 78 mg/dL (3/4/2024)  A1C: No results in last 5 years (No results in last 5 years)      Cholesterol Screening Once every 5 years if you don't have a lipid disorder. May order more often based on risk factors. Lipid panel: 03/04/2024          Other Preventive Screenings Covered by Medicare:  Abdominal Aortic Aneurysm (AAA) Screening: covered once if your at risk. You're considered to be at risk if you have a family history of AAA.  Lung Cancer Screening: covers low dose CT scan once per year if you meet all of the following conditions: (1) Age 55-77; (2) No signs or symptoms of lung cancer; (3) Current smoker or have quit smoking within the last 15 years; (4) You have a tobacco smoking history of at least 20 pack years (packs per day multiplied by number of years you smoked); (5) You get a written order from a healthcare provider.  Glaucoma Screening: covered annually if you're considered high risk: (1) You have diabetes OR (2) Family history of glaucoma OR (3)  aged 50 and older OR (4)  American aged 65 and older  Osteoporosis Screening: covered every 2 years if you meet one of the following conditions: (1) You're estrogen deficient and at risk for osteoporosis based off medical history and other findings; (2) Have a vertebral abnormality; (3) On glucocorticoid therapy for more than 3 months; (4) Have primary hyperparathyroidism; (5) On osteoporosis medications and need to assess response to drug therapy.   Last bone density test (DXA Scan): 11/05/2022.  HIV Screening: covered annually if you're between the age of 15-65. Also covered annually if you are younger than 15 and older than 65 with risk factors for HIV infection. For pregnant patients, it is covered up to 3 times per pregnancy.    Immunizations:  Immunization Recommendations   Influenza Vaccine Annual influenza  vaccination during flu season is recommended for all persons aged >= 6 months who do not have contraindications   Pneumococcal Vaccine   * Pneumococcal conjugate vaccine = PCV13 (Prevnar 13), PCV15 (Vaxneuvance), PCV20 (Prevnar 20)  * Pneumococcal polysaccharide vaccine = PPSV23 (Pneumovax) Adults 19-65 yo with certain risk factors or if 65+ yo  If never received any pneumonia vaccine: recommend Prevnar 20 (PCV20)  Give PCV20 if previously received 1 dose of PCV13 or PPSV23   Hepatitis B Vaccine 3 dose series if at intermediate or high risk (ex: diabetes, end stage renal disease, liver disease)   Respiratory syncytial virus (RSV) Vaccine - COVERED BY MEDICARE PART D  * RSVPreF3 (Arexvy) CDC recommends that adults 60 years of age and older may receive a single dose of RSV vaccine using shared clinical decision-making (SCDM)   Tetanus (Td) Vaccine - COST NOT COVERED BY MEDICARE PART B Following completion of primary series, a booster dose should be given every 10 years to maintain immunity against tetanus. Td may also be given as tetanus wound prophylaxis.   Tdap Vaccine - COST NOT COVERED BY MEDICARE PART B Recommended at least once for all adults. For pregnant patients, recommended with each pregnancy.   Shingles Vaccine (Shingrix) - COST NOT COVERED BY MEDICARE PART B  2 shot series recommended in those 19 years and older who have or will have weakened immune systems or those 50 years and older     Health Maintenance Due:      Topic Date Due   • Breast Cancer Screening: Mammogram  10/23/2024   • Hepatitis C Screening  Completed   • Colorectal Cancer Screening  Discontinued     Immunizations Due:      Topic Date Due   • COVID-19 Vaccine (4 - 2023-24 season) 09/01/2023     Advance Directives   What are advance directives?  Advance directives are legal documents that state your wishes and plans for medical care. These plans are made ahead of time in case you lose your ability to make decisions for yourself. Advance  directives can apply to any medical decision, such as the treatments you want, and if you want to donate organs.   What are the types of advance directives?  There are many types of advance directives, and each state has rules about how to use them. You may choose a combination of any of the following:  Living will:  This is a written record of the treatment you want. You can also choose which treatments you do not want, which to limit, and which to stop at a certain time. This includes surgery, medicine, IV fluid, and tube feedings.   Durable power of  for healthcare (DPAHC):  This is a written record that states who you want to make healthcare choices for you when you are unable to make them for yourself. This person, called a proxy, is usually a family member or a friend. You may choose more than 1 proxy.  Do not resuscitate (DNR) order:  A DNR order is used in case your heart stops beating or you stop breathing. It is a request not to have certain forms of treatment, such as CPR. A DNR order may be included in other types of advance directives.  Medical directive:  This covers the care that you want if you are in a coma, near death, or unable to make decisions for yourself. You can list the treatments you want for each condition. Treatment may include pain medicine, surgery, blood transfusions, dialysis, IV or tube feedings, and a ventilator (breathing machine).  Values history:  This document has questions about your views, beliefs, and how you feel and think about life. This information can help others choose the care that you would choose.  Why are advance directives important?  An advance directive helps you control your care. Although spoken wishes may be used, it is better to have your wishes written down. Spoken wishes can be misunderstood, or not followed. Treatments may be given even if you do not want them. An advance directive may make it easier for your family to make difficult choices about  your care.   Weight Management   Why it is important to manage your weight:  Being overweight increases your risk of health conditions such as heart disease, high blood pressure, type 2 diabetes, and certain types of cancer. It can also increase your risk for osteoarthritis, sleep apnea, and other respiratory problems. Aim for a slow, steady weight loss. Even a small amount of weight loss can lower your risk of health problems.  How to lose weight safely:  A safe and healthy way to lose weight is to eat fewer calories and get regular exercise. You can lose up about 1 pound a week by decreasing the number of calories you eat by 500 calories each day.   Healthy meal plan for weight management:  A healthy meal plan includes a variety of foods, contains fewer calories, and helps you stay healthy. A healthy meal plan includes the following:  Eat whole-grain foods more often.  A healthy meal plan should contain fiber. Fiber is the part of grains, fruits, and vegetables that is not broken down by your body. Whole-grain foods are healthy and provide extra fiber in your diet. Some examples of whole-grain foods are whole-wheat breads and pastas, oatmeal, brown rice, and bulgur.  Eat a variety of vegetables every day.  Include dark, leafy greens such as spinach, kale, jean carlos greens, and mustard greens. Eat yellow and orange vegetables such as carrots, sweet potatoes, and winter squash.   Eat a variety of fruits every day.  Choose fresh or canned fruit (canned in its own juice or light syrup) instead of juice. Fruit juice has very little or no fiber.  Eat low-fat dairy foods.  Drink fat-free (skim) milk or 1% milk. Eat fat-free yogurt and low-fat cottage cheese. Try low-fat cheeses such as mozzarella and other reduced-fat cheeses.  Choose meat and other protein foods that are low in fat.  Choose beans or other legumes such as split peas or lentils. Choose fish, skinless poultry (chicken or turkey), or lean cuts of red meat  (beef or pork). Before you cook meat or poultry, cut off any visible fat.   Use less fat and oil.  Try baking foods instead of frying them. Add less fat, such as margarine, sour cream, regular salad dressing and mayonnaise to foods. Eat fewer high-fat foods. Some examples of high-fat foods include french fries, doughnuts, ice cream, and cakes.  Eat fewer sweets.  Limit foods and drinks that are high in sugar. This includes candy, cookies, regular soda, and sweetened drinks.  Exercise:  Exercise at least 30 minutes per day on most days of the week. Some examples of exercise include walking, biking, dancing, and swimming. You can also fit in more physical activity by taking the stairs instead of the elevator or parking farther away from stores. Ask your healthcare provider about the best exercise plan for you.      © Copyright [employer] 2018 Information is for End User's use only and may not be sold, redistributed or otherwise used for commercial purposes. All illustrations and images included in CareNotes® are the copyrighted property of A.D.A.M., Inc. or [employer]

## 2024-03-14 ENCOUNTER — APPOINTMENT (OUTPATIENT)
Dept: LAB | Facility: CLINIC | Age: 74
End: 2024-03-14
Payer: COMMERCIAL

## 2024-03-14 DIAGNOSIS — E87.6 HYPOKALEMIA: ICD-10-CM

## 2024-03-14 LAB — POTASSIUM SERPL-SCNC: 3.9 MMOL/L (ref 3.5–5.3)

## 2024-03-14 PROCEDURE — 84132 ASSAY OF SERUM POTASSIUM: CPT

## 2024-03-14 PROCEDURE — 36415 COLL VENOUS BLD VENIPUNCTURE: CPT

## 2024-04-25 ENCOUNTER — OFFICE VISIT (OUTPATIENT)
Dept: PODIATRY | Facility: CLINIC | Age: 74
End: 2024-04-25
Payer: COMMERCIAL

## 2024-04-25 VITALS
DIASTOLIC BLOOD PRESSURE: 74 MMHG | RESPIRATION RATE: 18 BRPM | HEIGHT: 65 IN | BODY MASS INDEX: 26.82 KG/M2 | SYSTOLIC BLOOD PRESSURE: 143 MMHG | HEART RATE: 64 BPM | WEIGHT: 161 LBS

## 2024-04-25 DIAGNOSIS — L84 CORNS: ICD-10-CM

## 2024-04-25 DIAGNOSIS — M21.961 DEFORMITY OF METATARSAL BONE OF RIGHT FOOT: ICD-10-CM

## 2024-04-25 DIAGNOSIS — L60.3 NAIL DYSTROPHY: Primary | ICD-10-CM

## 2024-04-25 PROCEDURE — 99212 OFFICE O/P EST SF 10 MIN: CPT | Performed by: PODIATRIST

## 2024-04-25 NOTE — PROGRESS NOTES
Patient presents for palliative footcare.  Patient has a painful callus beneath the right first metatarsal head secondary to a plantarflexed first ray.  Also callus back of the right heel.  Patient also notes long toenails that she has difficulty cutting due to a hand problem.    Treatment consisted of nail and lesion trimming.  Patient utilizing [employer] moisturizer with questionable success.  Recommended nocturnal use of bag balm.  Reappoint 3 months.

## 2024-05-01 PROBLEM — Z00.00 MEDICARE ANNUAL WELLNESS VISIT, SUBSEQUENT: Status: RESOLVED | Noted: 2019-01-04 | Resolved: 2024-05-01

## 2024-05-02 ENCOUNTER — HOSPITAL ENCOUNTER (OUTPATIENT)
Dept: NON INVASIVE DIAGNOSTICS | Facility: CLINIC | Age: 74
Discharge: HOME | End: 2024-05-02
Payer: COMMERCIAL

## 2024-05-02 DIAGNOSIS — I77.1 SUBCLAVIAN ARTERY STENOSIS (HCC): ICD-10-CM

## 2024-05-02 PROCEDURE — 93880 EXTRACRANIAL BILAT STUDY: CPT | Performed by: SURGERY

## 2024-05-02 PROCEDURE — 93880 EXTRACRANIAL BILAT STUDY: CPT

## 2024-05-23 ENCOUNTER — OFFICE VISIT (OUTPATIENT)
Dept: FAMILY MEDICINE CLINIC | Facility: CLINIC | Age: 74
End: 2024-05-23
Payer: COMMERCIAL

## 2024-05-23 VITALS
OXYGEN SATURATION: 99 % | TEMPERATURE: 97.2 F | WEIGHT: 160.6 LBS | HEART RATE: 52 BPM | RESPIRATION RATE: 18 BRPM | SYSTOLIC BLOOD PRESSURE: 122 MMHG | HEIGHT: 65 IN | BODY MASS INDEX: 26.76 KG/M2 | DIASTOLIC BLOOD PRESSURE: 84 MMHG

## 2024-05-23 DIAGNOSIS — J06.9 ACUTE URI: Primary | ICD-10-CM

## 2024-05-23 DIAGNOSIS — H57.9 ITCHY EYES: ICD-10-CM

## 2024-05-23 PROCEDURE — 99213 OFFICE O/P EST LOW 20 MIN: CPT | Performed by: NURSE PRACTITIONER

## 2024-05-23 PROCEDURE — G2211 COMPLEX E/M VISIT ADD ON: HCPCS | Performed by: NURSE PRACTITIONER

## 2024-05-23 RX ORDER — AZELASTINE HYDROCHLORIDE 0.5 MG/ML
1 SOLUTION/ DROPS OPHTHALMIC 2 TIMES DAILY
Qty: 6 ML | Refills: 0 | Status: SHIPPED | OUTPATIENT
Start: 2024-05-23

## 2024-05-23 NOTE — PROGRESS NOTES
Ambulatory Visit  Name: Marianne Betts      : 1950      MRN: [id number]  Encounter Provider: CHOLO Madrigal  Encounter Date: 2024   Encounter department: [facility]    Assessment & Plan   1. Acute URI  Comments:  No signs of bacterial infection at this time.  Pt declined COVID testing.  May take OTC Zyrtec and Flonase prn.  Stay hydrated  2. Itchy eyes  Comments:  May use Azelastine eye drops prn.  Avoid scratching/rubbing eyes  Orders:  -     azelastine (OPTIVAR) 0.05 % ophthalmic solution; Administer 1 drop to both eyes 2 (two) times a day       History of Present Illness     HPI  Pt presents by herself today for an acute visit   Started 3-4 days ago with a sore throat, nasal congestion, itchy eyes, right sinus pressure, sneezing.  No cough, ear pain, F/C, N/V/D.  Is not taking anything OTC.  She has not tested herself for COVID.  Notes she has never had seasonal allergies before.  Non smoker     Review of Systems   Constitutional:  Negative for activity change, appetite change, chills, diaphoresis, fatigue, fever and unexpected weight change.   HENT:  Positive for congestion, postnasal drip, sinus pressure, sneezing and sore throat.    Eyes:  Positive for itching. Negative for visual disturbance.   Respiratory:  Negative for cough, chest tightness, shortness of breath and wheezing.    Cardiovascular:  Negative for chest pain, palpitations and leg swelling.   Gastrointestinal:  Negative for abdominal pain, blood in stool, constipation, diarrhea and nausea.   Genitourinary:  Negative for dysuria.   Musculoskeletal:  Negative for arthralgias and myalgias.   Skin:  Negative for rash and wound.   Neurological:  Negative for dizziness, weakness, numbness and headaches.   Psychiatric/Behavioral:  Negative for dysphoric mood, self-injury, sleep disturbance and suicidal ideas. The patient is not nervous/anxious.      Current Outpatient Medications on File Prior to Visit  "  Medication Sig Dispense Refill    albuterol (ProAir HFA) 90 mcg/act inhaler Inhale 2 puffs every 6 (six) hours as needed for wheezing 8.5 g 0    Cholecalciferol (VITAMIN D3) 2000 units TABS Take 1 tablet by mouth daily      levothyroxine 112 mcg tablet Take 1 tab. daily Mon - Sat and 1/2 tab. on  90 tablet 3    losartan-hydrochlorothiazide (HYZAAR) 100-25 MG per tablet Take 1 tablet by mouth daily 90 tablet 3    potassium chloride (MICRO-K) 10 MEQ CR capsule Take 1 capsule (10 mEq total) by mouth daily 30 capsule 5    rosuvastatin (CRESTOR) 20 MG tablet Take 1 tablet (20 mg total) by mouth daily 90 tablet 3    Sodium Fluoride 5000 PPM 1.1 % PSTE BRUSH NORMALLY AND EXPECTORATE BUT DO NOT RINSE AFTER. DO NOT EAT...  (REFER TO PRESCRIPTION NOTES).      mupirocin (BACTROBAN) 2 % ointment Apply topically 3 (three) times a day (Patient not taking: Reported on 2023) 22 g 0     No current facility-administered medications on file prior to visit.      Social History     Tobacco Use    Smoking status: Former     Current packs/day: 0.00     Average packs/day: 1 pack/day for 39.8 years (39.8 ttl pk-yrs)     Types: Cigarettes     Start date: 1963     Quit date: 10/16/2002     Years since quittin.6     Passive exposure: Past    Smokeless tobacco: Never   Vaping Use    Vaping status: Never Used   Substance and Sexual Activity    Alcohol use: Not Currently     Comment: rarely    Drug use: No    Sexual activity: Not Currently     Partners: Male     Birth control/protection: Other     Comment: taken nothing for over 40 years     Objective     /84   Pulse (!) 52   Temp (!) 97.2 °F (36.2 °C) (Temporal)   Resp 18   Ht 5' 5\" (1.651 m)   Wt 72.8 kg (160 lb 9.6 oz)   SpO2 99%   BMI 26.73 kg/m²     Physical Exam  Constitutional:       General: She is not in acute distress.     Appearance: Normal appearance. She is well-developed. She is not ill-appearing, toxic-appearing or diaphoretic.   HENT:      Head: " Normocephalic and atraumatic.      Right Ear: Tympanic membrane, ear canal and external ear normal. There is no impacted cerumen.      Left Ear: Tympanic membrane, ear canal and external ear normal. There is no impacted cerumen.      Nose: Nose normal.      Mouth/Throat:      Mouth: Mucous membranes are moist.      Pharynx: Oropharynx is clear. No oropharyngeal exudate or posterior oropharyngeal erythema.   Eyes:      Extraocular Movements: Extraocular movements intact.      Conjunctiva/sclera: Conjunctivae normal.      Pupils: Pupils are equal, round, and reactive to light.   Neck:      Thyroid: No thyromegaly.   Cardiovascular:      Rate and Rhythm: Normal rate and regular rhythm.      Heart sounds: Normal heart sounds. No murmur heard.  Pulmonary:      Effort: Pulmonary effort is normal. No respiratory distress.      Breath sounds: Normal breath sounds. No stridor. No wheezing, rhonchi or rales.   Chest:      Chest wall: No tenderness.   Abdominal:      General: Bowel sounds are normal. There is no distension.      Palpations: Abdomen is soft.      Tenderness: There is no abdominal tenderness.   Musculoskeletal:         General: Normal range of motion.      Cervical back: Normal range of motion and neck supple. No rigidity or tenderness.   Lymphadenopathy:      Cervical: No cervical adenopathy.   Skin:     General: Skin is warm and dry.   Neurological:      General: No focal deficit present.      Mental Status: She is alert and oriented to person, place, and time.   Psychiatric:         Mood and Affect: Mood normal.         Behavior: Behavior normal.         Thought Content: Thought content normal.         Judgment: Judgment normal.       Administrative Statements

## 2024-06-12 NOTE — TELEPHONE ENCOUNTER
Tashia from [facility] called regarding the levothyroxine script.  She said they had received a script and then it was cancelled yesterday.  I don't see that in the pt chart.  I see one sent in March for 90 tablets with 3 refills.  Pharmacy states yes, they still have that one as valid with refills.  I told her to go ahead and use the refills on it for the pt

## 2024-07-08 ENCOUNTER — TELEPHONE (OUTPATIENT)
Age: 74
End: 2024-07-08

## 2024-07-08 NOTE — TELEPHONE ENCOUNTER
Hello,    Please advise if a forced appointment can be accommodated for the patient:    Call back #: [phone]    Insurance: [employer]    Reason for appointment: dropped quilting mat on her left great toe yesterday-bled/left a laceration on nail bed as she has no nail-very swollen and painful. Asking to be checked. Please call back and advise.     Requested doctor and/or location: Dr. Ceja/Lyn      Thank you.

## 2024-07-09 ENCOUNTER — OFFICE VISIT (OUTPATIENT)
Dept: PODIATRY | Facility: CLINIC | Age: 74
End: 2024-07-09
Payer: COMMERCIAL

## 2024-07-09 VITALS
RESPIRATION RATE: 18 BRPM | HEART RATE: 74 BPM | DIASTOLIC BLOOD PRESSURE: 77 MMHG | HEIGHT: 65 IN | BODY MASS INDEX: 26.49 KG/M2 | SYSTOLIC BLOOD PRESSURE: 119 MMHG | WEIGHT: 159 LBS

## 2024-07-09 DIAGNOSIS — M79.675 PAIN IN TOE OF LEFT FOOT: Primary | ICD-10-CM

## 2024-07-09 DIAGNOSIS — S90.112A CONTUSION OF LEFT GREAT TOE WITHOUT DAMAGE TO NAIL, INITIAL ENCOUNTER: ICD-10-CM

## 2024-07-09 PROCEDURE — 99214 OFFICE O/P EST MOD 30 MIN: CPT | Performed by: PODIATRIST

## 2024-07-09 NOTE — PROGRESS NOTES
Patient presents with her .  She was seen as an emergency.  On July 7, patient dropped a heavy object on top of the left great toe.  Patient had a total matrixectomy here approximately 9 months ago.  Patient is fearful that she may have fractured the toe.    On exam, there is a laceration noted on the nailbed of the left great toe.  The nailbed is sensitive with direct pressure.  There is mild bleeding noted.  No evidence of infection.    I personally reviewed x-rays of the toes of the left foot.  They were negative for fracture.    Explained to patient that she is dealing with a contusion along with nailbed laceration.  There is no need to suture this wound.  Prescribed Bactroban ointment for twice daily application.  Patient will be seen for palliative care at her regularly scheduled appointment in approximately 2 weeks.

## 2024-07-25 ENCOUNTER — OFFICE VISIT (OUTPATIENT)
Dept: PODIATRY | Facility: CLINIC | Age: 74
End: 2024-07-25
Payer: COMMERCIAL

## 2024-07-25 VITALS
SYSTOLIC BLOOD PRESSURE: 129 MMHG | DIASTOLIC BLOOD PRESSURE: 73 MMHG | BODY MASS INDEX: 26.66 KG/M2 | RESPIRATION RATE: 18 BRPM | WEIGHT: 160 LBS | HEIGHT: 65 IN | HEART RATE: 58 BPM

## 2024-07-25 DIAGNOSIS — L60.3 NAIL DYSTROPHY: ICD-10-CM

## 2024-07-25 DIAGNOSIS — M21.961 DEFORMITY OF METATARSAL BONE OF RIGHT FOOT: Primary | ICD-10-CM

## 2024-07-25 PROCEDURE — 99212 OFFICE O/P EST SF 10 MIN: CPT | Performed by: PODIATRIST

## 2024-07-25 NOTE — PROGRESS NOTES
Patient presents for assessment of left great toe.  Patient suffered a traumatic nail injury leading to nail removal at last visit.  Nailbed has healed uneventfully and there is no evidence of infection.  No additional treatment needed here.    Patient again has a painful hyperkeratotic lesion beneath the right first metatarsal head due to a plantarflexed first ray.    On exam, pedal pulses are within normal limits.  Toenails are elongated.  Keratotic lesion beneath right first metatarsal head.    Treatment consisted of nail and lesion trimming.  Discussed treatment options for plantarflexed ray.  Surgery is needed for correction but patient disinterested.  Orthotics are also a treatment option.  Patient will consider.  She is rescheduled in 10 weeks for palliative footcare.  It was explained that this type of care is not covered by insurance as she has excellent circulation.

## 2024-08-07 DIAGNOSIS — E87.6 HYPOKALEMIA: ICD-10-CM

## 2024-08-07 DIAGNOSIS — I10 PRIMARY HYPERTENSION: ICD-10-CM

## 2024-08-07 RX ORDER — LOSARTAN POTASSIUM AND HYDROCHLOROTHIAZIDE 25; 100 MG/1; MG/1
1 TABLET ORAL DAILY
Qty: 90 TABLET | Refills: 1 | Status: SHIPPED | OUTPATIENT
Start: 2024-08-07

## 2024-08-08 RX ORDER — POTASSIUM CHLORIDE 750 MG/1
10 CAPSULE, EXTENDED RELEASE ORAL DAILY
Qty: 30 CAPSULE | Refills: 5 | Status: SHIPPED | OUTPATIENT
Start: 2024-08-08

## 2024-08-30 NOTE — TELEPHONE ENCOUNTER
Patient calling for refill of potassium chloride. Reviewed chart with patient and made aware that a script was sent on 8/8/24 with 5 additional refills. Patient is going to follow up with pharmacy.

## 2024-09-17 ENCOUNTER — APPOINTMENT (OUTPATIENT)
Dept: LAB | Facility: CLINIC | Age: 74
End: 2024-09-17
Payer: COMMERCIAL

## 2024-09-17 DIAGNOSIS — I10 PRIMARY HYPERTENSION: ICD-10-CM

## 2024-09-17 DIAGNOSIS — N18.31 STAGE 3A CHRONIC KIDNEY DISEASE (HCC): ICD-10-CM

## 2024-09-17 DIAGNOSIS — E03.9 ACQUIRED HYPOTHYROIDISM: ICD-10-CM

## 2024-09-17 DIAGNOSIS — E78.2 MIXED HYPERLIPIDEMIA: ICD-10-CM

## 2024-09-17 LAB
ALBUMIN SERPL BCG-MCNC: 4.1 G/DL (ref 3.5–5)
ALP SERPL-CCNC: 56 U/L (ref 34–104)
ALT SERPL W P-5'-P-CCNC: 9 U/L (ref 7–52)
ANION GAP SERPL CALCULATED.3IONS-SCNC: 4 MMOL/L (ref 4–13)
AST SERPL W P-5'-P-CCNC: 16 U/L (ref 13–39)
BILIRUB SERPL-MCNC: 0.73 MG/DL (ref 0.2–1)
BUN SERPL-MCNC: 22 MG/DL (ref 5–25)
CALCIUM SERPL-MCNC: 9.3 MG/DL (ref 8.4–10.2)
CHLORIDE SERPL-SCNC: 103 MMOL/L (ref 96–108)
CHOLEST SERPL-MCNC: 143 MG/DL
CO2 SERPL-SCNC: 30 MMOL/L (ref 21–32)
CREAT SERPL-MCNC: 1 MG/DL (ref 0.6–1.3)
GFR SERPL CREATININE-BSD FRML MDRD: 54 ML/MIN/1.73SQ M
GLUCOSE P FAST SERPL-MCNC: 82 MG/DL (ref 65–99)
HDLC SERPL-MCNC: 57 MG/DL
LDLC SERPL CALC-MCNC: 73 MG/DL (ref 0–100)
NONHDLC SERPL-MCNC: 86 MG/DL
POTASSIUM SERPL-SCNC: 3.3 MMOL/L (ref 3.5–5.3)
PROT SERPL-MCNC: 6.7 G/DL (ref 6.4–8.4)
SODIUM SERPL-SCNC: 137 MMOL/L (ref 135–147)
TRIGL SERPL-MCNC: 66 MG/DL
TSH SERPL DL<=0.05 MIU/L-ACNC: 4.31 UIU/ML (ref 0.45–4.5)

## 2024-09-17 PROCEDURE — 36415 COLL VENOUS BLD VENIPUNCTURE: CPT

## 2024-09-17 PROCEDURE — 80053 COMPREHEN METABOLIC PANEL: CPT

## 2024-09-17 PROCEDURE — 80061 LIPID PANEL: CPT

## 2024-09-17 PROCEDURE — 84443 ASSAY THYROID STIM HORMONE: CPT

## 2024-09-18 ENCOUNTER — TELEPHONE (OUTPATIENT)
Dept: FAMILY MEDICINE CLINIC | Facility: CLINIC | Age: 74
End: 2024-09-18

## 2024-09-18 NOTE — TELEPHONE ENCOUNTER
Spoke to patient and she stated that she has not been taking the potassium on a daily basis she will start. And she will see you tomorrow at her appointment ----- Message from Lucretia Limon MD sent at 9/17/2024  1:51 PM EDT -----  Potassium level is low at 3.3.  I she takes Potassium chloride 10 mEq 1 tablet daily, recommend to increase dose to 2 tablets daily.

## 2024-09-19 ENCOUNTER — OFFICE VISIT (OUTPATIENT)
Dept: FAMILY MEDICINE CLINIC | Facility: CLINIC | Age: 74
End: 2024-09-19
Payer: COMMERCIAL

## 2024-09-19 VITALS
OXYGEN SATURATION: 95 % | RESPIRATION RATE: 18 BRPM | TEMPERATURE: 97.5 F | BODY MASS INDEX: 26.06 KG/M2 | HEART RATE: 53 BPM | HEIGHT: 65 IN | WEIGHT: 156.4 LBS | SYSTOLIC BLOOD PRESSURE: 130 MMHG | DIASTOLIC BLOOD PRESSURE: 64 MMHG

## 2024-09-19 DIAGNOSIS — E03.9 ACQUIRED HYPOTHYROIDISM: ICD-10-CM

## 2024-09-19 DIAGNOSIS — I77.1 SUBCLAVIAN ARTERY STENOSIS (HCC): ICD-10-CM

## 2024-09-19 DIAGNOSIS — E78.2 MIXED HYPERLIPIDEMIA: ICD-10-CM

## 2024-09-19 DIAGNOSIS — M81.0 AGE-RELATED OSTEOPOROSIS WITHOUT CURRENT PATHOLOGICAL FRACTURE: ICD-10-CM

## 2024-09-19 DIAGNOSIS — Z17.0 MALIGNANT NEOPLASM OF LOWER-INNER QUADRANT OF RIGHT BREAST OF FEMALE, ESTROGEN RECEPTOR POSITIVE (HCC): ICD-10-CM

## 2024-09-19 DIAGNOSIS — C50.311 MALIGNANT NEOPLASM OF LOWER-INNER QUADRANT OF RIGHT BREAST OF FEMALE, ESTROGEN RECEPTOR POSITIVE (HCC): ICD-10-CM

## 2024-09-19 DIAGNOSIS — E87.6 HYPOKALEMIA: ICD-10-CM

## 2024-09-19 DIAGNOSIS — I10 PRIMARY HYPERTENSION: Primary | ICD-10-CM

## 2024-09-19 DIAGNOSIS — N18.31 STAGE 3A CHRONIC KIDNEY DISEASE (HCC): ICD-10-CM

## 2024-09-19 PROCEDURE — G2211 COMPLEX E/M VISIT ADD ON: HCPCS | Performed by: FAMILY MEDICINE

## 2024-09-19 PROCEDURE — 99214 OFFICE O/P EST MOD 30 MIN: CPT | Performed by: FAMILY MEDICINE

## 2024-09-19 RX ORDER — LOSARTAN POTASSIUM AND HYDROCHLOROTHIAZIDE 25; 100 MG/1; MG/1
1 TABLET ORAL DAILY
Qty: 90 TABLET | Refills: 3 | Status: SHIPPED | OUTPATIENT
Start: 2024-09-19

## 2024-09-19 NOTE — ASSESSMENT & PLAN NOTE
Lab Results   Component Value Date    EGFR 54 09/17/2024    EGFR 54 03/04/2024    EGFR 48 09/05/2023    CREATININE 1.00 09/17/2024    CREATININE 1.01 03/04/2024    CREATININE 1.12 09/05/2023     Kidney function remains stable.    Recommended to stay well-hydrated.    Avoid NSAIDs, nephrotoxins.  Will continue to monitor labs.

## 2024-09-19 NOTE — ASSESSMENT & PLAN NOTE
Patient has not been taking Potassium chloride 10 mEq on a regular basis.    Recommended to start taking potassium pill daily.    Increase dietary potassium intake.    Recheck potassium level in 7 -10 days.    Orders:    Potassium; Future

## 2024-09-19 NOTE — ASSESSMENT & PLAN NOTE
Patient completed Anastrozole for 5 years in 2023.    Bilateral diagnostic mammogram done in October 2023 showed no mammographic evidence of malignancy.    Patient scheduled bilateral screening mammogram next month.         Patient with schedule flu shot and updated COVID booster next month.    Schedule follow-up visit, Medicare AWV in 6 months.

## 2024-09-19 NOTE — PROGRESS NOTES
Ambulatory Visit  Name: Marianne Betts      : 1950      MRN: [id number]  Encounter Provider: Lucretia Limon MD  Encounter Date: 2024   Encounter department: [facility]    Chief Complaint   Patient presents with    Follow-up     6 month f/u      Health Maintenance   Topic Date Due    Zoster Vaccine (1 of 2) Never done    RSV Vaccine Age 60+ Years (1 - 1-dose 60+ series) Never done    Breast Cancer Survivorship Visit  Never done    COVID-19 Vaccine ( season) 2024    Influenza Vaccine (1) 2024    Breast Cancer Screening: Mammogram  10/23/2024    Depression Screening  2025    Fall Risk  2025    Urinary Incontinence Screening  2025    Medicare Annual Wellness Visit (AWV)  2025    Hepatitis C Screening  Completed    Osteoporosis Screening  Completed    Pneumococcal Vaccine: 65+ Years  Completed    RSV Vaccine age 0-20 Months  Aged Out    HIB Vaccine  Aged Out    IPV Vaccine  Aged Out    Hepatitis A Vaccine  Aged Out    Meningococcal ACWY Vaccine  Aged Out    HPV Vaccine  Aged Out    Colorectal Cancer Screening  Discontinued       Assessment & Plan  Primary hypertension  Blood pressure is adequately controlled.    Continue Losartan/HCTZ 100/25 mg daily.    Follow-up with cardiology Dr. Garcia annually.    Orders:    losartan-hydrochlorothiazide (HYZAAR) 100-25 MG per tablet; Take 1 tablet by mouth daily    Stage 3a chronic kidney disease (HCC)  Lab Results   Component Value Date    EGFR 54 2024    EGFR 54 2024    EGFR 48 2023    CREATININE 1.00 2024    CREATININE 1.01 2024    CREATININE 1.12 2023     Kidney function remains stable.    Recommended to stay well-hydrated.    Avoid NSAIDs, nephrotoxins.  Will continue to monitor labs.       Mixed hyperlipidemia  LDL 73 - at goal.    Continue Rosuvastatin 20 mg daily.         Acquired hypothyroidism  Continue replacement with Levothyroxine current dose.        Age-related osteoporosis without current pathological fracture    Orders:    DXA bone density spine hip and pelvis; Future    Last DEXA scan done in November 2022 showed osteopenia.  Patient stopped Prolia injection due to developing jaw problem.  Recommended to continue weightbearing exercise, calcium with vitamin D supplementation.    Recheck DEXA scan.  Subclavian artery stenosis (HCC)  Continue statin therapy.  Aspirin was d/c by cardiology.         Malignant neoplasm of lower-inner quadrant of right breast of female, estrogen receptor positive (HCC)  Patient completed Anastrozole for 5 years in 2023.    Bilateral diagnostic mammogram done in October 2023 showed no mammographic evidence of malignancy.    Patient scheduled bilateral screening mammogram next month.         Patient with schedule flu shot and updated COVID booster next month.    Schedule follow-up visit, Medicare AWV in 6 months.    Hypokalemia  Patient has not been taking Potassium chloride 10 mEq on a regular basis.    Recommended to start taking potassium pill daily.    Increase dietary potassium intake.    Recheck potassium level in 7 -10 days.    Orders:    Potassium; Future       History of Present Illness     HPI    Patient presents for 6-month follow-up visit.    PMHx: HTN, CKD stage 3, Hypothyroidism, Hyperlipidemia, carotid artery stenosis, left subclavian artery stenosis, osteoporosis, pulmonary nodule, right breast CA, S/P lumpectomy in December 2018.    Patient feels well overall.    Reports no chest pain, shortness of breath, dizziness, heart palpitations.      Reviewed current medications, blood work results from 9/17/2024.    TSH 4.312, creatinine 1.00 - stable.  GFR 54.    Potassium 3.3.  LFTs - within normal range. LDL 73.    HTN - currently taking Losartan/HCTZ 100/25 mg daily.  Followed by cardiology Dr. Garcia annually, was seen in November 2023.      Hyperlipidemia - controlled on Rosuvastatin 20 mg daily.  Reports no  "side effects.      CKD stage 3- kidney function remains stable.    Osteoporosis - DEXA scan done in November 2022 showed osteopenia.    Patient stopped Prolia injection due to developing jaw problem.    She walks, takes vitamin D supplements.      Quit smoking over 20 years ago.    Right breast CA diagnosed in November 2018 - completed 5-year course of Anastrozole  in 2023.      Bilateral diagnostic mammogram done in October 2023 showed no mammographic evidence of malignancy.    Colonoscopy performed by colorectal surgeon Dr. Sharp in August 2022.    Patient denies family history of colon cancer.        Review of Systems   Constitutional:  Negative for activity change, appetite change, chills, fatigue and fever.   HENT:  Negative for congestion, ear pain, hearing loss and sore throat.    Eyes:  Negative for pain, discharge, redness, itching and visual disturbance.   Respiratory:  Negative for cough and shortness of breath.    Cardiovascular:  Negative for chest pain, palpitations and leg swelling.   Gastrointestinal:  Negative for abdominal pain, blood in stool, constipation, diarrhea, nausea and vomiting.   Genitourinary:  Negative for difficulty urinating, dysuria and hematuria.   Musculoskeletal:  Negative for arthralgias, back pain and gait problem.   Skin:  Negative for rash.   Neurological:  Negative for dizziness, syncope and headaches.   Hematological: Negative.    Psychiatric/Behavioral:  Negative for dysphoric mood and sleep disturbance. The patient is not nervous/anxious.            Objective     /64   Pulse (!) 53   Temp 97.5 °F (36.4 °C) (Temporal)   Resp 18   Ht 5' 5\" (1.651 m)   Wt 70.9 kg (156 lb 6.4 oz)   SpO2 95%   BMI 26.03 kg/m²     Physical Exam  Vitals and nursing note reviewed.   Constitutional:       Appearance: Normal appearance.   HENT:      Head: Normocephalic and atraumatic.   Eyes:      Conjunctiva/sclera: Conjunctivae normal.      Pupils: Pupils are equal, round, and " reactive to light.   Neck:      Vascular: Carotid bruit (left carotid bruit) present.   Cardiovascular:      Rate and Rhythm: Regular rhythm. Bradycardia present.      Heart sounds: No murmur heard.  Pulmonary:      Effort: Pulmonary effort is normal.      Breath sounds: Normal breath sounds.   Abdominal:      General: There is no distension.      Palpations: Abdomen is soft.      Tenderness: There is no abdominal tenderness.   Musculoskeletal:         General: No swelling. Normal range of motion.      Cervical back: Normal range of motion and neck supple.      Left lower leg: No edema.      Comments: Mild chronic right ankle swelling   Skin:     General: Skin is warm and dry.      Findings: No rash.   Neurological:      Mental Status: She is alert.   Psychiatric:         Mood and Affect: Mood normal.

## 2024-09-19 NOTE — ASSESSMENT & PLAN NOTE
Orders:    DXA bone density spine hip and pelvis; Future    Last DEXA scan done in November 2022 showed osteopenia.  Patient stopped Prolia injection due to developing jaw problem.  Recommended to continue weightbearing exercise, calcium with vitamin D supplementation.    Recheck DEXA scan.

## 2024-09-19 NOTE — ASSESSMENT & PLAN NOTE
Continue replacement with Levothyroxine current dose.        [Follow-Up: _____] : a [unfilled] follow-up visit

## 2024-09-19 NOTE — ASSESSMENT & PLAN NOTE
Blood pressure is adequately controlled.    Continue Losartan/HCTZ 100/25 mg daily.    Follow-up with cardiology Dr. Garcia annually.    Orders:    losartan-hydrochlorothiazide (HYZAAR) 100-25 MG per tablet; Take 1 tablet by mouth daily

## 2024-09-25 ENCOUNTER — TELEPHONE (OUTPATIENT)
Dept: DERMATOLOGY | Facility: CLINIC | Age: 74
End: 2024-09-25

## 2024-09-25 NOTE — TELEPHONE ENCOUNTER
Called patient to advise her appt with Dr. Motley on 11/20 needs to be rescheduled, due to provider leaving the practice. Spoke with patient and appt was r/s for 12/2 @ 9:40 with Dr. Lynne in [facility] office.

## 2024-10-03 ENCOUNTER — OFFICE VISIT (OUTPATIENT)
Dept: PODIATRY | Facility: CLINIC | Age: 74
End: 2024-10-03

## 2024-10-03 VITALS
SYSTOLIC BLOOD PRESSURE: 127 MMHG | BODY MASS INDEX: 26.33 KG/M2 | HEIGHT: 65 IN | WEIGHT: 158 LBS | HEART RATE: 56 BPM | DIASTOLIC BLOOD PRESSURE: 72 MMHG

## 2024-10-03 DIAGNOSIS — M21.961 DEFORMITY OF METATARSAL BONE OF RIGHT FOOT: Primary | ICD-10-CM

## 2024-10-03 DIAGNOSIS — L84 CORNS: ICD-10-CM

## 2024-10-03 PROCEDURE — NCFTCARE PR NON-COVERED FOOT CARE: Performed by: PODIATRIST

## 2024-10-03 NOTE — PROGRESS NOTES
Patient presents for palliative care.  Patient has a hyperkeratotic lesion beneath the right first metatarsal head due to a plantarflexed ray.  Treatment consisted of nail and lesion trimming.  Patient will be rescheduled in 2 months.  Patient is considering orthotics in the future.

## 2024-10-04 ENCOUNTER — APPOINTMENT (OUTPATIENT)
Dept: LAB | Facility: CLINIC | Age: 74
End: 2024-10-04
Payer: COMMERCIAL

## 2024-10-04 DIAGNOSIS — E87.6 HYPOKALEMIA: ICD-10-CM

## 2024-10-04 DIAGNOSIS — I10 PRIMARY HYPERTENSION: Primary | ICD-10-CM

## 2024-10-04 LAB — POTASSIUM SERPL-SCNC: 3.5 MMOL/L (ref 3.5–5.3)

## 2024-10-04 PROCEDURE — 84132 ASSAY OF SERUM POTASSIUM: CPT

## 2024-10-04 PROCEDURE — 36415 COLL VENOUS BLD VENIPUNCTURE: CPT

## 2024-10-25 ENCOUNTER — HOSPITAL ENCOUNTER (OUTPATIENT)
Dept: RADIOLOGY | Age: 74
Discharge: HOME | End: 2024-10-25
Payer: COMMERCIAL

## 2024-10-25 VITALS — WEIGHT: 158.07 LBS | HEIGHT: 65 IN | BODY MASS INDEX: 26.34 KG/M2

## 2024-10-25 DIAGNOSIS — Z12.31 VISIT FOR SCREENING MAMMOGRAM: ICD-10-CM

## 2024-10-25 PROCEDURE — 77067 SCR MAMMO BI INCL CAD: CPT

## 2024-10-25 PROCEDURE — 77063 BREAST TOMOSYNTHESIS BI: CPT

## 2024-11-08 ENCOUNTER — APPOINTMENT (OUTPATIENT)
Dept: LAB | Facility: CLINIC | Age: 74
End: 2024-11-08
Payer: COMMERCIAL

## 2024-11-08 DIAGNOSIS — I10 PRIMARY HYPERTENSION: ICD-10-CM

## 2024-11-08 DIAGNOSIS — E87.6 HYPOKALEMIA: ICD-10-CM

## 2024-11-08 LAB
ANION GAP SERPL CALCULATED.3IONS-SCNC: 5 MMOL/L (ref 4–13)
BUN SERPL-MCNC: 22 MG/DL (ref 5–25)
CALCIUM SERPL-MCNC: 9.4 MG/DL (ref 8.4–10.2)
CHLORIDE SERPL-SCNC: 105 MMOL/L (ref 96–108)
CO2 SERPL-SCNC: 29 MMOL/L (ref 21–32)
CREAT SERPL-MCNC: 1.02 MG/DL (ref 0.6–1.3)
GFR SERPL CREATININE-BSD FRML MDRD: 53 ML/MIN/1.73SQ M
GLUCOSE SERPL-MCNC: 79 MG/DL (ref 65–140)
POTASSIUM SERPL-SCNC: 3.5 MMOL/L (ref 3.5–5.3)
SODIUM SERPL-SCNC: 139 MMOL/L (ref 135–147)

## 2024-11-08 PROCEDURE — 80048 BASIC METABOLIC PNL TOTAL CA: CPT

## 2024-11-08 PROCEDURE — 36415 COLL VENOUS BLD VENIPUNCTURE: CPT

## 2024-12-02 ENCOUNTER — OFFICE VISIT (OUTPATIENT)
Dept: DERMATOLOGY | Facility: CLINIC | Age: 74
End: 2024-12-02
Payer: COMMERCIAL

## 2024-12-02 VITALS — TEMPERATURE: 98.4 F | WEIGHT: 155.4 LBS | BODY MASS INDEX: 25.86 KG/M2

## 2024-12-02 DIAGNOSIS — L85.3 XEROSIS OF SKIN: ICD-10-CM

## 2024-12-02 DIAGNOSIS — D22.9 MULTIPLE MELANOCYTIC NEVI: Primary | ICD-10-CM

## 2024-12-02 DIAGNOSIS — L82.1 SEBORRHEIC KERATOSES: ICD-10-CM

## 2024-12-02 DIAGNOSIS — D18.01 CHERRY ANGIOMA: ICD-10-CM

## 2024-12-02 DIAGNOSIS — L81.4 LENTIGINES: ICD-10-CM

## 2024-12-02 PROCEDURE — 99213 OFFICE O/P EST LOW 20 MIN: CPT | Performed by: REGISTERED NURSE

## 2024-12-02 NOTE — PATIENT INSTRUCTIONS
SEBORRHEIC KERATOSES  - Relevant exam: Scattered over the trunk/extremities are waxy brown to black plaques and papules with stuck on appearance  - Exam and clinical history consistent with seborrheic keratoses  - Counseled that these are benign growths that do not require treatment  - Counseled that removal of lesions is considered cosmetic and so would incur a fee should patient elect to move forward.   - Patient to hold on treatments for now but will inform us should they desire additional treatments    MELANOCYTIC NEVI  -Relevant exam: Scattered over the trunk/extremities are homogenously pigmented brown macules and papules. ELM performed and without concerning findings.  - Exam and clinical history consistent with melanocytic nevi  - Educated on the ABCDE's of melanoma; handout provided  - Counseled to return to clinic prior to scheduled appointment should any of these lesions change or should any new lesions of concern arise  - Counseled on use of sun protection daily. Reviewed latest FDA sunscreen guidelines, including use of broad spectrum (UVA and UVB blocking) sunscreen or sun protective clothing with SPF 30-50 every 2-3 hours and reapplied after exposure to water; use of photoprotective clothing, including a broad brim hat and UPF rated clothing if outdoors for several hours; avoid use of tanning beds as these pose significant risk for melanoma and skin cancer.    LENTIGINES  OTHER SKIN CHANGES DUE TO CHRONIC EXPOSURE TO NONIONIZING RADIATION  - Relevant exam: Over sun exposed areas are brown macules. ELM performed and without concerning findings.  - Exam and clinical history consistent with lentigines.  - Educated that these are indicative of prior sun exposure.   - Counseled to return to clinic prior to scheduled appointment should any of these lesions change or should any new lesions of concern arise.  - Recommended use of sunscreen as above and below.  - Counseled on use of sun protection daily.  "Reviewed latest FDA sunscreen guidelines, including use of broad spectrum (UVA and UVB blocking) sunscreen or sun protective clothing with SPF 30-50 every 2-3 hours and reapplied after exposure to water; use of photoprotective clothing, including a broad brim hat and UPF rated clothing if outdoors for several hours; avoid use of tanning beds as these pose significant risk for melanoma and skin cancer.    CHERRY ANGIOMAS  - Relevant exam: Scattered over the trunk/extremities are red papules  - Exam and clinical history consistent with cherry angiomas  - Educated that these are benign  - Educated that removal is considered aesthetic and would incur a fee.  - Patient does not wish to pursue removal at this time but will contact us should this change.    XEROSIS (\"DRY SKIN\")    Assessment and Plan:  - History and physical consistent with xerosis  - Educated that dry skin is often exacerbated by low humidity conditions and during change of seasons  - Educated that gentle skin care and consistent use of emollients are the mainstay of treatment. Recommended use of occlusive emollient, such as vaseline, aquaphor or aveeno eczema balm. If unable to tolerate, noted that thick white cream is next best.   Based on a thorough discussion of this condition and the management approach to it (including a comprehensive discussion of the known risks, side effects and potential benefits of treatment), the patient (family) agrees to implement the following specific plan:  After a shower, when skin is still lightly wet, apply a good moisturizer, such as Cetaphil, CeraVe, or Eucerin.   Start daily emollient to moist skin.  Take luke warm showers (not hot)  Avoid scratching.   Transition to fragrance free, non-irritating skin care as possible.      Tip for relief: If itchy or uncomfortable, placing emollient (like vaseline) in the fridge is a great trick as the cool sensation is soothing.        "

## 2024-12-02 NOTE — PROGRESS NOTES
"[facility] Dermatology Clinic Note     Patient Name: Marianne Betts  Encounter Date: 12/2/24     Have you been cared for by a [facility] Dermatologist in the last 3 years and, if so, which description applies to you?    NO.   I am considered a \"new\" patient and must complete all patient intake questions. I am FEMALE/of child-bearing potential.    REVIEW OF SYSTEMS:  Have you recently had or currently have any of the following? Recent fever or chills? No  Any non-healing wound? No  Are you pregnant or planning to become pregnant? No  Are you currently or planning to be nursing or breast feeding? No   PAST MEDICAL HISTORY:  Have you personally ever had or currently have any of the following?  If \"YES,\" then please provide more detail. Skin cancer (such as Melanoma, Basal Cell Carcinoma, Squamous Cell Carcinoma?  No  Tuberculosis, HIV/AIDS, Hepatitis B or C: No  Radiation Treatment No   HISTORY OF IMMUNOSUPPRESSION:   Do you have a history of any of the following:  Systemic Immunosuppression such as Diabetes, Biologic or Immunotherapy, Chemotherapy, Organ Transplantation, Bone Marrow Transplantation or Prednsione?  No    Answering \"YES\" requires the addition of the dotphrase \"IMMUNOSUPPRESSED\" as the first diagnosis of the patient's visit.   FAMILY HISTORY:  Any \"first degree relatives\" (parent, brother, sister, or child) with the following?    Skin Cancer, Pancreatic or Other Cancer? YES, grandmother and mom-breast cancer, father-bladder   PATIENT EXPERIENCE:    Do you want the Dermatologist to perform a COMPLETE skin exam today including a clinical examination under the \"bra and underwear\" areas?  Yes  If necessary, do we have your permission to call and leave a detailed message on your Preferred Phone number that includes your specific medical information?  Yes      Allergies   Allergen Reactions    Sulfa Antibiotics Dizziness     Other reaction(s): feeling \"off-balance\"  Category: Adverse Reaction;       Current " Outpatient Medications:     albuterol (ProAir HFA) 90 mcg/act inhaler, Inhale 2 puffs every 6 (six) hours as needed for wheezing, Disp: 8.5 g, Rfl: 0    azelastine (OPTIVAR) 0.05 % ophthalmic solution, Administer 1 drop to both eyes 2 (two) times a day, Disp: 6 mL, Rfl: 0    Cholecalciferol (VITAMIN D3) 2000 units TABS, Take 1 tablet by mouth daily, Disp: , Rfl:     levothyroxine 112 mcg tablet, Take 1 tab. daily Mon - Sat and 1/2 tab. on Sunday, Disp: 90 tablet, Rfl: 3    losartan-hydrochlorothiazide (HYZAAR) 100-25 MG per tablet, Take 1 tablet by mouth daily, Disp: 90 tablet, Rfl: 3    potassium chloride (MICRO-K) 10 MEQ CR capsule, take 1 capsule by mouth once daily, Disp: 30 capsule, Rfl: 5    rosuvastatin (CRESTOR) 20 MG tablet, Take 1 tablet (20 mg total) by mouth daily, Disp: 90 tablet, Rfl: 3    Sodium Fluoride 5000 PPM 1.1 % PSTE, BRUSH NORMALLY AND EXPECTORATE BUT DO NOT RINSE AFTER. DO NOT EAT...  (REFER TO PRESCRIPTION NOTES)., Disp: , Rfl:     mupirocin (BACTROBAN) 2 % ointment, Apply topically 3 (three) times a day (Patient not taking: Reported on 11/9/2023), Disp: 22 g, Rfl: 0          Whom besides the patient is providing clinical information about today's encounter?   NO ADDITIONAL HISTORIAN (patient alone provided history)    Physical Exam and Assessment/Plan by Diagnosis:    SEBORRHEIC KERATOSES  - Relevant exam: Scattered over the trunk/extremities are waxy brown to black plaques and papules with stuck on appearance  - Exam and clinical history consistent with seborrheic keratoses  - Counseled that these are benign growths that do not require treatment  - Counseled that removal of lesions is considered cosmetic and so would incur a fee should patient elect to move forward.   - Patient to hold on treatments for now but will inform us should they desire additional treatments    MELANOCYTIC NEVI  -Relevant exam: Scattered over the trunk/extremities are homogenously pigmented brown macules and  papules. ELM performed and without concerning findings.  - Exam and clinical history consistent with melanocytic nevi  - Educated on the ABCDE's of melanoma; handout provided  - Counseled to return to clinic prior to scheduled appointment should any of these lesions change or should any new lesions of concern arise  - Counseled on use of sun protection daily. Reviewed latest FDA sunscreen guidelines, including use of broad spectrum (UVA and UVB blocking) sunscreen or sun protective clothing with SPF 30-50 every 2-3 hours and reapplied after exposure to water; use of photoprotective clothing, including a broad brim hat and UPF rated clothing if outdoors for several hours; avoid use of tanning beds as these pose significant risk for melanoma and skin cancer.    LENTIGINES  OTHER SKIN CHANGES DUE TO CHRONIC EXPOSURE TO NONIONIZING RADIATION  - Relevant exam: Over sun exposed areas are brown macules. ELM performed and without concerning findings.  - Exam and clinical history consistent with lentigines.  - Educated that these are indicative of prior sun exposure.   - Counseled to return to clinic prior to scheduled appointment should any of these lesions change or should any new lesions of concern arise.  - Recommended use of sunscreen as above and below.  - Counseled on use of sun protection daily. Reviewed latest FDA sunscreen guidelines, including use of broad spectrum (UVA and UVB blocking) sunscreen or sun protective clothing with SPF 30-50 every 2-3 hours and reapplied after exposure to water; use of photoprotective clothing, including a broad brim hat and UPF rated clothing if outdoors for several hours; avoid use of tanning beds as these pose significant risk for melanoma and skin cancer.    CHERRY ANGIOMAS  - Relevant exam: Scattered over the trunk/extremities are red papules  - Exam and clinical history consistent with cherry angiomas  - Educated that these are benign  - Educated that removal is considered  "aesthetic and would incur a fee.  - Patient does not wish to pursue removal at this time but will contact us should this change.    XEROSIS (\"DRY SKIN\")    Physical Exam:  Anatomic Location Affected:  extremities  Morphological Description:  xerotic papules and plaques  Pertinent Positives:  Pertinent Negatives:    Additional History of Present Condition:    - Current treatments: [employer] moisturizer    Assessment and Plan:  - History and physical consistent with xerosis  - Educated that dry skin is often exacerbated by low humidity conditions and during change of seasons  - Educated that gentle skin care and consistent use of emollients are the mainstay of treatment. Recommended use of occlusive emollient, such as vaseline, aquaphor or aveeno eczema balm. If unable to tolerate, noted that thick white cream is next best.   Based on a thorough discussion of this condition and the management approach to it (including a comprehensive discussion of the known risks, side effects and potential benefits of treatment), the patient (family) agrees to implement the following specific plan:  After a shower, when skin is still lightly wet, apply a good moisturizer, such as Cetaphil, CeraVe, or Eucerin.   Start daily emollient to moist skin.  Take luke warm showers (not hot)  Avoid scratching.   Transition to fragrance free, non-irritating skin care as possible.      Tip for relief: If itchy or uncomfortable, placing emollient (like vaseline) in the fridge is a great trick as the cool sensation is soothing.               Scribe Attestation      I,:  Lexi Gordon am acting as a scribe while in the presence of the attending physician.:       I,:  Minor Lynne MD personally performed the services described in this documentation    as scribed in my presence.:            "

## 2024-12-05 ENCOUNTER — OFFICE VISIT (OUTPATIENT)
Dept: PODIATRY | Facility: CLINIC | Age: 74
End: 2024-12-05

## 2024-12-05 VITALS
HEART RATE: 57 BPM | BODY MASS INDEX: 25.79 KG/M2 | SYSTOLIC BLOOD PRESSURE: 136 MMHG | RESPIRATION RATE: 18 BRPM | WEIGHT: 155 LBS | DIASTOLIC BLOOD PRESSURE: 83 MMHG

## 2024-12-05 DIAGNOSIS — L84 CORNS: Primary | ICD-10-CM

## 2024-12-05 DIAGNOSIS — L60.3 NAIL DYSTROPHY: ICD-10-CM

## 2024-12-05 PROCEDURE — NCFTCARE PR NON-COVERED FOOT CARE: Performed by: PODIATRIST

## 2024-12-05 NOTE — PROGRESS NOTES
Patient presents for palliative footcare.  No acute disorder noted.  Pedal pulses are palpable.  Treatment consists of nail and lesion trimming.

## 2024-12-20 ENCOUNTER — OFFICE VISIT (OUTPATIENT)
Dept: CARDIOLOGY CLINIC | Facility: CLINIC | Age: 74
End: 2024-12-20
Payer: COMMERCIAL

## 2024-12-20 VITALS
HEART RATE: 59 BPM | SYSTOLIC BLOOD PRESSURE: 138 MMHG | DIASTOLIC BLOOD PRESSURE: 74 MMHG | WEIGHT: 153 LBS | BODY MASS INDEX: 25.46 KG/M2

## 2024-12-20 DIAGNOSIS — I77.1 SUBCLAVIAN ARTERY STENOSIS (HCC): Primary | ICD-10-CM

## 2024-12-20 DIAGNOSIS — I10 PRIMARY HYPERTENSION: ICD-10-CM

## 2024-12-20 DIAGNOSIS — R60.0 LOWER EXTREMITY EDEMA: ICD-10-CM

## 2024-12-20 PROCEDURE — 93000 ELECTROCARDIOGRAM COMPLETE: CPT | Performed by: INTERNAL MEDICINE

## 2024-12-20 PROCEDURE — 99214 OFFICE O/P EST MOD 30 MIN: CPT | Performed by: INTERNAL MEDICINE

## 2024-12-20 RX ORDER — ROSUVASTATIN CALCIUM 20 MG/1
20 TABLET, COATED ORAL DAILY
Qty: 90 TABLET | Refills: 3 | Status: SHIPPED | OUTPATIENT
Start: 2024-12-20

## 2024-12-20 NOTE — PROGRESS NOTES
Cardiology Outpatient Follow-Up Note - Marianne Betts 74 y.o. female MRN: [id number]      Assessment/Plan:  1. Primary hypertension  Controlled on current therapy.   Goal BP < 140/90. Contiue Hyzaar 100-25 mg daily.       2. Subclavian artery stenosis (HCC)  Improved on serial studies with statin therapy. Continue rosuvastatin 20 mg daily. Routine surveillance is no longer necessary.    3. Lower extremity edema  With associated tingling and coldness. Appears vascular intact on exam today bilateral. Check TOMI.  - VAS TOMI and waveform analysis, multiple levels; Future      We will see Marianne Betts back in 12 months for routine follow-up.    Subjective:     HPI: Marianne Betts is a 74 y.o. year old female with HTN, PAD (L subclavian artery stenosis), CKD3a, HLD, presenting for follow-up.     She complains of uncomfortably cold feet lately. She gets tingling in her feet. It began 6 months ago but worse the last 2 months.       Cardiac Testing:    VAS carotid 3/22/23   CONCLUSION:  Impression  RIGHT:  There is <50% stenosis noted in the internal carotid artery. Plaque is  heterogenous and irregular. Vertebral artery flow is antegrade. There is no significant subclavian artery disease.  LEFT:  There is <50% stenosis noted in the internal carotid artery. Plaque is  heterogenous and irregular Vertebral artery flow is antegrade. There is no significant subclavian artery disease.    VAS Carotid 3/14/22   Impression  RIGHT:  There is <50% stenosis noted in the internal carotid artery. Plaque is  heterogenous/calcified and irregular.Vertebral artery flow is antegrade. There is no significant subclavian artery disease.  LEFT:  There is <50% stenosis noted in the internal carotid artery. Plaque is  heterogenous and irregular. Vertebral artery flow is antegrade. There is evidence suggestive of >50% stenosis in the proximal subclavian artery.      Echo 8/20/23   Interpretation Summary     Left Ventricle: Left ventricular cavity  size is normal. Wall thickness is normal. The left ventricular ejection fraction is 60%. Systolic function is normal. Wall motion is normal. Diastolic function is normal.    Right Ventricle: Right ventricular cavity size is normal. Systolic function is normal.    Mitral Valve: There is trace regurgitation.    Tricuspid Valve: There is mild regurgitation.    Prior TTE study available for comparison. Prior study date: 5/5/2022. No significant changes noted compared to the prior study.     EKGs, personally reviewed:  12/20/24 - Sinus bradycardia, 59 bpm, otherwise normal study    Relevant Labs & Results:  Lipid panel 9/5/23 -- LDL 66 mg/dL  CMP 9/5/23 -- K 3.9, Cr 1.12  CBC 9/5/23 -- WNL    BMP 11/8/2024--potassium 3.5, creatinine 1.02, GFR 53  Lipid panel 9/17/2024--, TG 66, HDL 57, LDL 73 mg/dL    ROS:  Review of Systems:  Review of Systems    Objective:     Vitals:   Vitals:    12/20/24 0858   BP: 138/74   BP Location: Left arm   Patient Position: Sitting   Cuff Size: Standard   Pulse: 59   Weight: 69.4 kg (153 lb)    Body surface area is 1.77 meters squared.  Wt Readings from Last 3 Encounters:   12/20/24 69.4 kg (153 lb)   12/05/24 70.3 kg (155 lb)   12/02/24 70.5 kg (155 lb 6.4 oz)       Physical Exam:  General: Marianne Betts is a well appearing female, in no acute distress, sitting comfortably  HEENT: moist mucous membranes, EOMI  Neck:  No JVD, supple, trachea midline  Cardiovascular: unremarkable S1/S2, regular rate and rhythm, no murmurs, rubs or gallops  Pulmonary: normal respiratory effort, CTAB  Abdomen: soft and nondistended  Extremities: LE cool bilaterally, bilateral PT +1, bilateral DP +2, bilateral  LE cap refill brisk <2sec  Neuro: no focal motor deficits, AAOx3 (person, place, time)  Psych: Normal mood and affect, cooperative      Medications (at the START of this encounter):  Outpatient Medications Prior to Visit   Medication Sig Dispense Refill    albuterol (ProAir HFA) 90 mcg/act inhaler  "Inhale 2 puffs every 6 (six) hours as needed for wheezing 8.5 g 0    azelastine (OPTIVAR) 0.05 % ophthalmic solution Administer 1 drop to both eyes 2 (two) times a day 6 mL 0    Cholecalciferol (VITAMIN D3) 2000 units TABS Take 1 tablet by mouth daily      levothyroxine 112 mcg tablet Take 1 tab. daily Mon - Sat and 1/2 tab. on Sunday 90 tablet 3    losartan-hydrochlorothiazide (HYZAAR) 100-25 MG per tablet Take 1 tablet by mouth daily 90 tablet 3    potassium chloride (MICRO-K) 10 MEQ CR capsule take 1 capsule by mouth once daily 30 capsule 5    rosuvastatin (CRESTOR) 20 MG tablet Take 1 tablet (20 mg total) by mouth daily 90 tablet 3    Sodium Fluoride 5000 PPM 1.1 % PSTE BRUSH NORMALLY AND EXPECTORATE BUT DO NOT RINSE AFTER. DO NOT EAT...  (REFER TO PRESCRIPTION NOTES).      mupirocin (BACTROBAN) 2 % ointment Apply topically 3 (three) times a day (Patient not taking: Reported on 11/9/2023) 22 g 0     No facility-administered medications prior to visit.             This note was completed in part utilizing [employer] voice recognition software. Grammatical errors, random word insertion, spelling mistakes, occasional wrong word or \"sound-alike\" substitutions and incomplete sentences may be an occasional consequence of the system secondary to software limitations, ambient noise and hardware issues. At the time of dictation, efforts were made to edit, clarify and /or correct errors.  Please read the chart carefully and recognize, using context, where substitutions have occurred.  If you have any questions or concerns about the context, text or information contained within the body of this dictation, please contact myself, the provider, for further clarification.    "

## 2025-02-13 ENCOUNTER — HOSPITAL ENCOUNTER (OUTPATIENT)
Dept: RADIOLOGY | Age: 75
Discharge: HOME | End: 2025-02-13
Payer: COMMERCIAL

## 2025-02-13 VITALS — HEIGHT: 65 IN | WEIGHT: 153 LBS | BODY MASS INDEX: 25.49 KG/M2

## 2025-02-13 DIAGNOSIS — M81.0 AGE-RELATED OSTEOPOROSIS WITHOUT CURRENT PATHOLOGICAL FRACTURE: ICD-10-CM

## 2025-02-13 PROCEDURE — 77080 DXA BONE DENSITY AXIAL: CPT

## 2025-02-17 ENCOUNTER — RESULTS FOLLOW-UP (OUTPATIENT)
Dept: FAMILY MEDICINE CLINIC | Facility: CLINIC | Age: 75
End: 2025-02-17

## 2025-02-17 DIAGNOSIS — E87.6 HYPOKALEMIA: ICD-10-CM

## 2025-02-17 RX ORDER — POTASSIUM CHLORIDE 750 MG/1
10 CAPSULE, EXTENDED RELEASE ORAL DAILY
Qty: 90 CAPSULE | Refills: 1 | Status: SHIPPED | OUTPATIENT
Start: 2025-02-17

## 2025-02-18 ENCOUNTER — OFFICE VISIT (OUTPATIENT)
Dept: FAMILY MEDICINE CLINIC | Facility: CLINIC | Age: 75
End: 2025-02-18
Payer: COMMERCIAL

## 2025-02-18 VITALS
DIASTOLIC BLOOD PRESSURE: 78 MMHG | OXYGEN SATURATION: 98 % | HEIGHT: 65 IN | BODY MASS INDEX: 25.76 KG/M2 | TEMPERATURE: 97 F | WEIGHT: 154.6 LBS | HEART RATE: 59 BPM | RESPIRATION RATE: 18 BRPM | SYSTOLIC BLOOD PRESSURE: 142 MMHG

## 2025-02-18 DIAGNOSIS — M81.0 AGE-RELATED OSTEOPOROSIS WITHOUT CURRENT PATHOLOGICAL FRACTURE: Primary | ICD-10-CM

## 2025-02-18 DIAGNOSIS — N18.31 STAGE 3A CHRONIC KIDNEY DISEASE (HCC): ICD-10-CM

## 2025-02-18 DIAGNOSIS — E03.9 ACQUIRED HYPOTHYROIDISM: ICD-10-CM

## 2025-02-18 DIAGNOSIS — Z17.0 MALIGNANT NEOPLASM OF LOWER-INNER QUADRANT OF RIGHT BREAST OF FEMALE, ESTROGEN RECEPTOR POSITIVE (HCC): ICD-10-CM

## 2025-02-18 DIAGNOSIS — E78.2 MIXED HYPERLIPIDEMIA: ICD-10-CM

## 2025-02-18 DIAGNOSIS — C50.311 MALIGNANT NEOPLASM OF LOWER-INNER QUADRANT OF RIGHT BREAST OF FEMALE, ESTROGEN RECEPTOR POSITIVE (HCC): ICD-10-CM

## 2025-02-18 PROCEDURE — G2211 COMPLEX E/M VISIT ADD ON: HCPCS | Performed by: FAMILY MEDICINE

## 2025-02-18 PROCEDURE — 99214 OFFICE O/P EST MOD 30 MIN: CPT | Performed by: FAMILY MEDICINE

## 2025-02-18 NOTE — ASSESSMENT & PLAN NOTE
Continue levothyroxine 112 mcg daily.   Orders:    Comprehensive metabolic panel; Future    Lipid Panel with Direct LDL reflex; Future    TSH, 3rd generation with Free T4 reflex; Future

## 2025-02-18 NOTE — PROGRESS NOTES
Name: Marianne Betts      : 1950      MRN: [id number]  Encounter Provider: Leodan Schmitt MD  Encounter Date: 2025   Encounter department: [facility]  :  Assessment & Plan  Age-related osteoporosis without current pathological fracture  Reviewed Dexa 2025 report.   Advised pt to take calcium 600mg daily. Continue vit D 2000IU daily.   Weight bearing exercise.  Cannot tolerate fosamax or prolia. Refer to endocrinology.   Orders:    Ambulatory Referral to Endocrinology; Future    Comprehensive metabolic panel; Future    Lipid Panel with Direct LDL reflex; Future    TSH, 3rd generation with Free T4 reflex; Future    Malignant neoplasm of lower-inner quadrant of right breast of female, estrogen receptor positive (HCC)  10/2024 mammogram negative. FU surg/onc.        Stage 3a chronic kidney disease (HCC)  Lab Results   Component Value Date    EGFR 53 2024    EGFR 54 2024    EGFR 54 2024    CREATININE 1.02 2024    CREATININE 1.00 2024    CREATININE 1.01 2024     Keep hydrated. Avoid motrin/ibuprofen/aleve NSAIDs nephrotoxic agents.     Orders:    Comprehensive metabolic panel; Future    Lipid Panel with Direct LDL reflex; Future    TSH, 3rd generation with Free T4 reflex; Future    Acquired hypothyroidism  Continue levothyroxine 112 mcg daily.   Orders:    Comprehensive metabolic panel; Future    Lipid Panel with Direct LDL reflex; Future    TSH, 3rd generation with Free T4 reflex; Future    Mixed hyperlipidemia    Orders:    Comprehensive metabolic panel; Future    Lipid Panel with Direct LDL reflex; Future    TSH, 3rd generation with Free T4 reflex; Future           History of Present Illness   HPI    Pt is here with her .   Hx of HTN, CKD3a, hyperlipidemia, hypothyroidism, hx of breast cancer, carotid artery stenosis.   2025 Dexa showed T-2.6 significant decrease from 2022.   Pt states she was on fosamax before.   Then, she was on  "prolia for 10 years. She stopped prolia around 2022/2023 due to developing jaw problem.   Hx of fracture of right foot in 2022.   She is on vit D every day. She does not take calcium supplement.       Review of Systems   Constitutional:  Negative for appetite change, chills and fever.   HENT:  Negative for congestion, ear pain, sinus pain and sore throat.    Eyes:  Negative for discharge and itching.   Respiratory:  Negative for apnea, cough, chest tightness, shortness of breath and wheezing.    Cardiovascular:  Negative for chest pain, palpitations and leg swelling.   Gastrointestinal:  Negative for abdominal pain, anal bleeding, constipation, diarrhea, nausea and vomiting.   Endocrine: Negative for cold intolerance, heat intolerance and polyuria.   Genitourinary:  Negative for difficulty urinating and dysuria.   Musculoskeletal:  Negative for arthralgias, back pain and myalgias.   Skin:  Negative for rash.   Neurological:  Negative for dizziness and headaches.   Psychiatric/Behavioral:  Negative for agitation.        Objective   /78   Pulse 59   Temp (!) 97 °F (36.1 °C) (Tympanic)   Resp 18   Ht 5' 4.5\" (1.638 m)   Wt 70.1 kg (154 lb 9.6 oz)   SpO2 98%   BMI 26.13 kg/m²      Physical Exam  Constitutional:       General: She is not in acute distress.     Appearance: She is well-developed.   HENT:      Head: Normocephalic.   Eyes:      General:         Right eye: No discharge.         Left eye: No discharge.      Conjunctiva/sclera: Conjunctivae normal.   Neck:      Thyroid: No thyromegaly.   Cardiovascular:      Rate and Rhythm: Normal rate and regular rhythm.      Heart sounds: Normal heart sounds. No murmur heard.     No friction rub. No gallop.   Pulmonary:      Effort: Pulmonary effort is normal. No respiratory distress.      Breath sounds: Normal breath sounds. No wheezing or rales.   Chest:      Chest wall: No tenderness.   Abdominal:      General: Bowel sounds are normal. There is no distension. "      Palpations: Abdomen is soft. There is no mass.      Tenderness: There is no abdominal tenderness. There is no guarding or rebound.   Musculoskeletal:         General: No tenderness or deformity. Normal range of motion.      Cervical back: Normal range of motion.      Right lower leg: Edema present.   Lymphadenopathy:      Cervical: No cervical adenopathy.   Neurological:      Mental Status: She is alert.

## 2025-02-18 NOTE — ASSESSMENT & PLAN NOTE
Lab Results   Component Value Date    EGFR 53 11/08/2024    EGFR 54 09/17/2024    EGFR 54 03/04/2024    CREATININE 1.02 11/08/2024    CREATININE 1.00 09/17/2024    CREATININE 1.01 03/04/2024     Keep hydrated. Avoid motrin/ibuprofen/aleve NSAIDs nephrotoxic agents.     Orders:    Comprehensive metabolic panel; Future    Lipid Panel with Direct LDL reflex; Future    TSH, 3rd generation with Free T4 reflex; Future

## 2025-03-07 ENCOUNTER — OFFICE VISIT (OUTPATIENT)
Dept: URGENT CARE | Age: 75
End: 2025-03-07
Payer: COMMERCIAL

## 2025-03-07 VITALS
SYSTOLIC BLOOD PRESSURE: 108 MMHG | OXYGEN SATURATION: 97 % | HEART RATE: 77 BPM | WEIGHT: 153.1 LBS | RESPIRATION RATE: 20 BRPM | HEIGHT: 65 IN | TEMPERATURE: 96.4 F | BODY MASS INDEX: 25.51 KG/M2 | DIASTOLIC BLOOD PRESSURE: 55 MMHG

## 2025-03-07 DIAGNOSIS — J06.9 VIRAL URI WITH COUGH: Primary | ICD-10-CM

## 2025-03-07 PROCEDURE — 99213 OFFICE O/P EST LOW 20 MIN: CPT | Performed by: PHYSICIAN ASSISTANT

## 2025-03-07 NOTE — PROGRESS NOTES
[facility]        NAME: Marianne Betts is a 75 y.o. female  : 1950    MRN: [id number]  DATE: 2025  TIME: 3:45 PM    Assessment and Plan   Viral URI with cough [J06.9]  1. Viral URI with cough        Symptoms and examination consistent with viral URI.  Recommend symptomatic and supportive care.    Medical Decision Making     PROBLEM: 1 acute, uncomplicated illness or injury    DATA: None     RISK: Over-the-counter medication(s)    TIME: 15 minutes     Patient Instructions     Patient Instructions   Nearly all of upper respiratory infections in adults are the result of viruses. These viruses include COVID, flu, RSV, adenoviruses and other coronaviruses. Antibiotics do not treat viruses and are not recommended or indicated at this time.   Recommend Coricidin HBP for sinus congestion along with saline rinses.   Salt water gargles and over the counter lozenges as needed for sore throat symptoms  Recommend Mucinex DM for cough.   Also may consider over the counter allergy medications such as Allegra-D and Zyrtec.   If symptoms persist for 10+ days, follow-up with primary care provider or return to clinic to discuss appropriateness of antibiotics.   Vaccination is important to help prevent the spread of disease and infants. Vaccines are available for COVID and influenza for ages 6 months and older. Please discuss scheduling vaccines with your PCP.    If symptoms worsen or new symptoms develop, report to the emergency department immediately      Follow up with PCP in 3-5 days.  Proceed to  ER if symptoms worsen.    If tests have been performed at [facility], our office will contact you with results if changes need to be made to the care plan discussed with you at the visit. You can review your full results on [facility].     Chief Complaint     Chief Complaint   Patient presents with    Sore Throat     Started on Monday. Has an on and off sore throat. Has a history of sinus infections.      Cough     Yellow mucus.     Dizziness    Shortness of Breath     Chest tightness.          History of Present Illness       75 year old female presents with runny nose, sinus congestion, sore throat, and dry cough since Monday. Pt denies fever and chills, notes SOBE and intermittent lightheadedness. Has tried Tylenol, concerned because snot became yellow today.      Sore Throat   Associated symptoms include congestion, coughing and shortness of breath.   Cough  This is a new problem. The current episode started in the past 7 days. The problem has been waxing and waning. The problem occurs hourly. The cough is Productive of sputum. Associated symptoms include chills, nasal congestion, postnasal drip, rhinorrhea, a sore throat and shortness of breath. Pertinent negatives include no chest pain. The symptoms are aggravated by lying down.   Dizziness  Associated symptoms include chills, congestion, coughing and a sore throat. Pertinent negatives include no chest pain.   Shortness of Breath  Associated symptoms include coughing, dizziness, rhinorrhea and a sore throat. Pertinent negatives include no chest pain.       Review of Systems   Review of Systems   Constitutional:  Positive for chills.   HENT:  Positive for congestion, postnasal drip, rhinorrhea and sore throat.    Respiratory:  Positive for cough and shortness of breath.    Cardiovascular:  Negative for chest pain.   Neurological:  Positive for dizziness.         Current Medications       Current Outpatient Medications:     Cholecalciferol (VITAMIN D3) 2000 units TABS, Take 1 tablet by mouth daily, Disp: , Rfl:     levothyroxine 112 mcg tablet, Take 1 tab. daily Mon - Sat and 1/2 tab. on Sunday, Disp: 90 tablet, Rfl: 3    losartan-hydrochlorothiazide (HYZAAR) 100-25 MG per tablet, Take 1 tablet by mouth daily, Disp: 90 tablet, Rfl: 3    potassium chloride (MICRO-K) 10 MEQ CR capsule, Take 1 capsule (10 mEq total) by mouth daily, Disp: 90 capsule, Rfl: 1     rosuvastatin (CRESTOR) 20 MG tablet, Take 1 tablet (20 mg total) by mouth daily, Disp: 90 tablet, Rfl: 3    Sodium Fluoride 5000 PPM 1.1 % PSTE, BRUSH NORMALLY AND EXPECTORATE BUT DO NOT RINSE AFTER. DO NOT EAT...  (REFER TO PRESCRIPTION NOTES). (Patient not taking: Reported on 2/18/2025), Disp: , Rfl:     Current Allergies     Allergies as of 03/07/2025 - Reviewed 03/07/2025   Allergen Reaction Noted    Sulfa antibiotics Dizziness 06/19/2012            The following portions of the patient's history were reviewed and updated as appropriate: allergies, current medications, past family history, past medical history, past social history, past surgical history and problem list.     Past Medical History:   Diagnosis Date    Acute pain of left shoulder     History    Acute recurrent maxillary sinusitis     History    Acute upper respiratory infection     History    BRCA negative 11/12/2018    Inv[facility]ae    BRCA1 negative     BRCA2 negative     Breast cancer (HCC) 11/01/2018    Right    Callus     Cancer (HCC)     Carotid artery narrowing     stenosis    Disease of thyroid gland     [place]     History of Graves' disease     status post 131 treatment    History of lateral epicondylitis of left elbow     History of onychomycosis     of toenail    History of perforation of tympanic membrane     Hyperlipidemia     Hypertension     Ingrown toenail     Left otitis media     History    Medicare annual wellness visit, subsequent        Past Surgical History:   Procedure Laterality Date    APPENDECTOMY  1955    BREAST BIOPSY Right 11/01/2018    U/S BX    BREAST LUMPECTOMY Right 12/18/2018    Procedure: BREAST LUMPECTOMY; BREAST NEEDLE LOCALIZATION (NEEDLE LOC AT 0700);  Surgeon: Mateus Quinteros MD;  Location: [facility];  Service: Surgical Oncology    BREAST LUMPECTOMY      ELBOW FRACTURE SURGERY  09/1960    Open Treatment of Fracture of proximal Radius    LYMPH NODE BIOPSY Right 12/18/2018    Procedure: SENTINEL LYMPH NODE BIOPSY;  "LYMPHATIC MAPPING WITH BLUE DYE AND RADIOACTIVE DYE (INJECT AT 0800 BY DR QUINTEROS IN THE OR);  Surgeon: Mateus Quinteros MD;  Location: [facility];  Service: Surgical Oncology    SINUS SURGERY Left 1996    nasal polyps(?)-large papilloma left maxillary sinus with extension into posterior nasopharynx completed by Dr Estrada.    THYROID CYST EXCISION      ablation    TONSILLECTOMY AND ADENOIDECTOMY      US BREAST NEEDLE LOC RIGHT Right 12/18/2018    US GUIDED BREAST BIOPSY RIGHT COMPLETE Right 11/01/2018       Family History   Problem Relation Age of Onset    Breast cancer Mother 58    Pancreatic cancer Mother 64    Cancer Mother     Aortic aneurysm Father     No Known Problems Sister     No Known Problems Daughter     Breast cancer Maternal Grandmother     No Known Problems Son     Aortic aneurysm Family         Abdominal    Breast cancer Family     No Known Problems Maternal Aunt     No Known Problems Maternal Aunt          Medications have been verified.        Objective   /55   Pulse 77   Temp (!) 96.4 °F (35.8 °C) (Tympanic)   Resp 20   Ht 5' 4.5\" (1.638 m)   Wt 69.4 kg (153 lb 1.6 oz)   SpO2 97%   BMI 25.87 kg/m²   No LMP recorded. Patient is postmenopausal.       Physical Exam     Physical Exam  Vitals and nursing note reviewed.   Constitutional:       General: She is not in acute distress.     Appearance: Normal appearance. She is not ill-appearing or toxic-appearing.   HENT:      Head: Normocephalic and atraumatic.      Jaw: No trismus.      Right Ear: Hearing, tympanic membrane, ear canal and external ear normal. There is no impacted cerumen. No foreign body.      Left Ear: Hearing, tympanic membrane, ear canal and external ear normal. There is no impacted cerumen. No foreign body.      Nose: No nasal deformity, mucosal edema, congestion or rhinorrhea.      Right Nostril: No foreign body, epistaxis or occlusion.      Left Nostril: No foreign body, epistaxis or occlusion.      Right Turbinates: Pale. Not " "enlarged or swollen.      Left Turbinates: Pale. Not enlarged or swollen.      Right Sinus: Maxillary sinus tenderness (mild) present.      Mouth/Throat:      Lips: Pink. No lesions.      Mouth: Mucous membranes are moist. No injury, oral lesions or angioedema.      Dentition: Normal dentition.      Tongue: No lesions. Tongue does not deviate from midline.      Palate: No mass and lesions.      Pharynx: Uvula midline. Postnasal drip present. No pharyngeal swelling, oropharyngeal exudate, posterior oropharyngeal erythema or uvula swelling.      Tonsils: No tonsillar exudate or tonsillar abscesses.   Eyes:      General: Lids are normal. Gaze aligned appropriately. No allergic shiner.     Extraocular Movements: Extraocular movements intact.   Cardiovascular:      Rate and Rhythm: Normal rate and regular rhythm.      Heart sounds: Normal heart sounds, S1 normal and S2 normal.   Pulmonary:      Effort: Pulmonary effort is normal.      Breath sounds: Normal breath sounds.      Comments: Patient speaking in full sentences with no increased respiratory effort.   No audible wheezing or stridor.   Lymphadenopathy:      Cervical: No cervical adenopathy.   Skin:     General: Skin is warm and dry.   Neurological:      Mental Status: She is alert and oriented to person, place, and time.      Coordination: Coordination is intact.      Gait: Gait is intact.   Psychiatric:         Attention and Perception: Attention and perception normal.         Mood and Affect: Mood and affect normal.         Speech: Speech normal.         Behavior: Behavior is cooperative.               Note: Portions of this record may have been created with voice recognition software. Occasional wrong word or \"sound a like\" substitutions may have occurred due to the inherent limitations of voice recognition software. Please read the chart carefully and recognize, using context, where substitutions have occurred.*      "

## 2025-03-07 NOTE — PATIENT INSTRUCTIONS
Nearly all of upper respiratory infections in adults are the result of viruses. These viruses include COVID, flu, RSV, adenoviruses and other coronaviruses. Antibiotics do not treat viruses and are not recommended or indicated at this time.   Recommend Coricidin HBP for sinus congestion along with saline rinses.   Salt water gargles and over the counter lozenges as needed for sore throat symptoms  Recommend Mucinex DM for cough.   Also may consider over the counter allergy medications such as Allegra-D and Zyrtec.   If symptoms persist for 10+ days, follow-up with primary care provider or return to clinic to discuss appropriateness of antibiotics.   Vaccination is important to help prevent the spread of disease and infants. Vaccines are available for COVID and influenza for ages 6 months and older. Please discuss scheduling vaccines with your PCP.    If symptoms worsen or new symptoms develop, report to the emergency department immediately

## 2025-03-11 ENCOUNTER — OFFICE VISIT (OUTPATIENT)
Dept: PODIATRY | Facility: CLINIC | Age: 75
End: 2025-03-11

## 2025-03-11 VITALS — RESPIRATION RATE: 18 BRPM | HEIGHT: 64 IN | WEIGHT: 154 LBS | BODY MASS INDEX: 26.29 KG/M2

## 2025-03-11 DIAGNOSIS — L84 CORNS: Primary | ICD-10-CM

## 2025-03-11 DIAGNOSIS — L60.3 NAIL DYSTROPHY: ICD-10-CM

## 2025-03-11 DIAGNOSIS — E03.9 ACQUIRED HYPOTHYROIDISM: ICD-10-CM

## 2025-03-11 PROCEDURE — NCFTCARE PR NON-COVERED FOOT CARE: Performed by: PODIATRIST

## 2025-03-11 RX ORDER — LEVOTHYROXINE SODIUM 112 UG/1
TABLET ORAL
Qty: 90 TABLET | Refills: 3 | Status: SHIPPED | OUTPATIENT
Start: 2025-03-11

## 2025-03-11 NOTE — PROGRESS NOTES
Patient presents for palliative footcare.  Pedal pulses are palpable.  Treatment consists of nail and lesion trimming.  Recommended bag balm for right heel callus and dry skin.

## 2025-03-27 ENCOUNTER — RESULTS FOLLOW-UP (OUTPATIENT)
Dept: FAMILY MEDICINE CLINIC | Facility: CLINIC | Age: 75
End: 2025-03-27

## 2025-03-27 ENCOUNTER — APPOINTMENT (OUTPATIENT)
Dept: LAB | Facility: CLINIC | Age: 75
End: 2025-03-27
Payer: COMMERCIAL

## 2025-03-27 DIAGNOSIS — E78.2 MIXED HYPERLIPIDEMIA: ICD-10-CM

## 2025-03-27 DIAGNOSIS — N18.31 STAGE 3A CHRONIC KIDNEY DISEASE (HCC): ICD-10-CM

## 2025-03-27 DIAGNOSIS — M81.0 AGE-RELATED OSTEOPOROSIS WITHOUT CURRENT PATHOLOGICAL FRACTURE: ICD-10-CM

## 2025-03-27 DIAGNOSIS — E03.9 ACQUIRED HYPOTHYROIDISM: ICD-10-CM

## 2025-03-27 LAB
ALBUMIN SERPL BCG-MCNC: 3.7 G/DL (ref 3.5–5)
ALP SERPL-CCNC: 53 U/L (ref 34–104)
ALT SERPL W P-5'-P-CCNC: 7 U/L (ref 7–52)
ANION GAP SERPL CALCULATED.3IONS-SCNC: 5 MMOL/L (ref 4–13)
AST SERPL W P-5'-P-CCNC: 14 U/L (ref 13–39)
BILIRUB SERPL-MCNC: 0.74 MG/DL (ref 0.2–1)
BUN SERPL-MCNC: 20 MG/DL (ref 5–25)
CALCIUM SERPL-MCNC: 9.2 MG/DL (ref 8.4–10.2)
CHLORIDE SERPL-SCNC: 103 MMOL/L (ref 96–108)
CHOLEST SERPL-MCNC: 140 MG/DL (ref ?–200)
CO2 SERPL-SCNC: 29 MMOL/L (ref 21–32)
CREAT SERPL-MCNC: 0.96 MG/DL (ref 0.6–1.3)
GFR SERPL CREATININE-BSD FRML MDRD: 57 ML/MIN/1.73SQ M
GLUCOSE P FAST SERPL-MCNC: 83 MG/DL (ref 65–99)
HDLC SERPL-MCNC: 60 MG/DL
LDLC SERPL CALC-MCNC: 69 MG/DL (ref 0–100)
POTASSIUM SERPL-SCNC: 3.8 MMOL/L (ref 3.5–5.3)
PROT SERPL-MCNC: 6.6 G/DL (ref 6.4–8.4)
SODIUM SERPL-SCNC: 137 MMOL/L (ref 135–147)
TRIGL SERPL-MCNC: 56 MG/DL (ref ?–150)
TSH SERPL DL<=0.05 MIU/L-ACNC: 2.06 UIU/ML (ref 0.45–4.5)

## 2025-03-27 PROCEDURE — 80061 LIPID PANEL: CPT

## 2025-03-27 PROCEDURE — 84443 ASSAY THYROID STIM HORMONE: CPT

## 2025-03-27 PROCEDURE — 36415 COLL VENOUS BLD VENIPUNCTURE: CPT

## 2025-03-27 PROCEDURE — 80053 COMPREHEN METABOLIC PANEL: CPT

## 2025-03-28 ENCOUNTER — RA CDI HCC (OUTPATIENT)
Dept: OTHER | Facility: HOSPITAL | Age: 75
End: 2025-03-28

## 2025-03-31 ENCOUNTER — APPOINTMENT (OUTPATIENT)
Dept: LAB | Facility: CLINIC | Age: 75
End: 2025-03-31
Payer: COMMERCIAL

## 2025-03-31 ENCOUNTER — CONSULT (OUTPATIENT)
Dept: ENDOCRINOLOGY | Facility: CLINIC | Age: 75
End: 2025-03-31
Payer: COMMERCIAL

## 2025-03-31 VITALS
SYSTOLIC BLOOD PRESSURE: 130 MMHG | DIASTOLIC BLOOD PRESSURE: 72 MMHG | HEIGHT: 64 IN | WEIGHT: 152.4 LBS | OXYGEN SATURATION: 97 % | HEART RATE: 61 BPM | BODY MASS INDEX: 26.02 KG/M2

## 2025-03-31 DIAGNOSIS — M81.0 AGE-RELATED OSTEOPOROSIS WITHOUT CURRENT PATHOLOGICAL FRACTURE: Primary | ICD-10-CM

## 2025-03-31 DIAGNOSIS — E55.9 VITAMIN D DEFICIENCY: ICD-10-CM

## 2025-03-31 LAB
25(OH)D3 SERPL-MCNC: 68.2 NG/ML (ref 30–100)
PTH-INTACT SERPL-MCNC: 54.9 PG/ML (ref 12–88)

## 2025-03-31 PROCEDURE — 99204 OFFICE O/P NEW MOD 45 MIN: CPT | Performed by: INTERNAL MEDICINE

## 2025-03-31 PROCEDURE — 83970 ASSAY OF PARATHORMONE: CPT | Performed by: INTERNAL MEDICINE

## 2025-03-31 PROCEDURE — 82306 VITAMIN D 25 HYDROXY: CPT | Performed by: INTERNAL MEDICINE

## 2025-03-31 PROCEDURE — 36415 COLL VENOUS BLD VENIPUNCTURE: CPT | Performed by: INTERNAL MEDICINE

## 2025-03-31 PROCEDURE — G2211 COMPLEX E/M VISIT ADD ON: HCPCS | Performed by: INTERNAL MEDICINE

## 2025-03-31 NOTE — ASSESSMENT & PLAN NOTE
She spent 10years on Prolia but last took this ~2 years ago. She has not experienced a fragility fracture at any time. She no longer takes anastrozole. She is not a candidate for an anabolic agent. At this time, I recommend testing PTH and vit D to rule out secondary causes of bone loss. Suggested meeting with [employer] for core/strength training to reduce fall risk. Continue dietary calcium and supplements at this time.

## 2025-03-31 NOTE — PROGRESS NOTES
"Chief Complaint   Patient presents with   • Osteoporosis      Referring Provider  Leodan Schmitt Md  [address] [place] [address]     History of Present Illness:   Marianne Betts is a 75 y.o. female with osteopenia/porosis seen in consultation at the request of  Dr. Leodan Schmitt.  Her  accompaies her for support.  She recalls being diagnosed with metabolic bone disease in the . She has had a fracture in the foot, but no history of fragility fractures.    Medications used in past:   Bisphosphonates alendronate (Fosamax) when still living in [facility]. She thinks she was taking this in the 1990s, but not since.  Denosumab 10years (last in either 9769-2972). Started in    Reports significant jaw/teeth issues for 40years.   Her history includes breast CA dx in 2018. She had lumpectomy and anastrozole for 5 years. No chemo or XRT. She was receiving Prolia at the time of anastrozole use.       Risk Factors:   Early menopause at age 41yrs  Family history of osteoporosis unknown as her mother and grandmother  of breast CA  No history of rheumatoid arthritis, alcohol, or Glucocorticoid use  Smoking - Quit ~  after smoking for 40yrs     Dietary Calcium intake: drinks milk, eats cheese, but not yogurt. Spinach, kale, broccoli  Calcium/Vitamin D supplementation: calcium 600mg and vit D 2000units daily  Weight bearing exercises: \"not really exercising.\"   DXA , loss was seen compared with . Lowest T score/BMD is the total hip at -2.6/0.626.   Had one significant fall last year without a fracture. She does feel some balance issues.    Also- had Graves' Disease in the late . Had an iodine ablation and now on levothyroxine 112mcg Mon-Sat and 1/2 tab . TSH is normal    Patient Active Problem List   Diagnosis   • Allergic rhinitis   • Bradycardia   • Heart murmur   • Hyperlipidemia   • Hypertension   • Postablative hypothyroidism   • Osteoporosis   • Pulmonary nodules   • Subclavian artery " stenosis (HCC)   • Vitamin D deficiency   • Malignant neoplasm of lower-inner quadrant of right breast of female, estrogen receptor positive (HCC)   • Osteopenia of multiple sites   • Decreased pedal pulses   • History of tobacco use   • Paresthesia of foot, bilateral   • History of Graves' disease   • Stage 3a chronic kidney disease (HCC)   • History of COVID-19   • Abnormal EKG   • Hypokalemia      Past Medical History:   Diagnosis Date   • Acute pain of left shoulder     History   • Acute recurrent maxillary sinusitis     History   • Acute upper respiratory infection     History   • BRCA negative 11/12/2018    [facility]   • BRCA1 negative    • BRCA2 negative    • Breast cancer (HCC) 11/01/2018    Right   • Callus    • Cancer (HCC)    • Carotid artery narrowing     stenosis   • Disease of thyroid gland    • High arches    • History of Graves' disease     status post 131 treatment   • History of lateral epicondylitis of left elbow    • History of onychomycosis     of toenail   • History of perforation of tympanic membrane    • Hyperlipidemia    • Hypertension    • Ingrown toenail    • Left otitis media     History   • Medicare annual wellness visit, subsequent       Past Surgical History:   Procedure Laterality Date   • APPENDECTOMY  1955   • BREAST BIOPSY Right 11/01/2018    U/S BX   • BREAST LUMPECTOMY Right 12/18/2018    Procedure: BREAST LUMPECTOMY; BREAST NEEDLE LOCALIZATION (NEEDLE LOC AT 0700);  Surgeon: Mateus Quinteros MD;  Location: [facility];  Service: Surgical Oncology   • BREAST LUMPECTOMY     • ELBOW FRACTURE SURGERY  09/1960    Open Treatment of Fracture of proximal Radius   • LYMPH NODE BIOPSY Right 12/18/2018    Procedure: SENTINEL LYMPH NODE BIOPSY; LYMPHATIC MAPPING WITH BLUE DYE AND RADIOACTIVE DYE (INJECT AT 0800 BY DR QUINTEROS IN THE OR);  Surgeon: Mateus Quinteros MD;  Location: [facility];  Service: Surgical Oncology   • SINUS SURGERY Left 1996    nasal polyps(?)-large papilloma left maxillary sinus with  "extension into posterior nasopharynx completed by Dr Estrada.   • THYROID CYST EXCISION      ablation   • TONSILLECTOMY AND ADENOIDECTOMY     • US BREAST NEEDLE LOC RIGHT Right 2018   • US GUIDED BREAST BIOPSY RIGHT COMPLETE Right 2018      Family History   Problem Relation Age of Onset   • Breast cancer Mother 58   • Pancreatic cancer Mother 64   • Cancer Mother    • Aortic aneurysm Father    • No Known Problems Sister    • No Known Problems Daughter    • Breast cancer Maternal Grandmother    • No Known Problems Son    • Aortic aneurysm Family         Abdominal   • Breast cancer Family    • No Known Problems Maternal Aunt    • No Known Problems Maternal Aunt      Social History     Tobacco Use   • Smoking status: Former     Current packs/day: 0.00     Average packs/day: 1 pack/day for 39.8 years (39.8 ttl pk-yrs)     Types: Cigarettes     Start date: 1963     Quit date: 10/16/2002     Years since quittin.4     Passive exposure: Past   • Smokeless tobacco: Never   Substance Use Topics   • Alcohol use: Not Currently     Comment: rarely     Allergies   Allergen Reactions   • Sulfa Antibiotics Dizziness     Other reaction(s): feeling \"off-balance\"  Category: Adverse Reaction;          Current Outpatient Medications:   •  Cholecalciferol (VITAMIN D3) 2000 units TABS, Take 1 tablet by mouth daily, Disp: , Rfl:   •  levothyroxine 112 mcg tablet, Take 1 tab. daily Mon - Sat and 1/2 tab. on , Disp: 90 tablet, Rfl: 3  •  losartan-hydrochlorothiazide (HYZAAR) 100-25 MG per tablet, Take 1 tablet by mouth daily, Disp: 90 tablet, Rfl: 3  •  potassium chloride (MICRO-K) 10 MEQ CR capsule, Take 1 capsule (10 mEq total) by mouth daily, Disp: 90 capsule, Rfl: 1  •  rosuvastatin (CRESTOR) 20 MG tablet, Take 1 tablet (20 mg total) by mouth daily, Disp: 90 tablet, Rfl: 3  •  Sodium Fluoride 5000 PPM 1.1 % PSTE, BRUSH NORMALLY AND EXPECTORATE BUT DO NOT RINSE AFTER. DO NOT EAT...  (REFER TO PRESCRIPTION NOTES). " "(Patient not taking: Reported on 2/18/2025), Disp: , Rfl:   Review of Systems   Constitutional:  Negative for unexpected weight change.   HENT:  Negative for trouble swallowing and voice change.    Eyes:  Negative for visual disturbance.   Respiratory:  Negative for cough and shortness of breath.    Cardiovascular:  Negative for chest pain and palpitations.   Gastrointestinal:  Negative for constipation and diarrhea.   Musculoskeletal:  Negative for gait problem.        Some balance issues   Neurological:  Negative for tremors and weakness.   Psychiatric/Behavioral:  The patient is not nervous/anxious.        Physical Exam:  Body mass index is 26.16 kg/m².  /72   Pulse 61   Ht 5' 4\" (1.626 m)   Wt 69.1 kg (152 lb 6.4 oz)   SpO2 97%   BMI 26.16 kg/m²    Wt Readings from Last 3 Encounters:   03/31/25 69.1 kg (152 lb 6.4 oz)   03/11/25 69.9 kg (154 lb)   03/07/25 69.4 kg (153 lb 1.6 oz)       GEN: NAD  E/n/m nl facies, hearing intact bilat, tongue midline, lips nl  Eyes: no stare or proptosis, nl lids, EOMI  Neck: trachea midline, thyroid NT to palpation, nl in size, no nodules or neck masses noted, no cervical LAD  CV; heart reg rate s1s2 nl, no m/r/g appreciated  Resp: CTAB, good effort  Ab+BS  Neuro: no tremor,  MS: no c/c in digits, moves all 4 ext, nl muscle bulk, gait nl  Skin: warm and dry, no palmar erythema  Psych: nl mood and affect, no gross lapses in memory    DATA:  Labs:       Lab Results   Component Value Date    SODIUM 137 03/27/2025    K 3.8 03/27/2025     03/27/2025    CO2 29 03/27/2025    BUN 20 03/27/2025    CREATININE 0.96 03/27/2025    GLUC 79 11/08/2024    CALCIUM 9.2 03/27/2025      Lab Results   Component Value Date    CALCIUM 9.2 03/27/2025         25-oh D: none recent      Radiology    02/13/2025     TECHNIQUE: Bone densitometry was performed using a [employer] Horizon A bone densitometer.  Regions of interest appear properly placed.     COMPARISON: 11/5/2022.     RESULTS:   "   LUMBAR SPINE  Level: L1-L3  (L4 vertebra excluded from analysis due to local structural abnormalities or artifact):  BMD: 0.967 gm/cm2  T-score: -0.5        LEFT  TOTAL HIP:  BMD: 0.626 gm/cm2  T-score: -2.6     LEFT  FEMORAL NECK:  BMD: 0.669 gm/cm2  T score: -1.6        IMPRESSION:     1. Osteoporosis. Based on the total left hip        2.  Since a DXA study from 11/5/2022, there has been:  A  STATISTICALLY SIGNIFICANT DECREASE in bone mineral density of 0.047 g/cm2 (4.6%) in the lumbar spine.  A  STATISTICALLY SIGNIFICANT DECREASE in bone mineral density of 0.028 g/cm2 (4.3)% in the left total hip.       Impression/Plan:  Problem List Items Addressed This Visit     Osteoporosis - Primary    She spent 10years on Prolia but last took this ~2 years ago. She has not experienced a fragility fracture at any time. She no longer takes anastrozole. She is not a candidate for an anabolic agent. At this time, I recommend testing PTH and vit D to rule out secondary causes of bone loss. Suggested meeting with [facility] [employer] for core/strength training to reduce fall risk. Continue dietary calcium and supplements at this time.          Relevant Orders    PTH, intact    Vitamin D deficiency    Relevant Orders    Vitamin D 25 hydroxy          Discussed with the patient and all questioned fully answered. She will call me if any problems arise.        Kristie Shields MD

## 2025-04-03 ENCOUNTER — OFFICE VISIT (OUTPATIENT)
Dept: FAMILY MEDICINE CLINIC | Facility: CLINIC | Age: 75
End: 2025-04-03
Payer: COMMERCIAL

## 2025-04-03 VITALS
RESPIRATION RATE: 16 BRPM | WEIGHT: 153 LBS | BODY MASS INDEX: 26.12 KG/M2 | DIASTOLIC BLOOD PRESSURE: 60 MMHG | OXYGEN SATURATION: 99 % | HEIGHT: 64 IN | HEART RATE: 58 BPM | TEMPERATURE: 96.9 F | SYSTOLIC BLOOD PRESSURE: 102 MMHG

## 2025-04-03 DIAGNOSIS — Z00.00 MEDICARE ANNUAL WELLNESS VISIT, SUBSEQUENT: ICD-10-CM

## 2025-04-03 DIAGNOSIS — Z23 ENCOUNTER FOR IMMUNIZATION: ICD-10-CM

## 2025-04-03 DIAGNOSIS — E87.6 HYPOKALEMIA: ICD-10-CM

## 2025-04-03 DIAGNOSIS — E89.0 POSTABLATIVE HYPOTHYROIDISM: ICD-10-CM

## 2025-04-03 DIAGNOSIS — M81.0 AGE-RELATED OSTEOPOROSIS WITHOUT CURRENT PATHOLOGICAL FRACTURE: ICD-10-CM

## 2025-04-03 DIAGNOSIS — I10 PRIMARY HYPERTENSION: Primary | ICD-10-CM

## 2025-04-03 DIAGNOSIS — E78.2 MIXED HYPERLIPIDEMIA: ICD-10-CM

## 2025-04-03 PROCEDURE — G0009 ADMIN PNEUMOCOCCAL VACCINE: HCPCS

## 2025-04-03 PROCEDURE — G0439 PPPS, SUBSEQ VISIT: HCPCS | Performed by: FAMILY MEDICINE

## 2025-04-03 PROCEDURE — 90677 PCV20 VACCINE IM: CPT

## 2025-04-03 PROCEDURE — G2211 COMPLEX E/M VISIT ADD ON: HCPCS | Performed by: FAMILY MEDICINE

## 2025-04-03 PROCEDURE — 99214 OFFICE O/P EST MOD 30 MIN: CPT | Performed by: FAMILY MEDICINE

## 2025-04-03 NOTE — PROGRESS NOTES
Name: Marianne Betts      : 1950      MRN: [id number]  Encounter Provider: Leodan Schmitt MD  Encounter Date: 4/3/2025   Encounter department: [facility]  :  Assessment & Plan  Primary hypertension  Controlled. DASH diet. Continue Hyzaar.   Orders:    CBC; Future    Comprehensive metabolic panel; Future    Lipid panel; Future    TSH, 3rd generation with Free T4 reflex; Future    Mixed hyperlipidemia  Low fat diet. Continue crestor 20mg qhs.   Orders:    CBC; Future    Comprehensive metabolic panel; Future    Lipid panel; Future    TSH, 3rd generation with Free T4 reflex; Future    Postablative hypothyroidism  Continue levothyroxine 112 mcg daily.   Orders:    CBC; Future    Comprehensive metabolic panel; Future    Lipid panel; Future    TSH, 3rd generation with Free T4 reflex; Future    Hypokalemia  Continue potassium supplement.   Orders:    CBC; Future    Comprehensive metabolic panel; Future    Lipid panel; Future    TSH, 3rd generation with Free T4 reflex; Future    Age-related osteoporosis without current pathological fracture  Saw endocrinology. Continue calcium, vit D and walk.     Orders:    CBC; Future    Comprehensive metabolic panel; Future    Lipid panel; Future    TSH, 3rd generation with Free T4 reflex; Future    Encounter for immunization    Orders:    Pneumococcal Conjugate Vaccine 20-valent (Pcv20)    Medicare annual wellness visit, subsequent           Depression Screening and Follow-up Plan: Patient was screened for depression during today's encounter. They screened negative with a PHQ-2 score of 0.        Reviewed lab in 3/2025  TSH normal  CMP ok  Lipid 140/56/60/69 good    Flu shot yearly.   Got covid 19 shots. Refused booster.   Give PCV20 today.   Refused shingrix.   Mammogram scheduled.   RTO in 6 months.         Preventive health issues were discussed with patient, and age appropriate screening tests were ordered as noted in patient's After Visit Summary.  Personalized health advice and appropriate referrals for health education or preventive services given if needed, as noted in patient's After Visit Summary.    History of Present Illness     HPI     Pt is here by herself.  waits outside.   Osteoporosis---Cannot tolerate fosamax and prolia. Saw endocrinology. PTH and vit D normal. Continue calcium, vit D and walk.     HTN - currently taking Losartan/HCTZ 100/25 mg daily.  Followed by cardiology Dr. Garcia annually, was seen in November 2023.       Hyperlipidemia - controlled on Rosuvastatin 20 mg daily. Denies SE.      CKD stage 3- kidney function remains stable.      Quit smoking over 20 years ago.     Right breast CA diagnosed in November 2018 - completed 5-year course of Anastrozole  in 2023.      Lives with . Does all ADL's. Does not drive.   Son and daughter lives hours away.   Denies recent falls.   Denies depression.       Patient Care Team:  Leodan Schmitt MD as PCP - General (Family Medicine)  Kristie Shields MD as PCP - Endocrinology (Endocrinology)  MD Mateus Blunt MD (Inactive) as Surgeon (Surgical Oncology)  Darwin Ceja DPM (Podiatry)  Washington Garcia MD (Cardiology)  CHOLO Almonte as Nurse Practitioner (Surgical Oncology)    Review of Systems   Constitutional:  Negative for appetite change, chills and fever.   HENT:  Negative for congestion, ear pain, sinus pain and sore throat.    Eyes:  Negative for discharge and itching.   Respiratory:  Negative for apnea, cough, chest tightness, shortness of breath and wheezing.    Cardiovascular:  Negative for chest pain, palpitations and leg swelling.   Gastrointestinal:  Negative for abdominal pain, anal bleeding, constipation, diarrhea, nausea and vomiting.   Endocrine: Negative for cold intolerance, heat intolerance and polyuria.   Genitourinary:  Negative for difficulty urinating and dysuria.   Musculoskeletal:  Negative for arthralgias, back pain and  myalgias.   Skin:  Negative for rash.   Neurological:  Negative for dizziness and headaches.   Psychiatric/Behavioral:  Negative for agitation.      Medical History Reviewed by provider this encounter:  Tobacco  Allergies  Meds  Problems  Med Hx  Surg Hx  Fam Hx  Soc   Hx      Annual Wellness Visit Questionnaire   Marianne is here for her Subsequent Wellness visit. Last Medicare Wellness visit information reviewed, patient interviewed and updates made to the record today.      Health Risk Assessment:   Patient rates overall health as very good. Patient feels that their physical health rating is same. Patient is satisfied with their life. Eyesight was rated as same. Hearing was rated as same. Patient feels that their emotional and mental health rating is much better. Patients states they are never, rarely angry. Patient states they are sometimes unusually tired/fatigued. Pain experienced in the last 7 days has been none. Patient states that she has experienced no weight loss or gain in last 6 months.     Depression Screening:   PHQ-2 Score: 0      Fall Risk Screening:   In the past year, patient has experienced: no history of falling in past year      Urinary Incontinence Screening:   Patient has not leaked urine accidently in the last six months.     Home Safety:  Patient does not have trouble with stairs inside or outside of their home. Patient has working smoke alarms and has no working carbon monoxide detector. Home safety hazards include: none.     Nutrition:   Current diet is Regular.     Medications:   Patient is currently taking over-the-counter supplements. OTC medications include: see medication list. Patient is able to manage medications.     Activities of Daily Living (ADLs)/Instrumental Activities of Daily Living (IADLs):   Walk and transfer into and out of bed and chair?: Yes  Dress and groom yourself?: Yes    Bathe or shower yourself?: Yes    Feed yourself? Yes  Do your laundry/housekeeping?:  Yes  Manage your money, pay your bills and track your expenses?: Yes  Make your own meals?: Yes    Do your own shopping?: Yes    Previous Hospitalizations:   Any hospitalizations or ED visits within the last 12 months?: No      Advance Care Planning:   Living will: No    Durable POA for healthcare: No    Advanced directive: No    ACP document given: Yes      Cognitive Screening:   Provider or family/friend/caregiver concerned regarding cognition?: No    PREVENTIVE SCREENINGS      Cardiovascular Screening:    General: History Lipid Disorder and Screening Current      Diabetes Screening:     General: Screening Current      Colorectal Cancer Screening:     General: Screening Not Indicated      Breast Cancer Screening:     General: History Breast Cancer      Cervical Cancer Screening:    General: Screening Not Indicated      Osteoporosis Screening:    General: Screening Not Indicated and History Osteoporosis      Lung Cancer Screening:     General: Screening Not Indicated      Hepatitis C Screening:    General: Screening Current    Screening, Brief Intervention, and Referral to Treatment (SBIRT)     Screening  Typical number of drinks in a day: 0  Typical number of drinks in a week: 0  Interpretation: Low risk drinking behavior.    AUDIT-C Screenin) How often did you have a drink containing alcohol in the past year? never  2) How many drinks did you have on a typical day when you were drinking in the past year? 0  3) How often did you have 6 or more drinks on one occasion in the past year? never    AUDIT-C Score: 0  Interpretation: Score 0-2 (female): Negative screen for alcohol misuse    Single Item Drug Screening:  How often have you used an illegal drug (including marijuana) or a prescription medication for non-medical reasons in the past year? never    Single Item Drug Screen Score: 0  Interpretation: Negative screen for possible drug use disorder    Social Drivers of Health     Financial Resource Strain: Low  "Risk  (3/7/2024)    Overall Financial Resource Strain (CARDIA)     Difficulty of Paying Living Expenses: Not hard at all   Food Insecurity: No Food Insecurity (4/3/2025)    Hunger Vital Sign     Worried About Running Out of Food in the Last Year: Never true     Ran Out of Food in the Last Year: Never true   Transportation Needs: No Transportation Needs (4/3/2025)    PRAPARE - Transportation     Lack of Transportation (Medical): No     Lack of Transportation (Non-Medical): No   Housing Stability: Low Risk  (4/3/2025)    Housing Stability Vital Sign     Unable to Pay for Housing in the Last Year: No     Number of Times Moved in the Last Year: 0     Homeless in the Last Year: No   Utilities: Not At Risk (4/3/2025)    [facility] Utilities     Threatened with loss of utilities: No     No results found.    Objective   /60 (BP Location: Left arm, Patient Position: Sitting, Cuff Size: Standard)   Pulse 58   Temp (!) 96.9 °F (36.1 °C) (Tympanic)   Resp 16   Ht 5' 4\" (1.626 m)   Wt 69.4 kg (153 lb)   SpO2 99%   BMI 26.26 kg/m²     Physical Exam  Constitutional:       General: She is not in acute distress.     Appearance: She is well-developed.   HENT:      Head: Normocephalic.   Eyes:      General:         Right eye: No discharge.         Left eye: No discharge.      Conjunctiva/sclera: Conjunctivae normal.   Neck:      Thyroid: No thyromegaly.   Cardiovascular:      Rate and Rhythm: Normal rate and regular rhythm.      Heart sounds: Normal heart sounds. No murmur heard.     No friction rub. No gallop.   Pulmonary:      Effort: Pulmonary effort is normal. No respiratory distress.      Breath sounds: Normal breath sounds. No wheezing or rales.   Chest:      Chest wall: No tenderness.   Abdominal:      General: Bowel sounds are normal. There is no distension.      Palpations: Abdomen is soft. There is no mass.      Tenderness: There is no abdominal tenderness. There is no guarding or rebound.   Musculoskeletal:         " General: No tenderness or deformity. Normal range of motion.      Cervical back: Normal range of motion.   Lymphadenopathy:      Cervical: No cervical adenopathy.   Neurological:      Mental Status: She is alert.

## 2025-04-03 NOTE — ASSESSMENT & PLAN NOTE
Controlled. DASH diet. Continue Hyzaar.   Orders:    CBC; Future    Comprehensive metabolic panel; Future    Lipid panel; Future    TSH, 3rd generation with Free T4 reflex; Future

## 2025-04-03 NOTE — ASSESSMENT & PLAN NOTE
Saw endocrinology. Continue calcium, vit D and walk.     Orders:    CBC; Future    Comprehensive metabolic panel; Future    Lipid panel; Future    TSH, 3rd generation with Free T4 reflex; Future

## 2025-04-03 NOTE — ASSESSMENT & PLAN NOTE
Continue potassium supplement.   Orders:    CBC; Future    Comprehensive metabolic panel; Future    Lipid panel; Future    TSH, 3rd generation with Free T4 reflex; Future

## 2025-04-03 NOTE — ASSESSMENT & PLAN NOTE
Low fat diet. Continue crestor 20mg qhs.   Orders:    CBC; Future    Comprehensive metabolic panel; Future    Lipid panel; Future    TSH, 3rd generation with Free T4 reflex; Future

## 2025-04-03 NOTE — ASSESSMENT & PLAN NOTE
Continue levothyroxine 112 mcg daily.   Orders:    CBC; Future    Comprehensive metabolic panel; Future    Lipid panel; Future    TSH, 3rd generation with Free T4 reflex; Future

## 2025-04-03 NOTE — PATIENT INSTRUCTIONS
Medicare Preventive Visit Patient Instructions  Thank you for completing your Welcome to Medicare Visit or Medicare Annual Wellness Visit today. Your next wellness visit will be due in one year (4/4/2026).  The screening/preventive services that you may require over the next 5-10 years are detailed below. Some tests may not apply to you based off risk factors and/or age. Screening tests ordered at today's visit but not completed yet may show as past due. Also, please note that scanned in results may not display below.  Preventive Screenings:  Service Recommendations Previous Testing/Comments   Colorectal Cancer Screening  * Colonoscopy    * Fecal Occult Blood Test (FOBT)/Fecal Immunochemical Test (FIT)  * Fecal DNA/Cologuard Test  * Flexible Sigmoidoscopy Age: 45-75 years old   Colonoscopy: every 10 years (may be performed more frequently if at higher risk)  OR  FOBT/FIT: every 1 year  OR  Cologuard: every 3 years  OR  Sigmoidoscopy: every 5 years  Screening may be recommended earlier than age 45 if at higher risk for colorectal cancer. Also, an individualized decision between you and your healthcare provider will decide whether screening between the ages of 76-85 would be appropriate. Colonoscopy: 08/22/2022  FOBT/FIT: Not on file  Cologuard: Not on file  Sigmoidoscopy: Not on file          Breast Cancer Screening Age: 40+ years old  Frequency: every 1-2 years  Not required if history of left and right mastectomy Mammogram: 10/25/2024    History Breast Cancer   Cervical Cancer Screening Between the ages of 21-29, pap smear recommended once every 3 years.   Between the ages of 30-65, can perform pap smear with HPV co-testing every 5 years.   Recommendations may differ for women with a history of total hysterectomy, cervical cancer, or abnormal pap smears in past. Pap Smear: Not on file    Screening Not Indicated   Hepatitis C Screening Once for adults born between 1945 and 1965  More frequently in patients at high  risk for Hepatitis C Hep C Antibody: 02/03/2020    Screening Current   Diabetes Screening 1-2 times per year if you're at risk for diabetes or have pre-diabetes Fasting glucose: 83 mg/dL (3/27/2025)  A1C: No results in last 5 years (No results in last 5 years)  Screening Current   Cholesterol Screening Once every 5 years if you don't have a lipid disorder. May order more often based on risk factors. Lipid panel: 03/27/2025    Screening Not Indicated  History Lipid Disorder     Other Preventive Screenings Covered by Medicare:  Abdominal Aortic Aneurysm (AAA) Screening: covered once if your at risk. You're considered to be at risk if you have a family history of AAA.  Lung Cancer Screening: covers low dose CT scan once per year if you meet all of the following conditions: (1) Age 55-77; (2) No signs or symptoms of lung cancer; (3) Current smoker or have quit smoking within the last 15 years; (4) You have a tobacco smoking history of at least 20 pack years (packs per day multiplied by number of years you smoked); (5) You get a written order from a healthcare provider.  Glaucoma Screening: covered annually if you're considered high risk: (1) You have diabetes OR (2) Family history of glaucoma OR (3)  aged 50 and older OR (4)  American aged 65 and older  Osteoporosis Screening: covered every 2 years if you meet one of the following conditions: (1) You're estrogen deficient and at risk for osteoporosis based off medical history and other findings; (2) Have a vertebral abnormality; (3) On glucocorticoid therapy for more than 3 months; (4) Have primary hyperparathyroidism; (5) On osteoporosis medications and need to assess response to drug therapy.   Last bone density test (DXA Scan): 02/13/2025.  HIV Screening: covered annually if you're between the age of 15-65. Also covered annually if you are younger than 15 and older than 65 with risk factors for HIV infection. For pregnant patients, it is  covered up to 3 times per pregnancy.    Immunizations:  Immunization Recommendations   Influenza Vaccine Annual influenza vaccination during flu season is recommended for all persons aged >= 6 months who do not have contraindications   Pneumococcal Vaccine   * Pneumococcal conjugate vaccine = PCV13 (Prevnar 13), PCV15 (Vaxneuvance), PCV20 (Prevnar 20)  * Pneumococcal polysaccharide vaccine = PPSV23 (Pneumovax) Adults 19-65 yo with certain risk factors or if 65+ yo  If never received any pneumonia vaccine: recommend Prevnar 20 (PCV20)  Give PCV20 if previously received 1 dose of PCV13 or PPSV23   Hepatitis B Vaccine 3 dose series if at intermediate or high risk (ex: diabetes, end stage renal disease, liver disease)   Respiratory syncytial virus (RSV) Vaccine - COVERED BY MEDICARE PART D  * RSVPreF3 (Arexvy) CDC recommends that adults 60 years of age and older may receive a single dose of RSV vaccine using shared clinical decision-making (SCDM)   Tetanus (Td) Vaccine - COST NOT COVERED BY MEDICARE PART B Following completion of primary series, a booster dose should be given every 10 years to maintain immunity against tetanus. Td may also be given as tetanus wound prophylaxis.   Tdap Vaccine - COST NOT COVERED BY MEDICARE PART B Recommended at least once for all adults. For pregnant patients, recommended with each pregnancy.   Shingles Vaccine (Shingrix) - COST NOT COVERED BY MEDICARE PART B  2 shot series recommended in those 19 years and older who have or will have weakened immune systems or those 50 years and older     Health Maintenance Due:      Topic Date Due   • Breast Cancer Screening: Mammogram  10/25/2025   • Hepatitis C Screening  Completed   • Colorectal Cancer Screening  Discontinued     Immunizations Due:      Topic Date Due   • COVID-19 Vaccine (4 - 2024-25 season) 09/01/2024     Advance Directives   What are advance directives?  Advance directives are legal documents that state your wishes and plans for  medical care. These plans are made ahead of time in case you lose your ability to make decisions for yourself. Advance directives can apply to any medical decision, such as the treatments you want, and if you want to donate organs.   What are the types of advance directives?  There are many types of advance directives, and each state has rules about how to use them. You may choose a combination of any of the following:  Living will:  This is a written record of the treatment you want. You can also choose which treatments you do not want, which to limit, and which to stop at a certain time. This includes surgery, medicine, IV fluid, and tube feedings.   Durable power of  for healthcare (DPAHC):  This is a written record that states who you want to make healthcare choices for you when you are unable to make them for yourself. This person, called a proxy, is usually a family member or a friend. You may choose more than 1 proxy.  Do not resuscitate (DNR) order:  A DNR order is used in case your heart stops beating or you stop breathing. It is a request not to have certain forms of treatment, such as CPR. A DNR order may be included in other types of advance directives.  Medical directive:  This covers the care that you want if you are in a coma, near death, or unable to make decisions for yourself. You can list the treatments you want for each condition. Treatment may include pain medicine, surgery, blood transfusions, dialysis, IV or tube feedings, and a ventilator (breathing machine).  Values history:  This document has questions about your views, beliefs, and how you feel and think about life. This information can help others choose the care that you would choose.  Why are advance directives important?  An advance directive helps you control your care. Although spoken wishes may be used, it is better to have your wishes written down. Spoken wishes can be misunderstood, or not followed. Treatments may be given  even if you do not want them. An advance directive may make it easier for your family to make difficult choices about your care.   Weight Management   Why it is important to manage your weight:  Being overweight increases your risk of health conditions such as heart disease, high blood pressure, type 2 diabetes, and certain types of cancer. It can also increase your risk for osteoarthritis, sleep apnea, and other respiratory problems. Aim for a slow, steady weight loss. Even a small amount of weight loss can lower your risk of health problems.  How to lose weight safely:  A safe and healthy way to lose weight is to eat fewer calories and get regular exercise. You can lose up about 1 pound a week by decreasing the number of calories you eat by 500 calories each day.   Healthy meal plan for weight management:  A healthy meal plan includes a variety of foods, contains fewer calories, and helps you stay healthy. A healthy meal plan includes the following:  Eat whole-grain foods more often.  A healthy meal plan should contain fiber. Fiber is the part of grains, fruits, and vegetables that is not broken down by your body. Whole-grain foods are healthy and provide extra fiber in your diet. Some examples of whole-grain foods are whole-wheat breads and pastas, oatmeal, brown rice, and bulgur.  Eat a variety of vegetables every day.  Include dark, leafy greens such as spinach, kale, jean carlos greens, and mustard greens. Eat yellow and orange vegetables such as carrots, sweet potatoes, and winter squash.   Eat a variety of fruits every day.  Choose fresh or canned fruit (canned in its own juice or light syrup) instead of juice. Fruit juice has very little or no fiber.  Eat low-fat dairy foods.  Drink fat-free (skim) milk or 1% milk. Eat fat-free yogurt and low-fat cottage cheese. Try low-fat cheeses such as mozzarella and other reduced-fat cheeses.  Choose meat and other protein foods that are low in fat.  Choose beans or other  legumes such as split peas or lentils. Choose fish, skinless poultry (chicken or turkey), or lean cuts of red meat (beef or pork). Before you cook meat or poultry, cut off any visible fat.   Use less fat and oil.  Try baking foods instead of frying them. Add less fat, such as margarine, sour cream, regular salad dressing and mayonnaise to foods. Eat fewer high-fat foods. Some examples of high-fat foods include french fries, doughnuts, ice cream, and cakes.  Eat fewer sweets.  Limit foods and drinks that are high in sugar. This includes candy, cookies, regular soda, and sweetened drinks.  Exercise:  Exercise at least 30 minutes per day on most days of the week. Some examples of exercise include walking, biking, dancing, and swimming. You can also fit in more physical activity by taking the stairs instead of the elevator or parking farther away from stores. Ask your healthcare provider about the best exercise plan for you.      © Copyright [employer] 2018 Information is for End User's use only and may not be sold, redistributed or otherwise used for commercial purposes. All illustrations and images included in CareNotes® are the copyrighted property of [employer] Inc[employer] or [employer]

## 2025-04-08 ENCOUNTER — RESULTS FOLLOW-UP (OUTPATIENT)
Dept: ENDOCRINOLOGY | Facility: CLINIC | Age: 75
End: 2025-04-08

## 2025-04-08 NOTE — RESULT ENCOUNTER NOTE
Please let her know that I review her testing. The vitamin D level is excellent and the PTH is normal. I would continue to monitor kristen off of any medication for her bones at this time

## 2025-04-12 ENCOUNTER — APPOINTMENT (OUTPATIENT)
Dept: RADIOLOGY | Facility: CLINIC | Age: 75
End: 2025-04-12
Attending: PHYSICIAN ASSISTANT
Payer: COMMERCIAL

## 2025-04-12 ENCOUNTER — OFFICE VISIT (OUTPATIENT)
Dept: URGENT CARE | Facility: CLINIC | Age: 75
End: 2025-04-12
Payer: COMMERCIAL

## 2025-04-12 VITALS
DIASTOLIC BLOOD PRESSURE: 60 MMHG | HEART RATE: 68 BPM | RESPIRATION RATE: 20 BRPM | OXYGEN SATURATION: 98 % | SYSTOLIC BLOOD PRESSURE: 142 MMHG

## 2025-04-12 DIAGNOSIS — M25.562 ACUTE PAIN OF BOTH KNEES: ICD-10-CM

## 2025-04-12 DIAGNOSIS — M25.561 ACUTE PAIN OF BOTH KNEES: ICD-10-CM

## 2025-04-12 DIAGNOSIS — M25.561 ACUTE PAIN OF BOTH KNEES: Primary | ICD-10-CM

## 2025-04-12 DIAGNOSIS — M25.562 ACUTE PAIN OF BOTH KNEES: Primary | ICD-10-CM

## 2025-04-12 PROCEDURE — 99213 OFFICE O/P EST LOW 20 MIN: CPT | Performed by: PHYSICIAN ASSISTANT

## 2025-04-12 PROCEDURE — 73564 X-RAY EXAM KNEE 4 OR MORE: CPT

## 2025-04-12 NOTE — PROGRESS NOTES
[facility]        NAME: Marianne Betts is a 75 y.o. female  : 1950    MRN: [id number]  DATE: 2025  TIME: 12:52 PM    Assessment and Plan   Acute pain of both knees [M25.561, M25.562]  1. Acute pain of both knees  XR knee 4+ vw right injury    XR knee 4+ vw left injury            Patient Instructions       Follow up with PCP in 3-5 days.  Proceed to  ER if symptoms worsen.    If tests have been performed at [facility], our office will contact you with results if changes need to be made to the care plan discussed with you at the visit.  You can review your full results on [facility].    Chief Complaint     Chief Complaint   Patient presents with    Fall     Patient sustained a fall about 3 hours ago at [facility] when her foot got stuck on the floor and she landed on her knees and with her hands against wooden crates.          History of Present Illness       Patient presents after a fall at [facility] when her foot got stuck on the floor and she fell forward landing on her knees and catching herself with her left hand.  Since then has had pain over the bilateral knees.  Has two small scratches over the left hand.  Can ambulate just fine but has pain with palpation over the knees .  Denies any pain, bruising or swelling or any complaints regarding anywhere else on the body.  Did not hit head.    Fall  Pertinent negatives include no headaches or numbness.       Review of Systems   Review of Systems   Eyes:  Negative for visual disturbance.   Musculoskeletal:  Positive for arthralgias and joint swelling. Negative for gait problem.   Skin:  Negative for color change.   Neurological:  Negative for syncope, weakness, numbness and headaches.         Current Medications       Current Outpatient Medications:     Cholecalciferol (VITAMIN D3) 2000 units TABS, Take 1 tablet by mouth daily, Disp: , Rfl:     levothyroxine 112 mcg tablet, Take 1 tab. daily Mon - Sat and 1/2 tab. on , Disp: 90  tablet, Rfl: 3    losartan-hydrochlorothiazide (HYZAAR) 100-25 MG per tablet, Take 1 tablet by mouth daily, Disp: 90 tablet, Rfl: 3    potassium chloride (MICRO-K) 10 MEQ CR capsule, Take 1 capsule (10 mEq total) by mouth daily, Disp: 90 capsule, Rfl: 1    rosuvastatin (CRESTOR) 20 MG tablet, Take 1 tablet (20 mg total) by mouth daily, Disp: 90 tablet, Rfl: 3    Current Allergies     Allergies as of 04/12/2025 - Reviewed 04/12/2025   Allergen Reaction Noted    Sulfa antibiotics Dizziness 06/19/2012            The following portions of the patient's history were reviewed and updated as appropriate: allergies, current medications, past family history, past medical history, past social history, past surgical history and problem list.     Past Medical History:   Diagnosis Date    Acute pain of left shoulder     History    Acute recurrent maxillary sinusitis     History    Acute upper respiratory infection     History    BRCA negative 11/12/2018    [facility]    BRCA1 negative     BRCA2 negative     Breast cancer (HCC) 11/01/2018    Right    Callus     Cancer (HCC)     Carotid artery narrowing     stenosis    Disease of thyroid gland     High arches     History of Graves' disease     status post 131 treatment    History of lateral epicondylitis of left elbow     History of onychomycosis     of toenail    History of perforation of tympanic membrane     Hyperlipidemia     Hypertension     Ingrown toenail     Left otitis media     History    Medicare annual wellness visit, subsequent        Past Surgical History:   Procedure Laterality Date    APPENDECTOMY  1955    BREAST BIOPSY Right 11/01/2018    U/S BX    BREAST LUMPECTOMY Right 12/18/2018    Procedure: BREAST LUMPECTOMY; BREAST NEEDLE LOCALIZATION (NEEDLE LOC AT 0700);  Surgeon: Mateus Quinteros MD;  Location: [facility];  Service: Surgical Oncology    BREAST LUMPECTOMY      ELBOW FRACTURE SURGERY  09/1960    Open Treatment of Fracture of proximal Radius    LYMPH NODE BIOPSY  Right 12/18/2018    Procedure: SENTINEL LYMPH NODE BIOPSY; LYMPHATIC MAPPING WITH BLUE DYE AND RADIOACTIVE DYE (INJECT AT 0800 BY DR QUINTEROS IN THE OR);  Surgeon: Mateus Quinteros MD;  Location: [facility];  Service: Surgical Oncology    SINUS SURGERY Left 1996    nasal polyps(?)-large papilloma left maxillary sinus with extension into posterior nasopharynx completed by Dr Estrada.    THYROID CYST EXCISION      ablation    TONSILLECTOMY AND ADENOIDECTOMY      US BREAST NEEDLE LOC RIGHT Right 12/18/2018    US GUIDED BREAST BIOPSY RIGHT COMPLETE Right 11/01/2018       Family History   Problem Relation Age of Onset    Breast cancer Mother 58    Pancreatic cancer Mother 64    Cancer Mother     Aortic aneurysm Father     No Known Problems Sister     No Known Problems Daughter     Breast cancer Maternal Grandmother     No Known Problems Son     Aortic aneurysm Family         Abdominal    Breast cancer Family     No Known Problems Maternal Aunt     No Known Problems Maternal Aunt          Medications have been verified.        Objective   /60   Pulse 68   Resp 20   SpO2 98%   No LMP recorded. Patient is postmenopausal.       Physical Exam     Physical Exam  Constitutional:       Appearance: Normal appearance.   Cardiovascular:      Pulses: Normal pulses.   Musculoskeletal:         General: Swelling and tenderness present. No deformity. Normal range of motion.      Comments: Tenderness to palpation, mild swelling, and mild ecchymosis noted over the bilateral tibial tuberosities.  Small abrasion over the left knee.  No tenderness to palpation, swelling, ecchymosis otherwise noted over the lower extremities, hands/wrists.  Full active range of motion with 5 out of 5 muscle strength of the bilateral upper and lower extremities.  Small superficial abrasion over the left thenar eminence and distal tip of the index finger.     Skin:     General: Skin is warm.      Capillary Refill: Capillary refill takes less than 2 seconds.       Findings: Bruising present.   Neurological:      Mental Status: She is alert.   Psychiatric:         Mood and Affect: Mood normal.         Behavior: Behavior normal.

## 2025-04-12 NOTE — PATIENT INSTRUCTIONS
Follow-up with your primary care provider in the next 3-5 days.  Any new or worsening symptoms develop get re-evaluated sooner or proceed to the ER.  Radiologist reading of x-rays to be available later

## 2025-04-15 ENCOUNTER — DOCUMENTATION (OUTPATIENT)
Dept: ADMINISTRATIVE | Facility: OTHER | Age: 75
End: 2025-04-15

## 2025-04-15 NOTE — PROGRESS NOTES
BRITTNEY Saavedra Patient Reported Team         Blood pressure elevated  Appointment department: [facility]  Appointment provider: * No providers found *  Blood pressure  04/12/25 1227 142/60  04/12/25 1219 142/60    04/15/25 2:50 PM    Patient was called after the Urgent Care visit . Home BP reading(s) was/were has not taken . Patient has no symptoms. Will call if follow up needed.    Thank you.  Preston Harrison MA  [facility]

## 2025-05-20 ENCOUNTER — PROCEDURE VISIT (OUTPATIENT)
Dept: PODIATRY | Facility: CLINIC | Age: 75
End: 2025-05-20
Payer: COMMERCIAL

## 2025-05-20 DIAGNOSIS — L60.3 NAIL DYSTROPHY: Primary | ICD-10-CM

## 2025-05-20 PROCEDURE — NCFTCARE PR NON-COVERED FOOT CARE: Performed by: PODIATRIST

## 2025-05-20 NOTE — PROGRESS NOTES
Name: Marianne Betts      : 1950      MRN: [id number]  Encounter Provider: Darwin Ceja DPM  Encounter Date: 2025   Encounter department: [facility] [place]    Treatment consists of nail trimming.  Reappoint 10 weeks  :  Assessment & Plan  Nail dystrophy         Trimming    History of Present Illness   HPI  Marianne Betts is a 75 y.o. female who presents patient presents for palliative nail care.  No acute disorder noted.  Patient has difficulty trimming toenails.      Review of Systems       Objective   There were no vitals taken for this visit.     Physical Exam    Cardiovascular:      Pulses: Normal pulses.     Skin:     Comments: Elongated toenails x 10

## 2025-07-29 ENCOUNTER — PROCEDURE VISIT (OUTPATIENT)
Dept: PODIATRY | Facility: CLINIC | Age: 75
End: 2025-07-29

## 2025-07-29 VITALS — WEIGHT: 153 LBS | BODY MASS INDEX: 26.12 KG/M2 | HEIGHT: 64 IN | RESPIRATION RATE: 18 BRPM

## 2025-07-29 DIAGNOSIS — L84 CALLUS: Primary | ICD-10-CM

## 2025-07-29 PROCEDURE — NCFTCARE PR NON-COVERED FOOT CARE: Performed by: PODIATRIST

## 2025-08-19 DIAGNOSIS — E87.6 HYPOKALEMIA: ICD-10-CM

## 2025-08-21 RX ORDER — POTASSIUM CHLORIDE 750 MG/1
10 CAPSULE, EXTENDED RELEASE ORAL DAILY
Qty: 90 CAPSULE | Refills: 1 | Status: SHIPPED | OUTPATIENT
Start: 2025-08-21

## (undated) DEVICE — DRAPE EQUIPMENT RF WAND

## (undated) DEVICE — GLOVE SRG BIOGEL 7

## (undated) DEVICE — BETHLEHEM UNIVERSAL MINOR GEN: Brand: CARDINAL HEALTH

## (undated) DEVICE — SUT MONOCRYL 4-0 PS-2 18 IN Y496G

## (undated) DEVICE — DRAPE PROBE NEO-PROBE/ULTRASOUND

## (undated) DEVICE — CHLORAPREP HI-LITE 26ML ORANGE

## (undated) DEVICE — PAD GROUNDING ADULT

## (undated) DEVICE — MEDI-VAC YANK SUCT HNDL W/TPRD BULBOUS TIP: Brand: CARDINAL HEALTH

## (undated) DEVICE — INTENDED FOR TISSUE SEPARATION, AND OTHER PROCEDURES THAT REQUIRE A SHARP SURGICAL BLADE TO PUNCTURE OR CUT.: Brand: BARD-PARKER SAFETY BLADES SIZE 15, STERILE

## (undated) DEVICE — MARGIN MARKER SET

## (undated) DEVICE — PENCIL ELECTROSURG E-Z CLEAN -0035H

## (undated) DEVICE — TUBING SUCTION 5MM X 12 FT

## (undated) DEVICE — LIGHT HANDLE COVER SLEEVE DISP BLUE STELLAR

## (undated) DEVICE — SUT VICRYL 3-0 SH 27 IN J416H

## (undated) DEVICE — ADHESIVE SKN CLSR HISTOACRYL FLEX 0.5ML LF

## (undated) DEVICE — NEEDLE 25G X 1 1/2

## (undated) DEVICE — SMOKE EVACUATION TUBING WITH 8 IN INTEGRAL WAND AND SPONGE GUARD: Brand: BUFFALO FILTER

## (undated) DEVICE — 3M™ STERI-STRIP™ REINFORCED ADHESIVE SKIN CLOSURES, R1547, 1/2 IN X 4 IN (12 MM X 100 MM), 6 STRIPS/ENVELOPE: Brand: 3M™ STERI-STRIP™

## (undated) DEVICE — VIAL DECANTER